# Patient Record
Sex: FEMALE | Race: WHITE | Employment: OTHER | URBAN - METROPOLITAN AREA
[De-identification: names, ages, dates, MRNs, and addresses within clinical notes are randomized per-mention and may not be internally consistent; named-entity substitution may affect disease eponyms.]

---

## 2017-02-06 NOTE — TELEPHONE ENCOUNTER
Refused Prescriptions:                       Disp   Refills    ranitidine (ZANTAC) 300 MG tablet [Pharmac*90 tab*0        Sig: TAKE ONE TABLET BY MOUTH EVERY NIGHT AT BEDTIME  Refused By: KRISTIAN BREWSTER  Reason for Refusal: Request already responded to by other means (phone, fax, etc.)  Reason for Refusal Comment: refilled 11-4-2016 for one year    Deo  [phone] (home) [phone] (work)

## 2017-02-21 DIAGNOSIS — I10 ESSENTIAL HYPERTENSION, BENIGN: ICD-10-CM

## 2017-02-21 RX ORDER — LISINOPRIL 10 MG/1
TABLET ORAL
Qty: 90 TABLET | Refills: 0 | COMMUNITY
Start: 2017-02-21 | End: 2017-03-23

## 2017-02-21 RX ORDER — LISINOPRIL 10 MG/1
TABLET ORAL
Qty: 270 TABLET | Refills: 0 | OUTPATIENT
Start: 2017-02-21

## 2017-02-21 NOTE — TELEPHONE ENCOUNTER
Ok refill of lisinopril for 1 month to [facility]. Pt is due for a fasting OV for refills.    Thanks,Lauren  [phone] (home)

## 2017-03-09 DIAGNOSIS — G40.909 SEIZURE DISORDER (H): ICD-10-CM

## 2017-03-09 RX ORDER — PRIMIDONE 250 MG/1
TABLET ORAL
Qty: 180 TABLET | Refills: 1 | Status: SHIPPED | OUTPATIENT
Start: 2017-03-09 | End: 2017-09-13

## 2017-03-09 NOTE — TELEPHONE ENCOUNTER
Last OV was 8/1/16. Pt was notified on 2/21/17 that she needs a return OV to recheck her lipids, they were off last March.     Gema Coombs is requesting a refill of:    Pending Prescriptions:                       Disp   Refills    primidone (MYSOLINE) 250 MG tablet [Pharm*60 tab*0            Sig: TAKE 1 TABLET(250 MG) BY MOUTH TWICE DAILY    Please advise

## 2017-03-23 DIAGNOSIS — I10 ESSENTIAL HYPERTENSION, BENIGN: ICD-10-CM

## 2017-03-23 RX ORDER — LISINOPRIL 10 MG/1
TABLET ORAL
Qty: 45 TABLET | Refills: 0 | COMMUNITY
Start: 2017-03-23 | End: 2017-03-24

## 2017-03-23 NOTE — TELEPHONE ENCOUNTER
Refill for Lisinopril for a 15 day supply (which is 45 tablets).   Pt has a O.V on 3/24/17.   Prescription has been called into the Pharmacy at [phone].     LOC [facility] ([facility])

## 2017-03-24 ENCOUNTER — OFFICE VISIT (OUTPATIENT)
Dept: FAMILY MEDICINE | Facility: CLINIC | Age: 67
End: 2017-03-24

## 2017-03-24 VITALS
OXYGEN SATURATION: 96 % | DIASTOLIC BLOOD PRESSURE: 80 MMHG | WEIGHT: 293 LBS | SYSTOLIC BLOOD PRESSURE: 134 MMHG | HEART RATE: 82 BPM | TEMPERATURE: 97.9 F | BODY MASS INDEX: 52.35 KG/M2

## 2017-03-24 DIAGNOSIS — I10 ESSENTIAL HYPERTENSION, BENIGN: ICD-10-CM

## 2017-03-24 DIAGNOSIS — J45.30 MILD PERSISTENT ASTHMA WITHOUT COMPLICATION: ICD-10-CM

## 2017-03-24 DIAGNOSIS — E66.01 MORBID OBESITY WITH BMI OF 50.0-59.9, ADULT (H): ICD-10-CM

## 2017-03-24 DIAGNOSIS — G47.33 SLEEP APNEA, OBSTRUCTIVE: ICD-10-CM

## 2017-03-24 PROCEDURE — 36415 COLL VENOUS BLD VENIPUNCTURE: CPT | Performed by: FAMILY MEDICINE

## 2017-03-24 PROCEDURE — 99214 OFFICE O/P EST MOD 30 MIN: CPT | Performed by: FAMILY MEDICINE

## 2017-03-24 PROCEDURE — 80061 LIPID PANEL: CPT | Mod: 90 | Performed by: FAMILY MEDICINE

## 2017-03-24 RX ORDER — ALBUTEROL SULFATE 90 UG/1
AEROSOL, METERED RESPIRATORY (INHALATION)
Qty: 8.5 G | Refills: 1 | Status: SHIPPED | OUTPATIENT
Start: 2017-03-24 | End: 2017-09-13

## 2017-03-24 RX ORDER — LISINOPRIL 10 MG/1
TABLET ORAL
Qty: 270 TABLET | Refills: 1 | Status: SHIPPED | OUTPATIENT
Start: 2017-03-24 | End: 2017-09-13

## 2017-03-24 NOTE — PATIENT INSTRUCTIONS
Schedule sleep study/ consult    Recheck in August-  Due for a repeat echocardiogram at that time    New dose of advair- sent to your pharmacy    Stay as active as possible     Helpful site for healthy eating: www.choosemyplate.gov    -enjoy food but eat less  -avoid oversize portions (avoid buffets)  -make half your plate fruits and vegetables  -switch to fat-free or low fat milk  -make at least one half of your grains, whole grains  -drink water instead of sweet drinks  -compare sodium (salt) in foods like soup, bread and frozen meals and choose foods with lower numbers

## 2017-03-24 NOTE — NURSING NOTE
Gema is here for a medication recheck.     Pre-Visit Screening :  Immunizations : up to date  Colonoscopy : Pt is due for this  Mammogram :Pt is due for this  Asthma Action Test/Plan : anahy  PHQ9/GAD7 :  Na    Pulse - regular  My Chart - accepts    CLASSIFICATION OF OVERWEIGHT AND OBESITY BY BMI                         Obesity Class           BMI(kg/m2)  Underweight                                    < 18.5  Normal                                         18.5-24.9  Overweight                                     25.0-29.9  OBESITY                     I                  30.0-34.9                              II                 35.0-39.9  EXTREME OBESITY             III                >40                             Patient's  BMI Body mass index is 52.35 kg/(m^2).  http://hin.nhlbi.nih.gov/menuplanner/menu.cgi  Questioned patient about current smoking habits.  Pt. has never smoked.    LOC [place] ([facility])

## 2017-03-24 NOTE — PROGRESS NOTES
SUBJECTIVE:                                                    Gema Coombs is a 66 year old female who presents to clinic today for the following health issues     Hypertension Follow-up      Outpatient blood pressures are not being checked.    Low Salt Diet: no- eats salty snacks- chips     Asthma Follow-Up    Was ACT completed today?  Yes- 20     Respiratory symptoms:   Cough: Yes-  When in [place]-   Wheezing:not currently     Shortness of breath: not currently    Use of short- acting(rescue) inhaler: three times a week,      Taking controlled (daily) meds as prescribed: Yes    ER/UC visits or hospital admissions since last visit: none     Recent asthma triggers that patient is dealing with: animal dander/ horse barn-(three dogs in house)           Amount of exercise or physical activity: No regular exercise program    Problems taking medications regularly: No    Medication side effects: none    Diet: regular (no restrictions)      PROBLEMS TO ADD ON...    Paroxysmal atrial fibrillation- Dr Salcedo prescribed calcium channel blocker    Morbid Obesity:gaining- not wearing sleep apnea    Sleep apnea: suppose to see sleep doctor again    Seizure disorder- due for primidone level in August    Ear pressure since landing last May    Problem list and histories reviewed & adjusted, as indicated.  Additional history: working from home- tries to get up from desk and move around  Father in law- ill- lives out of state- more stress  No seizure for 30 years- tolerates mysoline- repeat levels in six months    Patient Active Problem List   Diagnosis     Myalgia and myositis     Breast disorder     Generalized nonconvulsive epilepsy (H)     Family history of other cardiovascular diseases     Esophageal reflux     Essential hypertension, benign     Mild persistent asthma     Hyperlipidemia     Diverticulitis of colon     Advance care planning     Sleep apnea, obstructive     Other specified cardiac dysrhythmias/pac's      Health Care Home     Morbid obesity with BMI of 50.0-59.9, adult (H)     Chronic idiopathic thrombocytopenia (H)     BCC (basal cell carcinoma), face     Seizure disorder (H)     Blurry vision     Mitral valve regurgitation/ repeat echocardiogram 2017     Past Surgical History:   Procedure Laterality Date     BIOPSY OF BREAST, INCISIONAL      L breasts     BIOPSY OF BREAST, NEEDLE CORE  &    R breasts     C ANORECTAL MYOMECTOMY      uterine fibroids     C  DELIVERY ONLY      x 2     LAPAROSCOPY,LYSIS ADHESNS  1985    pain R side poss r/t c-sections       Social History   Substance Use Topics     Smoking status: Never Smoker     Smokeless tobacco: Never Used     Alcohol use 0.0 oz/week     0 drink(s) per week      Comment: rare     Family History   Problem Relation Age of Onset     CANCER Maternal Aunt      breast-     CANCER Maternal Grandmother      breast-     HEART DISEASE Paternal Grandfather      heartattack-  at 58     HEART DISEASE Maternal Grandfather           HEART DISEASE Paternal Uncle      bypass-alive &well     Thyroid Disease Sister      hypothyroidism-alive         Current Outpatient Prescriptions   Medication Sig Dispense Refill     lisinopril (PRINIVIL/ZESTRIL) 10 MG tablet TAKE 2 TABLETS BY MOUTH EVERY MORNING AND 1 TABLET EVERY EVENING 270 tablet 1     albuterol (ALBUTEROL) 108 (90 BASE) MCG/ACT Inhaler INHALE ONE TO TWO PUFFS INTO THE LUNGS THREE TIMES DAILY AS NEEDED 8.5 g 1     fluticasone-salmeterol (ADVAIR) 250-50 MCG/DOSE diskus inhaler Inhale 1 puff into the lungs 2 times daily 3 Inhaler 1     primidone (MYSOLINE) 250 MG tablet TAKE 1 TABLET(250 MG) BY MOUTH TWICE DAILY 180 tablet 1     ranitidine (ZANTAC) 300 MG tablet TAKE ONE TABLET BY MOUTH EVERY NIGHT AT BEDTIME 90 tablet 3     diltiazem (TIAZAC) 120 MG 24 hr ER beaded capsule Take 1 capsule (120 mg) by mouth daily 30 capsule 11     aspirin  MG tablet Take 1 tablet  (325 mg) by mouth daily 90 tablet 3     albuterol (2.5 MG/3ML) 0.083% nebulizer solution Take 1 vial (2.5 mg) by nebulization every 6 hours as needed for shortness of breath / dyspnea 1 Box 1     ibuprofen (ADVIL,MOTRIN) 600 MG tablet Take 1 tablet (600 mg) by mouth every 6 hours as needed for moderate pain 30 tablet 0     [DISCONTINUED] lisinopril (PRINIVIL/ZESTRIL) 10 MG tablet TAKE 2 TABLETS BY MOUTH EVERY MORNING AND 1 TABLET EVERY EVENING 45 tablet 0     [DISCONTINUED] albuterol (ALBUTEROL) 108 (90 BASE) MCG/ACT inhaler INHALE ONE TO TWO PUFFS INTO THE LUNGS THREE TIMES DAILY AS NEEDED 8.5 g 3     [DISCONTINUED] fluticasone-salmeterol (ADVAIR) 100-50 MCG/DOSE diskus inhaler Inhale 1 puff into the lungs 2 times daily 1 Inhaler 3       Reviewed and updated as needed this visit by clinical staff       Reviewed and updated as needed this visit by Provider         ROS:  C: NEGATIVE for fever, chills, change in weight  E/M: NEGATIVE for ear, mouth and throat problems  RESP:feeling like her asthma is less well controlled- although ACT improved from six months ago  CV: NEGATIVE for chest pain, palpitations or peripheral edema    OBJECTIVE:                                                    /80 (BP Location: Left arm, Patient Position: Chair)  Pulse 82  Temp 97.9  F (36.6  C) (Oral)  Wt (!) 142.7 kg (314 lb 9.6 oz)  SpO2 96%  BMI 52.35 kg/m2  Body mass index is 52.35 kg/(m^2).  Regular rate and  rhythm. S1 and S2 normal, no murmurs, clicks, gallops or rubs. No edema or JVD. Chest is clear; no wheezes or rales.      Diagnostic Test Results:  No results found for this or any previous visit (from the past 24 hour(s)).     ASSESSMENT/PLAN:                                                      Problem List Items Addressed This Visit     Mild persistent asthma    Essential hypertension, benign - Primary           BMI:   Estimated body mass index is 52.35 kg/(m^2) as calculated from the following:    Height as of  "12/19/16: 1.651 m (5' 5\").    Weight as of this encounter: 142.7 kg (314 lb 9.6 oz).   Weight management plan: Discussed healthy diet and exercise guidelines and patient will follow up in 6 months in clinic to re-evaluate.   Understands recommendations for healthy eating- but not motivated to change- weight gain this year      MEDICATIONS:        - Increase Advair  to 250/50       - Continue other medications without change  FUTURE APPOINTMENTS:       - Follow-up visit in 6 months- surveillance labs for medications- schedule echocardiogram  Work on weight loss  Regular exercise  Reduce salt intake  Schedule sleep study- new onset paroxysmal atrial fibrillation-    Flavia Alberts MD  [facility]  "

## 2017-03-24 NOTE — MR AVS SNAPSHOT
After Visit Summary   3/24/2017    Gema Coombs    MRN: [id number]           Patient Information     Date Of Birth          1950        Visit Information        Provider Department      3/24/2017 8:00 AM Flavia Alberts MD [facility]        Today's Diagnoses     Essential hypertension, benign    -  1    Mild persistent asthma without complication          Care Instructions    Schedule sleep study/ consult    Recheck in August-  Due for a repeat echocardiogram at that time    New dose of advair- sent to your pharmacy    Stay as active as possible     Helpful site for healthy eating: www.[employer].gov    -enjoy food but eat less  -avoid oversize portions (avoid buffets)  -make half your plate fruits and vegetables  -switch to fat-free or low fat milk  -make at least one half of your grains, whole grains  -drink water instead of sweet drinks  -compare sodium (salt) in foods like soup, bread and frozen meals and choose foods with lower numbers        Follow-ups after your visit        Who to contact     If you have questions or need follow up information about today's clinic visit or your schedule please contact [facility] directly at [phone].  Normal or non-critical lab and imaging results will be communicated to you by Globoforcehart, letter or phone within 4 business days after the clinic has received the results. If you do not hear from us within 7 days, please contact the clinic through [employer]a Was[employer] or phone. If you have a critical or abnormal lab result, we will notify you by phone as soon as possible.  Submit refill requests through VibeDeck or call your pharmacy and they will forward the refill request to us. Please allow 3 business days for your refill to be completed.          Additional Information About Your Visit        MyChart Information     V[employer]Deck gives you secure access to your electronic health record. If you see a primary care  provider, you can also send messages to your care team and make appointments. If you have questions, please call your primary care clinic.  If you do not have a primary care provider, please call [phone] and they will assist you.        Care EveryWhere ID     This is your Care EveryWhere ID. This could be used by other organizations to access your [facility] medical records  [id number]        Your Vitals Were     Pulse Temperature Pulse Oximetry BMI (Body Mass Index)          82 97.9  F (36.6  C) (Oral) 96% 52.35 kg/m2         Blood Pressure from Last 3 Encounters:   03/24/17 134/80   12/19/16 134/64   09/12/16 149/74    Weight from Last 3 Encounters:   03/24/17 (!) 142.7 kg (314 lb 9.6 oz)   12/19/16 (!) 140.6 kg (310 lb)   09/12/16 (!) 140.6 kg (310 lb)              We Performed the Following     Lipid Profile     VENOUS COLLECTION          Today's Medication Changes          These changes are accurate as of: 3/24/17  8:33 AM.  If you have any questions, ask your nurse or doctor.               Start taking these medicines.        Dose/Directions    fluticasone-salmeterol 250-50 MCG/DOSE diskus inhaler   Commonly known as:  ADVAIR   Used for:  Mild persistent asthma without complication   Replaces:  fluticasone-salmeterol 100-50 MCG/DOSE diskus inhaler   Started by:  Flavia Alberts MD        Dose:  1 puff   Inhale 1 puff into the lungs 2 times daily   Quantity:  3 Inhaler   Refills:  1         These medicines have changed or have updated prescriptions.        Dose/Directions    lisinopril 10 MG tablet   Commonly known as:  PRINIVIL/ZESTRIL   This may have changed:  See the new instructions.   Used for:  Essential hypertension, benign   Changed by:  Flavia Alberts MD        TAKE 2 TABLETS BY MOUTH EVERY MORNING AND 1 TABLET EVERY EVENING   Quantity:  270 tablet   Refills:  1         Stop taking these medicines if you haven't already. Please contact your care team if you have questions.      fluticasone-salmeterol 100-50 MCG/DOSE diskus inhaler   Commonly known as:  ADVAIR   Replaced by:  fluticasone-salmeterol 250-50 MCG/DOSE diskus inhaler   Stopped by:  Flavia Alberts MD                Where to get your medicines      These medications were sent to [id number] [place] - [address] AT [address], [place] [address]     Phone:  [phone]     albuterol 108 (90 BASE) MCG/ACT Inhaler    fluticasone-salmeterol 250-50 MCG/DOSE diskus inhaler    lisinopril 10 MG tablet                Primary Care Provider Office Phone # Fax #    Flavia Alberts [phone]       [facility] [address]  [facility] [address]        Thank you!     Thank you for choosing [facility]  for your care. Our goal is always to provide you with excellent care. Hearing back from our patients is one way we can continue to improve our services. Please take a few minutes to complete the written survey that you may receive in the mail after your visit with us. Thank you!             Your Updated Medication List - Protect others around you: Learn how to safely use, store and throw away your medicines at www.disposemymeds.org.          This list is accurate as of: 3/24/17  8:33 AM.  Always use your most recent med list.                   Brand Name Dispense Instructions for use    * albuterol (2.5 MG/3ML) 0.083% neb solution     1 Box    Take 1 vial (2.5 mg) by nebulization every 6 hours as needed for shortness of breath / dyspnea       * albuterol 108 (90 BASE) MCG/ACT Inhaler    albuterol    8.5 g    INHALE ONE TO TWO PUFFS INTO THE LUNGS THREE TIMES DAILY AS NEEDED       aspirin  MG EC tablet     90 tablet    Take 1 tablet (325 mg) by mouth daily       diltiazem 120 MG 24 hr ER beaded capsule    TIAZAC    30 capsule    Take 1 capsule (120 mg) by mouth daily       fluticasone-salmeterol  250-50 MCG/DOSE diskus inhaler    ADVAIR    3 Inhaler    Inhale 1 puff into the lungs 2 times daily       ibuprofen 600 MG tablet    ADVIL/MOTRIN    30 tablet    Take 1 tablet (600 mg) by mouth every 6 hours as needed for moderate pain       lisinopril 10 MG tablet    PRINIVIL/ZESTRIL    270 tablet    TAKE 2 TABLETS BY MOUTH EVERY MORNING AND 1 TABLET EVERY EVENING       primidone 250 MG tablet    MYSOLINE    180 tablet    TAKE 1 TABLET(250 MG) BY MOUTH TWICE DAILY       ranitidine 300 MG tablet    ZANTAC    90 tablet    TAKE ONE TABLET BY MOUTH EVERY NIGHT AT BEDTIME       * Notice:  This list has 2 medication(s) that are the same as other medications prescribed for you. Read the directions carefully, and ask your doctor or other care provider to review them with you.

## 2017-03-25 LAB
CHOLEST SERPL-MCNC: 232 MG/DL (ref 125–200)
CHOLEST/HDLC SERPL: 4.5 (CALC)
HDLC SERPL-MCNC: 51 MG/DL
LDLC SERPL CALC-MCNC: 155 MG/DL (CALC)
NONHDLC SERPL-MCNC: 181 MG/DL (CALC)
TRIGL SERPL-MCNC: 129 MG/DL

## 2017-03-25 ASSESSMENT — ASTHMA QUESTIONNAIRES: ACT_TOTALSCORE: 20

## 2017-06-08 DIAGNOSIS — I48.0 PAROXYSMAL ATRIAL FIBRILLATION (H): ICD-10-CM

## 2017-06-08 RX ORDER — DILTIAZEM HYDROCHLORIDE 120 MG/1
120 CAPSULE, EXTENDED RELEASE ORAL DAILY
Qty: 60 CAPSULE | Refills: 0 | Status: SHIPPED | OUTPATIENT
Start: 2017-06-08 | End: 2017-09-13

## 2017-06-09 ENCOUNTER — MYC MEDICAL ADVICE (OUTPATIENT)
Dept: CARDIOLOGY | Facility: CLINIC | Age: 67
End: 2017-06-09

## 2017-06-09 NOTE — TELEPHONE ENCOUNTER
New prescription  Received: Today       Gema Espinoza [facility] Heart Ep Nurse       Phone Number: [phone]                     Hello Dr Salcedo,   I picked up refills of my prescriptions and there was one from you that I was not familiar with, nor had I requested it.  It was Diltiazem  MG capsules. When I saw you last August you had prescribed Tatzia  MG capsules because of my low platelet account instead of anticoagulants to treat my A-Fib. Which drug am I supposed to be taking?     Georgia Coombs       Message received via My Chart-will forward to Dr. Salcedo. ERICA Trevino RN.

## 2017-06-13 NOTE — TELEPHONE ENCOUNTER
"Georgia,    Per Dr. Salcedo, \"This is the same medication.\" Please take as prescribed.    Thank You,    ERICA Trevino RN  "

## 2017-09-06 DIAGNOSIS — J45.30 MILD PERSISTENT ASTHMA WITHOUT COMPLICATION: ICD-10-CM

## 2017-09-06 RX ORDER — ALBUTEROL SULFATE 90 UG/1
AEROSOL, METERED RESPIRATORY (INHALATION)
Qty: 8.5 G | Refills: 0 | Status: SHIPPED | OUTPATIENT
Start: 2017-09-06 | End: 2017-11-20

## 2017-09-06 NOTE — TELEPHONE ENCOUNTER
Pending Prescriptions:                       Disp   Refills    PROAIR  (90 BASE) MCG/ACT inhaler *8.5 g               Sig: INHALE ONE TO TWO PUFFS INTO THE LUNGS THREE           TIMES DAILY AS NEEDED    Dr. Alberts please review:    Pt last ACT was 3-2017-good for one year  Pt last refill was 3--six months  Do you want to refill for another six months?  Do you need pt to come back fasting?  Last lipid was 3- and per notes pt is to rtc in one year to repeat that.  Please advise, fax, change qty or send to DARREL Desouza  [phone] (home) [phone] (work)

## 2017-09-13 ENCOUNTER — OFFICE VISIT (OUTPATIENT)
Dept: FAMILY MEDICINE | Facility: CLINIC | Age: 67
End: 2017-09-13

## 2017-09-13 VITALS
SYSTOLIC BLOOD PRESSURE: 146 MMHG | OXYGEN SATURATION: 97 % | WEIGHT: 293 LBS | HEIGHT: 64 IN | DIASTOLIC BLOOD PRESSURE: 88 MMHG | BODY MASS INDEX: 50.02 KG/M2 | TEMPERATURE: 98.1 F | HEART RATE: 82 BPM

## 2017-09-13 DIAGNOSIS — Z71.89 ACP (ADVANCE CARE PLANNING): ICD-10-CM

## 2017-09-13 DIAGNOSIS — E66.01 MORBID OBESITY, UNSPECIFIED OBESITY TYPE (H): ICD-10-CM

## 2017-09-13 DIAGNOSIS — J45.30 MILD PERSISTENT ASTHMA WITHOUT COMPLICATION: ICD-10-CM

## 2017-09-13 DIAGNOSIS — Z11.59 NEED FOR HEPATITIS C SCREENING TEST: ICD-10-CM

## 2017-09-13 DIAGNOSIS — G40.909 SEIZURE DISORDER (H): Primary | ICD-10-CM

## 2017-09-13 DIAGNOSIS — I10 ESSENTIAL HYPERTENSION, BENIGN: ICD-10-CM

## 2017-09-13 DIAGNOSIS — I48.0 PAROXYSMAL ATRIAL FIBRILLATION (H): ICD-10-CM

## 2017-09-13 PROCEDURE — 80048 BASIC METABOLIC PNL TOTAL CA: CPT | Mod: 90 | Performed by: PHYSICIAN ASSISTANT

## 2017-09-13 PROCEDURE — 99213 OFFICE O/P EST LOW 20 MIN: CPT | Performed by: PHYSICIAN ASSISTANT

## 2017-09-13 PROCEDURE — 86803 HEPATITIS C AB TEST: CPT | Mod: 90 | Performed by: PHYSICIAN ASSISTANT

## 2017-09-13 PROCEDURE — 36415 COLL VENOUS BLD VENIPUNCTURE: CPT | Performed by: PHYSICIAN ASSISTANT

## 2017-09-13 RX ORDER — DILTIAZEM HYDROCHLORIDE 120 MG/1
120 CAPSULE, EXTENDED RELEASE ORAL DAILY
Qty: 90 CAPSULE | Refills: 1 | Status: SHIPPED | OUTPATIENT
Start: 2017-09-13 | End: 2018-05-22

## 2017-09-13 RX ORDER — ALBUTEROL SULFATE 0.83 MG/ML
1 SOLUTION RESPIRATORY (INHALATION) EVERY 6 HOURS PRN
Qty: 1 BOX | Refills: 1 | Status: SHIPPED | OUTPATIENT
Start: 2017-09-13 | End: 2019-01-28

## 2017-09-13 RX ORDER — PRIMIDONE 250 MG/1
TABLET ORAL
Qty: 180 TABLET | Refills: 1 | Status: SHIPPED | OUTPATIENT
Start: 2017-09-13 | End: 2018-03-16

## 2017-09-13 RX ORDER — LISINOPRIL 10 MG/1
TABLET ORAL
Qty: 270 TABLET | Refills: 1 | Status: SHIPPED | OUTPATIENT
Start: 2017-09-13 | End: 2018-03-16

## 2017-09-13 NOTE — NURSING NOTE
Gema Coombs is here today for a non fasting medication recheck.    Pre-Visit Screening :  Immunizations : not up to date - Prevnar 13 due (Patient Declined), will have flu shot at her work  Colonoscopy : is due and to be scheduled by patient for later completion  Mammogram : is due and to be scheduled by patient for later completion  Asthma Action Test/Plan : Completed Today  PHQ9/GAD7 :  NA    Pulse - regular  My Chart - accepts    CLASSIFICATION OF OVERWEIGHT AND OBESITY BY BMI                         Obesity Class           BMI(kg/m2)  Underweight                                    < 18.5  Normal                                         18.5-24.9  Overweight                                     25.0-29.9  OBESITY                     I                  30.0-34.9                              II                 35.0-39.9  EXTREME OBESITY             III                >40                             Patient's  BMI Body mass index is 54.07 kg/(m^2).  http://hin.nhlbi.nih.gov/menuplanner/menu.cgi  Questioned patient about current smoking habits.  Pt. has never smoked.    Claudette Ramirez, CMA

## 2017-09-13 NOTE — LETTER
My Asthma Action Plan  Name: Gema Coombs   YOB: 1950  Date: 9/13/2017   My doctor: Iveth Veras PA-C   My clinic: [facility]        My Control Medicine: Fluticasone + salmeterol (Advair) -  Diskus 250/50 mcg BID  My Rescue Medicine: Albuterol nebulizer solution every 6 hours as needed  Albuterol (Proair/Ventolin/Proventil) inhaler every 6 hours as needed   My Asthma Severity: intermittent  Avoid your asthma triggers: smoke and strong odors and fumes              GREEN ZONE   Good Control    I feel good    No cough or wheeze    Can work, sleep and play without asthma symptoms       Take your asthma control medicine every day.     1. If exercise triggers your asthma, take your rescue medication    15 minutes before exercise or sports, and    During exercise if you have asthma symptoms  2. Spacer to use with inhaler: If you have a spacer, make sure to use it with your inhaler             YELLOW ZONE Getting Worse  I have ANY of these:    I do not feel good    Cough or wheeze    Chest feels tight    Wake up at night   1. Keep taking your Green Zone medications  2. Start taking your rescue medicine:    every 20 minutes for up to 1 hour. Then every 4 hours for 24-48 hours.  3. If you stay in the Yellow Zone for more than 12-24 hours, contact your doctor.  4. If you do not return to the Green Zone in 12-24 hours or you get worse, start taking your oral steroid medicine if prescribed by your provider.           RED ZONE Medical Alert - Get Help  I have ANY of these:    I feel awful    Medicine is not helping    Breathing getting harder    Trouble walking or talking    Nose opens wide to breathe       1. Take your rescue medicine NOW  2. If your provider has prescribed an oral steroid medicine, start taking it NOW  3. Call your doctor NOW  4. If you are still in the Red Zone after 20 minutes and you have not reached your doctor:    Take your rescue medicine again and    Call  911 or go to the emergency room right away    See your regular doctor within 2 weeks of an Emergency Room or Urgent Care visit for follow-up treatment.        Electronically signed by: Iveth Veras, September 13, 2017    Annual Reminders:  Meet with Asthma Educator,  Flu Shot in the Fall, consider Pneumonia Vaccination for patients with asthma (aged 19 and older).    Pharmacy: [facility] [address] [place] [address] [address]                    Asthma Triggers  How To Control Things That Make Your Asthma Worse    Triggers are things that make your asthma worse.  Look at the list below to help you find your triggers and what you can do about them.  You can help prevent asthma flare-ups by staying away from your triggers.      Trigger                                                          What you can do   Cigarette Smoke  Tobacco smoke can make asthma worse. Do not allow smoking in your home, car or around you.  Be sure no one smokes at a child s day care or school.  If you smoke, ask your health care provider for ways to help you quit.  Ask family members to quit too.  Ask your health care provider for a referral to Quit Plan to help you quit smoking, or call 9-060-386-PLAN.     Colds, Flu, Bronchitis  These are common triggers of asthma. Wash your hands often.  Don t touch your eyes, nose or mouth.  Get a flu shot every year.     Dust Mites  These are tiny bugs that live in cloth or carpet. They are too small to see. Wash sheets and blankets in hot water every week.   Encase pillows and mattress in dust mite proof covers.  Avoid having carpet if you can. If you have carpet, vacuum weekly.   Use a dust mask and HEPA vacuum.   Pollen and Outdoor Mold  Some people are allergic to trees, grass, or weed pollen, or molds. Try to keep your windows closed.  Limit time out doors when pollen count is high.   Ask you health care provider about taking medicine during allergy  season.     Animal Dander  Some people are allergic to skin flakes, urine or saliva from pets with fur or feathers. Keep pets with fur or feathers out of your home.    If you can t keep the pet outdoors, then keep the pet out of your bedroom.  Keep the bedroom door closed.  Keep pets off cloth furniture and away from stuffed toys.     Mice, Rats, and Cockroaches  Some people are allergic to the waste from these pests.   Cover food and garbage.  Clean up spills and food crumbs.  Store grease in the refrigerator.   Keep food out of the bedroom.   Indoor Mold  This can be a trigger if your home has high moisture. Fix leaking faucets, pipes, or other sources of water.   Clean moldy surfaces.  Dehumidify basement if it is damp and smelly.   Smoke, Strong Odors, and Sprays  These can reduce air quality. Stay away from strong odors and sprays, such as perfume, powder, hair spray, paints, smoke incense, paint, cleaning products, candles and new carpet.   Exercise or Sports  Some people with asthma have this trigger. Be active!  Ask your doctor about taking medicine before sports or exercise to prevent symptoms.    Warm up for 5-10 minutes before and after sports or exercise.     Other Triggers of Asthma  Cold air:  Cover your nose and mouth with a scarf.  Sometimes laughing or crying can be a trigger.  Some medicines and food can trigger asthma.
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
## 2017-09-14 LAB
BUN SERPL-MCNC: 11 MG/DL (ref 7–25)
BUN/CREATININE RATIO: ABNORMAL (CALC) (ref 6–22)
CALCIUM SERPL-MCNC: 9 MG/DL (ref 8.6–10.4)
CHLORIDE SERPLBLD-SCNC: 105 MMOL/L (ref 98–110)
CO2 SERPL-SCNC: 26 MMOL/L (ref 20–31)
CREAT SERPL-MCNC: 0.75 MG/DL (ref 0.5–0.99)
EGFR AFRICAN AMERICAN - QUEST: 97 ML/MIN/1.73M2
GFR SERPL CREATININE-BSD FRML MDRD: 84 ML/MIN/1.73M2
GLUCOSE - QUEST: 103 MG/DL (ref 65–99)
HCV AB - QUEST: NORMAL
POTASSIUM SERPL-SCNC: 4.1 MMOL/L (ref 3.5–5.3)
SIGNAL TO CUT OFF - QUEST: 0.01
SODIUM SERPL-SCNC: 142 MMOL/L (ref 135–146)

## 2017-09-25 ENCOUNTER — MYC MEDICAL ADVICE (OUTPATIENT)
Dept: FAMILY MEDICINE | Facility: CLINIC | Age: 67
End: 2017-09-25

## 2017-09-25 ENCOUNTER — HOSPITAL ENCOUNTER (EMERGENCY)
Facility: CLINIC | Age: 67
Discharge: HOME | End: 2017-09-25
Attending: INTERNAL MEDICINE | Admitting: INTERNAL MEDICINE
Payer: COMMERCIAL

## 2017-09-25 VITALS
HEART RATE: 98 BPM | OXYGEN SATURATION: 96 % | RESPIRATION RATE: 18 BRPM | SYSTOLIC BLOOD PRESSURE: 129 MMHG | TEMPERATURE: 98.2 F | BODY MASS INDEX: 53.38 KG/M2 | WEIGHT: 293 LBS | DIASTOLIC BLOOD PRESSURE: 71 MMHG

## 2017-09-25 DIAGNOSIS — K57.32 DIVERTICULITIS OF COLON: ICD-10-CM

## 2017-09-25 PROCEDURE — 99282 EMERGENCY DEPT VISIT SF MDM: CPT

## 2017-09-25 RX ORDER — CIPROFLOXACIN 500 MG/1
500 TABLET, FILM COATED ORAL 2 TIMES DAILY
Qty: 20 TABLET | Refills: 0 | Status: SHIPPED | OUTPATIENT
Start: 2017-09-25 | End: 2017-10-05

## 2017-09-25 RX ORDER — METRONIDAZOLE 500 MG/1
500 TABLET ORAL 4 TIMES DAILY
Qty: 40 TABLET | Refills: 0 | Status: SHIPPED | OUTPATIENT
Start: 2017-09-25 | End: 2017-10-05

## 2017-09-25 ASSESSMENT — ENCOUNTER SYMPTOMS
ABDOMINAL PAIN: 1
DIARRHEA: 1
BACK PAIN: 1
VOMITING: 0
FEVER: 0
NAUSEA: 1

## 2017-09-25 NOTE — ED AVS SNAPSHOT
[facility]    [address]    [facility] [address]    Phone:  [phone]    Fax:  [phone]                                       Gema Coombs   MRN: [id number]    Department:  [facility]   Date of Visit:  9/25/2017           After Visit Summary Signature Page     I have received my discharge instructions, and my questions have been answered. I have discussed any challenges I see with this plan with the nurse or doctor.    ..........................................................................................................................................  Patient/Patient Representative Signature      ..........................................................................................................................................  Patient Representative Print Name and Relationship to Patient    ..................................................               ................................................  Date                                            Time    ..........................................................................................................................................  Reviewed by Signature/Title    ...................................................              ..............................................  Date                                                            Time

## 2017-09-25 NOTE — ED PROVIDER NOTES
History     Chief Complaint:    Abdominal Pain      HPI   Gema Coombs is a 67 year old female with a history of diverticulitis who presents to the ED today with abdominal pain. The patient states that she has had abdominal pain for the last four days. The patient reports that the pain comes and goes since the onset of her symptoms and states that the pain is similar to her pain related to diverticulitis in the past, stating that it is present in her lower back and the left lower quadrant and radiates across the abdomen. She also reports to having nausea last night, but states that her fluid intake has been normal. The patient states that she has had one normal bowel movement since the onset of symptoms, but then diarrhea ever since. The patient also denies having any fevers. The patient strongly believes that her symptoms are consistent with diverticulitis and tried to call her clinic earlier, but could not get an appointment and was denied a prescription without an appointment, which is why she presents to the ED today.     Allergies:  No known drug allergies.      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     Past Surgical History:    Anorectal myomectomy -        Family History:    Thyroid disease - sister    Social History:  Marital Status:    Presents to the ED alone   Tobacco Use: never smoker  Alcohol Use: rare  PCP: Flavia Alberts     Review of Systems   Constitutional: Negative for fever.   Gastrointestinal: Positive for abdominal pain, diarrhea and nausea. Negative for vomiting.   Musculoskeletal: Positive for back pain.   All other systems reviewed and are negative.      Physical Exam   First Vitals:  BP: 129/71  Pulse: 98  Temp: 98.2  F (36.8  C)  Resp:  18  Weight: (!) 141.1 kg (311 lb)  SpO2: 96 %    Physical Exam   Constitutional: She is cooperative.   HENT:   Mouth/Throat: Oropharynx is clear and moist and mucous membranes are normal.   Eyes: Conjunctivae are normal.   Neck: Normal range of motion.   Cardiovascular: Regular rhythm and normal heart sounds.    Pulmonary/Chest: Effort normal and breath sounds normal.   Abdominal: Soft. Normal appearance and bowel sounds are normal. There is tenderness in the left lower quadrant. There is no rebound and no guarding.   Musculoskeletal: Normal range of motion.   Lymphadenopathy:     She has no cervical adenopathy.   Neurological: She is alert.   Skin: Skin is warm and dry.   Psychiatric: She has a normal mood and affect.       Emergency Department Course   Emergency Department Course:  Nursing notes and vitals reviewed.  (4134) I performed an exam of the patient as documented above.    Findings and plan explained to the patient. Patient discharged home with instructions regarding supportive care, medications, and reasons to return. The importance of close follow-up was reviewed. The patient was prescribed flagyl and Cipro.      Impression & Plan    Medical Decision Making:  Gema Coombs is a 67 year old female with a history of recurrent diverticulitis, who presents complaining of her typical symptoms. As noted, she had called her clinic prior to coming in, asking if they could prescribe her medication, but they declined. Her exam does not suggest any more serious etiology of symptoms. She is afebrile and able to take oral fluid without difficulty. She is well educated and informed. I am comfortable with a clinic diagnosis and empirical treatment with close observation as an outpatient. I trust that she will participate full in this. I have discharged her with Cipro and flagyl, return is worse or not improved in 2-3 days.       Diagnosis:    ICD-10-CM    1. Diverticulitis of colon K57.32         Disposition:  discharged to home    Discharge Medications:  New Prescriptions    CIPROFLOXACIN (CIPRO) 500 MG TABLET    Take 1 tablet (500 mg) by mouth 2 times daily for 10 days    METRONIDAZOLE (FLAGYL) 500 MG TABLET    Take 1 tablet (500 mg) by mouth 4 times daily for 10 days         Vanessa FERREIRA am serving as a scribe on 9/25/2017 at 5:24 PM to personally document services performed by Dr. Light based on my observations and the provider's statements to me.    9/25/2017   [facility] EMERGENCY DEPARTMENT       Bernadette Light MD  09/25/17 1949

## 2017-09-25 NOTE — DISCHARGE INSTRUCTIONS
Diverticulitis    Some people get pouches along the wall of the colon as they get older. The pouches, called diverticuli, usually cause no symptoms. If the pouches become blocked, you can get an infection. This infection is called diverticulitis. It causes pain in your lower abdomen and fever. If not treated, it can become a serious condition, causing an abscess to form inside the pouch. The abscess may block the intestinal tract even or rupture, spreading infection throughout the abdomen.  When treatment is started early, oral antibiotics alone may be enough to cure diverticulitis. This method is tried first. But, if you don't improve or if your condition gets worse while using oral antibiotics, you may need to be admitted to the hospital for IV antibiotics. Severe cases may require surgery.  Home care  The following guidelines will help you care for yourself at home:    During the acute illness, rest and follow your healthcare provider's instructions about diet. Sometimes you will need to follow a clear liquid diet to rest your bowel. Once your symptoms are better, you may be told to follow a low-fiber diet for some time. Include foods like:    Flake cereal, mashed potatoes, pancakes, waffles, pasta, white bread, rice, applesauce, bananas, eggs, fish, poultry, tofu, and cooked soft vegetables    Take antibiotics exactly as instructed. Don't miss any doses or stop taking the medication, even if you feel better.    Monitor your temperature and tell your healthcare provider if you have rising temperatures.  Preventing future attacks  Once you have an episode of diverticulitis, you are at risk for having it again. After you have recovered from this episode, you may be able to lower your risk by eating a high-fiber diet (20 gm/day to 35 gm/day of fiber). This cleans out the colon pouches that already exist and may prevent new ones from forming. Foods high in fiber include fresh fruits and edible peelings, raw or  lightly cooked vegetables, whole grain cereals and breads, dried beans and peas, and bran.  Other steps that can help prevent future attacks include:    Take your medicines, such as antibiotics, as your healthcare provider says.    Drink 6 to 8 glasses of water every day, unless told otherwise.    Use a heating pad or hot water bottle to help abdominal cramping or pain.    Begin an exercise program. Ask your healthcare provider how to get started. You can benefit from simple activities such as walking or gardening.    Treat diarrhea with a bland diet. Start with liquids only; then slowly add fiber over time.    Watch for changes in your bowel movements (constipation to diarrhea). Avoid constipation by eating a high fiber diet and taking a stool softener if needed.    Get plenty of rest and sleep.  Follow-up care  Follow up with your healthcare provider as advised or sooner if you are not getting better in the next 2 days.  When to seek medical advice  Call your healthcare provider right away if any of these occur:    Fever of 100.4 F (38 C) or higher, or as directed by your healthcare provider    Repeated vomiting or swelling of the abdomen    Weakness, dizziness, light-headedness    Pain in your abdomen that gets worse, severe, or spreads to your back    Pain that moves to the right lower abdomen    Rectal bleeding (stools that are red, black or maroon color)    Unexpected vaginal bleeding  Date Last Reviewed: 9/1/2016 2000-2017 [employer]. [address] [place] [address]. All rights reserved. This information is not intended as a substitute for professional medical care. Always follow your healthcare professional's instructions.

## 2017-09-25 NOTE — ED NOTES
67-year-old female presnts to the ER with complaints of abd pain that has come and gone over with last couple of days. Pt states she has a history of diverticulitis and this is the same type of pain.

## 2017-09-25 NOTE — ED AVS SNAPSHOT
[facility] Emergency Department    [address] [place] [address]    Phone:  [phone]    Fax:  [phone]                                       Gema Coombs   MRN: [id number]    Department:  [facility] Emergency Department   Date of Visit:  9/25/2017           Patient Information     Date Of Birth          1950        Your diagnoses for this visit were:     Diverticulitis of colon        You were seen by Bernadette Light MD.        Discharge Instructions         Diverticulitis    Some people get pouches along the wall of the colon as they get older. The pouches, called diverticuli, usually cause no symptoms. If the pouches become blocked, you can get an infection. This infection is called diverticulitis. It causes pain in your lower abdomen and fever. If not treated, it can become a serious condition, causing an abscess to form inside the pouch. The abscess may block the intestinal tract even or rupture, spreading infection throughout the abdomen.  When treatment is started early, oral antibiotics alone may be enough to cure diverticulitis. This method is tried first. But, if you don't improve or if your condition gets worse while using oral antibiotics, you may need to be admitted to the hospital for IV antibiotics. Severe cases may require surgery.  Home care  The following guidelines will help you care for yourself at home:    During the acute illness, rest and follow your healthcare provider's instructions about diet. Sometimes you will need to follow a clear liquid diet to rest your bowel. Once your symptoms are better, you may be told to follow a low-fiber diet for some time. Include foods like:    Flake cereal, mashed potatoes, pancakes, waffles, pasta, white bread, rice, applesauce, bananas, eggs, fish, poultry, tofu, and cooked soft vegetables    Take antibiotics exactly as instructed. Don't miss any doses or stop taking the medication, even if  you feel better.    Monitor your temperature and tell your healthcare provider if you have rising temperatures.  Preventing future attacks  Once you have an episode of diverticulitis, you are at risk for having it again. After you have recovered from this episode, you may be able to lower your risk by eating a high-fiber diet (20 gm/day to 35 gm/day of fiber). This cleans out the colon pouches that already exist and may prevent new ones from forming. Foods high in fiber include fresh fruits and edible peelings, raw or lightly cooked vegetables, whole grain cereals and breads, dried beans and peas, and bran.  Other steps that can help prevent future attacks include:    Take your medicines, such as antibiotics, as your healthcare provider says.    Drink 6 to 8 glasses of water every day, unless told otherwise.    Use a heating pad or hot water bottle to help abdominal cramping or pain.    Begin an exercise program. Ask your healthcare provider how to get started. You can benefit from simple activities such as walking or gardening.    Treat diarrhea with a bland diet. Start with liquids only; then slowly add fiber over time.    Watch for changes in your bowel movements (constipation to diarrhea). Avoid constipation by eating a high fiber diet and taking a stool softener if needed.    Get plenty of rest and sleep.  Follow-up care  Follow up with your healthcare provider as advised or sooner if you are not getting better in the next 2 days.  When to seek medical advice  Call your healthcare provider right away if any of these occur:    Fever of 100.4 F (38 C) or higher, or as directed by your healthcare provider    Repeated vomiting or swelling of the abdomen    Weakness, dizziness, light-headedness    Pain in your abdomen that gets worse, severe, or spreads to your back    Pain that moves to the right lower abdomen    Rectal bleeding (stools that are red, black or maroon color)    Unexpected vaginal bleeding  Date Last  Reviewed: 9/1/2016 2000-2017 [employer] [address] [place] [address], [place] [address]. All rights reserved. This information is not intended as a substitute for professional medical care. Always follow your healthcare professional's instructions.          24 Hour Appointment Hotline       To make an appointment at any [facility], call [phone] ([phone]). If you don't have a family doctor or clinic, we will help you find one. [facility] clinics are conveniently located to serve the needs of you and your family.             Review of your medicines      START taking        Dose / Directions Last dose taken    ciprofloxacin 500 MG tablet   Commonly known as:  CIPRO   Dose:  500 mg   Quantity:  20 tablet        Take 1 tablet (500 mg) by mouth 2 times daily for 10 days   Refills:  0        metroNIDAZOLE 500 MG tablet   Commonly known as:  FLAGYL   Dose:  500 mg   Quantity:  40 tablet        Take 1 tablet (500 mg) by mouth 4 times daily for 10 days   Refills:  0          Our records show that you are taking the medicines listed below. If these are incorrect, please call your family doctor or clinic.        Dose / Directions Last dose taken    diltiazem 120 MG 24 hr ER beaded capsule   Commonly known as:  TIAZAC   Dose:  120 mg   Quantity:  90 capsule        Take 1 capsule (120 mg) by mouth daily   Refills:  1        fluticasone-salmeterol 250-50 MCG/DOSE diskus inhaler   Commonly known as:  ADVAIR   Dose:  1 puff   Quantity:  3 Inhaler        Inhale 1 puff into the lungs 2 times daily   Refills:  1        ibuprofen 600 MG tablet   Commonly known as:  ADVIL/MOTRIN   Dose:  600 mg   Quantity:  30 tablet        Take 1 tablet (600 mg) by mouth every 6 hours as needed for moderate pain   Refills:  0        lisinopril 10 MG tablet   Commonly known as:  PRINIVIL/ZESTRIL   Quantity:  270 tablet        TAKE 2 TABLETS BY MOUTH EVERY MORNING AND 1 TABLET EVERY EVENING   Refills:  1        primidone 250  MG tablet   Commonly known as:  MYSOLINE   Quantity:  180 tablet        TAKE 1 TABLET(250 MG) BY MOUTH TWICE DAILY   Refills:  1        * PROAIR  (90 BASE) MCG/ACT Inhaler   Quantity:  8.5 g   Generic drug:  albuterol        INHALE ONE TO TWO PUFFS INTO THE LUNGS THREE TIMES DAILY AS NEEDED   Refills:  0        * albuterol (2.5 MG/3ML) 0.083% neb solution   Dose:  1 vial   Quantity:  1 Box        Take 1 vial (2.5 mg) by nebulization every 6 hours as needed for shortness of breath / dyspnea   Refills:  1        ranitidine HCl 300 MG Caps   Quantity:  90 capsule        TAKE ONE CAPSULE BY MOUTH ONCE DAILY   Refills:  3        * Notice:  This list has 2 medication(s) that are the same as other medications prescribed for you. Read the directions carefully, and ask your doctor or other care provider to review them with you.            Prescriptions were sent or printed at these locations (2 Prescriptions)                   Other Prescriptions                Printed at Department/Unit printer (2 of 2)         metroNIDAZOLE (FLAGYL) 500 MG tablet               ciprofloxacin (CIPRO) 500 MG tablet                Orders Needing Specimen Collection     None      Pending Results     No orders found from 9/23/2017 to 9/26/2017.            Pending Culture Results     No orders found from 9/23/2017 to 9/26/2017.            Pending Results Instructions     If you had any lab results that were not finalized at the time of your Discharge, you can call the ED Lab Result RN at [phone]. You will be contacted by this team for any positive Lab results or changes in treatment. The nurses are available 7 days a week from 10A to 6:30P.  You can leave a message 24 hours per day and they will return your call.        Test Results From Your Hospital Stay               Clinical Quality Measure: Blood Pressure Screening     Your blood pressure was checked while you were in the emergency department today. The last reading we obtained  was  BP: 129/71 . Please read the guidelines below about what these numbers mean and what you should do about them.  If your systolic blood pressure (the top number) is less than 120 and your diastolic blood pressure (the bottom number) is less than 80, then your blood pressure is normal. There is nothing more that you need to do about it.  If your systolic blood pressure (the top number) is 120-139 or your diastolic blood pressure (the bottom number) is 80-89, your blood pressure may be higher than it should be. You should have your blood pressure rechecked within a year by a primary care provider.  If your systolic blood pressure (the top number) is 140 or greater or your diastolic blood pressure (the bottom number) is 90 or greater, you may have high blood pressure. High blood pressure is treatable, but if left untreated over time it can put you at risk for heart attack, stroke, or kidney failure. You should have your blood pressure rechecked by a primary care provider within the next 4 weeks.  If your provider in the emergency department today gave you specific instructions to follow-up with your doctor or provider even sooner than that, you should follow that instruction and not wait for up to 4 weeks for your follow-up visit.        Thank you for choosing [facility]       Thank you for choosing [facility] for your care. Our goal is always to provide you with excellent care. Hearing back from our patients is one way we can continue to improve our services. Please take a few minutes to complete the written survey that you may receive in the mail after you visit with us. Thank you!        [email or web address] Information     [employer] gives you secure access to your electronic health record. If you see a primary care provider, you can also send messages to your care team and make appointments. If you have questions, please call your primary care clinic.  If you do not have a primary care provider, please call [phone] and they  will assist you.        Care EveryWhere ID     This is your Care EveryWhere ID. This could be used by other organizations to access your [facility] medical records  [id number]        Equal Access to Services     KYARA CHAPMAN : Ila Geiger, dot benz, nayla shields, simba hernandez. So [facility] [place] [phone].    ATENCIÓN: Si habla español, tiene a teresa disposición servicios gratuitos de asistencia lingüística. Llame al [phone].    We comply with applicable federal civil rights laws and [place] laws. We do not discriminate on the basis of race, color, national origin, age, disability sex, sexual orientation or gender identity.            After Visit Summary       This is your record. Keep this with you and show to your community pharmacist(s) and doctor(s) at your next visit.
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
## 2017-11-20 ENCOUNTER — OFFICE VISIT (OUTPATIENT)
Dept: FAMILY MEDICINE | Facility: CLINIC | Age: 67
End: 2017-11-20

## 2017-11-20 VITALS
BODY MASS INDEX: 53.97 KG/M2 | HEART RATE: 95 BPM | WEIGHT: 293 LBS | TEMPERATURE: 99.6 F | DIASTOLIC BLOOD PRESSURE: 80 MMHG | SYSTOLIC BLOOD PRESSURE: 150 MMHG | OXYGEN SATURATION: 96 %

## 2017-11-20 DIAGNOSIS — D69.3 CHRONIC IDIOPATHIC THROMBOCYTOPENIA (H): ICD-10-CM

## 2017-11-20 DIAGNOSIS — J45.30 MILD PERSISTENT ASTHMA WITHOUT COMPLICATION: ICD-10-CM

## 2017-11-20 DIAGNOSIS — J06.9 VIRAL URI WITH COUGH: ICD-10-CM

## 2017-11-20 LAB
% GRANULOCYTES: 77.6 %
HCT VFR BLD AUTO: 45.5 % (ref 35–47)
HEMOGLOBIN: 14.9 G/DL (ref 11.7–15.7)
LYMPHOCYTES NFR BLD AUTO: 14.2 %
MCH RBC QN AUTO: 30.6 PG (ref 26–33)
MCHC RBC AUTO-ENTMCNC: 32.7 G/DL (ref 31–36)
MCV RBC AUTO: 93.5 FL (ref 78–100)
MONOCYTES NFR BLD AUTO: 8.2 %
PLATELET COUNT - QUEST: 126 10^9/L (ref 150–375)
RBC # BLD AUTO: 4.87 10*12/L (ref 3.8–5.2)
WBC # BLD AUTO: 6.8 10*9/L (ref 4–11)

## 2017-11-20 PROCEDURE — 99213 OFFICE O/P EST LOW 20 MIN: CPT | Performed by: FAMILY MEDICINE

## 2017-11-20 PROCEDURE — 85025 COMPLETE CBC W/AUTO DIFF WBC: CPT | Performed by: FAMILY MEDICINE

## 2017-11-20 PROCEDURE — 36415 COLL VENOUS BLD VENIPUNCTURE: CPT | Performed by: FAMILY MEDICINE

## 2017-11-20 RX ORDER — ALBUTEROL SULFATE 90 UG/1
1 AEROSOL, METERED RESPIRATORY (INHALATION) EVERY 4 HOURS PRN
Qty: 8.5 G | Refills: 0 | Status: SHIPPED | OUTPATIENT
Start: 2017-11-20 | End: 2018-02-05
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
## 2017-11-20 NOTE — NURSING NOTE
Gema is here for St and cough started Friday with St now has a cough and chest congestion possible bronchitis    Pre-Visit Screening :  Immunizations : up to date    Colonoscopy : is up to date  Mammogram : is up to date  Asthma Action Test/Plan : Done today  PHQ9/GAD7 :  NA  Pulse - regular  My Chart - accepts    CLASSIFICATION OF OVERWEIGHT AND OBESITY BY BMI                         Obesity Class           BMI(kg/m2)  Underweight                                    < 18.5  Normal                                         18.5-24.9  Overweight                                     25.0-29.9  OBESITY                     I                  30.0-34.9                              II                 35.0-39.9  EXTREME OBESITY             III                >40                             Patient's  BMI Body mass index is 53.97 kg/(m^2).  http://hin.nhlbi.nih.gov/menuplanner/menu.cgi  Questioned patient about current smoking habits.  Pt. has never smoked.  The patient has verbalized that it is ok to leave a detailed voice message on the patient's cell phone with results/recommendations from this visit.

## 2017-11-20 NOTE — MR AVS SNAPSHOT
After Visit Summary   11/20/2017    Gema Coombs    MRN: [id number]           Patient Information     Date Of Birth          1950        Visit Information        Provider Department      11/20/2017 4:00 PM Flavia Alberts MD [facility]        Today's Diagnoses     Viral URI with cough        Mild persistent asthma without complication        Chronic idiopathic thrombocytopenia (H)           Follow-ups after your visit        Who to contact     If you have questions or need follow up information about today's clinic visit or your schedule please contact [facility] directly at [phone].  Normal or non-critical lab and imaging results will be communicated to you by [employer]Intern[employer]hart, letter or phone within 4 business days after the clinic has received the results. If you do not hear from us within 7 days, please contact the clinic through [employer]hart or phone. If you have a critical or abnormal lab result, we will notify you by phone as soon as possible.  Submit refill requests through [employer] or call your pharmacy and they will forward the refill request to us. Please allow 3 business days for your refill to be completed.          Additional Information About Your Visit        MyChart Information     [employer] gives you secure access to your electronic health record. If you see a primary care provider, you can also send messages to your care team and make appointments. If you have questions, please call your primary care clinic.  If you do not have a primary care provider, please call [phone] and they will assist you.        Care EveryWhere ID     This is your Care EveryWhere ID. This could be used by other organizations to access your [facility] medical records  [id number]        Your Vitals Were     Pulse Temperature Pulse Oximetry Breastfeeding? BMI (Body Mass Index)       95 99.6  F (37.6  C) (Oral) 96% Yes 53.97 kg/m2        Blood Pressure from Last  3 Encounters:   11/20/17 150/80   09/25/17 129/71   09/13/17 146/88    Weight from Last 3 Encounters:   11/20/17 (!) 142.6 kg (314 lb 6.4 oz)   09/25/17 (!) 141.1 kg (311 lb)   09/13/17 (!) 142.9 kg (315 lb)              We Performed the Following     CL AFF HEMOGRAM/PLATE/DIFF (BFP)     VENOUS COLLECTION          Today's Medication Changes          These changes are accurate as of: 11/20/17 11:59 PM.  If you have any questions, ask your nurse or doctor.               These medicines have changed or have updated prescriptions.        Dose/Directions    * albuterol (2.5 MG/3ML) 0.083% neb solution   This may have changed:  Another medication with the same name was changed. Make sure you understand how and when to take each.   Used for:  Mild persistent asthma without complication   Changed by:  Iveth Veras PA-C        Dose:  1 vial   Take 1 vial (2.5 mg) by nebulization every 6 hours as needed for shortness of breath / dyspnea   Quantity:  1 Box   Refills:  1       * albuterol 108 (90 BASE) MCG/ACT Inhaler   Commonly known as:  PROAIR HFA   This may have changed:  See the new instructions.   Used for:  Mild persistent asthma without complication   Changed by:  Flavia Alberts MD        Dose:  1 puff   Inhale 1 puff into the lungs every 4 hours as needed for shortness of breath / dyspnea or wheezing   Quantity:  8.5 g   Refills:  0       * Notice:  This list has 2 medication(s) that are the same as other medications prescribed for you. Read the directions carefully, and ask your doctor or other care provider to review them with you.         Where to get your medicines      These medications were sent to [id number] - [address]  [address], [place] [address]     Phone:  [phone]     albuterol 108 (90 BASE) MCG/ACT Inhaler                Primary Care Provider Office Phone # Fax #    Flavia Alberts [phone]  [phone]       [address] [facility] [address]  [facility] [address]        Equal Access to Services     KYARA CHAPMAN : Hadii aad ku hadshortyo Sofrancescaali, waaxda luqadaha, qaybta kaalmada alyson, simba voazra david. So [facility] [phone].    ATENCIÓN: Si habla español, tiene a teresa disposición servicios gratuitos de asistencia lingüística. Rachael al [phone].    We comply with applicable federal civil rights laws and [place] laws. We do not discriminate on the basis of race, color, national origin, age, disability, sex, sexual orientation, or gender identity.            Thank you!     Thank you for choosing [facility]  for your care. Our goal is always to provide you with excellent care. Hearing back from our patients is one way we can continue to improve our services. Please take a few minutes to complete the written survey that you may receive in the mail after your visit with us. Thank you!             Your Updated Medication List - Protect others around you: Learn how to safely use, store and throw away your medicines at www.disposemymeds.org.          This list is accurate as of: 11/20/17 11:59 PM.  Always use your most recent med list.                   Brand Name Dispense Instructions for use Diagnosis    * albuterol (2.5 MG/3ML) 0.083% neb solution     1 Box    Take 1 vial (2.5 mg) by nebulization every 6 hours as needed for shortness of breath / dyspnea    Mild persistent asthma without complication       * albuterol 108 (90 BASE) MCG/ACT Inhaler    PROAIR HFA    8.5 g    Inhale 1 puff into the lungs every 4 hours as needed for shortness of breath / dyspnea or wheezing    Mild persistent asthma without complication       diltiazem 120 MG 24 hr ER beaded capsule    TIAZAC    90 capsule    Take 1 capsule (120 mg) by mouth daily    Paroxysmal atrial fibrillation (H)       fluticasone-salmeterol 250-50 MCG/DOSE diskus inhaler    ADVAIR    3 Inhaler    Inhale 1  puff into the lungs 2 times daily    Mild persistent asthma without complication       ibuprofen 600 MG tablet    ADVIL/MOTRIN    30 tablet    Take 1 tablet (600 mg) by mouth every 6 hours as needed for moderate pain        lisinopril 10 MG tablet    PRINIVIL/ZESTRIL    270 tablet    TAKE 2 TABLETS BY MOUTH EVERY MORNING AND 1 TABLET EVERY EVENING    Essential hypertension, benign       primidone 250 MG tablet    MYSOLINE    180 tablet    TAKE 1 TABLET(250 MG) BY MOUTH TWICE DAILY    Seizure disorder (H)       ranitidine HCl 300 MG Caps     90 capsule    TAKE ONE CAPSULE BY MOUTH ONCE DAILY    Gastroesophageal reflux disease without esophagitis       * Notice:  This list has 2 medication(s) that are the same as other medications prescribed for you. Read the directions carefully, and ask your doctor or other care provider to review them with you.

## 2017-11-21 ASSESSMENT — ASTHMA QUESTIONNAIRES: ACT_TOTALSCORE: 18

## 2018-02-05 DIAGNOSIS — J45.30 MILD PERSISTENT ASTHMA WITHOUT COMPLICATION: ICD-10-CM

## 2018-02-06 NOTE — TELEPHONE ENCOUNTER
Pending Prescriptions:                       Disp   Refills    PROAIR  (90 BASE) MCG/ACT inhaler *8.5 g               Sig: INHALE 1 PUFFS INTO THE LUNGS EVERY 4 HOURS AS           NEEDED FOR SHORTNESS OF BREATH, DYSPNEA, OR           WHEEZING      Pt last refill was  with asthma check  Do you want to refill again?  Is pt due for ov?  Please fax deny or send to [facility] and let them know if pt is due for a fasting or non fasting ov  [facility]  [phone] (home) NONE (work)

## 2018-02-08 RX ORDER — ALBUTEROL SULFATE 90 UG/1
AEROSOL, METERED RESPIRATORY (INHALATION)
Qty: 8.5 G | Refills: 1 | Status: SHIPPED | OUTPATIENT
Start: 2018-02-08 | End: 2018-04-12

## 2018-03-16 DIAGNOSIS — G40.909 SEIZURE DISORDER (H): ICD-10-CM

## 2018-03-16 DIAGNOSIS — I10 ESSENTIAL HYPERTENSION, BENIGN: ICD-10-CM

## 2018-03-16 RX ORDER — LISINOPRIL 10 MG/1
TABLET ORAL
Qty: 90 TABLET | Refills: 0 | COMMUNITY
Start: 2018-03-16 | End: 2018-04-12

## 2018-03-16 RX ORDER — LISINOPRIL 10 MG/1
TABLET ORAL
Qty: 45 TABLET | Refills: 0 | OUTPATIENT
Start: 2018-03-16

## 2018-03-16 RX ORDER — PRIMIDONE 250 MG/1
TABLET ORAL
Qty: 60 TABLET | Refills: 0 | COMMUNITY
Start: 2018-03-16 | End: 2018-04-13

## 2018-03-16 NOTE — TELEPHONE ENCOUNTER
[facility]  Lisinopril  30 days   Pt is due for a fasting ov  [facility]  [phone] (home

## 2018-03-16 NOTE — TELEPHONE ENCOUNTER
Gema YAS Coombs is requesting a refill of the following medication:    Signed Prescriptions:                        Disp   Refills    primidone (MYSOLINE) 250 MG tablet         60 tab*0        Sig: TAKE 1 TABLET(250 MG) BY MOUTH TWICE DAILY  Authorizing Provider: BOBBY WILL  Ordering User: HERBERT PRAKASH    Patient has been informed that they are due for a fasting OV. 30 days sent to pharmacy.    Herbert Prakash, [facility]

## 2018-04-12 DIAGNOSIS — I10 ESSENTIAL HYPERTENSION, BENIGN: ICD-10-CM

## 2018-04-12 DIAGNOSIS — J45.30 MILD PERSISTENT ASTHMA WITHOUT COMPLICATION: ICD-10-CM

## 2018-04-12 RX ORDER — LISINOPRIL 10 MG/1
TABLET ORAL
Qty: 90 TABLET | Refills: 0 | Status: SHIPPED | OUTPATIENT
Start: 2018-04-12 | End: 2018-04-13

## 2018-04-12 RX ORDER — ALBUTEROL SULFATE 90 UG/1
AEROSOL, METERED RESPIRATORY (INHALATION)
Qty: 8.5 G | Refills: 0 | Status: SHIPPED | OUTPATIENT
Start: 2018-04-12 | End: 2018-05-19

## 2018-04-12 NOTE — TELEPHONE ENCOUNTER
Pending Prescriptions:                       Disp   Refills    PROAIR  (90 Base) MCG/ACT inhaler *8.5 g               Sig: INHALE 1 PUFFS INTO THE LUNGS EVERY 4 HOURS AS           NEEDED FOR SHORTNESS OF BREATH, DYSPNEA, OR           WHEEZING    Pt last ov was 11-  Last refill was 2-8-2018  Please deny fax change qty or send to FD, and let them know if pt should be fasting or not  Deo  [phone] (home) NONE (work)

## 2018-04-12 NOTE — TELEPHONE ENCOUNTER
Pt last seen 9/13/17 for OV. Was advised to F/U in 6 months for medication and ACT. 30 day given 3/16/18 and Pt was advised that she needs OV.  Please advise    Gema Coombs is requesting a refill of:    Pending Prescriptions:                       Disp   Refills    PROAIR  (90 Base) MCG/ACT inhaler *8.5 g  0            Sig: INHALE 1 PUFFS INTO THE LUNGS EVERY 4 HOURS AS           NEEDED FOR SHORTNESS OF BREATH, DYSPNEA, OR           WHEEZING    lisinopril (PRINIVIL/ZESTRIL) 10 MG [id number] tab*0            Sig: TAKE 2 TABLETS BY MOUTH EVERY MORNING AND 1           TABLET EVERY EVENING

## 2018-04-13 ENCOUNTER — OFFICE VISIT (OUTPATIENT)
Dept: FAMILY MEDICINE | Facility: CLINIC | Age: 68
End: 2018-04-13

## 2018-04-13 VITALS
HEART RATE: 89 BPM | TEMPERATURE: 98.8 F | DIASTOLIC BLOOD PRESSURE: 80 MMHG | OXYGEN SATURATION: 97 % | SYSTOLIC BLOOD PRESSURE: 126 MMHG | WEIGHT: 293 LBS | BODY MASS INDEX: 54.41 KG/M2

## 2018-04-13 DIAGNOSIS — G40.909 SEIZURE DISORDER (H): ICD-10-CM

## 2018-04-13 DIAGNOSIS — R10.32 LLQ ABDOMINAL PAIN: ICD-10-CM

## 2018-04-13 DIAGNOSIS — Z79.899 MEDICATION MANAGEMENT: ICD-10-CM

## 2018-04-13 DIAGNOSIS — I10 ESSENTIAL HYPERTENSION, BENIGN: ICD-10-CM

## 2018-04-13 DIAGNOSIS — D69.6 THROMBOCYTOPENIA (H): ICD-10-CM

## 2018-04-13 DIAGNOSIS — R73.09 ABNORMAL GLUCOSE: ICD-10-CM

## 2018-04-13 LAB
ALBUMIN (URINE): ABNORMAL MG/DL
APPEARANCE UR: ABNORMAL
BACTERIA, UR: ABNORMAL
BILIRUB UR QL: ABNORMAL
CASTS/LPF: ABNORMAL
COLOR UR: YELLOW
EP/HPF: ABNORMAL
ERYTHROCYTE [DISTWIDTH] IN BLOOD BY AUTOMATED COUNT: 13.7 %
GLUCOSE URINE: ABNORMAL MG/DL
HBA1C MFR BLD: 4.7 % (ref 4–7)
HCT VFR BLD AUTO: 44 % (ref 35–47)
HEMOGLOBIN: 14.7 G/DL (ref 11.7–15.7)
HGB UR QL: ABNORMAL
KETONES UR QL: ABNORMAL MG/DL
LEUKOCYTE ESTERASE - QUEST: ABNORMAL
MCH RBC QN AUTO: 30.2 PG (ref 26–33)
MCHC RBC AUTO-ENTMCNC: 33.4 G/DL (ref 31–36)
MCV RBC AUTO: 90.4 FL (ref 78–100)
MISC.: ABNORMAL
NITRITE UR QL STRIP: ABNORMAL
PH UR STRIP: 5.5 PH (ref 5–7)
PLATELET COUNT - QUEST: 100 10^9/L (ref 150–375)
RBC # BLD AUTO: 4.87 10*12/L (ref 3.8–5.2)
RBC, UR MICRO: ABNORMAL (ref ?–2)
SP. GRAVITY: 1.02
UROBILINOGEN UR QL STRIP: 0.2 EU/DL (ref 0.2–1)
WBC # BLD AUTO: 4.3 10*9/L (ref 4–11)
WBC, UR MICRO: ABNORMAL (ref ?–2)

## 2018-04-13 PROCEDURE — 99214 OFFICE O/P EST MOD 30 MIN: CPT | Performed by: FAMILY MEDICINE

## 2018-04-13 PROCEDURE — 87086 URINE CULTURE/COLONY COUNT: CPT | Mod: 90 | Performed by: FAMILY MEDICINE

## 2018-04-13 PROCEDURE — 81001 URINALYSIS AUTO W/SCOPE: CPT | Performed by: FAMILY MEDICINE

## 2018-04-13 PROCEDURE — 83036 HEMOGLOBIN GLYCOSYLATED A1C: CPT | Performed by: FAMILY MEDICINE

## 2018-04-13 PROCEDURE — 85027 COMPLETE CBC AUTOMATED: CPT | Performed by: FAMILY MEDICINE

## 2018-04-13 PROCEDURE — 87088 URINE BACTERIA CULTURE: CPT | Mod: 90 | Performed by: FAMILY MEDICINE

## 2018-04-13 PROCEDURE — 80053 COMPREHEN METABOLIC PANEL: CPT | Mod: 90 | Performed by: FAMILY MEDICINE

## 2018-04-13 PROCEDURE — 36415 COLL VENOUS BLD VENIPUNCTURE: CPT | Performed by: FAMILY MEDICINE

## 2018-04-13 RX ORDER — CIPROFLOXACIN 500 MG/1
500 TABLET, FILM COATED ORAL 2 TIMES DAILY
Qty: 20 TABLET | Refills: 0 | Status: SHIPPED | OUTPATIENT
Start: 2018-04-13 | End: 2018-06-16

## 2018-04-13 RX ORDER — LISINOPRIL 10 MG/1
TABLET ORAL
Qty: 90 TABLET | Refills: 3 | Status: SHIPPED | OUTPATIENT
Start: 2018-04-13 | End: 2018-06-16

## 2018-04-13 RX ORDER — PRIMIDONE 250 MG/1
TABLET ORAL
Qty: 180 TABLET | Refills: 3 | Status: SHIPPED | OUTPATIENT
Start: 2018-04-13 | End: 2018-06-16

## 2018-04-13 RX ORDER — METRONIDAZOLE 500 MG/1
500 TABLET ORAL 3 TIMES DAILY
Qty: 30 TABLET | Refills: 0 | Status: SHIPPED | OUTPATIENT
Start: 2018-04-13 | End: 2018-06-16

## 2018-04-13 NOTE — NURSING NOTE
Gema is here for medication check. She would also like to talk about a possible diverticulitis flare up she is experiencing. Healthcare Directive was given to patient to fill out and turn in when able to.     Pre-visit Screening:  Immunizations:  not up to date - Due to pneumonia and Tdap   Colonoscopy:  is due but patient declined today-will call when ready to have referral put in  Mammogram: is due but patient declined today-will call when ready to have referral put in  Asthma Action Test/Plan: Doing all right  PHQ9: No concerns  GAD7:  No concerns   Questioned patient about current smoking habits Pt. has never smoked.  Ok to leave detailed message on voice mail for today's visit only Yes, phone # [phone]

## 2018-04-13 NOTE — PROGRESS NOTES
SUBJECTIVE:   Gema Coombs is a 67 year old female who presents to clinic today for the following health issues:    Hypertension Follow-up      Outpatient blood pressures are being checked with a phone ap- she reports her results correlate with today's office pressures .    Ap also monitors her heart rate- history of paroxysmal atrial fibrillation-     Low Salt Diet: no added salt      Amount of exercise or physical activity: no formal exercise program    Problems taking medications regularly: No    Medication side effects: none    Diet: low salt      PROBLEMS TO ADD ON...  Seizure disorder: needs refill of primidone  No seizures for decades  Unwilling to stop medication- she has no apparent side effects  Monitoring labs today, CBC, comp profile  Last Primidone level 8/2016- therapeutic level on her current dose    She believes she is coming done with another bout of diverticulitis-  Last treated for diverticulitis last fall-   CT scan 12/2016- showed mild diverticulitis  Previous antibiotic treatments for diiverculitis:8    She states her symptoms are always consistent-  Starts feeling low grade pain in her left side, which progresses in severity and radiates to her low back       She currently denies fever or chills  Started clear liquids a day ago-   No change in bowel habits-       Problem list and histories reviewed & adjusted, as indicated.  Additional history: as above    Patient Active Problem List   Diagnosis     Myalgia and myositis     Breast disorder     Generalized nonconvulsive epilepsy (H)     Family history of other cardiovascular diseases     Esophageal reflux     Essential hypertension, benign     Mild persistent asthma     Hyperlipidemia     Diverticulitis of colon     ACP (advance care planning)     Sleep apnea, obstructive     Other specified cardiac dysrhythmias/pac's     Health Care Home     Morbid obesity with BMI of 50.0-59.9, adult (H)     Chronic idiopathic thrombocytopenia (H)     BCC  (basal cell carcinoma), face     Seizure disorder (H)     Blurry vision     Mitral valve regurgitation/ repeat echocardiogram 2017     Past Surgical History:   Procedure Laterality Date     BIOPSY OF BREAST, INCISIONAL      L breasts     BIOPSY OF BREAST, NEEDLE CORE  &    R breasts     C ANORECTAL MYOMECTOMY      uterine fibroids     C  DELIVERY ONLY      x 2     LAPAROSCOPY,LYSIS ADHESNS      pain R side poss r/t c-sections       Social History   Substance Use Topics     Smoking status: Never Smoker     Smokeless tobacco: Never Used     Alcohol use 0.0 oz/week     0 drink(s) per week      Comment: rare     Family History   Problem Relation Age of Onset     CANCER Maternal Aunt      breast-     CANCER Maternal Grandmother      breast-     HEART DISEASE Paternal Grandfather      heartattack-  at 58     HEART DISEASE Maternal Grandfather           HEART DISEASE Paternal Uncle      bypass-alive &well     Thyroid Disease Sister      hypothyroidism-alive         Current Outpatient Prescriptions   Medication Sig Dispense Refill     lisinopril (PRINIVIL/ZESTRIL) 10 MG tablet TAKE 2 TABLETS BY MOUTH EVERY MORNING AND 1 TABLET EVERY EVENING 90 tablet 3     primidone (MYSOLINE) 250 MG tablet TAKE 1 TABLET(250 MG) BY MOUTH TWICE DAILY 180 tablet 3     ciprofloxacin (CIPRO) 500 MG tablet Take 1 tablet (500 mg) by mouth 2 times daily 20 tablet 0     metroNIDAZOLE (FLAGYL) 500 MG tablet Take 1 tablet (500 mg) by mouth 3 times daily 30 tablet 0     PROAIR  (90 Base) MCG/ACT inhaler INHALE 1 PUFFS INTO THE LUNGS EVERY 4 HOURS AS NEEDED FOR SHORTNESS OF BREATH, DYSPNEA, OR WHEEZING 8.5 g 0     albuterol (2.5 MG/3ML) 0.083% neb solution Take 1 vial (2.5 mg) by nebulization every 6 hours as needed for shortness of breath / dyspnea 1 Box 1     diltiazem (TIAZAC) 120 MG 24 hr ER beaded capsule Take 1 capsule (120 mg) by mouth daily 90 capsule 1     ranitidine HCl  300 MG CAPS TAKE ONE CAPSULE BY MOUTH ONCE DAILY 90 capsule 3     fluticasone-salmeterol (ADVAIR) 250-50 MCG/DOSE diskus inhaler Inhale 1 puff into the lungs 2 times daily 3 Inhaler 1     ibuprofen (ADVIL,MOTRIN) 600 MG tablet Take 1 tablet (600 mg) by mouth every 6 hours as needed for moderate pain 30 tablet 0       Reviewed and updated as needed this visit by clinical staff  Tobacco  Allergies  Meds  Problems       Reviewed and updated as needed this visit by Provider         ROS:  CONSTITUTIONAL: NEGATIVE for fever, chills, change in weight  ENT/MOUTH: NEGATIVE for ear, mouth and throat problems  RESP: NEGATIVE for significant cough or SOB  CV: NEGATIVE for chest pain, palpitations or peripheral edema  GI: abdominal pain as above  ; denies dysuria, hematuria  Endo: weight unchanged this past year    OBJECTIVE:     /80 (BP Location: Right arm, Patient Position: Sitting, Cuff Size: Adult Large)  Pulse 89  Temp 98.8  F (37.1  C) (Oral)  Wt 143.8 kg (317 lb)  SpO2 97%  BMI 54.41 kg/m2  Body mass index is 54.41 kg/(m^2).   No acute distress  Regular rate and  rhythm. S1 and S2 normal, no murmurs, clicks, gallops or rubs. No edema or JVD. Chest is clear; no wheezes or rales.  The abdomen is soft without  guarding, mass or organomegaly.   Point tenderness lower left abdomen/ groin-Bowel sounds are normal. No CVA tenderness.      Diagnostic Test Results:  No results found for this or any previous visit (from the past 24 hour(s)).    ASSESSMENT/PLAN:     Problem List Items Addressed This Visit     Essential hypertension, benign    Relevant Medications    lisinopril (PRINIVIL/ZESTRIL) 10 MG tablet    Other Relevant Orders    VENOUS COLLECTION (Completed)    Comprehensive metabolic panel    Seizure disorder (H)    Relevant Medications    primidone (MYSOLINE) 250 MG tablet    Other Relevant Orders    Comprehensive metabolic panel    HEMOGRAM/PLATELET (BFP) (Completed)      Other Visit Diagnoses     LLQ  "abdominal pain        Relevant Medications    ciprofloxacin (CIPRO) 500 MG tablet    metroNIDAZOLE (FLAGYL) 500 MG tablet    Other Relevant Orders    VENOUS COLLECTION (Completed)    HEMOGRAM/PLATELET (BFP) (Completed)    HCL  Urinalysis, Routine (BFP) (Completed)    Urine Culture Aerobic Bacterial    Medication management        Relevant Orders    Comprehensive metabolic panel    HEMOGRAM/PLATELET (BFP) (Completed)    Abnormal glucose        Relevant Orders    Hemoglobin A1c (BFP) (Completed)    VENOUS COLLECTION (Completed)    Comprehensive metabolic panel    Thrombocytopenia (H)        Relevant Orders    CL AFF HEMOGRAM/PLATE/DIFF (BFP)    VENOUS COLLECTION           BMI:   Estimated body mass index is 54.41 kg/(m^2) as calculated from the following:    Height as of 9/13/17: 1.626 m (5' 4\").    Weight as of this encounter: 143.8 kg (317 lb).   Weight management plan: Discussed healthy diet and exercise guidelines and patient will follow up in 6 months in clinic to re-evaluate.      MEDICATIONS:  Continue current medications without change    RX for antibiotics given, but advised further observation (upcoming weekend) -   If increasing LLQ pain, fever/ chills- fill RX  FUTURE APPOINTMENTS:       - Follow-up visit in 6 months       - repeat CBC/ platelet count in one month (low platelet)  Regular exercise    Flavia Alberts MD  [facility]    "

## 2018-04-13 NOTE — MR AVS SNAPSHOT
After Visit Summary   4/13/2018    Gema Coombs    MRN: [id number]           Patient Information     Date Of Birth          1950        Visit Information        Provider Department      4/13/2018 9:30 AM Flavia Alberts MD [facility]        Today's Diagnoses     Medication management    -  1    Essential hypertension, benign        Seizure disorder (H)        LLQ abdominal pain        Abnormal glucose           Follow-ups after your visit        Who to contact     If you have questions or need follow up information about today's clinic visit or your schedule please contact [facility] directly at [phone].  Normal or non-critical lab and imaging results will be communicated to you by NeighborMDhart, letter or phone within 4 business days after the clinic has received the results. If you do not hear from us within 7 days, please contact the clinic through NeighborMDhart or phone. If you have a critical or abnormal lab result, we will notify you by phone as soon as possible.  Submit refill requests through [employer] or call your pharmacy and they will forward the refill request to us. Please allow 3 business days for your refill to be completed.          Additional Information About Your Visit        MyChart Information     [employer] gives you secure access to your electronic health record. If you see a primary care provider, you can also send messages to your care team and make appointments. If you have questions, please call your primary care clinic.  If you do not have a primary care provider, please call [phone] and they will assist you.        Care EveryWhere ID     This is your Care EveryWhere ID. This could be used by other organizations to access your [place] medical records  [id number]        Your Vitals Were     Pulse Temperature Pulse Oximetry BMI (Body Mass Index)          89 98.8  F (37.1  C) (Oral) 97% 54.41 kg/m2         Blood Pressure  from Last 3 Encounters:   04/13/18 126/80   11/20/17 150/80   09/25/17 129/71    Weight from Last 3 Encounters:   04/13/18 143.8 kg (317 lb)   11/20/17 (!) 142.6 kg (314 lb 6.4 oz)   09/25/17 (!) 141.1 kg (311 lb)              We Performed the Following     Comprehensive metabolic panel     HCL  Urinalysis, Routine (BFP)     Hemoglobin A1c (BFP)     HEMOGRAM/PLATELET (BFP)     Urine Culture Aerobic Bacterial     VENOUS COLLECTION          Today's Medication Changes          These changes are accurate as of 4/13/18 10:38 AM.  If you have any questions, ask your nurse or doctor.               Start taking these medicines.        Dose/Directions    ciprofloxacin 500 MG tablet   Commonly known as:  CIPRO   Used for:  LLQ abdominal pain   Started by:  Flavia Alberts MD        Dose:  500 mg   Take 1 tablet (500 mg) by mouth 2 times daily   Quantity:  20 tablet   Refills:  0       metroNIDAZOLE 500 MG tablet   Commonly known as:  FLAGYL   Used for:  LLQ abdominal pain   Started by:  Flavia Alberts MD        Dose:  500 mg   Take 1 tablet (500 mg) by mouth 3 times daily   Quantity:  30 tablet   Refills:  0            Where to get your medicines      These medications were sent to [id number]  [place] - [address]  [address], [place] [address]     Phone:  [phone]     ciprofloxacin 500 MG tablet    lisinopril 10 MG tablet    metroNIDAZOLE 500 MG tablet    primidone 250 MG tablet                Primary Care Provider Office Phone # Fax #    Flavia Alberts [phone] [address] [facility] [address]  [facility] [address]        Equal Access to Services     [facility]: Hadii aad ku hadasho Soomaali, waaxda luqadaha, qaybta kaalmada adeegyada, simba nuñezn ascencion hernandez. So [facility] [phone].    ATENCIÓN: Si habla español, tiene a teresa disposición servicios gratuitos de asistencia lingüística. Llame al  [phone].    We comply with applicable federal civil rights laws and [place] laws. We do not discriminate on the basis of race, color, national origin, age, disability, sex, sexual orientation, or gender identity.            Thank you!     Thank you for choosing [facility]  for your care. Our goal is always to provide you with excellent care. Hearing back from our patients is one way we can continue to improve our services. Please take a few minutes to complete the written survey that you may receive in the mail after your visit with us. Thank you!             Your Updated Medication List - Protect others around you: Learn how to safely use, store and throw away your medicines at www.disposemymeds.org.          This list is accurate as of 4/13/18 10:38 AM.  Always use your most recent med list.                   Brand Name Dispense Instructions for use Diagnosis    * albuterol (2.5 MG/3ML) 0.083% neb solution     1 Box    Take 1 vial (2.5 mg) by nebulization every 6 hours as needed for shortness of breath / dyspnea    Mild persistent asthma without complication       * PROAIR  (90 Base) MCG/ACT Inhaler   Generic drug:  albuterol     8.5 g    INHALE 1 PUFFS INTO THE LUNGS EVERY 4 HOURS AS NEEDED FOR SHORTNESS OF BREATH, DYSPNEA, OR WHEEZING    Mild persistent asthma without complication       ciprofloxacin 500 MG tablet    CIPRO    20 tablet    Take 1 tablet (500 mg) by mouth 2 times daily    LLQ abdominal pain       diltiazem 120 MG 24 hr ER beaded capsule    TIAZAC    90 capsule    Take 1 capsule (120 mg) by mouth daily    Paroxysmal atrial fibrillation (H)       fluticasone-salmeterol 250-50 MCG/DOSE diskus inhaler    ADVAIR    3 Inhaler    Inhale 1 puff into the lungs 2 times daily    Mild persistent asthma without complication       ibuprofen 600 MG tablet    ADVIL/MOTRIN    30 tablet    Take 1 tablet (600 mg) by mouth every 6 hours as needed for moderate pain         lisinopril 10 MG tablet    PRINIVIL/ZESTRIL    90 tablet    TAKE 2 TABLETS BY MOUTH EVERY MORNING AND 1 TABLET EVERY EVENING    Essential hypertension, benign       metroNIDAZOLE 500 MG tablet    FLAGYL    30 tablet    Take 1 tablet (500 mg) by mouth 3 times daily    LLQ abdominal pain       primidone 250 MG tablet    MYSOLINE    180 tablet    TAKE 1 TABLET(250 MG) BY MOUTH TWICE DAILY    Seizure disorder (H)       ranitidine HCl 300 MG Caps     90 capsule    TAKE ONE CAPSULE BY MOUTH ONCE DAILY    Gastroesophageal reflux disease without esophagitis       * Notice:  This list has 2 medication(s) that are the same as other medications prescribed for you. Read the directions carefully, and ask your doctor or other care provider to review them with you.

## 2018-04-14 LAB
ALBUMIN SERPL-MCNC: 4.1 G/DL (ref 3.6–5.1)
ALBUMIN/GLOB SERPL: 1.7 (CALC) (ref 1–2.5)
ALP SERPL-CCNC: 90 U/L (ref 33–130)
ALT SERPL-CCNC: 14 U/L (ref 6–29)
AST SERPL-CCNC: 15 U/L (ref 10–35)
BILIRUB SERPL-MCNC: 0.6 MG/DL (ref 0.2–1.2)
BUN SERPL-MCNC: 16 MG/DL (ref 7–25)
BUN/CREATININE RATIO: NORMAL (CALC) (ref 6–22)
CALCIUM SERPL-MCNC: 8.9 MG/DL (ref 8.6–10.4)
CHLORIDE SERPLBLD-SCNC: 104 MMOL/L (ref 98–110)
CO2 SERPL-SCNC: 24 MMOL/L (ref 20–31)
CREAT SERPL-MCNC: 0.74 MG/DL (ref 0.5–0.99)
EGFR AFRICAN AMERICAN - QUEST: 99 ML/MIN/1.73M2
GFR SERPL CREATININE-BSD FRML MDRD: 85 ML/MIN/1.73M2
GLOBULIN, CALCULATED - QUEST: 2.4 G/DL (CALC) (ref 1.9–3.7)
GLUCOSE - QUEST: 94 MG/DL (ref 65–99)
POTASSIUM SERPL-SCNC: 3.9 MMOL/L (ref 3.5–5.3)
PROT SERPL-MCNC: 6.5 G/DL (ref 6.1–8.1)
SODIUM SERPL-SCNC: 141 MMOL/L (ref 135–146)

## 2018-04-15 LAB — URINE VOIDED CULTURE: NORMAL

## 2018-05-19 DIAGNOSIS — J45.30 MILD PERSISTENT ASTHMA WITHOUT COMPLICATION: ICD-10-CM

## 2018-05-21 NOTE — TELEPHONE ENCOUNTER
Pending Prescriptions:                       Disp   Refills    PROAIR  (90 Base) MCG/ACT inhaler *8.5 g  0            Sig: INHALE 1 PUFFS INTO THE LUNGS EVERY 4 HOURS AS           NEEDED FOR SHORTNESS OF BREATH, DYSPNEA, OR           WHEEZING    You refilled this on 4-  Would pt be out this soon?  Please fax deny or send to DARREL Desouza  [phone] (home) NONE (work)

## 2018-05-21 NOTE — TELEPHONE ENCOUNTER
Please call patient to see how she is dosing her albuterol     Complete ACT     If score is under 20- needs to be seen  I am holding on the refill

## 2018-05-22 ENCOUNTER — MYC MEDICAL ADVICE (OUTPATIENT)
Dept: FAMILY MEDICINE | Facility: CLINIC | Age: 68
End: 2018-05-22

## 2018-05-22 DIAGNOSIS — I48.0 PAROXYSMAL ATRIAL FIBRILLATION (H): ICD-10-CM

## 2018-05-22 RX ORDER — ALBUTEROL SULFATE 90 UG/1
AEROSOL, METERED RESPIRATORY (INHALATION)
Qty: 8.5 G | Refills: 0 | Status: SHIPPED | OUTPATIENT
Start: 2018-05-22 | End: 2018-06-16

## 2018-05-22 RX ORDER — DILTIAZEM HYDROCHLORIDE 120 MG/1
120 CAPSULE, EXTENDED RELEASE ORAL DAILY
Qty: 90 CAPSULE | Refills: 3 | Status: ON HOLD | OUTPATIENT
Start: 2018-05-22 | End: 2018-10-20

## 2018-05-22 NOTE — TELEPHONE ENCOUNTER
Did ACT over the phone with patient and her score was 20. She states that over all she feels her asthma is well controlled and that right now she is just having a harder time with it because of the weather but right when she uses the inhaler those concerns go away. Routing to Dr. Alberts for review.

## 2018-05-22 NOTE — TELEPHONE ENCOUNTER
From: Gema Coombs  To: Flavia Alberts MD  Sent: 5/22/2018 9:36 AM CDT  Subject: Question about medications    Hello,  I sent a request to my pharmacy last week for a refill on my Diltiazem for my A-Fib. As of last night Nora have not heard from anyone in your office. I was in the office on 4/13/18 and we went over all my meds at that time. I took my last Diltiazem yesterday. Can we please get this refilled?    Thank you so much!    Georgia Coombs

## 2018-05-23 ASSESSMENT — ASTHMA QUESTIONNAIRES: ACT_TOTALSCORE: 20

## 2018-06-16 ENCOUNTER — HOSPITAL ENCOUNTER (INPATIENT)
Facility: CLINIC | Age: 68
LOS: 2 days | Discharge: HOME | DRG: 392 | End: 2018-06-18
Attending: EMERGENCY MEDICINE | Admitting: HOSPITALIST
Payer: COMMERCIAL

## 2018-06-16 ENCOUNTER — APPOINTMENT (OUTPATIENT)
Dept: CT IMAGING | Facility: CLINIC | Age: 68
DRG: 392 | End: 2018-06-16
Attending: EMERGENCY MEDICINE
Payer: COMMERCIAL

## 2018-06-16 DIAGNOSIS — K57.32 DIVERTICULITIS OF COLON: ICD-10-CM

## 2018-06-16 PROBLEM — K57.92 ACUTE DIVERTICULITIS: Status: ACTIVE | Noted: 2018-06-16

## 2018-06-16 LAB
ALBUMIN SERPL-MCNC: 3.5 G/DL (ref 3.4–5)
ALBUMIN UR-MCNC: NEGATIVE MG/DL
ALP SERPL-CCNC: 93 U/L (ref 40–150)
ALT SERPL W P-5'-P-CCNC: 16 U/L (ref 0–50)
ANION GAP SERPL CALCULATED.3IONS-SCNC: 7 MMOL/L (ref 3–14)
APPEARANCE UR: CLEAR
AST SERPL W P-5'-P-CCNC: 16 U/L (ref 0–45)
BASOPHILS # BLD AUTO: 0 10E9/L (ref 0–0.2)
BASOPHILS NFR BLD AUTO: 0.1 %
BILIRUB SERPL-MCNC: 0.4 MG/DL (ref 0.2–1.3)
BILIRUB UR QL STRIP: NEGATIVE
BUN SERPL-MCNC: 8 MG/DL (ref 7–30)
CALCIUM SERPL-MCNC: 8.5 MG/DL (ref 8.5–10.1)
CHLORIDE SERPL-SCNC: 106 MMOL/L (ref 94–109)
CO2 SERPL-SCNC: 27 MMOL/L (ref 20–32)
COLOR UR AUTO: YELLOW
CREAT BLD-MCNC: 0.7 MG/DL (ref 0.52–1.04)
CREAT SERPL-MCNC: 0.71 MG/DL (ref 0.52–1.04)
CRP SERPL-MCNC: 31 MG/L (ref 0–8)
DIFFERENTIAL METHOD BLD: ABNORMAL
EOSINOPHIL # BLD AUTO: 0 10E9/L (ref 0–0.7)
EOSINOPHIL NFR BLD AUTO: 0 %
ERYTHROCYTE [DISTWIDTH] IN BLOOD BY AUTOMATED COUNT: 13.7 % (ref 10–15)
ERYTHROCYTE [SEDIMENTATION RATE] IN BLOOD BY WESTERGREN METHOD: 9 MM/H (ref 0–30)
GFR SERPL CREATININE-BSD FRML MDRD: 82 ML/MIN/1.7M2
GFR SERPL CREATININE-BSD FRML MDRD: 84 ML/MIN/1.7M2
GLUCOSE SERPL-MCNC: 104 MG/DL (ref 70–99)
GLUCOSE UR STRIP-MCNC: NEGATIVE MG/DL
HCT VFR BLD AUTO: 44.5 % (ref 35–47)
HGB BLD-MCNC: 14.5 G/DL (ref 11.7–15.7)
HGB UR QL STRIP: ABNORMAL
IMM GRANULOCYTES # BLD: 0 10E9/L (ref 0–0.4)
IMM GRANULOCYTES NFR BLD: 0.5 %
KETONES UR STRIP-MCNC: NEGATIVE MG/DL
LACTATE BLD-SCNC: 2.4 MMOL/L (ref 0.7–2)
LEUKOCYTE ESTERASE UR QL STRIP: NEGATIVE
LIPASE SERPL-CCNC: 120 U/L (ref 73–393)
LYMPHOCYTES # BLD AUTO: 1.1 10E9/L (ref 0.8–5.3)
LYMPHOCYTES NFR BLD AUTO: 14.5 %
MCH RBC QN AUTO: 29.5 PG (ref 26.5–33)
MCHC RBC AUTO-ENTMCNC: 32.6 G/DL (ref 31.5–36.5)
MCV RBC AUTO: 91 FL (ref 78–100)
MONOCYTES # BLD AUTO: 0.6 10E9/L (ref 0–1.3)
MONOCYTES NFR BLD AUTO: 7.9 %
NEUTROPHILS # BLD AUTO: 6 10E9/L (ref 1.6–8.3)
NEUTROPHILS NFR BLD AUTO: 77 %
NITRATE UR QL: NEGATIVE
NRBC # BLD AUTO: 0 10*3/UL
NRBC BLD AUTO-RTO: 0 /100
PH UR STRIP: 6 PH (ref 5–7)
PLATELET # BLD AUTO: 88 10E9/L (ref 150–450)
POTASSIUM SERPL-SCNC: 3.7 MMOL/L (ref 3.4–5.3)
PROCALCITONIN SERPL-MCNC: <0.05 NG/ML
PROT SERPL-MCNC: 6.7 G/DL (ref 6.8–8.8)
RBC # BLD AUTO: 4.91 10E12/L (ref 3.8–5.2)
RBC #/AREA URNS AUTO: 0 /HPF (ref 0–2)
SODIUM SERPL-SCNC: 140 MMOL/L (ref 133–144)
SOURCE: ABNORMAL
SP GR UR STRIP: 1.01 (ref 1–1.03)
UROBILINOGEN UR STRIP-MCNC: 0 MG/DL (ref 0–2)
WBC # BLD AUTO: 7.8 10E9/L (ref 4–11)
WBC #/AREA URNS AUTO: 0 /HPF (ref 0–5)

## 2018-06-16 PROCEDURE — 12000000 ZZH R&B MED SURG/OB

## 2018-06-16 PROCEDURE — 85652 RBC SED RATE AUTOMATED: CPT | Performed by: EMERGENCY MEDICINE

## 2018-06-16 PROCEDURE — 85025 COMPLETE CBC W/AUTO DIFF WBC: CPT | Performed by: EMERGENCY MEDICINE

## 2018-06-16 PROCEDURE — 81001 URINALYSIS AUTO W/SCOPE: CPT | Performed by: EMERGENCY MEDICINE

## 2018-06-16 PROCEDURE — 25000128 H RX IP 250 OP 636: Performed by: EMERGENCY MEDICINE

## 2018-06-16 PROCEDURE — 82565 ASSAY OF CREATININE: CPT

## 2018-06-16 PROCEDURE — 96374 THER/PROPH/DIAG INJ IV PUSH: CPT

## 2018-06-16 PROCEDURE — 25000128 H RX IP 250 OP 636: Performed by: HOSPITALIST

## 2018-06-16 PROCEDURE — 96375 TX/PRO/DX INJ NEW DRUG ADDON: CPT

## 2018-06-16 PROCEDURE — 25000125 ZZHC RX 250: Performed by: EMERGENCY MEDICINE

## 2018-06-16 PROCEDURE — 83690 ASSAY OF LIPASE: CPT | Performed by: EMERGENCY MEDICINE

## 2018-06-16 PROCEDURE — 99223 1ST HOSP IP/OBS HIGH 75: CPT | Mod: AI | Performed by: HOSPITALIST

## 2018-06-16 PROCEDURE — 25000132 ZZH RX MED GY IP 250 OP 250 PS 637: Performed by: HOSPITALIST

## 2018-06-16 PROCEDURE — 84145 PROCALCITONIN (PCT): CPT | Performed by: HOSPITALIST

## 2018-06-16 PROCEDURE — 80053 COMPREHEN METABOLIC PANEL: CPT | Performed by: EMERGENCY MEDICINE

## 2018-06-16 PROCEDURE — 25000125 ZZHC RX 250: Performed by: HOSPITALIST

## 2018-06-16 PROCEDURE — 74177 CT ABD & PELVIS W/CONTRAST: CPT

## 2018-06-16 PROCEDURE — 96361 HYDRATE IV INFUSION ADD-ON: CPT

## 2018-06-16 PROCEDURE — 36415 COLL VENOUS BLD VENIPUNCTURE: CPT | Performed by: HOSPITALIST

## 2018-06-16 PROCEDURE — 86140 C-REACTIVE PROTEIN: CPT | Performed by: EMERGENCY MEDICINE

## 2018-06-16 PROCEDURE — 83605 ASSAY OF LACTIC ACID: CPT | Performed by: EMERGENCY MEDICINE

## 2018-06-16 PROCEDURE — 99285 EMERGENCY DEPT VISIT HI MDM: CPT | Mod: 25

## 2018-06-16 RX ORDER — ONDANSETRON 2 MG/ML
4 INJECTION INTRAMUSCULAR; INTRAVENOUS EVERY 6 HOURS PRN
Status: DISCONTINUED | OUTPATIENT
Start: 2018-06-16 | End: 2018-06-18 | Stop reason: HOSPADM

## 2018-06-16 RX ORDER — PRIMIDONE 250 MG/1
250 TABLET ORAL 2 TIMES DAILY
COMMUNITY
End: 2019-01-28

## 2018-06-16 RX ORDER — SODIUM CHLORIDE, SODIUM LACTATE, POTASSIUM CHLORIDE, CALCIUM CHLORIDE 600; 310; 30; 20 MG/100ML; MG/100ML; MG/100ML; MG/100ML
1000 INJECTION, SOLUTION INTRAVENOUS CONTINUOUS
Status: DISCONTINUED | OUTPATIENT
Start: 2018-06-16 | End: 2018-06-18

## 2018-06-16 RX ORDER — IOPAMIDOL 755 MG/ML
100 INJECTION, SOLUTION INTRAVASCULAR ONCE
Status: COMPLETED | OUTPATIENT
Start: 2018-06-16 | End: 2018-06-16

## 2018-06-16 RX ORDER — PRIMIDONE 50 MG/1
250 TABLET ORAL 2 TIMES DAILY
Status: DISCONTINUED | OUTPATIENT
Start: 2018-06-16 | End: 2018-06-18 | Stop reason: HOSPADM

## 2018-06-16 RX ORDER — LISINOPRIL 20 MG/1
20 TABLET ORAL EVERY MORNING
Status: DISCONTINUED | OUTPATIENT
Start: 2018-06-17 | End: 2018-06-18 | Stop reason: HOSPADM

## 2018-06-16 RX ORDER — LISINOPRIL 10 MG/1
10 TABLET ORAL EVERY EVENING
Status: ON HOLD | COMMUNITY
End: 2018-10-20

## 2018-06-16 RX ORDER — HYDROMORPHONE HYDROCHLORIDE 1 MG/ML
.3-.5 INJECTION, SOLUTION INTRAMUSCULAR; INTRAVENOUS; SUBCUTANEOUS
Status: DISCONTINUED | OUTPATIENT
Start: 2018-06-16 | End: 2018-06-18 | Stop reason: HOSPADM

## 2018-06-16 RX ORDER — ERTAPENEM 1 G/1
1 INJECTION, POWDER, LYOPHILIZED, FOR SOLUTION INTRAMUSCULAR; INTRAVENOUS ONCE
Status: COMPLETED | OUTPATIENT
Start: 2018-06-16 | End: 2018-06-16

## 2018-06-16 RX ORDER — LISINOPRIL 10 MG/1
20 TABLET ORAL EVERY MORNING
Status: ON HOLD | COMMUNITY
End: 2018-10-20

## 2018-06-16 RX ORDER — HYDROMORPHONE HYDROCHLORIDE 2 MG/1
2 TABLET ORAL
Status: DISCONTINUED | OUTPATIENT
Start: 2018-06-16 | End: 2018-06-18 | Stop reason: HOSPADM

## 2018-06-16 RX ORDER — ONDANSETRON 4 MG/1
4 TABLET, ORALLY DISINTEGRATING ORAL EVERY 6 HOURS PRN
Status: DISCONTINUED | OUTPATIENT
Start: 2018-06-16 | End: 2018-06-18 | Stop reason: HOSPADM

## 2018-06-16 RX ORDER — LISINOPRIL 10 MG/1
10 TABLET ORAL EVERY EVENING
Status: DISCONTINUED | OUTPATIENT
Start: 2018-06-16 | End: 2018-06-18 | Stop reason: HOSPADM

## 2018-06-16 RX ORDER — HYDROMORPHONE HYDROCHLORIDE 1 MG/ML
0.5 INJECTION, SOLUTION INTRAMUSCULAR; INTRAVENOUS; SUBCUTANEOUS
Status: COMPLETED | OUTPATIENT
Start: 2018-06-16 | End: 2018-06-16

## 2018-06-16 RX ORDER — NALOXONE HYDROCHLORIDE 0.4 MG/ML
.1-.4 INJECTION, SOLUTION INTRAMUSCULAR; INTRAVENOUS; SUBCUTANEOUS
Status: DISCONTINUED | OUTPATIENT
Start: 2018-06-16 | End: 2018-06-18 | Stop reason: HOSPADM

## 2018-06-16 RX ORDER — ALBUTEROL SULFATE 90 UG/1
1 AEROSOL, METERED RESPIRATORY (INHALATION) EVERY 4 HOURS PRN
COMMUNITY
End: 2019-01-28

## 2018-06-16 RX ORDER — ONDANSETRON 2 MG/ML
4 INJECTION INTRAMUSCULAR; INTRAVENOUS EVERY 30 MIN PRN
Status: DISCONTINUED | OUTPATIENT
Start: 2018-06-16 | End: 2018-06-16

## 2018-06-16 RX ORDER — ACETAMINOPHEN 500 MG
500-1000 TABLET ORAL EVERY 6 HOURS PRN
COMMUNITY
End: 2020-05-22

## 2018-06-16 RX ORDER — DILTIAZEM HYDROCHLORIDE 120 MG/1
120 CAPSULE, COATED, EXTENDED RELEASE ORAL DAILY
Status: DISCONTINUED | OUTPATIENT
Start: 2018-06-17 | End: 2018-06-18 | Stop reason: HOSPADM

## 2018-06-16 RX ADMIN — ERTAPENEM SODIUM 1 G: 1 INJECTION, POWDER, LYOPHILIZED, FOR SOLUTION INTRAMUSCULAR; INTRAVENOUS at 10:52

## 2018-06-16 RX ADMIN — IOPAMIDOL 100 ML: 755 INJECTION, SOLUTION INTRAVENOUS at 09:49

## 2018-06-16 RX ADMIN — SODIUM CHLORIDE, POTASSIUM CHLORIDE, SODIUM LACTATE AND CALCIUM CHLORIDE 1000 ML: 600; 310; 30; 20 INJECTION, SOLUTION INTRAVENOUS at 21:30

## 2018-06-16 RX ADMIN — SODIUM CHLORIDE, PRESERVATIVE FREE 65 ML: 5 INJECTION INTRAVENOUS at 09:48

## 2018-06-16 RX ADMIN — ONDANSETRON 4 MG: 2 INJECTION INTRAMUSCULAR; INTRAVENOUS at 09:27

## 2018-06-16 RX ADMIN — TAZOBACTAM SODIUM AND PIPERACILLIN SODIUM 3.38 G: 375; 3 INJECTION, SOLUTION INTRAVENOUS at 21:09

## 2018-06-16 RX ADMIN — Medication 0.5 MG: at 09:27

## 2018-06-16 RX ADMIN — SODIUM CHLORIDE, POTASSIUM CHLORIDE, SODIUM LACTATE AND CALCIUM CHLORIDE 1000 ML: 600; 310; 30; 20 INJECTION, SOLUTION INTRAVENOUS at 13:35

## 2018-06-16 RX ADMIN — HYDROMORPHONE HYDROCHLORIDE 2 MG: 2 TABLET ORAL at 16:06

## 2018-06-16 RX ADMIN — SODIUM CHLORIDE, POTASSIUM CHLORIDE, SODIUM LACTATE AND CALCIUM CHLORIDE 1000 ML: 600; 310; 30; 20 INJECTION, SOLUTION INTRAVENOUS at 09:26

## 2018-06-16 RX ADMIN — LISINOPRIL 10 MG: 10 TABLET ORAL at 21:10

## 2018-06-16 RX ADMIN — TAZOBACTAM SODIUM AND PIPERACILLIN SODIUM 3.38 G: 375; 3 INJECTION, SOLUTION INTRAVENOUS at 15:43

## 2018-06-16 RX ADMIN — FAMOTIDINE 20 MG: 10 INJECTION, SOLUTION INTRAVENOUS at 13:35

## 2018-06-16 RX ADMIN — PRIMIDONE 250 MG: 50 TABLET ORAL at 21:09

## 2018-06-16 ASSESSMENT — ACTIVITIES OF DAILY LIVING (ADL)
ADLS_ACUITY_SCORE: 8
ADLS_ACUITY_SCORE: 8

## 2018-06-16 ASSESSMENT — PAIN DESCRIPTION - DESCRIPTORS: DESCRIPTORS: CONSTANT;CRAMPING

## 2018-06-16 NOTE — IP AVS SNAPSHOT
MRN:[id number]                      After Visit Summary   6/16/2018    Gema Coombs    MRN: [id number]           Thank you!     Thank you for choosing [facility] for your care. Our goal is always to provide you with excellent care. Hearing back from our patients is one way we can continue to improve our services. Please take a few minutes to complete the written survey that you may receive in the mail after you visit. If you would like to speak to someone directly about your visit please contact Patient Relations at [phone]. Thank you!          Patient Information     Date Of Birth          1950        Designated Caregiver       Most Recent Value    Caregiver    Will someone help with your care after discharge? yes    Name of designated caregiver Shreyas ()    Phone number of caregiver [phone]    Caregiver address Same at pt      About your hospital stay     You were admitted on:  June 16, 2018 You last received care in the:  [place] [address] [facility]    You were discharged on:  June 18, 2018        Reason for your hospital stay       Acute sigmoid diverticulitis                  Who to Call     For medical emergencies, please call 911.  For non-urgent questions about your medical care, please call your primary care provider or clinic, [phone]          Attending Provider     Provider Specialty    Beto Heredia MD Emergency Medicine    [facility]       Primary Care Provider Office Phone # Fax #    Flavia Alberts [phone]      After Care Instructions     Activity       Your activity upon discharge: activity as tolerated            Diet       Follow this diet upon discharge: Low residue                  Follow-up Appointments     Follow-up and recommended labs and tests        Recommend follow-up with Dr. Shayne Guallpa at [facility] for bariatric surgery discussion, call [phone]  "for appointments                  Pending Results     No orders found from 6/14/2018 to 6/17/2018.            Statement of Approval     Ordered          06/18/18 1247  I have reviewed and agree with all the recommendations and orders detailed in this document.  EFFECTIVE NOW     Approved and electronically signed by:  Stephen Aguilar MD             Admission Information     Date & Time Provider Department Dept. Phone    6/16/2018 Stephen Aguilar MD [place] [address] [facility] [phone]      Your Vitals Were     Blood Pressure Pulse Temperature Respirations Height Weight    146/78 (BP Location: Left arm) 81 97  F (36.1  C) (Oral) 16 1.626 m (5' 4.02\") 146.1 kg (322 lb 3.2 oz)    Pulse Oximetry BMI (Body Mass Index)                94% 55.31 kg/m2          MyChart Information     You[employer] gives you secure access to your electronic health record. If you see a primary care provider, you can also send messages to your care team and make appointments. If you have questions, please call your primary care clinic.  If you do not have a primary care provider, please call [phone] and they will assist you.        Care EveryWhere ID     This is your Care EveryWhere ID. This could be used by other organizations to access your [place] medical records  [id number]        Equal Access to Services     KYARA CHAPMAN : Ila leyo Sobarry, waaxda luqadaha, qaybta kaalmada adeegyada, simba hernandez. So [facility] [phone].    ATENCIÓN: Si habla español, tiene a teresa disposición servicios gratuitos de asistencia lingüística. Llame al [phone].    We comply with applicable federal civil rights laws and [place] laws. We do not discriminate on the basis of race, color, national origin, age, disability, sex, sexual orientation, or gender identity.               Review of your medicines      START taking        Dose / Directions    amoxicillin-clavulanate 875-125 MG per tablet "   Commonly known as:  AUGMENTIN   Used for:  Diverticulitis of colon   Notes to Patient:  Recommended to take this medication at the start of breakfast and evening meals.         Dose:  1 tablet   Take 1 tablet by mouth 2 times daily for 12 days   Quantity:  24 tablet   Refills:  0       saccharomyces boulardii 250 MG capsule   Commonly known as:  FLORASTOR   Used for:  Diverticulitis of colon   Notes to Patient:  Make sure to wait at LEAST two hours before/after taking this medication, to take your antibiotic        Dose:  250 mg   Take 1 capsule (250 mg) by mouth 2 times daily for 14 days   Quantity:  28 capsule   Refills:  0         CONTINUE these medicines which have NOT CHANGED        Dose / Directions    acetaminophen 500 MG tablet   Commonly known as:  TYLENOL        Dose:  500-1000 mg   Take 500-1,000 mg by mouth every 6 hours as needed for mild pain   Refills:  0       * albuterol 108 (90 Base) MCG/ACT Inhaler   Commonly known as:  PROAIR HFA/PROVENTIL HFA/VENTOLIN HFA        Dose:  1 puff   Inhale 1 puff into the lungs every 4 hours as needed for shortness of breath / dyspnea or wheezing   Refills:  0       * albuterol (2.5 MG/3ML) 0.083% neb solution   Used for:  Mild persistent asthma without complication        Dose:  1 vial   Take 1 vial (2.5 mg) by nebulization every 6 hours as needed for shortness of breath / dyspnea   Quantity:  1 Box   Refills:  1       diltiazem 120 MG 24 hr ER beaded capsule   Commonly known as:  TIAZAC   Used for:  Paroxysmal atrial fibrillation (H)        Dose:  120 mg   Take 1 capsule (120 mg) by mouth daily   Quantity:  90 capsule   Refills:  3       * lisinopril 10 MG tablet   Commonly known as:  PRINIVIL/ZESTRIL        Dose:  20 mg   Take 20 mg by mouth every morning   Refills:  0       * lisinopril 10 MG tablet   Commonly known as:  PRINIVIL/ZESTRIL        Dose:  10 mg   Take 10 mg by mouth every evening   Refills:  0       primidone 250 MG tablet   Commonly known as:   MYSOLINE        Dose:  250 mg   Take 250 mg by mouth 2 times daily   Refills:  0       ranitidine HCl 300 MG Caps        Dose:  1 capsule   Take 1 capsule by mouth daily   Refills:  0       * Notice:  This list has 4 medication(s) that are the same as other medications prescribed for you. Read the directions carefully, and ask your doctor or other care provider to review them with you.         Where to get your medicines      These medications were sent to [id number] - [place], [place] - [address] AT [facility] [address]  [address]RE[address]  [facility], [place] [address]     Phone:  [phone]     amoxicillin-clavulanate 875-125 MG per tablet    saccharomyces boulardii 250 MG capsule                Protect others around you: Learn how to safely use, store and throw away your medicines at www.disposemymeds.org.        ANTIBIOTIC INSTRUCTION     You've Been Prescribed an Antibiotic - Now What?  Your healthcare team thinks that you or your loved one might have an infection. Some infections can be treated with antibiotics, which are powerful, life-saving drugs. Like all medications, antibiotics have side effects and should only be used when necessary. There are some important things you should know about your antibiotic treatment.      Your healthcare team may run tests before you start taking an antibiotic.    Your team may take samples (e.g., from your blood, urine or other areas) to run tests to look for bacteria. These test can be important to determine if you need an antibiotic at all and, if you do, which antibiotic will work best.      Within a few days, your healthcare team might change or even stop your antibiotic.    Your team may start you on an antibiotic while they are working to find out what is making you sick.    Your team might change your antibiotic because test results show that a different antibiotic would be better to treat your infection.    In some cases, once your team has  more information, they learn that you do not need an antibiotic at all. They may find out that you don't have an infection, or that the antibiotic you're taking won't work against your infection. For example, an infection caused by a virus can't be treated with antibiotics. Staying on an antibiotic when you don't need it is more likely to be harmful than helpful.      You may experience side effects from your antibiotic.    Like all medications, antibiotics have side effects. Some of these can be serious.    Let you healthcare team know if you have any known allergies when you are admitted to the hospital.    One significant side effect of nearly all antibiotics is the risk of severe and sometimes deadly diarrhea caused by Clostridium difficile (C. Difficile). This occurs when a person takes antibiotics because some good germs are destroyed. Antibiotic use allows C. diificile to take over, putting patients at high risk for this serious infection.    As a patient or caregiver, it is important to understand your or your loved one's antibiotic treatment. It is especially important for caregivers to speak up when patients can't speak for themselves. Here are some important questions to ask your healthcare team.    What infection is this antibiotic treating and how do you know I have that infection?    What side effects might occur from this antibiotic?    How long will I need to take this antibiotic?    Is it safe to take this antibiotic with other medications or supplements (e.g., vitamins) that I am taking?     Are there any special directions I need to know about taking this antibiotic? For example, should I take it with food?    How will I be monitored to know whether my infection is responding to the antibiotic?    What tests may help to make sure the right antibiotic is prescribed for me?      Information provided by:  www.cdc.gov/getsmart  U.S. Department of Health and Human Services  Centers for disease Control  and Prevention  National Center for Emerging and Zoonotic Infectious Diseases  Division of Healthcare Quality Promotion             Medication List: This is a list of all your medications and when to take them. Check marks below indicate your daily home schedule. Keep this list as a reference.      Medications           Morning Afternoon Evening Bedtime As Needed    acetaminophen 500 MG tablet   Commonly known as:  TYLENOL   Take 500-1,000 mg by mouth every 6 hours as needed for mild pain   Last time this was given:  Not given during hospitalization.    Next Dose Due:  Take AS NEEDED, as prescribed.                                    * albuterol 108 (90 Base) MCG/ACT Inhaler   Commonly known as:  PROAIR HFA/PROVENTIL HFA/VENTOLIN HFA   Inhale 1 puff into the lungs every 4 hours as needed for shortness of breath / dyspnea or wheezing   Last time this was given:  Not given during hospitalization.    Next Dose Due:  Take AS NEEDED, as prescribed.                                    * albuterol (2.5 MG/3ML) 0.083% neb solution   Take 1 vial (2.5 mg) by nebulization every 6 hours as needed for shortness of breath / dyspnea   Last time this was given:  Not given during hospitalization.    Next Dose Due:  Take AS NEEDED, as prescribed.                                    amoxicillin-clavulanate 875-125 MG per tablet   Commonly known as:  AUGMENTIN   Take 1 tablet by mouth 2 times daily for 12 days   Last time this was given:  Not given during hospitalization, as you were receiving IV antibiotics while hospitalized.    Next Dose Due:  Start taking this tonight, Monday 6/18/2018, with evening meal.   Notes to Patient:  Recommended to take this medication at the start of breakfast and evening meals.                                 diltiazem 120 MG 24 hr ER beaded capsule   Commonly known as:  TIAZAC   Take 1 capsule (120 mg) by mouth daily   Last time this was given:  120 mg on 6/18/2018  8:33 AM   Next Dose Due:  Tomorrow  morning, Tuesday 6/19/2018                                * lisinopril 10 MG tablet   Commonly known as:  PRINIVIL/ZESTRIL   Take 20 mg by mouth every morning   Last time this was given:  20 mg on 6/18/2018  8:34 AM   Next Dose Due:  Tomorrow morning, Tuesday 6/19/2018                                * lisinopril 10 MG tablet   Commonly known as:  PRINIVIL/ZESTRIL   Take 10 mg by mouth every evening   Last time this was given:  20 mg on 6/18/2018  8:34 AM   Next Dose Due:  This evening, Monday 6/18/2018, around 8 PM.                                 primidone 250 MG tablet   Commonly known as:  MYSOLINE   Take 250 mg by mouth 2 times daily   Last time this was given:  250 mg on 6/18/2018  8:33 AM   Next Dose Due:  Tonight, Monday 6/18/2018, around 8 PM.                                 ranitidine HCl 300 MG Caps   Take 1 capsule by mouth daily   Last time this was given:  Not given during hospitalization, as you were receiving IV Pepcid while hospitalized. You last received a dose of that at midnight on 6/18/2018.    Next Dose Due:  Tomorrow morning, Tuesday 6/19/2018                                saccharomyces boulardii 250 MG capsule   Commonly known as:  FLORASTOR   Take 1 capsule (250 mg) by mouth 2 times daily for 14 days   Last time this was given:  Not given during hospitalization.    Next Dose Due:  Start taking tonight, Monday 6/18/2018, around bedtime.   Notes to Patient:  Make sure to wait at LEAST two hours before/after taking this medication, to take your antibiotic                                * Notice:  This list has 4 medication(s) that are the same as other medications prescribed for you. Read the directions carefully, and ask your doctor or other care provider to review them with you.              More Information        Amoxicillin; Clavulanic Acid chewable tablets  Brand Name: Augmentin  What is this medicine?  AMOXICILLIN; CLAVULANIC ACID (a mox i AUDRA in; LUC kelly ic AS id) is a penicillin  antibiotic. It is used to treat certain kinds of bacterial infections. It It will not work for colds, flu, or other viral infections.  How should I use this medicine?  Take this medicine by mouth. Chew it completely before swallowing. Follow the directions on the prescription label. Take this medicine at the start of a meal or snack. Take your medicine at regular intervals. Do not take your medicine more often than directed. Take all of your medicine as directed even if you think you are better. Do not skip doses or stop your medicine early.  Talk to your pediatrician regarding the use of this medicine in children. While this drug may be prescribed for selected conditions, precautions do apply.  What side effects may I notice from receiving this medicine?  Side effects that you should report to your doctor or health care professional as soon as possible:    allergic reactions like skin rash, itching or hives, swelling of the face, lips, or tongue    breathing problems    dark urine    fever or chills, sore throat    redness, blistering, peeling or loosening of the skin, including inside the mouth    seizures    trouble passing urine or change in the amount of urine    unusual bleeding, bruising    unusually weak or tired    white patches or sores in the mouth or throat  Side effects that usually do not require medical attention (report to your doctor or health care professional if they continue or are bothersome):    diarrhea    dizziness    headache    nausea, vomiting    stomach upset    vaginal or anal irritation  What may interact with this medicine?    allopurinol    anticoagulants    birth control pills    methotrexate    probenecid    What if I miss a dose?  If you miss a dose, take it as soon as you can. If it is almost time for your next dose, take only that dose. Do not take double or extra doses.  Where should I keep my medicine?  Keep out of the reach of children.  Store at room temperature below 25  degrees C (77 degrees F). Keep container tightly closed. Throw away any unused medicine after the expiration date.  What should I tell my health care provider before I take this medicine?  They need to know if you have any of these conditions:    bowel disease, like colitis    kidney disease    liver disease    mononucleosis    phenylketonuria    an unusual or allergic reaction to amoxicillin, penicillin, cephalosporin, other antibiotics, clavulanic acid, other medicines, foods, dyes, or preservatives    pregnant or trying to get pregnant    breast-feeding  What should I watch for while using this medicine?  Tell your doctor or health care professional if your symptoms do not improve.  Do not treat diarrhea with over the counter products. Contact your doctor if you have diarrhea that lasts more than 2 days or if it is severe and watery.  If you have diabetes, you may get a false-positive result for sugar in your urine. Check with your doctor or health care professional.  Birth control pills may not work properly while you are taking this medicine. Talk to your doctor about using an extra method of birth control.  NOTE:This sheet is a summary. It may not cover all possible information. If you have questions about this medicine, talk to your doctor, pharmacist, or health care provider. Copyright  2018 Elsevier                Saccharomyces boulardii (Florastor) oral dosage forms  What is this medicine?  SACCHAROMYCES boulardii (SAK a lindsay MYE sees pike LAR john eye) is a supplement. It is used to help the normal balance of bacteria in the colon. The FDA has not approved this supplement for any medical use.  How should I use this medicine?  Take this medicine by mouth. Follow the directions on the package labeling, or take as directed by your health care professional. Do not take this medicine more often than directed.    Capsules: Swallow capsules whole or mix contents of capsule with water, apple juice or other  non-carbonated drinks. Capsule contents can also be added to apple sauce and other soft foods. Do not add to hot beverages.    Granules: Mix contents of packet with water, apple juice, or other non-carbonated drinks. For babies, may add to formula after warmed. Can also be added to apple sauce and other soft baby foods. Do not add to hot beverages.    Chewable tablets: Chew tablet completely before swallowing. Can be crushed and sprinkled on tongue. Can also be added to water, apple juice, or other non-carbonated drinks or apple sauce or other soft foods. Do not add to hot beverages.  Contact your pediatrician regarding the use of this medicine in children. Special care may be needed. This medicine is not recommended for children or infants unless prescribed by a doctor.  What side effects may I notice from receiving this medicine?  Side effects that you should report to your doctor or health care professional as soon as possible:    allergic reactions like skin rash, itching or hives, swelling of the face, lips, or tongue    breathing problems    fever    nausea, vomiting    unusually weak or tired  Side effects that usually do not require medical attention (report to your doctor or health care professional if they continue or are bothersome):    constipation    increased thirst    mild gas  What may interact with this medicine?      medications for fungal infections, such as fluconazole, itraconazole, ketoconazole, nystatin, or voriconazole.  What if I miss a dose?  If you miss a dose, take it as soon as you can. If it is almost time for your next dose, take only that dose. Do not take double or extra doses.  Where should I keep my medicine?  Keep out of the reach of children.  Store at room temperature under 25 degrees C (77 degrees F). Keep granule packets or capsules closed until time of use. Throw away any unused medicine after the expiration date.  What should I tell my health care provider before I take this  medicine?  They need to know if you have any of these conditions:    chronic disease    have a central venous catheter    immune system problems    an unusual or allergic reaction to Saccharomyces boulardii, [place]'s yeast or other yeast, lactose or milk (Florastor brand contains lactose), other foods, dyes, or preservatives    pregnant or trying to get pregnant    breast-feeding  What should I watch for while using this medicine?  See your doctor if your symptoms do not get better or if they get worse.  If you have allergies to milk or you are sensitive to lactose, do not use the Florastor brand supplement. Florastor contains lactose.  NOTE:This sheet is a summary. It may not cover all possible information. If you have questions about this medicine, talk to your doctor, pharmacist, or health care provider. Copyright  2018 Elsevier

## 2018-06-16 NOTE — ED NOTES
[facility]  ED Nurse Handoff Report    Gema Coombs is a 67 year old female   ED Chief complaint: Abdominal Pain  . ED Diagnosis:   Final diagnoses:   Diverticulitis of colon     Allergies:   Allergies   Allergen Reactions     No Known Drug Allergy        Code Status: Full Code  Activity level - Baseline/Home:  Independent. Activity Level - Current:   Independent. Lift room needed: No. Bariatric: No   Needed: No   Isolation: No. Infection: Not Applicable.     Vital Signs:   Vitals:    06/16/18 0842 06/16/18 0845 06/16/18 1019 06/16/18 1020   BP:  (!) 173/97 138/76    Resp:  18     Temp:  98.3  F (36.8  C)     TempSrc:  Oral     SpO2:  96%  93%   Weight: 145 kg (319 lb 10.7 oz)          Cardiac Rhythm:  ,      Pain level: 0-10 Pain Scale: 6  Patient confused: No. Patient Falls Risk: Yes.   Elimination Status: Has voided   Patient Report - Initial Complaint: abdominal pain. Focused Assessment: Gastrointestinal - GI Signs/Symptoms: abdominal pain; constipation; nausea Gastrointestinal Comment: h/o abdominal pain. Pt states she usually has diarrhea with diverticulitis flares but is more constipated at this time.   Tests Performed: labs, imaging. Abnormal Results:   Labs Ordered and Resulted from Time of ED Arrival Up to the Time of Departure from the ED   CBC WITH PLATELETS DIFFERENTIAL - Abnormal; Notable for the following:        Result Value    Platelet Count 88 (*)     All other components within normal limits   COMPREHENSIVE METABOLIC PANEL - Abnormal; Notable for the following:     Glucose 104 (*)     Protein Total 6.7 (*)     All other components within normal limits   LACTIC ACID WHOLE BLOOD - Abnormal; Notable for the following:     Lactic Acid 2.4 (*)     All other components within normal limits   ROUTINE UA WITH MICROSCOPIC - Abnormal; Notable for the following:     Blood Urine Small (*)     All other components within normal limits   LIPASE   CREATININE POCT   PULSE OXIMETRY  NURSING   PERIPHERAL IV CATHETER   FREE WATER   ISTAT CREATININE NURSING POCT     CT Abdomen Pelvis w Contrast   Preliminary Result   IMPRESSION:  Acute sigmoid diverticulitis. No evidence for   intraperitoneal free air or abscess formation.      .   Treatments provided: IVF, ABX, Zofran, Dilaudid  Family Comments: at bedside  OBS brochure/video discussed/provided to patient:  No  ED Medications:   Medications   lactated ringers BOLUS 1,000 mL (1,000 mLs Intravenous New Bag 6/16/18 0926)     Followed by   lactated ringers infusion (not administered)   ondansetron (ZOFRAN) injection 4 mg (4 mg Intravenous Given 6/16/18 0927)   ertapenem (INVanz) 1 g vial to attach to  mL bag (not administered)   HYDROmorphone (PF) (DILAUDID) injection 0.5 mg (0.5 mg Intravenous Given 6/16/18 0927)   sodium chloride 0.9 % bag 500mL for CT scan flush use (65 mLs Intravenous Given 6/16/18 0948)   iopamidol (ISOVUE-370) solution 100 mL (100 mLs Intravenous Given 6/16/18 0949)     Drips infusing:  No  For the majority of the shift, the patient's behavior Green. Interventions performed were NA.     Severe Sepsis OR Septic Shock Diagnosis Present: No      ED Nurse Name/Phone Number: Leilani Allison,   10:23 AM    RECEIVING UNIT ED HANDOFF REVIEW    Above ED Nurse Handoff Report was reviewed: Yes  Reviewed by: Farheen Mckinney on June 16, 2018 at 10:58 AM

## 2018-06-16 NOTE — PHARMACY-ADMISSION MEDICATION HISTORY
Admission medication history interview status for this patient is complete. See [facility] admission navigator for allergy information, prior to admission medications and immunization status.     Medication history interview source(s):Patient  Medication history resources (including written lists, pill bottles, clinic record):None  [facility]    Changes made to PTA medication list:  Added: tylenol  Deleted: cipro, flagyl, advil, advair  Changed: None    Actions taken by pharmacist (provider contacted, etc):None     Additional medication history information:  -Was on Cipro and Flagyl in April 2018 for diverticulitis  -Advair stopped by physician due to vision side effects    Medication reconciliation/reorder completed by provider prior to medication history? No    Do you take OTC medications (eg tylenol, ibuprofen, fish oil, eye/ear drops, etc)?  Yes    For patients on insulin therapy: No        Prior to Admission medications    Medication Sig Last Dose Taking? Auth Provider   acetaminophen (TYLENOL) 500 MG tablet Take 500-1,000 mg by mouth every 6 hours as needed for mild pain Past Week at N/A Yes Unknown, Entered By History   albuterol (2.5 MG/3ML) 0.083% neb solution Take 1 vial (2.5 mg) by nebulization every 6 hours as needed for shortness of breath / dyspnea Past Week at N/A Yes Iveth Veras PA-C   albuterol (PROAIR HFA/PROVENTIL HFA/VENTOLIN HFA) 108 (90 Base) MCG/ACT Inhaler Inhale 1 puff into the lungs every 4 hours as needed for shortness of breath / dyspnea or wheezing Past Week at N/A Yes Unknown, Entered By History   diltiazem (TIAZAC) 120 MG 24 hr ER beaded capsule Take 1 capsule (120 mg) by mouth daily 6/16/2018 at AM Yes Flavia Alberts MD   lisinopril (PRINIVIL/ZESTRIL) 10 MG tablet Take 20 mg by mouth every morning 6/16/2018 at AM Yes Unknown, Entered By History   lisinopril (PRINIVIL/ZESTRIL) 10 MG tablet Take 10 mg by mouth every evening 6/15/2018 at PM Yes Unknown,  Entered By History   primidone (MYSOLINE) 250 MG tablet Take 250 mg by mouth 2 times daily 6/16/2018 at AM Yes Unknown, Entered By History   ranitidine HCl 300 MG CAPS Take 1 capsule by mouth daily 6/16/2018 at 0200 Yes Unknown, Entered By History

## 2018-06-16 NOTE — ED PROVIDER NOTES
History     Chief Complaint:  Abdominal Pain      HPI   Gema Coombs is a 67 year old female, with a history of diverticulitis, who presents with  for evaluation of abdominal pain. Patient reports that she was diagnosed several years ago with diverticulitis. She notes that on , she met with her Doctor and was prescribed Flagyl and Cipro which she has been taking since. Patient reports that her abdominal pain had improved but never fully resolved until yesterday when the patient reports that her abdominal pain worsened. The pain is present today which prompts her visit to the ED today. Pain is more on the left and radiates along the abdomen. She reports mild nausea recently but denies nausea now. Patient denies fevers or chills. She denies vomiting. She reports that walking and bumps in the car worsens symptoms. She reports that as far as she knows, her renal function has been okay. She reports history of 2 C sections.     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 She has never used smokeless tobacco. She reports that she drinks alcohol. She reports that she does not use illicit drugs.   Marital Status:   [2]     Review of Systems   All other systems reviewed and are negative.  ROS as in HPI above.     Physical Exam   First Vitals:  BP: (!) 173/97  Heart Rate: 99  Temp: 98.3  F (36.8   C)  Resp: 18  Weight: 145 kg (319 lb 10.7 oz)  SpO2: 96 %      Physical Exam  General: Morbidly Obese  HEENT:   The scalp and head appear normal    The pupils are equal, round, and reactive to light    Extraocular muscles are intact.    The nose is normal.    The oropharynx is normal.      Uvula is in the midline.    Neck:  Normal range of motion.    Lungs:  Clear.      No rales, no wheezing.      There is no tachypnea.  Non-labored.  Cardiac: Regular rate.      Normal S1 and S2.      No pathological murmur/rub    Abdomen: Rebound tenderness left lower quadrant in suprapubic region. Percussive tenderness in left lower quadrant.  MS:  Normal tone. Normal movement of all extremities.   Neurologic:     Normal mentation.  No cranial nerve deficits.  No focal motor or sensory                            changes.      Speech normal.  Psych:  Awake.     Alert.      Normal affect.      Appropriate interactions.  Skin:  No rash.      No lesions.    Emergency Department Course   Imaging:  CT Abdomen Pelvis w Contrast   Preliminary Result   IMPRESSION:  Acute sigmoid diverticulitis. No evidence for   intraperitoneal free air or abscess formation.           Laboratory:  UA: Urine blood small A, otherwise within normal limits  Creatinine POCT: 0.7, GFR Estimate 84, GFR Estimate if black >90  CBC: PLT 88L, otherwise within normal limits   CMP: Glucose 104H, Protein 6.7L, otherwise within normal limits   Lactic Acid 2.4H, otherwise within normal limits  Lipase 120    Interventions:  0926: lactated Bolus 1L  0927: Dilaudid 0.5mg  0927: Zofran     Emergency Department Course:  Past medical records, nursing notes, and vitals reviewed.  0848: I performed an exam of the patient and obtained history, as documented above.       IV inserted and blood drawn for the above work up to be conducted.      Findings and plan explained to the Patient and spouse who consents to admission.     1030: Discussed the patient with Dr. Chambers , who will  admit the patient to an inpatient bed for further monitoring, evaluation, and treatment.     Impression & Plan        Medical Decision Making:  Cynthia White is a 67-year-old female who is had ongoing symptoms of left lower quadrant abdominal pain since being clinically diagnosed in April by her primary doctor with acute sigmoid diverticulitis.  She been on a course of Cipro and Flagyl for 10-14 days but never got fully better and is had issues with pain in that area since.  Last night she developed severe pain in this region and was not able to sleep at all.  She feels nauseous.  She is felt chilled at times but has not had any measurable temperature.    CT imaging today shows acute sigmoid diverticulitis.  My thought is she never got fully resolved from her previous exacerbation.  I think she needs to be admitted for IV antibiotics for failure of outpatient management.  I am going to talk with the hospitalist about admission to medical floor.  Patient shows no signs of sepsis at this point.  White blood cell count is normal but her lactate is elevated and she is receiving fluids currently.  Critical Care time:  none    Diagnosis:    ICD-10-CM    1. Diverticulitis of colon K57.32    2.      Thrombocytopenia    Disposition:  Admitted to in patient care.    Discharge Medications:  New Prescriptions    No medications on file     Angelito FERREIRA, am serving as a scribe at 8:48 AM on 6/16/2018 to document services personally performed by Beto Heredia MD based on my observations and the provider's statements to me.    [facility] EMERGENCY DEPARTMENT              Beto Heredia MD  06/16/18 1908

## 2018-06-16 NOTE — IP AVS SNAPSHOT
[place] [id number] [facility]    [address]    [facility] [address]    Phone:  [phone]    Fax:  [phone]                                       After Visit Summary   6/16/2018    Gema Coombs    MRN: [id number]           After Visit Summary Signature Page     I have received my discharge instructions, and my questions have been answered. I have discussed any challenges I see with this plan with the nurse or doctor.    ..........................................................................................................................................  Patient/Patient Representative Signature      ..........................................................................................................................................  Patient Representative Print Name and Relationship to Patient    ..................................................               ................................................  Date                                            Time    ..........................................................................................................................................  Reviewed by Signature/Title    ...................................................              ..............................................  Date                                                            Time

## 2018-06-16 NOTE — ED TRIAGE NOTES
Patient to ED for evaluation of abdominal pain. Patient states she has h/o diverticulitis and was last on ABX in April.

## 2018-06-16 NOTE — H&P
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  She presents with progressing pain located in the left lower quadrant, present since Thursday night but aggravated in the last 24 hours.  Pain is unbearable at times.  She denies fever.  No nausea or vomiting.  No chills.  No diarrhea or abnormal urination.  She has a well-known history of diverticulosis with diverticulitis.  She has had 5 flareup before this time with similar presentation.  Last episode was in April of this year and she completed 10 days of ciprofloxacin and metronidazole.  In her opinion she never felt back to normal.  She has been seen by Dr. Cordova colorectal surgeon in the distant past.    Her lab workup:  Normal CBC except for platelets 80,000.  Lactic acid 2.4 and rest of the chemistry within normal limits.  EKG normal sinus rhythm.  CT of the abdomen and pelvis.  Acute sigmoid diverticulitis without evidence of perforation or abscess.    Past Medical History    I have reviewed this patient's medical history and updated it with pertinent information if needed.   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  It is her admission #6 with diverticulitis flareup.    1.  Acute sigmoid diverticulitis.  2.  Left lower quadrant abdominal pain secondary to #1.  3.  History of diverticulosis and diverticulitis in the past.  4.  Essential hypertension.  5.  Paroxysmal atrial fibrillation.  6.  History of seizure.  7.  Moderate thrombocytopenia, suspect secondary to diverticulitis.    Plan.    Inpatient.  Telemetry.  N.p.o. except for medication.  IVF ringer lactate 125 mL/h.  Pain controlled with Dilaudid tablets p.o. or IV depending on tolerance.  Zosyn 3.375 g IV every 6 hours.  Famotidine 20 mg IV every 12 hours.  Primidone 250 mg 1 tablet twice a day  Diltiazem and XT 12 milligrams daily.  Lisinopril as prior to admission for blood pressure control.  No aspirin, close follow-up of platelet number.   CRS consultation.        Code Status   Full Code    Primary Care Physician   Flavia Alberts      Past Surgical History   I have reviewed this patient's surgical history and updated it with pertinent information if needed.  Past Surgical History:   Procedure Laterality Date     BIOPSY OF BREAST, INCISIONAL      L breasts     BIOPSY OF BREAST, NEEDLE CORE  &    R breasts     C ANORECTAL MYOMECTOMY      uterine fibroids     C  DELIVERY ONLY      x 2     LAPAROSCOPY,LYSIS ADHESNS      pain R side poss r/t c-sections       Prior to Admission Medications   Prior to Admission Medications   Prescriptions Last Dose Informant Patient Reported? Taking?   acetaminophen (TYLENOL) 500 MG tablet Past Week at N/A Self Yes Yes   Sig: Take 500-1,000 mg by mouth every 6 hours as needed for mild pain   albuterol (2.5 MG/3ML) 0.083% neb solution Past Week at N/A Self No Yes   Sig: Take 1 vial (2.5 mg) by nebulization every 6 hours as needed for shortness of breath / dyspnea   albuterol (PROAIR HFA/PROVENTIL  HFA/VENTOLIN HFA) 108 (90 Base) MCG/ACT Inhaler Past Week at N/A Self Yes Yes   Sig: Inhale 1 puff into the lungs every 4 hours as needed for shortness of breath / dyspnea or wheezing   diltiazem (TIAZAC) 120 MG 24 hr ER beaded capsule 2018 at AM Self No Yes   Sig: Take 1 capsule (120 mg) by mouth daily   lisinopril (PRINIVIL/ZESTRIL) 10 MG tablet 2018 at AM Self Yes Yes   Sig: Take 20 mg by mouth every morning   lisinopril (PRINIVIL/ZESTRIL) 10 MG tablet 6/15/2018 at PM Self Yes Yes   Sig: Take 10 mg by mouth every evening   primidone (MYSOLINE) 250 MG tablet 2018 at AM Self Yes Yes   Sig: Take 250 mg by mouth 2 times daily   ranitidine HCl 300 MG CAPS 2018 at 0200 Self Yes Yes   Sig: Take 1 capsule by mouth daily      Facility-Administered Medications: None     Allergies   Allergies   Allergen Reactions     No Known Drug Allergy        Social History   I have personally reviewed the social history with the patient showing.  Social History   Substance Use Topics     Smoking status: Never Smoker     Smokeless tobacco: Never Used     Alcohol use 0.0 oz/week     0 drink(s) per week      Comment: rare     Family History   I have reviewed this patient's family history and updated it with pertinent information if needed.   Family History   Problem Relation Age of Onset     CANCER Maternal Aunt      breast-     CANCER Maternal Grandmother      breast-     HEART DISEASE Paternal Grandfather      heartattack-  at 58     HEART DISEASE Maternal Grandfather           HEART DISEASE Paternal Uncle      bypass-alive &well     Thyroid Disease Sister      hypothyroidism-alive       Review of Systems   Except as noted in the HPI, a 12-system Review of Systems was found to be negative.  Specifically:     General:  No chronic fatigue, unexpected weight gain or weight loss.  ENT:  No ear or sinus infections.  Respiratory:  No wheezing, dyspnea on exertion,  or chronic cough.  CV:   No chest pain, cyanosis, palpitations, dizziness, or fainting.  GI:  Abdominal pain, reflux symptoms, nausea, vomiting or diarrhea.  Allergy / Immunology:  No allergy symptoms (itching, sneezing, watery eyes, rhinorrhea, congestion, or post-nasal drip).  Endocrine:  No hot or cold intolerance, excessive sweating, excessive thirst, or frequent urination.  Skin:  No problems with rashes, sensitive skin, or eczema.  Neurologic:  No headaches, weakness, numbness, dizziness, or seizures.  Musculoskeletal:  No muscle or joint pain.  Lymphatic:  No swollen lymph nodes.  Psychiatric:  No depression, anxiety, or sleep disorder.    Physical Exam   Vital Signs with Ranges  Temp:  [98.3  F (36.8  C)] 98.3  F (36.8  C)  Heart Rate:  [96-99] 96  Resp:  [18] 18  BP: (138-173)/(76-97) 138/76  SpO2:  [93 %-96 %] 93 %  319 lbs 10.67 oz    GEN: Obese.  Alert, oriented x 3, appears comfortable, NAD.  HEENT:  Normocephalic/atraumatic, no scleral icterus, no nasal discharge, mouth moist.  CV:  Regular rate and rhythm, no murmur or JVD.  S1 + S2 noted, no S3 or S4.  LUNGS:  Clear to auscultation bilaterally without rales/rhonchi/wheezing/retractions.  Symmetric chest rise on inhalation noted.  ABD:  Active bowel sounds, soft, tenderness in left lower quadrant/non-distended.  No rebound/guarding/rigidity.  EXT:  No edema or cyanosis.  No joint synovitis noted.  SKIN:  Dry to touch, no exanthems noted in the visualized areas.       Data   I personally reviewed the EKG tracing showing Normal sinus rhythm..  Results for orders placed or performed during the hospital encounter of 06/16/18 (from the past 24 hour(s))   CBC with platelets differential   Result Value Ref Range    WBC 7.8 4.0 - 11.0 10e9/L    RBC Count 4.91 3.8 - 5.2 10e12/L    Hemoglobin 14.5 11.7 - 15.7 g/dL    Hematocrit 44.5 35.0 - 47.0 %    MCV 91 78 - 100 fl    MCH 29.5 26.5 - 33.0 pg    MCHC 32.6 31.5 - 36.5 g/dL    RDW 13.7 10.0 - 15.0 %    Platelet Count 88 (L) 150 - 450  10e9/L    Diff Method Automated Method     % Neutrophils 77.0 %    % Lymphocytes 14.5 %    % Monocytes 7.9 %    % Eosinophils 0.0 %    % Basophils 0.1 %    % Immature Granulocytes 0.5 %    Nucleated RBCs 0 0 /100    Absolute Neutrophil 6.0 1.6 - 8.3 10e9/L    Absolute Lymphocytes 1.1 0.8 - 5.3 10e9/L    Absolute Monocytes 0.6 0.0 - 1.3 10e9/L    Absolute Eosinophils 0.0 0.0 - 0.7 10e9/L    Absolute Basophils 0.0 0.0 - 0.2 10e9/L    Abs Immature Granulocytes 0.0 0 - 0.4 10e9/L    Absolute Nucleated RBC 0.0    Comprehensive metabolic panel   Result Value Ref Range    Sodium 140 133 - 144 mmol/L    Potassium 3.7 3.4 - 5.3 mmol/L    Chloride 106 94 - 109 mmol/L    Carbon Dioxide 27 20 - 32 mmol/L    Anion Gap 7 3 - 14 mmol/L    Glucose 104 (H) 70 - 99 mg/dL    Urea Nitrogen 8 7 - 30 mg/dL    Creatinine 0.71 0.52 - 1.04 mg/dL    GFR Estimate 82 >60 mL/min/1.7m2    GFR Estimate If Black >90 >60 mL/min/1.7m2    Calcium 8.5 8.5 - 10.1 mg/dL    Bilirubin Total 0.4 0.2 - 1.3 mg/dL    Albumin 3.5 3.4 - 5.0 g/dL    Protein Total 6.7 (L) 6.8 - 8.8 g/dL    Alkaline Phosphatase 93 40 - 150 U/L    ALT 16 0 - 50 U/L    AST 16 0 - 45 U/L   Lactic acid whole blood   Result Value Ref Range    Lactic Acid 2.4 (H) 0.7 - 2.0 mmol/L   Lipase   Result Value Ref Range    Lipase 120 73 - 393 U/L   Creatinine POCT   Result Value Ref Range    Creatinine 0.7 0.52 - 1.04 mg/dL    GFR Estimate 84 >60 mL/min/1.7m2    GFR Estimate If Black >90 >60 mL/min/1.7m2   UA with Microscopic   Result Value Ref Range    Color Urine Yellow     Appearance Urine Clear     Glucose Urine Negative NEG^Negative mg/dL    Bilirubin Urine Negative NEG^Negative    Ketones Urine Negative NEG^Negative mg/dL    Specific Gravity Urine 1.010 1.003 - 1.035    Blood Urine Small (A) NEG^Negative    pH Urine 6.0 5.0 - 7.0 pH    Protein Albumin Urine Negative NEG^Negative mg/dL    Urobilinogen mg/dL 0.0 0.0 - 2.0 mg/dL    Nitrite Urine Negative NEG^Negative    Leukocyte Esterase  Urine Negative NEG^Negative    Source Catheterized Urine     WBC Urine 0 0 - 5 /HPF    RBC Urine 0 0 - 2 /HPF   CT Abdomen Pelvis w Contrast    Narrative    CT ABDOMEN AND PELVIS WITH CONTRAST  6/16/2018 9:58 AM     HISTORY: Left lower quadrant abdominal pain, history of  diverticulitis.     COMPARISON: December 19, 2016.    TECHNIQUE: Volumetric helical acquisition of CT images from the lung  bases through the symphysis pubis were acquired after administration  of intravenous contrast. Coronal images reconstructed from axial image  data. Radiation dose for this scan was reduced using automated  exposure control, adjustment of the mA and/or kV according to patient  size, or iterative reconstruction technique.    FINDINGS: There is inflammation around the sigmoid colon consistent  with acute diverticulitis. No evidence for intraperitoneal free air or  abscess formation. Moderate diverticulosis. There are mild  atherosclerotic changes of the visualized aorta and its branches.  There is no evidence of aortic dissection or aneurysm. No  hydronephrosis. Unremarkable appendix. Unremarkable gallbladder. The  liver, spleen, adrenal glands, kidneys and pancreas demonstrate no  worrisome focal lesion. Lung bases are clear. No pleural or  pericardial effusion. Bone windows reveal no suspicious lesions. No  free air in the abdomen. There are no abdominal or pelvic lymph nodes  that are abnormal by size criteria. There are no dilated loops of  small intestine or large bowel to suggest ileus or obstruction.      Impression    IMPRESSION:  Acute sigmoid diverticulitis. No evidence for  intraperitoneal free air or abscess formation.    SARAH EDOUARD MD       Disclaimer: This note consists of symbols derived from keyboarding, dictation and/or voice recognition software. As a result, there may be errors in the script that have gone undetected. Please consider this when interpreting information found in this chart.

## 2018-06-16 NOTE — PLAN OF CARE
A&O x 4. Up with SBA. PO dilaudid for pain, IV if PO not working (plan to stay with PO if possible). NPO. NSR on tele. IV zosyn, LR at 125 ml/hr.   CRS

## 2018-06-17 LAB
BASOPHILS # BLD AUTO: 0 10E9/L (ref 0–0.2)
BASOPHILS NFR BLD AUTO: 0.2 %
DIFFERENTIAL METHOD BLD: ABNORMAL
EOSINOPHIL # BLD AUTO: 0 10E9/L (ref 0–0.7)
EOSINOPHIL NFR BLD AUTO: 0 %
ERYTHROCYTE [DISTWIDTH] IN BLOOD BY AUTOMATED COUNT: 13.6 % (ref 10–15)
HCT VFR BLD AUTO: 41.6 % (ref 35–47)
HGB BLD-MCNC: 13.3 G/DL (ref 11.7–15.7)
IMM GRANULOCYTES # BLD: 0 10E9/L (ref 0–0.4)
IMM GRANULOCYTES NFR BLD: 0.4 %
LYMPHOCYTES # BLD AUTO: 0.7 10E9/L (ref 0.8–5.3)
LYMPHOCYTES NFR BLD AUTO: 13.7 %
MCH RBC QN AUTO: 29.3 PG (ref 26.5–33)
MCHC RBC AUTO-ENTMCNC: 32 G/DL (ref 31.5–36.5)
MCV RBC AUTO: 92 FL (ref 78–100)
MONOCYTES # BLD AUTO: 0.4 10E9/L (ref 0–1.3)
MONOCYTES NFR BLD AUTO: 7.9 %
NEUTROPHILS # BLD AUTO: 4 10E9/L (ref 1.6–8.3)
NEUTROPHILS NFR BLD AUTO: 77.8 %
NRBC # BLD AUTO: 0 10*3/UL
NRBC BLD AUTO-RTO: 0 /100
PLATELET # BLD AUTO: 88 10E9/L (ref 150–450)
RBC # BLD AUTO: 4.54 10E12/L (ref 3.8–5.2)
WBC # BLD AUTO: 5.2 10E9/L (ref 4–11)

## 2018-06-17 PROCEDURE — 25000128 H RX IP 250 OP 636: Performed by: EMERGENCY MEDICINE

## 2018-06-17 PROCEDURE — 36415 COLL VENOUS BLD VENIPUNCTURE: CPT | Performed by: HOSPITALIST

## 2018-06-17 PROCEDURE — 12000000 ZZH R&B MED SURG/OB

## 2018-06-17 PROCEDURE — 25000125 ZZHC RX 250: Performed by: HOSPITALIST

## 2018-06-17 PROCEDURE — 25000132 ZZH RX MED GY IP 250 OP 250 PS 637: Performed by: HOSPITALIST

## 2018-06-17 PROCEDURE — 25000128 H RX IP 250 OP 636: Performed by: HOSPITALIST

## 2018-06-17 PROCEDURE — 99233 SBSQ HOSP IP/OBS HIGH 50: CPT | Performed by: HOSPITALIST

## 2018-06-17 PROCEDURE — 85025 COMPLETE CBC W/AUTO DIFF WBC: CPT | Performed by: HOSPITALIST

## 2018-06-17 RX ADMIN — FAMOTIDINE 20 MG: 10 INJECTION, SOLUTION INTRAVENOUS at 00:07

## 2018-06-17 RX ADMIN — TAZOBACTAM SODIUM AND PIPERACILLIN SODIUM 3.38 G: 375; 3 INJECTION, SOLUTION INTRAVENOUS at 04:03

## 2018-06-17 RX ADMIN — PRIMIDONE 250 MG: 50 TABLET ORAL at 09:03

## 2018-06-17 RX ADMIN — TAZOBACTAM SODIUM AND PIPERACILLIN SODIUM 3.38 G: 375; 3 INJECTION, SOLUTION INTRAVENOUS at 09:56

## 2018-06-17 RX ADMIN — PRIMIDONE 250 MG: 50 TABLET ORAL at 20:04

## 2018-06-17 RX ADMIN — TAZOBACTAM SODIUM AND PIPERACILLIN SODIUM 3.38 G: 375; 3 INJECTION, SOLUTION INTRAVENOUS at 15:56

## 2018-06-17 RX ADMIN — FAMOTIDINE 20 MG: 10 INJECTION, SOLUTION INTRAVENOUS at 11:46

## 2018-06-17 RX ADMIN — SODIUM CHLORIDE, POTASSIUM CHLORIDE, SODIUM LACTATE AND CALCIUM CHLORIDE 1000 ML: 600; 310; 30; 20 INJECTION, SOLUTION INTRAVENOUS at 06:18

## 2018-06-17 RX ADMIN — SODIUM CHLORIDE, POTASSIUM CHLORIDE, SODIUM LACTATE AND CALCIUM CHLORIDE 1000 ML: 600; 310; 30; 20 INJECTION, SOLUTION INTRAVENOUS at 15:56

## 2018-06-17 RX ADMIN — LISINOPRIL 20 MG: 20 TABLET ORAL at 09:03

## 2018-06-17 RX ADMIN — LISINOPRIL 10 MG: 10 TABLET ORAL at 20:04

## 2018-06-17 RX ADMIN — TAZOBACTAM SODIUM AND PIPERACILLIN SODIUM 3.38 G: 375; 3 INJECTION, SOLUTION INTRAVENOUS at 22:11

## 2018-06-17 RX ADMIN — DILTIAZEM HYDROCHLORIDE 120 MG: 120 CAPSULE, COATED, EXTENDED RELEASE ORAL at 09:03

## 2018-06-17 ASSESSMENT — ACTIVITIES OF DAILY LIVING (ADL)
ADLS_ACUITY_SCORE: 8

## 2018-06-17 NOTE — PROGRESS NOTES
[facility]    Hospitalist Progress Note    Date of Service (when I saw the patient): 06/17/2018  Provider:  Stephen Aguilar MD     Initial presenting complaint/issue to hospital (Diagnosis): LLQ abdomen pain  Assessment & Plan   Gema Coombs is a 67 year old female who presents with left lower quadrant abdominal  pain progressing in the last 3 days preceding the admission and radiological evidence of acute sigmoid diverticulitis.  It is her admission #6 with diverticulitis flareup.     1.  Acute sigmoid diverticulitis.  2.  Left lower quadrant abdominal pain secondary to #1.  3.  History of diverticulosis and diverticulitis in the past.  4.  Essential hypertension.  5.  Paroxysmal atrial fibrillation.  6.  History of seizure.  7.  Moderate thrombocytopenia, suspect secondary to diverticulitis.     Plan.    Inpatient.  Telemetry.  ADAT per CRS advice..  IVF ringer lactate 75 mL/h.  Pain controlled with Dilaudid tablets p.o. or IV depending on tolerance.  Zosyn 3.375 g IV every 6 hours.  Famotidine 20 mg IV every 12 hours.  Primidone 250 mg 1 tablet twice a day  Diltiazem and XT 12 milligrams daily.  Lisinopril as prior to admission for blood pressure control.  No aspirin, close follow-up of platelet number.   CRS consultation appreciatedapreciated.            # Pain Assessment:  Current Pain Score 6/17/2018   Patient currently in pain? denies   Pain score (0-10) -   Pain location -   Pain descriptors -   Gema may pain level was assessed and she currently denies pain.        DVT Prophylaxis: Pneumatic Compression Devices  Code Status: Full Code    Disposition: Expected discharge in 24 hours.     Interval History   She is feeling better, less pain, no nausea or vomiting.  Colorectal surgeon has seen her today, recommending advance diet as tolerated.    -Data reviewed today: I reviewed all new labs and imaging results over the last 24 hours. I personally reviewed the EKG tracing showing  sinus rhythm in  the monitor.    Physical Exam   Temp: 96.8  F (36  C) Temp src: Oral BP: 164/60 Pulse: 89 Heart Rate: 87 Resp: 16 SpO2: 95 % O2 Device: None (Room air)    Vitals:    06/16/18 0842 06/16/18 1117   Weight: 145 kg (319 lb 10.7 oz) 146.1 kg (322 lb 3.2 oz)     Vital Signs with Ranges  Temp:  [96.8  F (36  C)-98.9  F (37.2  C)] 96.8  F (36  C)  Pulse:  [89] 89  Heart Rate:  [76-87] 87  Resp:  [16-18] 16  BP: (121-164)/(46-61) 164/60  SpO2:  [92 %-95 %] 95 %  I/O last 3 completed shifts:  In: 1748 [I.V.:1748]  Out: -     GEN:  Alert, oriented x 3, appears comfortable, NAD.  HEENT:  Normocephalic/atraumatic, no scleral icterus, no nasal discharge, mouth moist.  CV:  Regular rate and rhythm, no murmur or JVD.  S1 + S2 noted, no S3 or S4.  LUNGS:  Clear to auscultation bilaterally without rales/rhonchi/wheezing/retractions.  Symmetric chest rise on inhalation noted.  ABD:  Active bowel sounds, soft, tender in LLQ/non-distended.  No rebound/guarding/rigidity.  EXT:  No edema or cyanosis.  No joint synovitis noted.  SKIN:  Dry to touch, no exanthems noted in the visualized areas.       Medications     lactated ringers 1,000 mL (06/17/18 0618)       diltiazem  120 mg Oral Daily     famotidine  20 mg Intravenous Q12H     lisinopril  10 mg Oral QPM     lisinopril  20 mg Oral QAM     piperacillin-tazobactam  3.375 g Intravenous Q6H     primidone  250 mg Oral BID       Data     Recent Labs  Lab 06/16/18  0854   WBC 7.8   HGB 14.5   MCV 91   PLT 88*      POTASSIUM 3.7   CHLORIDE 106   CO2 27   BUN 8   CR 0.71   ANIONGAP 7   TONIA 8.5   *   ALBUMIN 3.5   PROTTOTAL 6.7*   BILITOTAL 0.4   ALKPHOS 93   ALT 16   AST 16   LIPASE 120       No results found for this or any previous visit (from the past 24 hour(s)).          Disclaimer: This note consists of symbols derived from keyboarding, dictation and/or voice recognition software. As a result, there may be errors in the script that have gone undetected. Please consider this  when interpreting information found in this chart.

## 2018-06-17 NOTE — PLAN OF CARE
Problem: Patient Care Overview  Goal: Plan of Care/Patient Progress Review  Ambulatory Status:  Pt up independently  VS:  vss  Pain:  Minimal pain, declining intervention  Resp: LS diminished  GI:  denies nausea.  NPO with ice and meds diet.  BS active.  Passing flatus.  Last BM 6/16.  Tx:  zosyn  Consults:  colorectal  Disposition:  tbd  Tele:

## 2018-06-17 NOTE — PROGRESS NOTES
[facility]  Colon and Rectal Surgery Consult Note  Name: Gema Coombs    MRN: [id number]  YOB: 1950    Age: 67 year old  Date of admission: 2018  Primary care provider: Flavia Alberts     Requesting Physician:  Stephen Aguilar MD   Reason for consult:  Diverticulitis.           History of Present Illness:     Gema Coombs is a 67 year old morbidly obese female with BMI of 55.1 who presents with what she claims is her 5th episode of diverticulitis, the last being in 2018, treated with 10 days of Cipro/Flagyl. Reviewing the imaging, it appears she has had CT scan documented sigmoid diverticulitis on the following dates: 09, 10/12/10, 13 (questionable) and 16. Also had CT scan 14 which was not read as diverticulitis.   Pain present for last 3 days, worsened yesterday and brought her to ED at [facility]. Notes nagging symptoms since April attack.   Denies N/V/Fever/Chills/Urinary complaints    On presentation to ED, was AVSS, non-toxic, WBC 7.8  CT scan personally reviewed and consistent with uncomplicated sigmoid diverticulitis.                     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      Hypertension      Paroxysmal atrial fibrillation (H) 16             Past Surgical History:     Past Surgical History:   Procedure Laterality Date     BIOPSY OF BREAST, INCISIONAL      L breasts     BIOPSY OF BREAST, NEEDLE CORE  &    R breasts     C ANORECTAL MYOMECTOMY      uterine fibroids     C  DELIVERY ONLY      x 2     LAPAROSCOPY,LYSIS ADHESNS  1985    pain R side poss r/t c-sections               Social History:     Social History   Substance Use Topics     Smoking status: Never Smoker     Smokeless tobacco: Never Used     Alcohol use 0.0 oz/week     0 drink(s) per week      Comment:  "rare             Family History:     Family History   Problem Relation Age of Onset     CANCER Maternal Aunt      breast-     CANCER Maternal Grandmother      breast-     HEART DISEASE Paternal Grandfather      heartattack-  at 58     HEART DISEASE Maternal Grandfather           HEART DISEASE Paternal Uncle      bypass-alive &well     Thyroid Disease Sister      hypothyroidism-alive             Allergies:     Allergies   Allergen Reactions     No Known Drug Allergy              Medications:       diltiazem  120 mg Oral Daily     famotidine  20 mg Intravenous Q12H     lisinopril  10 mg Oral QPM     lisinopril  20 mg Oral QAM     piperacillin-tazobactam  3.375 g Intravenous Q6H     primidone  250 mg Oral BID             Review of Systems:   A comprehensive greater than 10 system review of systems was carried out.  Pertinent positives and negatives are noted above.  Otherwise negative for contributory info.            Physical Exam:   Blood pressure 149/61, pulse 89, temperature 97.3  F (36.3  C), temperature source Oral, resp. rate 18, height 1.626 m (5' 4\"), weight 146.1 kg (322 lb 3.2 oz), SpO2 93 %, currently breastfeeding.    Intake/Output Summary (Last 24 hours) at 18 0814  Last data filed at 18 0558   Gross per 24 hour   Intake             1748 ml   Output                0 ml   Net             1748 ml     Exam:  Awake alert and oriented  Non labored breathing  abd soft, minimally tender, obese  Extremities warm without edema         Data:     Recent Results (from the past 24 hour(s))   CT Abdomen Pelvis w Contrast    Narrative    CT ABDOMEN AND PELVIS WITH CONTRAST  2018 9:58 AM     HISTORY: Left lower quadrant abdominal pain, history of  diverticulitis.     COMPARISON: 2016.    TECHNIQUE: Volumetric helical acquisition of CT images from the lung  bases through the symphysis pubis were acquired after administration  of intravenous contrast. " Coronal images reconstructed from axial image  data. Radiation dose for this scan was reduced using automated  exposure control, adjustment of the mA and/or kV according to patient  size, or iterative reconstruction technique.    FINDINGS: There is inflammation around the sigmoid colon consistent  with acute diverticulitis. No evidence for intraperitoneal free air or  abscess formation. Moderate diverticulosis. There are mild  atherosclerotic changes of the visualized aorta and its branches.  There is no evidence of aortic dissection or aneurysm. No  hydronephrosis. Unremarkable appendix. Unremarkable gallbladder. The  liver, spleen, adrenal glands, kidneys and pancreas demonstrate no  worrisome focal lesion. Lung bases are clear. No pleural or  pericardial effusion. Bone windows reveal no suspicious lesions. No  free air in the abdomen. There are no abdominal or pelvic lymph nodes  that are abnormal by size criteria. There are no dilated loops of  small intestine or large bowel to suggest ileus or obstruction.      Impression    IMPRESSION:  Acute sigmoid diverticulitis. No evidence for  intraperitoneal free air or abscess formation.    SARAH EDOUARD MD         Recent Labs  Lab 06/16/18  0854   WBC 7.8   HGB 14.5   HCT 44.5   MCV 91   PLT 88*          Lab Results   Component Value Date     06/16/2018     04/13/2018     09/13/2017    Lab Results   Component Value Date    CHLORIDE 106 06/16/2018    CHLORIDE 104 04/13/2018    CHLORIDE 105 09/13/2017    Lab Results   Component Value Date    BUN 8 06/16/2018    BUN 16 04/13/2018    BUN NOT APPLICABLE 04/13/2018    BUN 11 09/13/2017    BUN NOT APPLICABLE 09/13/2017      Lab Results   Component Value Date    POTASSIUM 3.7 06/16/2018    POTASSIUM 3.9 04/13/2018    POTASSIUM 4.1 09/13/2017    Lab Results   Component Value Date    CO2 27 06/16/2018    CO2 24 04/13/2018    CO2 26 09/13/2017    Lab Results   Component Value Date    CR 0.71 06/16/2018    CR  0.74 04/13/2018    CR 0.75 09/13/2017            Assessment and Plan:   68 yo F with BMI 55.1 and 5th attack of uncomplicated diverticulitis dating back to 2009. Complains of smoldering symptoms since April 2018 attack.  AVSS, non-toxic, not in need of urgent surgery.  Discussed concept of surgery for diverticulitis with no absolute indication in this setting, but smoldering symptoms may warrant eventual consideration if unable to resolve.  Would recommend another round of po abx (14 days total with Augmentin, to try something different) to see if we can get resolution.  May want to consider bariatric surgery to set up for a more successful colectomy if needed. Patient has been thinking about this anyway to allow her to do other things she misses (ride horses).  Also concerned about a future trip to [place] for son's wedding and fears an attack on that trip - discussed sending her with abx if needed.  OK to adat per CRS.  Is completely non-toxic. D/C home when able to tolerate po, take abx and pain controlled with orals.  Bart Espino

## 2018-06-17 NOTE — PLAN OF CARE
Neuro: A&O x 4  Resp: WNL  Cardiac: NSR on tele  GI/: c/o boating. Diet changed to clear liquid   Mobility: independent  Pain: min. Amount of abdominal discomfort- denies need for intervention  IV: LR at 75 ml/hr  Plan: continue IV zosyn,   CRS following

## 2018-06-18 VITALS
BODY MASS INDEX: 50.02 KG/M2 | TEMPERATURE: 97 F | HEART RATE: 81 BPM | WEIGHT: 293 LBS | HEIGHT: 64 IN | OXYGEN SATURATION: 94 % | SYSTOLIC BLOOD PRESSURE: 146 MMHG | DIASTOLIC BLOOD PRESSURE: 78 MMHG | RESPIRATION RATE: 16 BRPM

## 2018-06-18 LAB
BASOPHILS # BLD AUTO: 0 10E9/L (ref 0–0.2)
BASOPHILS NFR BLD AUTO: 0.2 %
CRP SERPL-MCNC: 69.9 MG/L (ref 0–8)
DIFFERENTIAL METHOD BLD: ABNORMAL
EOSINOPHIL # BLD AUTO: 0 10E9/L (ref 0–0.7)
EOSINOPHIL NFR BLD AUTO: 0 %
ERYTHROCYTE [DISTWIDTH] IN BLOOD BY AUTOMATED COUNT: 13.5 % (ref 10–15)
HCT VFR BLD AUTO: 41.9 % (ref 35–47)
HGB BLD-MCNC: 13.4 G/DL (ref 11.7–15.7)
IMM GRANULOCYTES # BLD: 0 10E9/L (ref 0–0.4)
IMM GRANULOCYTES NFR BLD: 0.7 %
LYMPHOCYTES # BLD AUTO: 1.1 10E9/L (ref 0.8–5.3)
LYMPHOCYTES NFR BLD AUTO: 27 %
MCH RBC QN AUTO: 29 PG (ref 26.5–33)
MCHC RBC AUTO-ENTMCNC: 32 G/DL (ref 31.5–36.5)
MCV RBC AUTO: 91 FL (ref 78–100)
MONOCYTES # BLD AUTO: 0.3 10E9/L (ref 0–1.3)
MONOCYTES NFR BLD AUTO: 8.1 %
NEUTROPHILS # BLD AUTO: 2.7 10E9/L (ref 1.6–8.3)
NEUTROPHILS NFR BLD AUTO: 64 %
NRBC # BLD AUTO: 0 10*3/UL
NRBC BLD AUTO-RTO: 0 /100
PLATELET # BLD AUTO: 88 10E9/L (ref 150–450)
RBC # BLD AUTO: 4.62 10E12/L (ref 3.8–5.2)
WBC # BLD AUTO: 4.2 10E9/L (ref 4–11)

## 2018-06-18 PROCEDURE — 86140 C-REACTIVE PROTEIN: CPT | Performed by: HOSPITALIST

## 2018-06-18 PROCEDURE — 25000132 ZZH RX MED GY IP 250 OP 250 PS 637: Performed by: HOSPITALIST

## 2018-06-18 PROCEDURE — 25000128 H RX IP 250 OP 636: Performed by: EMERGENCY MEDICINE

## 2018-06-18 PROCEDURE — 25000125 ZZHC RX 250: Performed by: HOSPITALIST

## 2018-06-18 PROCEDURE — 85025 COMPLETE CBC W/AUTO DIFF WBC: CPT | Performed by: HOSPITALIST

## 2018-06-18 PROCEDURE — 36415 COLL VENOUS BLD VENIPUNCTURE: CPT | Performed by: HOSPITALIST

## 2018-06-18 PROCEDURE — 99239 HOSP IP/OBS DSCHRG MGMT >30: CPT | Performed by: HOSPITALIST

## 2018-06-18 PROCEDURE — 25000128 H RX IP 250 OP 636: Performed by: HOSPITALIST

## 2018-06-18 PROCEDURE — 25000128 H RX IP 250 OP 636: Performed by: PHYSICIAN ASSISTANT

## 2018-06-18 RX ORDER — SACCHAROMYCES BOULARDII 250 MG
250 CAPSULE ORAL 2 TIMES DAILY
Qty: 28 CAPSULE | Refills: 0 | Status: SHIPPED | OUTPATIENT
Start: 2018-06-18 | End: 2019-02-25

## 2018-06-18 RX ORDER — FAMOTIDINE 20 MG/1
20 TABLET, FILM COATED ORAL EVERY 12 HOURS
Status: DISCONTINUED | OUTPATIENT
Start: 2018-06-18 | End: 2018-06-18 | Stop reason: HOSPADM

## 2018-06-18 RX ORDER — SODIUM CHLORIDE, SODIUM LACTATE, POTASSIUM CHLORIDE, CALCIUM CHLORIDE 600; 310; 30; 20 MG/100ML; MG/100ML; MG/100ML; MG/100ML
1000 INJECTION, SOLUTION INTRAVENOUS CONTINUOUS
Status: DISCONTINUED | OUTPATIENT
Start: 2018-06-18 | End: 2018-06-18 | Stop reason: HOSPADM

## 2018-06-18 RX ADMIN — TAZOBACTAM SODIUM AND PIPERACILLIN SODIUM 3.38 G: 375; 3 INJECTION, SOLUTION INTRAVENOUS at 09:34

## 2018-06-18 RX ADMIN — PRIMIDONE 250 MG: 50 TABLET ORAL at 08:33

## 2018-06-18 RX ADMIN — SODIUM CHLORIDE, POTASSIUM CHLORIDE, SODIUM LACTATE AND CALCIUM CHLORIDE 1000 ML: 600; 310; 30; 20 INJECTION, SOLUTION INTRAVENOUS at 09:32

## 2018-06-18 RX ADMIN — SODIUM CHLORIDE, POTASSIUM CHLORIDE, SODIUM LACTATE AND CALCIUM CHLORIDE 1000 ML: 600; 310; 30; 20 INJECTION, SOLUTION INTRAVENOUS at 05:31

## 2018-06-18 RX ADMIN — FAMOTIDINE 20 MG: 10 INJECTION, SOLUTION INTRAVENOUS at 00:10

## 2018-06-18 RX ADMIN — TAZOBACTAM SODIUM AND PIPERACILLIN SODIUM 3.38 G: 375; 3 INJECTION, SOLUTION INTRAVENOUS at 05:01

## 2018-06-18 RX ADMIN — FAMOTIDINE 20 MG: 20 TABLET ORAL at 11:51

## 2018-06-18 RX ADMIN — DILTIAZEM HYDROCHLORIDE 120 MG: 120 CAPSULE, COATED, EXTENDED RELEASE ORAL at 08:33

## 2018-06-18 RX ADMIN — LISINOPRIL 20 MG: 20 TABLET ORAL at 08:34

## 2018-06-18 ASSESSMENT — ACTIVITIES OF DAILY LIVING (ADL)
ADLS_ACUITY_SCORE: 8

## 2018-06-18 NOTE — DISCHARGE SUMMARY
[facility]    Discharge Summary  Hospitalist    Date of Admission:  6/16/2018  Date of Discharge:  6/18/2018  Provider:  Stephen Aguilar MD  Date of Service (when I last saw the patient): 06/18/18    Discharge Diagnoses   1.  Acute sigmoid diverticulitis.  2.  Left lower quadrant abdominal pain secondary to #1.  3.  History of diverticulosis and diverticulitis in the past.  4.  Essential hypertension.  5.  Paroxysmal atrial fibrillation.  6.  History of seizure.  7.  Moderate thrombocytopenia, suspect secondary to diverticulitis.    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  Please see the admission history and physical for full details.    Hospital Course   Gema Coombs is a 67 year old female who presents with left lower quadrant abdominal  pain progressing in the last 3 days preceding the admission and radiological evidence of acute sigmoid diverticulitis.  It is her admission #6 with diverticulitis flareup.  She has had an uneventful hospital course, pain resolution, no fever and no other complications.  Colorectal surgeon consulted, they recommended full course of Augmentin on discharge to complete 14 days in total (she had 2 days of Zosyn here in the hospital).  She will continue advancing her diet to allow for residue, no need for narcotics on discharge per patient request.  Probiotic has been added to treatment.  Follow-up has been arranged with colorectal surgeon in the outpatient clinic.      1.  Acute sigmoid diverticulitis.  2.  Left lower quadrant abdominal pain secondary to #1.  3.  History of diverticulosis and diverticulitis in the past.  4.  Essential  hypertension.  5.  Paroxysmal atrial fibrillation.  6.  History of seizure.  7.  Moderate thrombocytopenia, suspect secondary to diverticulitis.    # Discharge Pain Plan:    - Patient currently has NO PAIN and is not being prescribed pain medications on discharge.    Significant Results and Procedures   See below    Pending Results      Unresulted Labs Ordered in the Past 30 Days of this Admission     No orders found from 4/17/2018 to 6/17/2018.          Code Status   Full Code       Primary Care Physician   Flavia Alberts    GEN:  Alert, oriented x 3, appears comfortable, NAD.  HEENT:  Normocephalic/atraumatic, no scleral icterus, no nasal discharge, mouth moist.  CV:  Regular rate and rhythm, no murmur or JVD.  S1 + S2 noted, no S3 or S4.  LUNGS:  Clear to auscultation bilaterally without rales/rhonchi/wheezing/retractions.  Symmetric chest rise on inhalation noted.  ABD:  Active bowel sounds, soft,  Mildly tender LLQ/non-distended.  No rebound/guarding/rigidity.  EXT:  No edema or cyanosis.  No joint synovitis noted.  SKIN:  Dry to touch, no exanthems noted in the visualized areas.     Discharge Disposition   Discharged to home    Consultations This Hospital Stay   COLORECTAL SURGERY IP CONSULT    Time Spent on this Encounter   I, Stephen Aguilar, personally saw the patient today and spent greater than 30 minutes discharging this patient.     Discharge Orders     Follow-up and recommended labs and tests    Recommend follow-up with Dr. Shayne Guallpa at [facility] for bariatric surgery discussion, call [phone] for appointments     Activity   Your activity upon discharge: activity as tolerated     Reason for your hospital stay   Acute sigmoid diverticulitis     Full Code     Diet   Follow this diet upon discharge: Low residue       Discharge Medications   Current Discharge Medication List      START taking these medications    Details   amoxicillin-clavulanate (AUGMENTIN) 875-125 MG per  tablet Take 1 tablet by mouth 2 times daily for 12 days  Qty: 24 tablet, Refills: 0    Associated Diagnoses: Diverticulitis of colon      saccharomyces boulardii (FLORASTOR) 250 MG capsule Take 1 capsule (250 mg) by mouth 2 times daily for 14 days  Qty: 28 capsule, Refills: 0    Associated Diagnoses: Diverticulitis of colon         CONTINUE these medications which have NOT CHANGED    Details   acetaminophen (TYLENOL) 500 MG tablet Take 500-1,000 mg by mouth every 6 hours as needed for mild pain      albuterol (2.5 MG/3ML) 0.083% neb solution Take 1 vial (2.5 mg) by nebulization every 6 hours as needed for shortness of breath / dyspnea  Qty: 1 Box, Refills: 1    Associated Diagnoses: Mild persistent asthma without complication      albuterol (PROAIR HFA/PROVENTIL HFA/VENTOLIN HFA) 108 (90 Base) MCG/ACT Inhaler Inhale 1 puff into the lungs every 4 hours as needed for shortness of breath / dyspnea or wheezing      diltiazem (TIAZAC) 120 MG 24 hr ER beaded capsule Take 1 capsule (120 mg) by mouth daily  Qty: 90 capsule, Refills: 3    Associated Diagnoses: Paroxysmal atrial fibrillation (H)      !! lisinopril (PRINIVIL/ZESTRIL) 10 MG tablet Take 20 mg by mouth every morning      !! lisinopril (PRINIVIL/ZESTRIL) 10 MG tablet Take 10 mg by mouth every evening      primidone (MYSOLINE) 250 MG tablet Take 250 mg by mouth 2 times daily      ranitidine HCl 300 MG CAPS Take 1 capsule by mouth daily       !! - Potential duplicate medications found. Please discuss with provider.        Allergies   Allergies   Allergen Reactions     No Known Drug Allergy      Data   Most Recent 3 CBC's:  Recent Labs   Lab Test  06/18/18   0648  06/17/18   1247  06/16/18   0854   WBC  4.2  5.2  7.8   HGB  13.4  13.3  14.5   MCV  91  92  91   PLT  88*  88*  88*      Most Recent 3 BMP's:  Recent Labs   Lab Test  06/16/18   0854  04/13/18   1047  09/13/17   1322  12/19/16   0240   02/29/16   1303   NA  140  141  142  143   < >  142   POTASSIUM  3.7   3.9  4.1  3.7   < >  3.9   CHLORIDE  106  104  105  105   < >  107   CO2  27  24  26  29   < >  28   BUN  8  16  NOT APPLICABLE  11  NOT APPLICABLE  12   < >  12   CR  0.71  0.74  0.75  0.80   < >  0.81   ANIONGAP  7   --    --   9   --   7   TONIA  8.5  8.9  9.0  8.3*   < >  8.4*   GLC  104*  94  103*  101*   < >  104*    < > = values in this interval not displayed.     Most Recent 2 LFT's:  Recent Labs   Lab Test  06/16/18   0854  04/13/18   1047   AST  16  15   ALT  16  14   ALKPHOS  93  90   BILITOTAL  0.4  0.6     Most Recent INR's and Anticoagulation Dosing History:  Anticoagulation Dose History     Recent Dosing and Labs Latest Ref Rng & Units 10/15/2003 2/14/2012 2/29/2016    INR 0.86 - 1.14 .99 0.99 0.97        Most Recent 3 Troponin's:  Recent Labs   Lab Test  02/29/16   1308  02/29/16   1303   TROPI   --   0.017   TROPONIN  0.00   --      Most Recent Cholesterol Panel:  Recent Labs   Lab Test  03/24/17   0838   CHOL  232*   LDL  155*   HDL  51   TRIG  129     Most Recent 6 Bacteria Isolates From Any Culture (See EPIC Reports for Culture Details):  Recent Labs   Lab Test  09/22/14   1003  03/26/13   1130  06/18/11   1437   CULT  10,000 to 50,000 colonies/mL mixed urogenital lois  No growth  50 to 100,000 colonies/mL Klebsiella pneumoniae     Most Recent TSH, T4 and A1c Labs:  Recent Labs   Lab Test  04/13/18   1034   03/01/16   1033   TSH   --    --   2.61   A1C  4.7   < >   --     < > = values in this interval not displayed.     Results for orders placed or performed during the hospital encounter of 06/16/18   CT Abdomen Pelvis w Contrast    Narrative    CT ABDOMEN AND PELVIS WITH CONTRAST  6/16/2018 9:58 AM     HISTORY: Left lower quadrant abdominal pain, history of  diverticulitis.     COMPARISON: December 19, 2016.    TECHNIQUE: Volumetric helical acquisition of CT images from the lung  bases through the symphysis pubis were acquired after administration  of intravenous contrast. Coronal images  reconstructed from axial image  data. Radiation dose for this scan was reduced using automated  exposure control, adjustment of the mA and/or kV according to patient  size, or iterative reconstruction technique.    FINDINGS: There is inflammation around the sigmoid colon consistent  with acute diverticulitis. No evidence for intraperitoneal free air or  abscess formation. Moderate diverticulosis. There are mild  atherosclerotic changes of the visualized aorta and its branches.  There is no evidence of aortic dissection or aneurysm. No  hydronephrosis. Unremarkable appendix. Unremarkable gallbladder. The  liver, spleen, adrenal glands, kidneys and pancreas demonstrate no  worrisome focal lesion. Lung bases are clear. No pleural or  pericardial effusion. Bone windows reveal no suspicious lesions. No  free air in the abdomen. There are no abdominal or pelvic lymph nodes  that are abnormal by size criteria. There are no dilated loops of  small intestine or large bowel to suggest ileus or obstruction.      Impression    IMPRESSION:  Acute sigmoid diverticulitis. No evidence for  intraperitoneal free air or abscess formation.    SARAH EDOUARD MD         Disclaimer: This note consists of symbols derived from keyboarding, dictation and/or voice recognition software. As a result, there may be errors in the script that have gone undetected. Please consider this when interpreting information found in this chart.

## 2018-06-18 NOTE — PROGRESS NOTES
COLON & RECTAL SURGERY  PROGRESS NOTE    June 18, 2018    SUBJECTIVE:  Pt doing better this morning. She states she had a small bowel movement that was soft and formed followed by a smaller, looser stool as well. She continues to pass gas. She denies nausea. She reports pain is minimal.     OBJECTIVE:  Temp:  [96.7  F (35.9  C)-97.9  F (36.6  C)] 97  F (36.1  C)  Pulse:  [81] 81  Heart Rate:  [74-87] 74  Resp:  [16-18] 16  BP: (146-164)/(60-78) 146/78  SpO2:  [94 %-96 %] 94 %    Intake/Output Summary (Last 24 hours) at 06/18/18 0816  Last data filed at 06/18/18 0558   Gross per 24 hour   Intake             2907 ml   Output                0 ml   Net             2907 ml       GENERAL:  Awake, alert, no acute distress, lying in bed.  HEAD: Nomocephalic atraumatic  SCLERA: anicteric  EXTREMITIES: warm and well perfused  ABDOMEN:  Soft, obese, non-tender, non-distended, no rebound or guarding, no peritoneal signs    LABS:  Lab Results   Component Value Date    WBC 4.2 06/18/2018     Lab Results   Component Value Date    HGB 13.4 06/18/2018     Lab Results   Component Value Date    HCT 41.9 06/18/2018     Lab Results   Component Value Date    PLT 88 06/18/2018     Last Basic Metabolic Panel:  Lab Results   Component Value Date     06/16/2018      Lab Results   Component Value Date    POTASSIUM 3.7 06/16/2018     Lab Results   Component Value Date    CHLORIDE 106 06/16/2018     Lab Results   Component Value Date    TONIA 8.5 06/16/2018     Lab Results   Component Value Date    CO2 27 06/16/2018     Lab Results   Component Value Date    BUN 8 06/16/2018     Lab Results   Component Value Date    CR 0.71 06/16/2018     Lab Results   Component Value Date     06/16/2018       ASSESSMENT/PLAN: 66yo woman morbidly obese female with smoldering diverticulitis. Pt remains afebrile, VSS. No leukocytosis.     1. Advance to full liquid diet; advance diet as tolerated  2. Continue antibiotics, transitioning to 14days total  with Augmentin   3. Continue PRN pain control, limit narcotics  4. Encourage OOB, ambulate  5. Dispo: if continues to tolerate advancing of diet, okay for d/c home as early as today from CRS standpoint    Sona Angel PA-C  [facility]  Phone: [phone]     Colon and Rectal Surgery Attending Note    Patient seen and examined independently.  Agree with above assessment and plan.  67 year old woman with BMI of 55 and frequent recurrent diverticulitis having had a course of cipro/flagyl last month and recurrent pain after feeling better for about 2 weeks.   CT C/W uncomplicated sigmoid diverticulitis. WBC count 7--> 4, thrombocytopenia at 88    She is feeling better this AM.    Abd obese, soft. Non-tender  Plan as above.   No plans for urgent surgery.   May benefit from referal to the weight loss clinic at the [facility] Zhou specific risk factors that may impact abdominal surgery making it difficult and higher risk, include her weight, low plts, HTN, history of afib and asthma.    Nieves Cordova MD  [facility]  Office Phone # [phone]

## 2018-06-18 NOTE — PLAN OF CARE
Problem: Patient Care Overview  Goal: Plan of Care/Patient Progress Review  Outcome: Adequate for Discharge Date Met: 06/18/18  Patient hospitalized for diverticulitis. Cleared for discharge to home with  today per MD. Discharge instructions, medications, and follow-ups reviewed with patient and  in detail. Discharge medications, including Augmentin and Florastor were filled at patient's pharmacy of choice. Patient and  verbalized understanding of discharge instructions. Belongings were returned to patient at time of discharge.  providing transport home.

## 2018-06-18 NOTE — PLAN OF CARE
Problem: Patient Care Overview  Goal: Plan of Care/Patient Progress Review  Ambulatory Status:  Pt up independently  Pain:  Denies pain  Resp: LS diminished  GI:  denies nausea.  Clear liquid diet.  BS active.  Passing flatus.  Last BM 6/17  Tx:  zosyn  Consults:  colorectal  Disposition:  tbd

## 2018-06-18 NOTE — PLAN OF CARE
Problem: Patient Care Overview  Goal: Plan of Care/Patient Progress Review  Outcome: Improving  [id number]. LS diminished. BS x4, pt no complaining of pain or nausea. Slight abdominal bloating mentioned. Tolerating clear liquid diet. Independently ambulating, walking hallways frequently. PIV - LR @ 125 ml/hr. Getting IV Zosyn. A&O, call appropriately for assistance. Telemetry monitoring. DC pending.

## 2018-06-22 ENCOUNTER — HOSPITAL ENCOUNTER (EMERGENCY)
Facility: CLINIC | Age: 68
Discharge: HOME | End: 2018-06-23
Attending: EMERGENCY MEDICINE | Admitting: EMERGENCY MEDICINE
Payer: COMMERCIAL

## 2018-06-22 ENCOUNTER — APPOINTMENT (OUTPATIENT)
Dept: CT IMAGING | Facility: CLINIC | Age: 68
End: 2018-06-22
Attending: EMERGENCY MEDICINE
Payer: COMMERCIAL

## 2018-06-22 DIAGNOSIS — K57.32 DIVERTICULITIS OF COLON: ICD-10-CM

## 2018-06-22 LAB
BASOPHILS # BLD AUTO: 0 10E9/L (ref 0–0.2)
BASOPHILS NFR BLD AUTO: 0.1 %
CREAT BLD-MCNC: 0.7 MG/DL (ref 0.52–1.04)
DIFFERENTIAL METHOD BLD: ABNORMAL
EOSINOPHIL # BLD AUTO: 0 10E9/L (ref 0–0.7)
EOSINOPHIL NFR BLD AUTO: 0.1 %
ERYTHROCYTE [DISTWIDTH] IN BLOOD BY AUTOMATED COUNT: 13.5 % (ref 10–15)
GFR SERPL CREATININE-BSD FRML MDRD: 84 ML/MIN/1.7M2
HCT VFR BLD AUTO: 47 % (ref 35–47)
HGB BLD-MCNC: 15.5 G/DL (ref 11.7–15.7)
IMM GRANULOCYTES # BLD: 0 10E9/L (ref 0–0.4)
IMM GRANULOCYTES NFR BLD: 0.4 %
LACTATE BLD-SCNC: 1.2 MMOL/L (ref 0.7–2)
LYMPHOCYTES # BLD AUTO: 1.1 10E9/L (ref 0.8–5.3)
LYMPHOCYTES NFR BLD AUTO: 14.8 %
MCH RBC QN AUTO: 29.8 PG (ref 26.5–33)
MCHC RBC AUTO-ENTMCNC: 33 G/DL (ref 31.5–36.5)
MCV RBC AUTO: 90 FL (ref 78–100)
MONOCYTES # BLD AUTO: 0.5 10E9/L (ref 0–1.3)
MONOCYTES NFR BLD AUTO: 6.2 %
NEUTROPHILS # BLD AUTO: 5.8 10E9/L (ref 1.6–8.3)
NEUTROPHILS NFR BLD AUTO: 78.4 %
NRBC # BLD AUTO: 0 10*3/UL
NRBC BLD AUTO-RTO: 0 /100
PLATELET # BLD AUTO: 110 10E9/L (ref 150–450)
RBC # BLD AUTO: 5.2 10E12/L (ref 3.8–5.2)
WBC # BLD AUTO: 7.4 10E9/L (ref 4–11)

## 2018-06-22 PROCEDURE — 80053 COMPREHEN METABOLIC PANEL: CPT | Performed by: EMERGENCY MEDICINE

## 2018-06-22 PROCEDURE — 74177 CT ABD & PELVIS W/CONTRAST: CPT

## 2018-06-22 PROCEDURE — 99285 EMERGENCY DEPT VISIT HI MDM: CPT | Mod: 25

## 2018-06-22 PROCEDURE — 85025 COMPLETE CBC W/AUTO DIFF WBC: CPT | Performed by: EMERGENCY MEDICINE

## 2018-06-22 PROCEDURE — 82565 ASSAY OF CREATININE: CPT

## 2018-06-22 PROCEDURE — 83605 ASSAY OF LACTIC ACID: CPT | Performed by: EMERGENCY MEDICINE

## 2018-06-22 PROCEDURE — 25000128 H RX IP 250 OP 636: Performed by: EMERGENCY MEDICINE

## 2018-06-22 RX ORDER — IOPAMIDOL 755 MG/ML
500 INJECTION, SOLUTION INTRAVASCULAR ONCE
Status: COMPLETED | OUTPATIENT
Start: 2018-06-22 | End: 2018-06-22

## 2018-06-22 RX ADMIN — SODIUM CHLORIDE 65 ML: 9 INJECTION, SOLUTION INTRAVENOUS at 23:51

## 2018-06-22 RX ADMIN — IOPAMIDOL 100 ML: 755 INJECTION, SOLUTION INTRAVENOUS at 23:51

## 2018-06-22 NOTE — ED AVS SNAPSHOT
[facility] Emergency Department    [address]    [facility] [address]    Phone:  [phone]    Fax:  [phone]                                       Gema Coombs   MRN: [id number]    Department:  [facility] Emergency Department   Date of Visit:  6/22/2018           After Visit Summary Signature Page     I have received my discharge instructions, and my questions have been answered. I have discussed any challenges I see with this plan with the nurse or doctor.    ..........................................................................................................................................  Patient/Patient Representative Signature      ..........................................................................................................................................  Patient Representative Print Name and Relationship to Patient    ..................................................               ................................................  Date                                            Time    ..........................................................................................................................................  Reviewed by Signature/Title    ...................................................              ..............................................  Date                                                            Time

## 2018-06-22 NOTE — ED AVS SNAPSHOT
[facility] Emergency Department    [address]    [facility] [address]    Phone:  [phone]    Fax:  [phone]                                       Gema Coombs   MRN: [id number]    Department:  [facility] Emergency Department   Date of Visit:  6/22/2018           Patient Information     Date Of Birth          1950        Your diagnoses for this visit were:     Diverticulitis of colon        You were seen by Mike Mejia MD.      Follow-up Information     Follow up with Flavia Alberts MD. Call in 1 week.    Specialty:  Family Practice    Contact information:    [address]  [facility] [address] [phone]          Discharge Instructions       Please stop the Augmentin    Discharge Instructions  Diverticulitis  Your doctor has diagnosed you with diverticulitis.  Diverticulitis is an infection of a diverticulum, which is a tiny sack-like structure that protrudes off the wall of the colon.  These sacks are created over years of increased pressure in the colon - usually as a result of a diet without enough fruits, vegetables and whole grains. Because these sacks are small, bacteria can get trapped inside them and cause an infection.  This infection often causes abdominal pain, fever, nausea and vomiting. Diverticulitis is usually treated at home.  However, sometimes diverticulitis needs treatment in the hospital and may even need surgery.    Return to the Emergency Department if:     You get an oral temperature above 102oF or as directed by your doctor.    You have blood in your stools (bright red or black, tarry stools), or have blood in your vomit.    You keep throwing up or can t drink liquids or can t keep your medicine down.    You can t have a bowel movement or you can t pass gas.    Your stomach gets bloated or bigger.    You faint or become very weak.    Your pain is too bad to tolerate.    You have new symptoms or anything that  "worries you.    What can I do to help myself?    Fill any antibiotic prescriptions the doctor gave you and take them right away. Be sure to finish the whole antibiotic prescription.    For the first day or two at home drink only clear liquids.  This lets your intestines rest.    If your pain has improved after one or two days, you may start eating mild foods. Soda crackers, toast, plain noodles, gelatin, applesauce and bananas are good first choices.  Avoid foods that have acid, are spicy, fatty or fibrous (such as meats, coarse grains, vegetables). You may start eating these foods again in about 3-4 days when you are better.    Once you are back to normal, eat a high fiber diet of fruits, vegetables and whole grains. Some people think you should avoid eating nuts, seeds, and corn, but there is no definite proof this makes any difference in whether you will get diverticulitis again.     Pain and fever can be treated with Tylenol  (acetaminophen) or with the prescription pain medication given to you by your doctor.  If the prescription pain medication has acetaminophen in it, don t use acetaminophen with it. If you have been given a narcotic pain medication, you should not drive for 4 hours after taking it.    Probiotics: If you have been given an antibiotic, you may want to also take a probiotic pill or eat yogurt with live cultures. Probiotics have \"good bacteria\" to help your intestines stay healthy. Studies have shown that probiotics help prevent diarrhea and other intestine problems (including C. diff infection) when you take antibiotics. You can buy these without a prescription in the pharmacy section of the store.   FOLLOW UP WITH YOUR REGULAR DOCTOR IN 2 - 3 DAYS.  This is important as further testing may be needed to determine how to treat your diverticulitis in the future.  If you were given a prescription for medicine here today, be sure to read all of the information (including the package insert) that " comes with your prescription.  This will include important information about the medicine, its side effects, and any warnings that you need to know about.  The pharmacist who fills the prescription can provide more information and answer questions you may have about the medicine.  If you have questions or concerns that the pharmacist cannot address, please call or return to the Emergency Department.     Opioid Medication Information    Pain medications are among the most commonly prescribed medicines, so we are including this information for all our patients. If you did not receive pain medication or get a prescription for pain medicine, you can ignore it.     You may have been given a prescription for an opioid (narcotic) pain medicine and/or have received a pain medicine while here in the Emergency Department. These medicines can make you drowsy or impaired. You must not drive, operate dangerous equipment, or engage in any other dangerous activities while taking these medications. If you drive while taking these medications, you could be arrested for DUI, or driving under the influence. Do not drink any alcohol while you are taking these medications.     Opioid pain medications can cause addiction. If you have a history of chemical dependency of any type, you are at a higher risk of becoming addicted to pain medications.  Only take these prescribed medications to treat your pain when all other options have been tried. Take it for as short a time and as few doses as possible. Store your pain pills in a secure place, as they are frequently stolen and provide a dangerous opportunity for children or visitors in your house to start abusing these powerful medications. We will not replace any lost or stolen medicine.  As soon as your pain is better, you should flush all your remaining medication.     Many prescription pain medications contain Tylenol  (acetaminophen), including Vicodin , Tylenol #3 , Norco , Lortab , and  Percocet .  You should not take any extra pills of Tylenol  if you are using these prescription medications or you can get very sick.  Do not ever take more than 3000 mg of acetaminophen in any 24 hour period.    All opioids tend to cause constipation. Drink plenty of water and eat foods that have a lot of fiber, such as fruits, vegetables, prune juice, apple juice and high fiber cereal.  Take a laxative if you don t move your bowels at least every other day. Miralax , Milk of Magnesia, Colace , or Senna  can be used to keep you regular.      Remember that you can always come back to the Emergency Department if you are not able to see your regular doctor in the amount of time listed above, if you get any new symptoms, or if there is anything that worries you.        24 Hour Appointment Hotline       To make an appointment at any [facility], call [phone] ([phone]). If you don't have a family doctor or clinic, we will help you find one. [facility] clinics are conveniently located to serve the needs of you and your family.             Review of your medicines      START taking        Dose / Directions Last dose taken    ciprofloxacin 500 MG tablet   Commonly known as:  CIPRO   Dose:  500 mg   Quantity:  20 tablet        Take 1 tablet (500 mg) by mouth 2 times daily for 10 days   Refills:  0        HYDROcodone-acetaminophen 5-325 MG per tablet   Commonly known as:  NORCO   Dose:  1 tablet   Quantity:  10 tablet        Take 1 tablet by mouth every 6 hours as needed for severe pain   Refills:  0        metroNIDAZOLE 500 MG tablet   Commonly known as:  FLAGYL   Dose:  500 mg   Quantity:  30 tablet        Take 1 tablet (500 mg) by mouth 3 times daily for 10 days   Refills:  0        ondansetron 4 MG ODT tab   Commonly known as:  ZOFRAN ODT   Dose:  4 mg   Quantity:  10 tablet        Take 1 tablet (4 mg) by mouth every 8 hours as needed for nausea   Refills:  0          Our records show that you are taking  the medicines listed below. If these are incorrect, please call your family doctor or clinic.        Dose / Directions Last dose taken    acetaminophen 500 MG tablet   Commonly known as:  TYLENOL   Dose:  500-1000 mg        Take 500-1,000 mg by mouth every 6 hours as needed for mild pain   Refills:  0        * albuterol 108 (90 Base) MCG/ACT Inhaler   Commonly known as:  PROAIR HFA/PROVENTIL HFA/VENTOLIN HFA   Dose:  1 puff        Inhale 1 puff into the lungs every 4 hours as needed for shortness of breath / dyspnea or wheezing   Refills:  0        * albuterol (2.5 MG/3ML) 0.083% neb solution   Dose:  1 vial   Quantity:  1 Box        Take 1 vial (2.5 mg) by nebulization every 6 hours as needed for shortness of breath / dyspnea   Refills:  1        diltiazem 120 MG 24 hr ER beaded capsule   Commonly known as:  TIAZAC   Dose:  120 mg   Quantity:  90 capsule        Take 1 capsule (120 mg) by mouth daily   Refills:  3        * lisinopril 10 MG tablet   Commonly known as:  PRINIVIL/ZESTRIL   Dose:  20 mg        Take 20 mg by mouth every morning   Refills:  0        * lisinopril 10 MG tablet   Commonly known as:  PRINIVIL/ZESTRIL   Dose:  10 mg        Take 10 mg by mouth every evening   Refills:  0        primidone 250 MG tablet   Commonly known as:  MYSOLINE   Dose:  250 mg        Take 250 mg by mouth 2 times daily   Refills:  0        ranitidine HCl 300 MG Caps   Dose:  1 capsule        Take 1 capsule by mouth daily   Refills:  0        saccharomyces boulardii 250 MG capsule   Commonly known as:  FLORASTOR   Dose:  250 mg   Quantity:  28 capsule        Take 1 capsule (250 mg) by mouth 2 times daily for 14 days   Refills:  0        * Notice:  This list has 4 medication(s) that are the same as other medications prescribed for you. Read the directions carefully, and ask your doctor or other care provider to review them with you.      STOP taking        Dose Reason for stopping Comments    amoxicillin-clavulanate 875-125 MG  per tablet   Commonly known as:  AUGMENTIN                      Information about OPIOIDS     PRESCRIPTION OPIOIDS: WHAT YOU NEED TO KNOW   We gave you an opioid (narcotic) pain medicine. It is important to manage your pain, but opioids are not always the best choice. You should first try all the other options your care team gave you. Take this medicine for as short a time (and as few doses) as possible.     These medicines have risks:    DO NOT drive when on new or higher doses of pain medicine. These medicines can affect your alertness and reaction times, and you could be arrested for driving under the influence (DUI). If you need to use opioids long-term, talk to your care team about driving.    DO NOT operate heave machinery    DO NOT do any other dangerous activities while taking these medicines.     DO NOT drink any alcohol while taking these medicines.      If the opioid prescribed includes acetaminophen, DO NOT take with any other medicines that contain acetaminophen. Read all labels carefully. Look for the word  acetaminophen  or  Tylenol.  Ask your pharmacist if you have questions or are unsure.    You can get addicted to pain medicines, especially if you have a history of addiction (chemical, alcohol or substance dependence). Talk to your care team about ways to reduce this risk.    Store your pills in a secure place, locked if possible. We will not replace any lost or stolen medicine. If you don t finish your medicine, please throw away (dispose) as directed by your pharmacist. The [place] [employer]ution [employer] has more information about safe disposal: https://www.pca.[facility].[place]/living-green/managing-unwanted-medications.     All opioids tend to cause constipation. Drink plenty of water and eat foods that have a lot of fiber, such as fruits, vegetables, prune juice, apple juice and high-fiber cereal. Take a laxative (Miralax, milk of magnesia, Colace, Senna) if you don t move your bowels at least  every other day.         Prescriptions were sent or printed at these locations (4 Prescriptions)                   Other Prescriptions                Printed at Department/Unit printer (4 of 4)         ciprofloxacin (CIPRO) 500 MG tablet               HYDROcodone-acetaminophen (NORCO) 5-325 MG per tablet               metroNIDAZOLE (FLAGYL) 500 MG tablet               ondansetron (ZOFRAN ODT) 4 MG ODT tab                Procedures and tests performed during your visit     CBC with platelets differential    CT Abdomen Pelvis w Contrast    Comprehensive metabolic panel    Creatinine POCT    Lactic acid whole blood      Orders Needing Specimen Collection     None      Pending Results     Date and Time Order Name Status Description    6/22/2018 2321 CT Abdomen Pelvis w Contrast Preliminary             Pending Culture Results     No orders found for last 3 day(s).            Pending Results Instructions     If you had any lab results that were not finalized at the time of your Discharge, you can call the ED Lab Result RN at [phone]. You will be contacted by this team for any positive Lab results or changes in treatment. The nurses are available 7 days a week from 10A to 6:30P.  You can leave a message 24 hours per day and they will return your call.        Test Results From Your Hospital Stay        6/22/2018 11:42 PM      Component Results     Component Value Ref Range & Units Status    WBC 7.4 4.0 - 11.0 10e9/L Final    RBC Count 5.20 3.8 - 5.2 10e12/L Final    Hemoglobin 15.5 11.7 - 15.7 g/dL Final    Hematocrit 47.0 35.0 - 47.0 % Final    MCV 90 78 - 100 fl Final    MCH 29.8 26.5 - 33.0 pg Final    MCHC 33.0 31.5 - 36.5 g/dL Final    RDW 13.5 10.0 - 15.0 % Final    Platelet Count 110 (L) 150 - 450 10e9/L Final    Diff Method Automated Method  Final    % Neutrophils 78.4 % Final    % Lymphocytes 14.8 % Final    % Monocytes 6.2 % Final    % Eosinophils 0.1 % Final    % Basophils 0.1 % Final    % Immature  Granulocytes 0.4 % Final    Nucleated RBCs 0 0 /100 Final    Absolute Neutrophil 5.8 1.6 - 8.3 10e9/L Final    Absolute Lymphocytes 1.1 0.8 - 5.3 10e9/L Final    Absolute Monocytes 0.5 0.0 - 1.3 10e9/L Final    Absolute Eosinophils 0.0 0.0 - 0.7 10e9/L Final    Absolute Basophils 0.0 0.0 - 0.2 10e9/L Final    Abs Immature Granulocytes 0.0 0 - 0.4 10e9/L Final    Absolute Nucleated RBC 0.0  Final         6/23/2018 12:05 AM      Component Results     Component Value Ref Range & Units Status    Sodium 139 133 - 144 mmol/L Final    Potassium 3.6 3.4 - 5.3 mmol/L Final    Chloride 103 94 - 109 mmol/L Final    Carbon Dioxide 28 20 - 32 mmol/L Final    Anion Gap 8 3 - 14 mmol/L Final    Glucose 99 70 - 99 mg/dL Final    Urea Nitrogen 6 (L) 7 - 30 mg/dL Final    Creatinine 0.68 0.52 - 1.04 mg/dL Final    GFR Estimate 87 >60 mL/min/1.7m2 Final    Non  GFR Calc    GFR Estimate If Black >90 >60 mL/min/1.7m2 Final    African American GFR Calc    Calcium 8.9 8.5 - 10.1 mg/dL Final    Bilirubin Total 0.3 0.2 - 1.3 mg/dL Final    Albumin 4.0 3.4 - 5.0 g/dL Final    Protein Total 7.4 6.8 - 8.8 g/dL Final    Alkaline Phosphatase 102 40 - 150 U/L Final    ALT 27 0 - 50 U/L Final    AST 21 0 - 45 U/L Final         6/22/2018 11:44 PM      Component Results     Component Value Ref Range & Units Status    Lactic Acid 1.2 0.7 - 2.0 mmol/L Final         6/23/2018 12:11 AM      Narrative     CT ABDOMEN PELVIS W CONTRAST  6/23/2018 12:00 AM     HISTORY: Worse left lower quadrant pain on ABX for known  diverticulitis.     TECHNIQUE: Volumetric acquisition through abdomen and pelvis with IV  contrast. 100 mL Isovue-370. Radiation dose for this scan was reduced  using automated exposure control, adjustment of the mA and/or kV  according to patient size, or iterative reconstruction technique.    COMPARISON: 6/16/2018.    FINDINGS: Liver and gallbladder are negative. Spleen is mildly  prominent measuring 14.5 cm craniocaudad.  Pancreas, adrenal glands and  kidneys demonstrate no worrisome findings.    Colonic diverticulosis, most prominent in the sigmoid colon. Mild wall  thickening of the sigmoid colon with adjacent inflammation compatible  with diverticulitis. No bowel obstruction or abscess. No significant  change.        Impression     IMPRESSION:  1. Sigmoid diverticulitis without significant change. No abscess,  bowel obstruction or free air.  2. Mild splenomegaly.         6/22/2018 11:41 PM      Component Results     Component Value Ref Range & Units Status    Creatinine 0.7 0.52 - 1.04 mg/dL Final    GFR Estimate 84 >60 mL/min/1.7m2 Final    GFR Estimate If Black >90 >60 mL/min/1.7m2 Final                Clinical Quality Measure: Blood Pressure Screening     Your blood pressure was checked while you were in the emergency department today. The last reading we obtained was  BP: (!) 184/114 . Please read the guidelines below about what these numbers mean and what you should do about them.  If your systolic blood pressure (the top number) is less than 120 and your diastolic blood pressure (the bottom number) is less than 80, then your blood pressure is normal. There is nothing more that you need to do about it.  If your systolic blood pressure (the top number) is 120-139 or your diastolic blood pressure (the bottom number) is 80-89, your blood pressure may be higher than it should be. You should have your blood pressure rechecked within a year by a primary care provider.  If your systolic blood pressure (the top number) is 140 or greater or your diastolic blood pressure (the bottom number) is 90 or greater, you may have high blood pressure. High blood pressure is treatable, but if left untreated over time it can put you at risk for heart attack, stroke, or kidney failure. You should have your blood pressure rechecked by a primary care provider within the next 4 weeks.  If your provider in the emergency department today gave you  specific instructions to follow-up with your doctor or provider even sooner than that, you should follow that instruction and not wait for up to 4 weeks for your follow-up visit.        Thank you for choosing [facility]       Thank you for choosing [facility] for your care. Our goal is always to provide you with excellent care. Hearing back from our patients is one way we can continue to improve our services. Please take a few minutes to complete the written survey that you may receive in the mail after you visit with us. Thank you!        [facility] Information     [facility] gives you secure access to your electronic health record. If you see a primary care provider, you can also send messages to your care team and make appointments. If you have questions, please call your primary care clinic.  If you do not have a primary care provider, please call [phone] and they will assist you.        Care EveryWhere ID     This is your Care EveryWhere ID. This could be used by other organizations to access your [facility] medical records  [id number]        Equal Access to Services     KYARA CHAPMAN AH: Hadii leah Geiger, dot benz, nayla shields, simba hernandez. So [facility] [phone].    ATENCIÓN: Si habla español, tiene a teresa disposición servicios gratuitos de asistencia lingüística. Llame al [phone].    We comply with applicable federal civil rights laws and [place] laws. We do not discriminate on the basis of race, color, national origin, age, disability, sex, sexual orientation, or gender identity.            After Visit Summary       This is your record. Keep this with you and show to your community pharmacist(s) and doctor(s) at your next visit.

## 2018-06-23 VITALS
HEART RATE: 97 BPM | OXYGEN SATURATION: 95 % | DIASTOLIC BLOOD PRESSURE: 98 MMHG | SYSTOLIC BLOOD PRESSURE: 169 MMHG | RESPIRATION RATE: 18 BRPM | TEMPERATURE: 98 F

## 2018-06-23 LAB
ALBUMIN SERPL-MCNC: 4 G/DL (ref 3.4–5)
ALP SERPL-CCNC: 102 U/L (ref 40–150)
ALT SERPL W P-5'-P-CCNC: 27 U/L (ref 0–50)
ANION GAP SERPL CALCULATED.3IONS-SCNC: 8 MMOL/L (ref 3–14)
AST SERPL W P-5'-P-CCNC: 21 U/L (ref 0–45)
BILIRUB SERPL-MCNC: 0.3 MG/DL (ref 0.2–1.3)
BUN SERPL-MCNC: 6 MG/DL (ref 7–30)
CALCIUM SERPL-MCNC: 8.9 MG/DL (ref 8.5–10.1)
CHLORIDE SERPL-SCNC: 103 MMOL/L (ref 94–109)
CO2 SERPL-SCNC: 28 MMOL/L (ref 20–32)
CREAT SERPL-MCNC: 0.68 MG/DL (ref 0.52–1.04)
GFR SERPL CREATININE-BSD FRML MDRD: 87 ML/MIN/1.7M2
GLUCOSE SERPL-MCNC: 99 MG/DL (ref 70–99)
POTASSIUM SERPL-SCNC: 3.6 MMOL/L (ref 3.4–5.3)
PROT SERPL-MCNC: 7.4 G/DL (ref 6.8–8.8)
SODIUM SERPL-SCNC: 139 MMOL/L (ref 133–144)

## 2018-06-23 RX ORDER — CIPROFLOXACIN 500 MG/1
500 TABLET, FILM COATED ORAL 2 TIMES DAILY
Qty: 20 TABLET | Refills: 0 | Status: SHIPPED | OUTPATIENT
Start: 2018-06-23 | End: 2019-02-25

## 2018-06-23 RX ORDER — ONDANSETRON 4 MG/1
4 TABLET, ORALLY DISINTEGRATING ORAL EVERY 8 HOURS PRN
Qty: 10 TABLET | Refills: 0 | Status: SHIPPED | OUTPATIENT
Start: 2018-06-23 | End: 2019-02-25

## 2018-06-23 RX ORDER — HYDROCODONE BITARTRATE AND ACETAMINOPHEN 5; 325 MG/1; MG/1
1 TABLET ORAL EVERY 6 HOURS PRN
Qty: 10 TABLET | Refills: 0 | Status: SHIPPED | OUTPATIENT
Start: 2018-06-23 | End: 2019-01-28

## 2018-06-23 RX ORDER — METRONIDAZOLE 500 MG/1
500 TABLET ORAL 3 TIMES DAILY
Qty: 30 TABLET | Refills: 0 | Status: SHIPPED | OUTPATIENT
Start: 2018-06-23 | End: 2019-02-25

## 2018-06-23 ASSESSMENT — ENCOUNTER SYMPTOMS
VOMITING: 0
ABDOMINAL PAIN: 1
BACK PAIN: 1

## 2018-06-23 NOTE — ED TRIAGE NOTES
Patient presents with complaints of abdomen pain. Patient was diagnosed with diverticulitis and hospitalized for it recently. Patient states that today the pain has came back and gotten worse She states that she has been following her diet restrictions but the pain keeps getting worse. ABC intact without need for intervention at this time.

## 2018-06-23 NOTE — ED PROVIDER NOTES
History     Chief Complaint:  Abdominal Pain    HPI   Gema Coombs is a 67 year old female with a history of diverticulitis and hypertension who presents to the emergency department today for evaluation of abdominal pain. Per chart review, the patient was admitted on 6/16/18 for sigmoid colon diverticulitis and put on a 12 day course of Augmentin. The patient reports she has been following her diet restrictions but continues to have lower abdominal pain and nausea, prompting her to present to the emergency department. She denies vomiting.     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 No pertinent family history.     Social History:  The patient was accompanied to the ED by her .  Smoking Status: never  Smokeless Tobacco: never  Alcohol Use: rarely  Marital Status:        Review of Systems   Cardiovascular: Positive for leg swelling.   Gastrointestinal: Positive for abdominal pain. Negative for vomiting.   Musculoskeletal: Positive for back pain.   All other systems reviewed and are negative.    Physical Exam   First Vitals: BP (!) 184/114  Pulse 97  Temp 98  F (36.7  C)  Resp 18  SpO2 94%    Physical Exam  Constitutional:  Oriented to person, place, and time. Well appearing and non-toxic.   HENT:   Head:    Normocephalic.   Mouth/Throat:   Oropharynx is clear and moist.   Eyes:    EOM are normal. Pupils are equal, round, and reactive to light.    Neck:    Neck supple.   Cardiovascular:  Normal rate, regular rhythm and normal heart sounds.      Exam reveals no gallop and no friction rub.       No murmur heard.  Pulmonary/Chest:  Effort normal and breath sounds normal.      No respiratory distress. No wheezes. No rales.      No reproducible chest wall pain.  Abdominal:   Mild lower left quadrant tenderness. Soft. No distension.No rebound and no guarding.   Musculoskeletal:  Normal range of motion.   Neurological:   Alert and oriented to person, place, and time.           Moves all 4 extremities spontaneously    Skin:    No rash noted. No pallor.     Emergency Department Course     Imaging:  Radiology findings were communicated with the patient who voiced understanding of the findings.    CT Abdomen Pelvis w Contrast   Preliminary Result   IMPRESSION:   1. Sigmoid diverticulitis without significant change. No abscess,   bowel obstruction or free air.   2. Mild splenomegaly.   Report per radiology      Laboratory:  Laboratory findings were communicated with the patient who voiced understanding of the findings.    Creatine POCT: 0.7  CBC: WBC 7.4, HGB 15.5,  (L)  CMP: Creatinine 0.68, BUN 6 (L)  Lactic Acid: 1.2    Emergency Department Course:  Nursing notes and vitals reviewed.  I performed an exam of the patient as documented above.   IV was inserted and blood was drawn for laboratory testing, results above.    I personally reviewed the laboratory and imaging results with the Patient and answered all related questions prior to discharge.  Findings and plan explained to the Patient. Patient discharged home with instructions regarding supportive care, medications, and reasons to return. The importance of close follow-up was reviewed. The patient was prescribed Cipro, Flagyl, Zofran and Norco.     Impression & Plan      Medical Decision Making:  Gema Coombs is a 67 year old female that was just recently admitted for diverticulitis an on Augmentin. She  have lower left quadrant abdominal pain. Differential includes acute diverticulitis, abscess perforation and other causes. She had a work up including CT which showed diverticulitis. She is afebrile here with a negative lactic acid and at this time, I do believe while initial trial Augmented was prudent, it appear she will need a change in her antibiotics. She Has been told to discontinue her Augmentin and to start cipro and flagyl has been prescribed for her. She is appropriate for continued out -patient management. Follow up with PCP and or colorectal surgery and return if any worsening pain or difficulty tolerating PO.     Diagnosis:      1. Diverticulitis of colon     Disposition:  discharged to home    Discharge Medication List as of 6/23/2018 12:21 AM   START taking these medications    Details   ciprofloxacin (CIPRO) 500 MG tablet Take 1 tablet (500 mg) by mouth 2 times daily for 10 days, Disp-20 tablet, R-0, Local Print      HYDROcodone-acetaminophen (NORCO) 5-325 MG per tablet Take 1 tablet by mouth every 6 hours as needed for severe pain, Disp-10 tablet, R-0, Local Print      metroNIDAZOLE (FLAGYL) 500 MG tablet Take 1 tablet (500 mg) by mouth 3 times daily for 10 days, Disp-30 tablet, R-0, Local PrintEat yogurt or cottage cheese daily to prevent diarrhea caused by taking this antibiotic      ondansetron (ZOFRAN ODT) 4 MG ODT tab Take 1 tablet (4 mg) by mouth every 8 hours as needed for nausea, Disp-10 tablet, R-0, Local Print     Scribe Disclosure:  I, Miguelangel Grant, am serving as a scribe at 11:13 PM on 6/22/2018 to document services personally performed by Mike Mejia MD based on my observations and the provider's statements to me.     6/22/2018   [facility] EMERGENCY DEPARTMENT       Mike Mejia MD  06/23/18 0258

## 2018-06-23 NOTE — DISCHARGE INSTRUCTIONS
Please stop the Augmentin    Discharge Instructions  Diverticulitis  Your doctor has diagnosed you with diverticulitis.  Diverticulitis is an infection of a diverticulum, which is a tiny sack-like structure that protrudes off the wall of the colon.  These sacks are created over years of increased pressure in the colon - usually as a result of a diet without enough fruits, vegetables and whole grains. Because these sacks are small, bacteria can get trapped inside them and cause an infection.  This infection often causes abdominal pain, fever, nausea and vomiting. Diverticulitis is usually treated at home.  However, sometimes diverticulitis needs treatment in the hospital and may even need surgery.    Return to the Emergency Department if:     You get an oral temperature above 102oF or as directed by your doctor.    You have blood in your stools (bright red or black, tarry stools), or have blood in your vomit.    You keep throwing up or can t drink liquids or can t keep your medicine down.    You can t have a bowel movement or you can t pass gas.    Your stomach gets bloated or bigger.    You faint or become very weak.    Your pain is too bad to tolerate.    You have new symptoms or anything that worries you.    What can I do to help myself?    Fill any antibiotic prescriptions the doctor gave you and take them right away. Be sure to finish the whole antibiotic prescription.    For the first day or two at home drink only clear liquids.  This lets your intestines rest.    If your pain has improved after one or two days, you may start eating mild foods. Soda crackers, toast, plain noodles, gelatin, applesauce and bananas are good first choices.  Avoid foods that have acid, are spicy, fatty or fibrous (such as meats, coarse grains, vegetables). You may start eating these foods again in about 3-4 days when you are better.    Once you are back to normal, eat a high fiber diet of fruits, vegetables and whole grains. Some  "people think you should avoid eating nuts, seeds, and corn, but there is no definite proof this makes any difference in whether you will get diverticulitis again.     Pain and fever can be treated with Tylenol  (acetaminophen) or with the prescription pain medication given to you by your doctor.  If the prescription pain medication has acetaminophen in it, don t use acetaminophen with it. If you have been given a narcotic pain medication, you should not drive for 4 hours after taking it.    Probiotics: If you have been given an antibiotic, you may want to also take a probiotic pill or eat yogurt with live cultures. Probiotics have \"good bacteria\" to help your intestines stay healthy. Studies have shown that probiotics help prevent diarrhea and other intestine problems (including C. diff infection) when you take antibiotics. You can buy these without a prescription in the pharmacy section of the store.   FOLLOW UP WITH YOUR REGULAR DOCTOR IN 2 - 3 DAYS.  This is important as further testing may be needed to determine how to treat your diverticulitis in the future.  If you were given a prescription for medicine here today, be sure to read all of the information (including the package insert) that comes with your prescription.  This will include important information about the medicine, its side effects, and any warnings that you need to know about.  The pharmacist who fills the prescription can provide more information and answer questions you may have about the medicine.  If you have questions or concerns that the pharmacist cannot address, please call or return to the Emergency Department.     Opioid Medication Information    Pain medications are among the most commonly prescribed medicines, so we are including this information for all our patients. If you did not receive pain medication or get a prescription for pain medicine, you can ignore it.     You may have been given a prescription for an opioid (narcotic) " pain medicine and/or have received a pain medicine while here in the Emergency Department. These medicines can make you drowsy or impaired. You must not drive, operate dangerous equipment, or engage in any other dangerous activities while taking these medications. If you drive while taking these medications, you could be arrested for DUI, or driving under the influence. Do not drink any alcohol while you are taking these medications.     Opioid pain medications can cause addiction. If you have a history of chemical dependency of any type, you are at a higher risk of becoming addicted to pain medications.  Only take these prescribed medications to treat your pain when all other options have been tried. Take it for as short a time and as few doses as possible. Store your pain pills in a secure place, as they are frequently stolen and provide a dangerous opportunity for children or visitors in your house to start abusing these powerful medications. We will not replace any lost or stolen medicine.  As soon as your pain is better, you should flush all your remaining medication.     Many prescription pain medications contain Tylenol  (acetaminophen), including Vicodin , Tylenol #3 , Norco , Lortab , and Percocet .  You should not take any extra pills of Tylenol  if you are using these prescription medications or you can get very sick.  Do not ever take more than 3000 mg of acetaminophen in any 24 hour period.    All opioids tend to cause constipation. Drink plenty of water and eat foods that have a lot of fiber, such as fruits, vegetables, prune juice, apple juice and high fiber cereal.  Take a laxative if you don t move your bowels at least every other day. Miralax , Milk of Magnesia, Colace , or Senna  can be used to keep you regular.      Remember that you can always come back to the Emergency Department if you are not able to see your regular doctor in the amount of time listed above, if you get any new symptoms, or  if there is anything that worries you.

## 2018-06-25 ENCOUNTER — CARE COORDINATION (OUTPATIENT)
Dept: FAMILY MEDICINE | Facility: CLINIC | Age: 68
End: 2018-06-25

## 2018-06-25 NOTE — PROGRESS NOTES
Care Coordination Assessment    PCP: Flavia Alberts    Referral Source:  ED/IP List    Clinical Data: Patient discharged from inpatient 06/18/18 with diverticulitis of colon.  Patient discharged home with instructions for low residue diet.    Plan: I will forward to Vilma in clinical support to assess and assist with appropriate follow up

## 2018-06-27 NOTE — PROGRESS NOTES
Left message for patient to call clinic back. She called back right away and stated that she ended up back in the hospital again and they gave her some different medications. She was out of work all last week but is back now and feels things are getting better. She is back on liquid diet but ate an egg and a piece of a sausage and is hoping to be able to eat more foods shortly once she feels more comfortable doing so. She is having soft stools right now and making sure to stay hydrated, she was told if things start to go in the wrong direction again to call us and come in for a follow up appointment. She will keep in contact and had no further questions or concerns at this time.

## 2018-08-01 ENCOUNTER — APPOINTMENT (OUTPATIENT)
Dept: CT IMAGING | Facility: CLINIC | Age: 68
End: 2018-08-01
Attending: EMERGENCY MEDICINE
Payer: COMMERCIAL

## 2018-08-01 ENCOUNTER — HOSPITAL ENCOUNTER (EMERGENCY)
Facility: CLINIC | Age: 68
Discharge: HOME | End: 2018-08-01
Attending: EMERGENCY MEDICINE | Admitting: EMERGENCY MEDICINE
Payer: COMMERCIAL

## 2018-08-01 VITALS
SYSTOLIC BLOOD PRESSURE: 122 MMHG | OXYGEN SATURATION: 95 % | HEART RATE: 65 BPM | TEMPERATURE: 98.2 F | RESPIRATION RATE: 20 BRPM | DIASTOLIC BLOOD PRESSURE: 59 MMHG

## 2018-08-01 DIAGNOSIS — K57.32 DIVERTICULITIS OF LARGE INTESTINE WITHOUT PERFORATION OR ABSCESS WITHOUT BLEEDING: ICD-10-CM

## 2018-08-01 DIAGNOSIS — K57.32 SIGMOID DIVERTICULITIS: ICD-10-CM

## 2018-08-01 LAB
ANION GAP SERPL CALCULATED.3IONS-SCNC: 7 MMOL/L (ref 3–14)
BASOPHILS # BLD AUTO: 0 10E9/L (ref 0–0.2)
BASOPHILS NFR BLD AUTO: 0.2 %
BUN SERPL-MCNC: 8 MG/DL (ref 7–30)
CALCIUM SERPL-MCNC: 9 MG/DL (ref 8.5–10.1)
CHLORIDE SERPL-SCNC: 107 MMOL/L (ref 94–109)
CO2 SERPL-SCNC: 28 MMOL/L (ref 20–32)
CREAT BLD-MCNC: 0.7 MG/DL (ref 0.52–1.04)
CREAT SERPL-MCNC: 0.73 MG/DL (ref 0.52–1.04)
DIFFERENTIAL METHOD BLD: ABNORMAL
EOSINOPHIL # BLD AUTO: 0 10E9/L (ref 0–0.7)
EOSINOPHIL NFR BLD AUTO: 0 %
ERYTHROCYTE [DISTWIDTH] IN BLOOD BY AUTOMATED COUNT: 14.3 % (ref 10–15)
GFR SERPL CREATININE-BSD FRML MDRD: 80 ML/MIN/1.7M2
GFR SERPL CREATININE-BSD FRML MDRD: 84 ML/MIN/1.7M2
GLUCOSE SERPL-MCNC: 100 MG/DL (ref 70–99)
HCT VFR BLD AUTO: 42.2 % (ref 35–47)
HGB BLD-MCNC: 13.9 G/DL (ref 11.7–15.7)
IMM GRANULOCYTES # BLD: 0 10E9/L (ref 0–0.4)
IMM GRANULOCYTES NFR BLD: 0.4 %
LACTATE BLD-SCNC: 1 MMOL/L (ref 0.7–2)
LYMPHOCYTES # BLD AUTO: 0.7 10E9/L (ref 0.8–5.3)
LYMPHOCYTES NFR BLD AUTO: 14.6 %
MCH RBC QN AUTO: 29.9 PG (ref 26.5–33)
MCHC RBC AUTO-ENTMCNC: 32.9 G/DL (ref 31.5–36.5)
MCV RBC AUTO: 91 FL (ref 78–100)
MONOCYTES # BLD AUTO: 0.3 10E9/L (ref 0–1.3)
MONOCYTES NFR BLD AUTO: 6.7 %
NEUTROPHILS # BLD AUTO: 3.8 10E9/L (ref 1.6–8.3)
NEUTROPHILS NFR BLD AUTO: 78.1 %
NRBC # BLD AUTO: 0 10*3/UL
NRBC BLD AUTO-RTO: 0 /100
PLATELET # BLD AUTO: 76 10E9/L (ref 150–450)
POTASSIUM SERPL-SCNC: 4 MMOL/L (ref 3.4–5.3)
RBC # BLD AUTO: 4.65 10E12/L (ref 3.8–5.2)
SODIUM SERPL-SCNC: 142 MMOL/L (ref 133–144)
WBC # BLD AUTO: 4.9 10E9/L (ref 4–11)

## 2018-08-01 PROCEDURE — 83605 ASSAY OF LACTIC ACID: CPT | Performed by: EMERGENCY MEDICINE

## 2018-08-01 PROCEDURE — 74177 CT ABD & PELVIS W/CONTRAST: CPT

## 2018-08-01 PROCEDURE — 82565 ASSAY OF CREATININE: CPT

## 2018-08-01 PROCEDURE — 25000132 ZZH RX MED GY IP 250 OP 250 PS 637: Performed by: EMERGENCY MEDICINE

## 2018-08-01 PROCEDURE — 96374 THER/PROPH/DIAG INJ IV PUSH: CPT | Mod: 59

## 2018-08-01 PROCEDURE — 85025 COMPLETE CBC W/AUTO DIFF WBC: CPT | Performed by: EMERGENCY MEDICINE

## 2018-08-01 PROCEDURE — 99285 EMERGENCY DEPT VISIT HI MDM: CPT | Mod: 25

## 2018-08-01 PROCEDURE — 96361 HYDRATE IV INFUSION ADD-ON: CPT

## 2018-08-01 PROCEDURE — 25000128 H RX IP 250 OP 636: Performed by: EMERGENCY MEDICINE

## 2018-08-01 PROCEDURE — 80048 BASIC METABOLIC PNL TOTAL CA: CPT | Performed by: EMERGENCY MEDICINE

## 2018-08-01 RX ORDER — HYDROCODONE BITARTRATE AND ACETAMINOPHEN 5; 325 MG/1; MG/1
2 TABLET ORAL ONCE
Status: COMPLETED | OUTPATIENT
Start: 2018-08-01 | End: 2018-08-01

## 2018-08-01 RX ORDER — METRONIDAZOLE 500 MG/1
500 TABLET ORAL 3 TIMES DAILY
Qty: 42 TABLET | Refills: 0 | Status: SHIPPED | OUTPATIENT
Start: 2018-08-01 | End: 2019-02-25

## 2018-08-01 RX ORDER — IOPAMIDOL 755 MG/ML
100 INJECTION, SOLUTION INTRAVASCULAR ONCE
Status: COMPLETED | OUTPATIENT
Start: 2018-08-01 | End: 2018-08-01

## 2018-08-01 RX ORDER — CIPROFLOXACIN 500 MG/1
500 TABLET, FILM COATED ORAL 2 TIMES DAILY
Qty: 28 TABLET | Refills: 0 | Status: SHIPPED | OUTPATIENT
Start: 2018-08-01 | End: 2019-02-25

## 2018-08-01 RX ORDER — ONDANSETRON 2 MG/ML
4 INJECTION INTRAMUSCULAR; INTRAVENOUS
Status: COMPLETED | OUTPATIENT
Start: 2018-08-01 | End: 2018-08-01

## 2018-08-01 RX ADMIN — IOPAMIDOL 100 ML: 755 INJECTION, SOLUTION INTRAVENOUS at 12:20

## 2018-08-01 RX ADMIN — SODIUM CHLORIDE 65 ML: 900 INJECTION, SOLUTION INTRAVENOUS at 12:20

## 2018-08-01 RX ADMIN — SODIUM CHLORIDE 1000 ML: 9 INJECTION, SOLUTION INTRAVENOUS at 11:30

## 2018-08-01 RX ADMIN — ONDANSETRON 4 MG: 2 INJECTION, SOLUTION INTRAMUSCULAR; INTRAVENOUS at 11:36

## 2018-08-01 RX ADMIN — HYDROCODONE BITARTRATE AND ACETAMINOPHEN 2 TABLET: 5; 325 TABLET ORAL at 11:36

## 2018-08-01 ASSESSMENT — ENCOUNTER SYMPTOMS
VOMITING: 0
FEVER: 0
NAUSEA: 1
DIARRHEA: 0
ABDOMINAL PAIN: 1

## 2018-08-01 NOTE — ED AVS SNAPSHOT
[facility] Emergency Department    [address]    [facility] [address]    Phone:  [phone]    Fax:  [phone]                                       Gema Coombs   MRN: [id number]    Department:  [facility] Emergency Department   Date of Visit:  8/1/2018           After Visit Summary Signature Page     I have received my discharge instructions, and my questions have been answered. I have discussed any challenges I see with this plan with the nurse or doctor.    ..........................................................................................................................................  Patient/Patient Representative Signature      ..........................................................................................................................................  Patient Representative Print Name and Relationship to Patient    ..................................................               ................................................  Date                                            Time    ..........................................................................................................................................  Reviewed by Signature/Title    ...................................................              ..............................................  Date                                                            Time

## 2018-08-01 NOTE — ED TRIAGE NOTES
Patient presents with left-lower sided abdominal pain. Patient was diagnosed with diverticulitis in June and has been fighting it ever since with multiple antibiotics. Pain got worse again today, so she came in. ABCDs intact, alert and oriented x 4.

## 2018-08-01 NOTE — DISCHARGE INSTRUCTIONS
Take antibiotics as directed.  Use Tylenol or ibuprofen for mild to moderate pain and your remaining stronger pain medications as needed for severe pain.  Use your Zofran as needed for nausea and vomiting.    Call your primary care provider for follow-up.  However, you should discuss recurrence with your colorectal surgeon to discuss possibility of colonoscopy and/or colon resection.    Return immediately if you have new or concerning symptoms such as fever greater than 101 F, uncontrolled pain, or uncontrolled vomiting/diarrhea.

## 2018-08-01 NOTE — ED PROVIDER NOTES
"  History     Chief Complaint:  Abdominal Pain    The history is provided by the patient.      Gema Coombs is a 68 year old female with a history of diverticulitis who presents with her  for abdominal pain. She states that she had diverticulitis confirmed by a CT in this ER 6/16/18. She was admitted for IV antibiotics and was discharged 6/18/18 on Augmentin. She returned 6/22/18 in this ER and repeat CT showed persistent diverticulitis, unchanged. She was then switched to Cipro and Flagyl for 10 days. She took these as prescribed and had improvement in her symptoms for about 10 days. She then had some diarrhea for a couple of days which resolved spontaneously and since then (for the last couple of weeks) patient has had \"twinges\" of pain that were mild and self limiting, but similar to prior episodes of diverticulitis. Patient had planned to see PCP, but in the last 1-2 days has had worsening or pain such that suprapubic and LLQ pain is now constant, moderate, and radiating toward her lower back. These symptoms are similar to all other episodes of diverticulitis, but patient is concerned she may have \"a perforation.\" She denies fever or emesis, although she notes some nausea last night that has since resolved. She has not taken analgesics prior to arrival. She reports she has never had colonoscopy but does see colorectal surgery. She had a normal bowel movement this morning.    Allergies:  NKDA     Medications:    Albuterol  Cardizem  Lisinopril  Rantidine  Primidone    Past Medical History:    Diverticulitis  Extrinsic asthma  HTN  Paroxysmal Afib  Blurry vision  Morbid obesity  Epilepsy  Breast disorder  GERD  Thrombocytopenia  Mitral valve regurgitation    Past Surgical History:    Breast biopsy  C section  Anorectal myomectomy    Family History:    Cancer  Heart disease  Thyroid disease    Social History:  Marital Status:     Negative for tobacco use.  Alcohol: rare    Review of Systems "   Constitutional: Negative for fever.   Gastrointestinal: Positive for abdominal pain and nausea. Negative for diarrhea (resolved weeks ago) and vomiting.   All other systems reviewed and are negative.      Physical Exam     Patient Vitals for the past 24 hrs:   BP Temp Temp src Pulse Heart Rate Resp SpO2   08/01/18 1330 122/59 - - 65 - - 95 %   08/01/18 1328 - - - - - - 97 %   08/01/18 1315 - - - - - - 95 %   08/01/18 1310 - - - - - - 95 %   08/01/18 1208 - - - - - - 95 %   08/01/18 1200 113/55 - - - - - 96 %   08/01/18 1145 - - - - - - 95 %   08/01/18 1141 - - - - - - 96 %   08/01/18 1140 124/76 - - - - - -   08/01/18 1049 164/85 98.2  F (36.8  C) Oral - 87 20 98 %       Physical Exam  General: Well-developed. Obese. Well appearing elderly  woman. Cooperative.  Head:  Atraumatic.  Eyes:  Conjunctivae, lids, and sclerae are normal.  ENT:    Normal nose. Moist mucous membranes.  Neck:  Supple. Normal range of motion.  CV:  Regular rate and rhythm. Normal heart sounds with no murmurs, rubs, or gallops detected.  Resp:  No respiratory distress. Clear to auscultation bilaterally without decreased breath sounds, wheezing, rales, or rhonchi.  GI:  Soft. Non-distended.  Suprapubic and left lower quadrant tenderness without guarding or rigidity.   MS:  Normal ROM.   Skin:  Warm. Non-diaphoretic. No pallor.  Neuro:  Awake. A&Ox3. Normal strength.  Psych: Normal mood and affect. Normal speech.  Vitals reviewed.    Emergency Department Course     Imaging:  Radiographic findings were communicated with the patient who voiced understanding of the findings.    CT Abdomen/Pelvis with IV contrast:   1. Sigmoid mucosal thickening and surrounding inflammation are  compatible with diverticulitis. Consider correlation with colonoscopy  if not already done.  2. Mild splenomegaly is unchanged. as per radiology.    Laboratory:    CBC: WBC: 4.9, HGB: 13.9, PLT: 76 (L)  BMP: Glucose 100 (H), o/w WNL (Creatinine: 0.73)    Lactic  "acid: 1.0    1141 Creatinine:    Interventions:  1136 NS 1L IV   Norco 5-325 X2 Oral    1312 Zofran 4 mg IV      Emergency Department Course:  Nursing notes and vitals reviewed. (1112) I performed an exam of the patient as documented above.     IV inserted. Medicine administered as documented above. Blood drawn. This was sent to the lab for further testing, results above.    The patient was sent for a CT of the abdomen and pelvis while in the emergency department, findings above.     (1323) I rechecked the patient and discussed the results of her workup thus far. She is feeling better with improvement in pain.     Findings and plan explained to the patient. Patient discharged home with instructions regarding supportive care, medications, and reasons to return. The importance of close follow-up was reviewed. The patient was prescribed Cipro and Flagyl.    I personally reviewed the laboratory results with the patient and answered all related questions prior to discharge.       Impression & Plan      Medical Decision Making:  Cynthia is a 68-year-old female who presents with suprapubic and left lower quadrant pain consistent with multiple episodes of prior diverticulitis.  She notes she was recently admitted for diverticulitis and discharged on Augmentin.  She did not have improvement and on repeat ER visit which confirmed unchanged diverticulitis on the CT scan, she was then changed to Cipro and Flagyl.  She states she had a period of wellness at for a few weeks after completion of her antibiotics though she has had \"twinges\" of pain over the couple of weeks.  She had worsening pain today.  She had a normal bowel movement this morning as well as no fever, vomiting, or diarrhea.  On exam, patient is well-appearing.  She has tenderness in the suprapubic and left lower quadrant without other findings.  Electrolytes are unremarkable with a creatinine of 0.73.  Fortunately there is no leukocytosis although patient does " have thrombocytopenia to 76 which is minimally lower from patient's baseline of .  Given concern for complicated diverticulitis, I did perform a CT of the abdomen pelvis with contrast.  This reveals mucosal thickening of the sigmoid with surrounding inflammation compatible with diverticulitis.  At this point, I believe it is reasonable to treat her as diverticulitis with antibiotics again.  However, patient has had multiple episodes of such (7 visits with diverticulitis as diagnosis since 2012, 7 of 8 CT scans of the abdomen and pelvis performed here since 2009 are positive for diverticulitis) and states she has not had a colonoscopy.  I stressed to her the importance of following up with her colorectal surgeon for colonoscopy and to discuss colon resection given this extensive history and this recurrence within 5 weeks of last episode that required hospitalization and 2 different courses of antibiotics. Patient states her pain is under control here with Norco and she was also given IV fluids and Zofran. On repeat evaluation, states she is feeling better.  She has leftover prescriptions for Zofran and Norco as needed at home.  I will treat her with ciprofloxacin and Flagyl given this seemed to work better for her last episode than Augmentin.  She verbalizes understanding of the importance of close follow-up with her colorectal surgeon.  I provided strict return precautions including uncontrolled abdominal pain, fever, or other concerns.  Patient's questions were answered and she verbalized understanding.  Amenable to discharge.      Diagnosis:    ICD-10-CM    1. Diverticulitis of large intestine without perforation or abscess without bleeding K57.32    2. Sigmoid diverticulitis K57.32        Disposition:  discharged home    Discharge Medications:  Discharge Medication List as of 8/1/2018  1:36 PM      START taking these medications    Details   ciprofloxacin (CIPRO) 500 MG tablet Take 1 tablet (500 mg) by  mouth 2 times daily for 14 days, Disp-28 tablet, R-0, Local Print      metroNIDAZOLE (FLAGYL) 500 MG tablet Take 1 tablet (500 mg) by mouth 3 times daily for 14 days, Disp-42 tablet, R-0, Local PrintEat yogurt or cottage cheese daily to prevent diarrhea caused by taking this antibiotic           Scribe Disclosure:  I, Seth David, am serving as a scribe on 8/1/2018 at 11:12 AM to personally document services performed by Malgorzata Soriano MD based on my observations and the provider's statements to me.       Seth David  8/1/2018   [facility] EMERGENCY DEPARTMENT       Malgorzata Soriano MD  08/01/18 [id number]

## 2018-08-01 NOTE — ED AVS SNAPSHOT
[facility] Emergency Department    [address]    [facility] [address]    Phone:  [phone]    Fax:  [phone]                                       Gema Coombs   MRN: [id number]    Department:  [facility] Emergency Department   Date of Visit:  8/1/2018           Patient Information     Date Of Birth          1950        Your diagnoses for this visit were:     Diverticulitis of large intestine without perforation or abscess without bleeding     Sigmoid diverticulitis        You were seen by Malgorzata Soriano MD.      Follow-up Information     Follow up with Nieves Cordova MD In 1 week.    Specialty:  Colon and Rectal Surgery    Contact information:    COLON RECTAL SURGERY  [address]  [facility] [address]  [phone]          Follow up with Flavia Alberts MD In 3 days.    Specialty:  Family Practice    Contact information:    [address] [facility] [address]  [facility] [id number] [phone]          Follow up with [facility] Emergency Department.    Specialty:  EMERGENCY MEDICINE    Why:  If symptoms worsen    Contact information:    [address]  [facility] [id number] [phone]        Discharge Instructions       Take antibiotics as directed.  Use Tylenol or ibuprofen for mild to moderate pain and your remaining stronger pain medications as needed for severe pain.  Use your Zofran as needed for nausea and vomiting.    Call your primary care provider for follow-up.  However, you should discuss recurrence with your colorectal surgeon to discuss possibility of colonoscopy and/or colon resection.    Return immediately if you have new or concerning symptoms such as fever greater than 101 F, uncontrolled pain, or uncontrolled vomiting/diarrhea.    Discharge References/Attachments     DIVERTICULITIS (ENGLISH)    DIVERTICULITIS, DISCHARGE INSTRUCTIONS FOR (ENGLISH)      24 Hour Appointment Hotline       To make an  appointment at any [facility], call [phone] ([phone]). If you don't have a family doctor or clinic, we will help you find one. [facility] are conveniently located to serve the needs of you and your family.             Review of your medicines      START taking        Dose / Directions Last dose taken    ciprofloxacin 500 MG tablet   Commonly known as:  CIPRO   Dose:  500 mg   Quantity:  28 tablet        Take 1 tablet (500 mg) by mouth 2 times daily for 14 days   Refills:  0        metroNIDAZOLE 500 MG tablet   Commonly known as:  FLAGYL   Dose:  500 mg   Quantity:  42 tablet        Take 1 tablet (500 mg) by mouth 3 times daily for 14 days   Refills:  0          Our records show that you are taking the medicines listed below. If these are incorrect, please call your family doctor or clinic.        Dose / Directions Last dose taken    acetaminophen 500 MG tablet   Commonly known as:  TYLENOL   Dose:  500-1000 mg        Take 500-1,000 mg by mouth every 6 hours as needed for mild pain   Refills:  0        * albuterol 108 (90 Base) MCG/ACT Inhaler   Commonly known as:  PROAIR HFA/PROVENTIL HFA/VENTOLIN HFA   Dose:  1 puff        Inhale 1 puff into the lungs every 4 hours as needed for shortness of breath / dyspnea or wheezing   Refills:  0        * albuterol (2.5 MG/3ML) 0.083% neb solution   Dose:  1 vial   Quantity:  1 Box        Take 1 vial (2.5 mg) by nebulization every 6 hours as needed for shortness of breath / dyspnea   Refills:  1        diltiazem 120 MG 24 hr ER beaded capsule   Commonly known as:  TIAZAC   Dose:  120 mg   Quantity:  90 capsule        Take 1 capsule (120 mg) by mouth daily   Refills:  3        HYDROcodone-acetaminophen 5-325 MG per tablet   Commonly known as:  NORCO   Dose:  1 tablet   Quantity:  10 tablet        Take 1 tablet by mouth every 6 hours as needed for severe pain   Refills:  0        * lisinopril 10 MG tablet   Commonly known as:  PRINIVIL/ZESTRIL   Dose:  20  mg        Take 20 mg by mouth every morning   Refills:  0        * lisinopril 10 MG tablet   Commonly known as:  PRINIVIL/ZESTRIL   Dose:  10 mg        Take 10 mg by mouth every evening   Refills:  0        primidone 250 MG tablet   Commonly known as:  MYSOLINE   Dose:  250 mg        Take 250 mg by mouth 2 times daily   Refills:  0        ranitidine HCl 300 MG Caps   Dose:  1 capsule        Take 1 capsule by mouth daily   Refills:  0        * Notice:  This list has 4 medication(s) that are the same as other medications prescribed for you. Read the directions carefully, and ask your doctor or other care provider to review them with you.            Prescriptions were sent or printed at these locations (2 Prescriptions)                   Other Prescriptions                Printed at Department/Unit printer (2 of 2)         ciprofloxacin (CIPRO) 500 MG tablet               metroNIDAZOLE (FLAGYL) 500 MG tablet                Procedures and tests performed during your visit     Basic metabolic panel    CBC with platelets differential    CT Abdomen Pelvis w Contrast    Creatinine POCT    Lactic acid whole blood    Peripheral IV catheter    Pulse oximetry nursing      Orders Needing Specimen Collection     None      Pending Results     No orders found from 7/30/2018 to 8/2/2018.            Pending Culture Results     No orders found from 7/30/2018 to 8/2/2018.            Pending Results Instructions     If you had any lab results that were not finalized at the time of your Discharge, you can call the ED Lab Result RN at [phone]. You will be contacted by this team for any positive Lab results or changes in treatment. The nurses are available 7 days a week from 10A to 6:30P.  You can leave a message 24 hours per day and they will return your call.        Test Results From Your Hospital Stay        8/1/2018 12:55 PM      Narrative     CT ABDOMEN AND PELVIS WITH CONTRAST   8/1/2018 12:25 PM     HISTORY: Abdominal pain,  diverticulitis.     TECHNIQUE:  100 mL Isovue-370. Radiation dose for this scan was  reduced using automated exposure control, adjustment of the mA and/or  kV according to patient size, or iterative reconstruction technique.    COMPARISON: 6/22/2018    FINDINGS: The liver, gallbladder, pancreas, and adrenal glands are  unremarkable. Mild splenomegaly is unchanged. No hydronephrosis or  solid renal mass. No abdominal or retroperitoneal adenopathy. No free  air or ascites. No evidence of bowel obstruction.    There is persistent infiltration around the sigmoid colon, associated  with diverticulosis. No evidence of perforation or abscess. No pelvic  lymphadenopathy or free fluid.        Impression     IMPRESSION:  1. Sigmoid mucosal thickening and surrounding inflammation are  compatible with diverticulitis. Consider correlation with colonoscopy  if not already done.  2. Mild splenomegaly is unchanged.    KEENAN JENSEN MD         8/1/2018 11:54 AM      Component Results     Component Value Ref Range & Units Status    Lactic Acid 1.0 0.7 - 2.0 mmol/L Final         8/1/2018 12:10 PM      Component Results     Component Value Ref Range & Units Status    Sodium 142 133 - 144 mmol/L Final    Potassium 4.0 3.4 - 5.3 mmol/L Final    Chloride 107 94 - 109 mmol/L Final    Carbon Dioxide 28 20 - 32 mmol/L Final    Anion Gap 7 3 - 14 mmol/L Final    Glucose 100 (H) 70 - 99 mg/dL Final    Urea Nitrogen 8 7 - 30 mg/dL Final    Creatinine 0.73 0.52 - 1.04 mg/dL Final    GFR Estimate 80 >60 mL/min/1.7m2 Final    Non  GFR Calc    GFR Estimate If Black >90 >60 mL/min/1.7m2 Final    African American GFR Calc    Calcium 9.0 8.5 - 10.1 mg/dL Final         8/1/2018 11:47 AM      Component Results     Component Value Ref Range & Units Status    WBC 4.9 4.0 - 11.0 10e9/L Final    RBC Count 4.65 3.8 - 5.2 10e12/L Final    Hemoglobin 13.9 11.7 - 15.7 g/dL Final    Hematocrit 42.2 35.0 - 47.0 % Final    MCV 91 78 - 100 fl  Final    MCH 29.9 26.5 - 33.0 pg Final    MCHC 32.9 31.5 - 36.5 g/dL Final    RDW 14.3 10.0 - 15.0 % Final    Platelet Count 76 (L) 150 - 450 10e9/L Final    Diff Method Automated Method  Final    % Neutrophils 78.1 % Final    % Lymphocytes 14.6 % Final    % Monocytes 6.7 % Final    % Eosinophils 0.0 % Final    % Basophils 0.2 % Final    % Immature Granulocytes 0.4 % Final    Nucleated RBCs 0 0 /100 Final    Absolute Neutrophil 3.8 1.6 - 8.3 10e9/L Final    Absolute Lymphocytes 0.7 (L) 0.8 - 5.3 10e9/L Final    Absolute Monocytes 0.3 0.0 - 1.3 10e9/L Final    Absolute Eosinophils 0.0 0.0 - 0.7 10e9/L Final    Absolute Basophils 0.0 0.0 - 0.2 10e9/L Final    Abs Immature Granulocytes 0.0 0 - 0.4 10e9/L Final    Absolute Nucleated RBC 0.0  Final         8/1/2018 11:45 AM      Component Results     Component Value Ref Range & Units Status    Creatinine 0.7 0.52 - 1.04 mg/dL Final    GFR Estimate 84 >60 mL/min/1.7m2 Final    GFR Estimate If Black >90 >60 mL/min/1.7m2 Final                Clinical Quality Measure: Blood Pressure Screening     Your blood pressure was checked while you were in the emergency department today. The last reading we obtained was  BP: 122/59 . Please read the guidelines below about what these numbers mean and what you should do about them.  If your systolic blood pressure (the top number) is less than 120 and your diastolic blood pressure (the bottom number) is less than 80, then your blood pressure is normal. There is nothing more that you need to do about it.  If your systolic blood pressure (the top number) is 120-139 or your diastolic blood pressure (the bottom number) is 80-89, your blood pressure may be higher than it should be. You should have your blood pressure rechecked within a year by a primary care provider.  If your systolic blood pressure (the top number) is 140 or greater or your diastolic blood pressure (the bottom number) is 90 or greater, you may have high blood pressure. High  blood pressure is treatable, but if left untreated over time it can put you at risk for heart attack, stroke, or kidney failure. You should have your blood pressure rechecked by a primary care provider within the next 4 weeks.  If your provider in the emergency department today gave you specific instructions to follow-up with your doctor or provider even sooner than that, you should follow that instruction and not wait for up to 4 weeks for your follow-up visit.        Thank you for choosing [facility]       Thank you for choosing [facility] for your care. Our goal is always to provide you with excellent care. Hearing back from our patients is one way we can continue to improve our services. Please take a few minutes to complete the written survey that you may receive in the mail after you visit with us. Thank you!        [employer] Information     [employer] gives you secure access to your electronic health record. If you see a primary care provider, you can also send messages to your care team and make appointments. If you have questions, please call your primary care clinic.  If you do not have a primary care provider, please call [phone] and they will assist you.        Care EveryWhere ID     This is your Care EveryWhere ID. This could be used by other organizations to access your [facility] medical records  [id number]        Equal Access to Services     KYARA CHAPMAN : Hadii leah Geiger, dot benz, nayla shields, simba hernandez. So [facility] [phone].    ATENCIÓN: Si habla español, tiene a teresa disposición servicios gratuitos de asistencia lingüística. Rachael al [phone].    We comply with applicable federal civil rights laws and [place] laws. We do not discriminate on the basis of race, color, national origin, age, disability, sex, sexual orientation, or gender identity.            After Visit Summary       This is your record. Keep this with you and show to  your community pharmacist(s) and doctor(s) at your next visit.

## 2018-08-29 ENCOUNTER — HOSPITAL ENCOUNTER (OUTPATIENT)
Facility: CLINIC | Age: 68
End: 2018-08-29
Attending: COLON & RECTAL SURGERY | Admitting: COLON & RECTAL SURGERY
Payer: COMMERCIAL

## 2018-08-30 ENCOUNTER — TRANSFERRED RECORDS (OUTPATIENT)
Dept: FAMILY MEDICINE | Facility: CLINIC | Age: 68
End: 2018-08-30

## 2018-09-12 ENCOUNTER — HOSPITAL ENCOUNTER (OUTPATIENT)
Facility: CLINIC | Age: 68
Setting detail: OBSERVATION
Discharge: HOME | End: 2018-09-13
Attending: EMERGENCY MEDICINE | Admitting: INTERNAL MEDICINE
Payer: COMMERCIAL

## 2018-09-12 ENCOUNTER — APPOINTMENT (OUTPATIENT)
Dept: CT IMAGING | Facility: CLINIC | Age: 68
End: 2018-09-12
Attending: EMERGENCY MEDICINE
Payer: COMMERCIAL

## 2018-09-12 DIAGNOSIS — K57.32 DIVERTICULITIS OF COLON: ICD-10-CM

## 2018-09-12 PROBLEM — K57.92 DIVERTICULITIS: Status: ACTIVE | Noted: 2018-09-12

## 2018-09-12 LAB
ALBUMIN SERPL-MCNC: 3.8 G/DL (ref 3.4–5)
ALBUMIN UR-MCNC: NEGATIVE MG/DL
ALP SERPL-CCNC: 95 U/L (ref 40–150)
ALT SERPL W P-5'-P-CCNC: 12 U/L (ref 0–50)
ANION GAP SERPL CALCULATED.3IONS-SCNC: 7 MMOL/L (ref 3–14)
APPEARANCE UR: CLEAR
AST SERPL W P-5'-P-CCNC: 16 U/L (ref 0–45)
BASOPHILS # BLD AUTO: 0 10E9/L (ref 0–0.2)
BASOPHILS NFR BLD AUTO: 0.2 %
BILIRUB SERPL-MCNC: 0.5 MG/DL (ref 0.2–1.3)
BILIRUB UR QL STRIP: NEGATIVE
BUN SERPL-MCNC: 7 MG/DL (ref 7–30)
CALCIUM SERPL-MCNC: 8.7 MG/DL (ref 8.5–10.1)
CHLORIDE SERPL-SCNC: 104 MMOL/L (ref 94–109)
CO2 SERPL-SCNC: 30 MMOL/L (ref 20–32)
COLOR UR AUTO: YELLOW
CREAT SERPL-MCNC: 0.75 MG/DL (ref 0.52–1.04)
DIFFERENTIAL METHOD BLD: ABNORMAL
EOSINOPHIL # BLD AUTO: 0 10E9/L (ref 0–0.7)
EOSINOPHIL NFR BLD AUTO: 0 %
ERYTHROCYTE [DISTWIDTH] IN BLOOD BY AUTOMATED COUNT: 14.3 % (ref 10–15)
GFR SERPL CREATININE-BSD FRML MDRD: 78 ML/MIN/1.7M2
GLUCOSE SERPL-MCNC: 98 MG/DL (ref 70–99)
GLUCOSE UR STRIP-MCNC: NEGATIVE MG/DL
HCT VFR BLD AUTO: 42.8 % (ref 35–47)
HGB BLD-MCNC: 13.9 G/DL (ref 11.7–15.7)
HGB UR QL STRIP: ABNORMAL
IMM GRANULOCYTES # BLD: 0 10E9/L (ref 0–0.4)
IMM GRANULOCYTES NFR BLD: 0.3 %
KETONES UR STRIP-MCNC: NEGATIVE MG/DL
LEUKOCYTE ESTERASE UR QL STRIP: ABNORMAL
LIPASE SERPL-CCNC: 116 U/L (ref 73–393)
LYMPHOCYTES # BLD AUTO: 0.8 10E9/L (ref 0.8–5.3)
LYMPHOCYTES NFR BLD AUTO: 13.1 %
MCH RBC QN AUTO: 29.8 PG (ref 26.5–33)
MCHC RBC AUTO-ENTMCNC: 32.5 G/DL (ref 31.5–36.5)
MCV RBC AUTO: 92 FL (ref 78–100)
MONOCYTES # BLD AUTO: 0.4 10E9/L (ref 0–1.3)
MONOCYTES NFR BLD AUTO: 6.6 %
MUCOUS THREADS #/AREA URNS LPF: PRESENT /LPF
NEUTROPHILS # BLD AUTO: 5.1 10E9/L (ref 1.6–8.3)
NEUTROPHILS NFR BLD AUTO: 79.8 %
NITRATE UR QL: NEGATIVE
NRBC # BLD AUTO: 0 10*3/UL
NRBC BLD AUTO-RTO: 0 /100
PH UR STRIP: 5 PH (ref 5–7)
PLATELET # BLD AUTO: 85 10E9/L (ref 150–450)
POTASSIUM SERPL-SCNC: 3.6 MMOL/L (ref 3.4–5.3)
PROT SERPL-MCNC: 6.7 G/DL (ref 6.8–8.8)
RBC # BLD AUTO: 4.67 10E12/L (ref 3.8–5.2)
RBC #/AREA URNS AUTO: 1 /HPF (ref 0–2)
SODIUM SERPL-SCNC: 141 MMOL/L (ref 133–144)
SOURCE: ABNORMAL
SP GR UR STRIP: 1.01 (ref 1–1.03)
SQUAMOUS #/AREA URNS AUTO: 1 /HPF (ref 0–1)
UROBILINOGEN UR STRIP-MCNC: 0 MG/DL (ref 0–2)
WBC # BLD AUTO: 6.4 10E9/L (ref 4–11)
WBC #/AREA URNS AUTO: 2 /HPF (ref 0–5)

## 2018-09-12 PROCEDURE — 25000132 ZZH RX MED GY IP 250 OP 250 PS 637: Performed by: PHYSICIAN ASSISTANT

## 2018-09-12 PROCEDURE — 25000128 H RX IP 250 OP 636: Performed by: PHYSICIAN ASSISTANT

## 2018-09-12 PROCEDURE — 96367 TX/PROPH/DG ADDL SEQ IV INF: CPT

## 2018-09-12 PROCEDURE — 80053 COMPREHEN METABOLIC PANEL: CPT | Performed by: EMERGENCY MEDICINE

## 2018-09-12 PROCEDURE — G0378 HOSPITAL OBSERVATION PER HR: HCPCS

## 2018-09-12 PROCEDURE — 74177 CT ABD & PELVIS W/CONTRAST: CPT

## 2018-09-12 PROCEDURE — 83690 ASSAY OF LIPASE: CPT | Performed by: EMERGENCY MEDICINE

## 2018-09-12 PROCEDURE — 25000128 H RX IP 250 OP 636: Performed by: EMERGENCY MEDICINE

## 2018-09-12 PROCEDURE — 96375 TX/PRO/DX INJ NEW DRUG ADDON: CPT

## 2018-09-12 PROCEDURE — 99220 ZZC INITIAL OBSERVATION CARE,LEVL III: CPT | Performed by: PHYSICIAN ASSISTANT

## 2018-09-12 PROCEDURE — 85025 COMPLETE CBC W/AUTO DIFF WBC: CPT | Performed by: EMERGENCY MEDICINE

## 2018-09-12 PROCEDURE — 96365 THER/PROPH/DIAG IV INF INIT: CPT | Mod: 59

## 2018-09-12 PROCEDURE — 81001 URINALYSIS AUTO W/SCOPE: CPT | Performed by: EMERGENCY MEDICINE

## 2018-09-12 PROCEDURE — 99285 EMERGENCY DEPT VISIT HI MDM: CPT | Mod: 25

## 2018-09-12 PROCEDURE — 96361 HYDRATE IV INFUSION ADD-ON: CPT

## 2018-09-12 RX ORDER — ONDANSETRON 2 MG/ML
4 INJECTION INTRAMUSCULAR; INTRAVENOUS EVERY 6 HOURS PRN
Status: DISCONTINUED | OUTPATIENT
Start: 2018-09-12 | End: 2018-09-13 | Stop reason: HOSPADM

## 2018-09-12 RX ORDER — DILTIAZEM HYDROCHLORIDE 120 MG/1
120 CAPSULE, COATED, EXTENDED RELEASE ORAL DAILY
Status: DISCONTINUED | OUTPATIENT
Start: 2018-09-13 | End: 2018-09-13 | Stop reason: HOSPADM

## 2018-09-12 RX ORDER — LIDOCAINE 40 MG/G
CREAM TOPICAL
Status: DISCONTINUED | OUTPATIENT
Start: 2018-09-12 | End: 2018-09-13 | Stop reason: HOSPADM

## 2018-09-12 RX ORDER — AMOXICILLIN 250 MG
1 CAPSULE ORAL 2 TIMES DAILY PRN
Status: DISCONTINUED | OUTPATIENT
Start: 2018-09-12 | End: 2018-09-13 | Stop reason: HOSPADM

## 2018-09-12 RX ORDER — LISINOPRIL 10 MG/1
10 TABLET ORAL EVERY EVENING
Status: DISCONTINUED | OUTPATIENT
Start: 2018-09-12 | End: 2018-09-13 | Stop reason: HOSPADM

## 2018-09-12 RX ORDER — ACETAMINOPHEN 650 MG/1
650 SUPPOSITORY RECTAL EVERY 4 HOURS PRN
Status: DISCONTINUED | OUTPATIENT
Start: 2018-09-12 | End: 2018-09-13 | Stop reason: HOSPADM

## 2018-09-12 RX ORDER — ACETAMINOPHEN 325 MG/1
650 TABLET ORAL EVERY 4 HOURS PRN
Status: DISCONTINUED | OUTPATIENT
Start: 2018-09-12 | End: 2018-09-13 | Stop reason: HOSPADM

## 2018-09-12 RX ORDER — IOPAMIDOL 755 MG/ML
100 INJECTION, SOLUTION INTRAVASCULAR ONCE
Status: COMPLETED | OUTPATIENT
Start: 2018-09-12 | End: 2018-09-12

## 2018-09-12 RX ORDER — SODIUM CHLORIDE 9 MG/ML
INJECTION, SOLUTION INTRAVENOUS CONTINUOUS
Status: DISCONTINUED | OUTPATIENT
Start: 2018-09-12 | End: 2018-09-13 | Stop reason: HOSPADM

## 2018-09-12 RX ORDER — MORPHINE SULFATE 4 MG/ML
4 INJECTION, SOLUTION INTRAMUSCULAR; INTRAVENOUS ONCE
Status: COMPLETED | OUTPATIENT
Start: 2018-09-12 | End: 2018-09-12

## 2018-09-12 RX ORDER — OXYCODONE HYDROCHLORIDE 5 MG/1
5-10 TABLET ORAL
Status: DISCONTINUED | OUTPATIENT
Start: 2018-09-12 | End: 2018-09-13 | Stop reason: HOSPADM

## 2018-09-12 RX ORDER — PROCHLORPERAZINE MALEATE 5 MG
5 TABLET ORAL EVERY 6 HOURS PRN
Status: DISCONTINUED | OUTPATIENT
Start: 2018-09-12 | End: 2018-09-13 | Stop reason: HOSPADM

## 2018-09-12 RX ORDER — LISINOPRIL 20 MG/1
20 TABLET ORAL EVERY MORNING
Status: DISCONTINUED | OUTPATIENT
Start: 2018-09-13 | End: 2018-09-13 | Stop reason: HOSPADM

## 2018-09-12 RX ORDER — NALOXONE HYDROCHLORIDE 0.4 MG/ML
.1-.4 INJECTION, SOLUTION INTRAMUSCULAR; INTRAVENOUS; SUBCUTANEOUS
Status: DISCONTINUED | OUTPATIENT
Start: 2018-09-12 | End: 2018-09-13 | Stop reason: HOSPADM

## 2018-09-12 RX ORDER — LEVOFLOXACIN 5 MG/ML
500 INJECTION, SOLUTION INTRAVENOUS ONCE
Status: COMPLETED | OUTPATIENT
Start: 2018-09-12 | End: 2018-09-12

## 2018-09-12 RX ORDER — PRIMIDONE 250 MG/1
250 TABLET ORAL 2 TIMES DAILY
Status: DISCONTINUED | OUTPATIENT
Start: 2018-09-12 | End: 2018-09-13 | Stop reason: HOSPADM

## 2018-09-12 RX ORDER — AMOXICILLIN 250 MG
2 CAPSULE ORAL 2 TIMES DAILY PRN
Status: DISCONTINUED | OUTPATIENT
Start: 2018-09-12 | End: 2018-09-13 | Stop reason: HOSPADM

## 2018-09-12 RX ORDER — POLYETHYLENE GLYCOL 3350 17 G/17G
17 POWDER, FOR SOLUTION ORAL DAILY PRN
Status: DISCONTINUED | OUTPATIENT
Start: 2018-09-12 | End: 2018-09-13 | Stop reason: HOSPADM

## 2018-09-12 RX ORDER — ONDANSETRON 4 MG/1
4 TABLET, ORALLY DISINTEGRATING ORAL EVERY 6 HOURS PRN
Status: DISCONTINUED | OUTPATIENT
Start: 2018-09-12 | End: 2018-09-13 | Stop reason: HOSPADM

## 2018-09-12 RX ORDER — PROCHLORPERAZINE 25 MG
12.5 SUPPOSITORY, RECTAL RECTAL EVERY 12 HOURS PRN
Status: DISCONTINUED | OUTPATIENT
Start: 2018-09-12 | End: 2018-09-13 | Stop reason: HOSPADM

## 2018-09-12 RX ORDER — LEVOFLOXACIN 5 MG/ML
500 INJECTION, SOLUTION INTRAVENOUS EVERY 24 HOURS
Status: DISCONTINUED | OUTPATIENT
Start: 2018-09-13 | End: 2018-09-13 | Stop reason: HOSPADM

## 2018-09-12 RX ORDER — HYDROMORPHONE HYDROCHLORIDE 1 MG/ML
0.5 INJECTION, SOLUTION INTRAMUSCULAR; INTRAVENOUS; SUBCUTANEOUS
Status: DISCONTINUED | OUTPATIENT
Start: 2018-09-12 | End: 2018-09-13 | Stop reason: HOSPADM

## 2018-09-12 RX ORDER — SODIUM CHLORIDE 9 MG/ML
INJECTION, SOLUTION INTRAVENOUS CONTINUOUS
Status: DISCONTINUED | OUTPATIENT
Start: 2018-09-12 | End: 2018-09-12

## 2018-09-12 RX ORDER — ALBUTEROL SULFATE 0.83 MG/ML
2.5 SOLUTION RESPIRATORY (INHALATION) EVERY 6 HOURS PRN
Status: DISCONTINUED | OUTPATIENT
Start: 2018-09-12 | End: 2018-09-13 | Stop reason: HOSPADM

## 2018-09-12 RX ADMIN — METRONIDAZOLE 500 MG: 500 INJECTION, SOLUTION INTRAVENOUS at 17:20

## 2018-09-12 RX ADMIN — IOPAMIDOL 100 ML: 755 INJECTION, SOLUTION INTRAVENOUS at 15:32

## 2018-09-12 RX ADMIN — PRIMIDONE 250 MG: 250 TABLET ORAL at 20:36

## 2018-09-12 RX ADMIN — RANITIDINE 300 MG: 150 TABLET ORAL at 20:35

## 2018-09-12 RX ADMIN — LISINOPRIL 10 MG: 10 TABLET ORAL at 20:37

## 2018-09-12 RX ADMIN — LEVOFLOXACIN 500 MG: 5 INJECTION, SOLUTION INTRAVENOUS at 16:10

## 2018-09-12 RX ADMIN — MORPHINE SULFATE 4 MG: 4 INJECTION INTRAVENOUS at 13:40

## 2018-09-12 RX ADMIN — SODIUM CHLORIDE 65 ML: 900 INJECTION, SOLUTION INTRAVENOUS at 15:31

## 2018-09-12 RX ADMIN — METRONIDAZOLE 500 MG: 500 INJECTION, SOLUTION INTRAVENOUS at 22:15

## 2018-09-12 RX ADMIN — SODIUM CHLORIDE: 9 INJECTION, SOLUTION INTRAVENOUS at 19:00

## 2018-09-12 ASSESSMENT — ENCOUNTER SYMPTOMS
DIARRHEA: 1
CONSTIPATION: 1
ABDOMINAL PAIN: 1

## 2018-09-12 NOTE — PHARMACY-ADMISSION MEDICATION HISTORY
Admission medication history interview status for this patient is complete. See [facility] admission navigator for allergy information, prior to admission medications and immunization status.     Medication history interview source(s):Patient  Medication history resources (including written lists, pill bottles, clinic record):Care Everywhere  Primary pharmacy:[facility] [address]    Changes made to PTA medication list:  Added: None  Deleted: None  Changed: None    Actions taken by pharmacist (provider contacted, etc):None     Additional medication history information:None    Medication reconciliation/reorder completed by provider prior to medication history? No    Do you take OTC medications (eg tylenol, ibuprofen, fish oil, eye/ear drops, etc)? Y    Prior to Admission medications    Medication Sig Last Dose Taking? Auth Provider   acetaminophen (TYLENOL) 500 MG tablet Take 500-1,000 mg by mouth every 6 hours as needed for mild pain 9/11/2018 at Unknown time Yes Unknown, Entered By History   albuterol (PROAIR HFA/PROVENTIL HFA/VENTOLIN HFA) 108 (90 Base) MCG/ACT Inhaler Inhale 1 puff into the lungs every 4 hours as needed for shortness of breath / dyspnea or wheezing 9/12/2018 at Noon Yes Unknown, Entered By History   diltiazem (TIAZAC) 120 MG 24 hr ER beaded capsule Take 1 capsule (120 mg) by mouth daily 9/12/2018 at 0900 Yes Flavia Alberts MD   HYDROcodone-acetaminophen (NORCO) 5-325 MG per tablet Take 1 tablet by mouth every 6 hours as needed for severe pain Past Month at Unknown time Yes Mike Mejia MD   lisinopril (PRINIVIL/ZESTRIL) 10 MG tablet Take 20 mg by mouth every morning 9/12/2018 at 0900 Yes Unknown, Entered By History   lisinopril (PRINIVIL/ZESTRIL) 10 MG tablet Take 10 mg by mouth every evening 9/11/2018 at pm Yes Unknown, Entered By History   primidone (MYSOLINE) 250 MG tablet Take 250 mg by mouth 2 times daily 9/12/2018 at 0900 Yes Unknown, Entered By History   ranitidine HCl 300 MG  CAPS Take 1 capsule by mouth daily 9/11/2018 at pm Yes Unknown, Entered By History   albuterol (2.5 MG/3ML) 0.083% neb solution Take 1 vial (2.5 mg) by nebulization every 6 hours as needed for shortness of breath / dyspnea More than a month at Unknown time  Iveth Veras PA-C

## 2018-09-12 NOTE — ED TRIAGE NOTES
Pt has had several episodes of diverticulitis since April.  Has been on 4 rounds of abx and liquid and low residue diet since.  Pt having worsening pain in left abdomen, some nausea.  Pt has followed up with colorectal surgery on 8/20 and then has a colonoscopy scheduled for 10/20.

## 2018-09-12 NOTE — IP AVS SNAPSHOT
MRN:[id number]                      After Visit Summary   9/12/2018    Gema Coombs    MRN: [id number]           Thank you!     Thank you for choosing [facility] for your care. Our goal is always to provide you with excellent care. Hearing back from our patients is one way we can continue to improve our services. Please take a few minutes to complete the written survey that you may receive in the mail after you visit. If you would like to speak to someone directly about your visit please contact Patient Relations at [phone]. Thank you!          Patient Information     Date Of Birth          1950        About your hospital stay     You were admitted on:  September 12, 2018 You last received care in the:  [facility] Observation Department    You were discharged on:  September 13, 2018        Reason for your hospital stay       Recurrent abdominal pain with ED work up confirming recurrent diverticulitis. Antibiotics were started and colorectal surgery was consulted. They recommended continued antibiotic treatment and will set you up for a partial colectomy as an outpatient in the near future.                  Who to Call     For medical emergencies, please call 911.  For non-urgent questions about your medical care, please call your primary care provider or clinic, [phone]          Attending Provider     Provider Specialty    Carolina Mendez MD Emergency Medicine    KohliAndra MD Internal Medicine       Primary Care Provider Office Phone # Fax #    Flavia Alberts [phone]       When to contact your care team       Call your primary doctor if you have any of the following: temperature greater than 101, increased pain or persistent nausea, vomiting or diarrhea.                  After Care Instructions     Activity       Your activity upon discharge: activity as tolerated            Diet       Follow this diet upon discharge:  Advance to a low residue diet as tolerated                  Follow-up Appointments     Follow-up and recommended labs and tests        Follow up with primary care provider, Flavia Alberts, within 7 days for hospital follow- up.  No follow up labs or test are needed.  Follow up with colorectal surgery to plan an outpatient surgery. Continue antibiotics                  Your next 10 appointments already scheduled     Oct 22, 2018   Procedure with Nieves Cordova MD   [facility] ([facility])    [address] [place] [id number] [phone]           [facility] is located at [address] [place]                         Pending Results     No orders found for last 3 day(s).            Statement of Approval     Ordered          09/13/18 9336  I have reviewed and agree with all the recommendations and orders detailed in this document.  EFFECTIVE NOW     Approved and electronically signed by:  Mariia De La Torre PA-C             Admission Information     Date & Time Provider Department Dept. Phone    9/12/2018 Andra Kohli MD [facility] Observation Department [phone]      Your Vitals Were     Blood Pressure Pulse Temperature Respirations Weight Pulse Oximetry    149/73 (BP Location: Left arm) 79 98.9  F (37.2  C) (Oral) 16 135.2 kg (298 lb) 93%    BMI (Body Mass Index)                   51.13 kg/m2           MyChart Information     [employer] gives you secure access to your electronic health record. If you see a primary care provider, you can also send messages to your care team and make appointments. If you have questions, please call your primary care clinic.  If you do not have a primary care provider, please call [phone] and they will assist you.        Care EveryWhere ID     This is your Care EveryWhere ID. This could be used by other organizations to access your [place] medical records  [id number]        Equal  Access to Services     [facility]: Hadii aad ku hadshortydavid Sobarry, waaxda luqadaha, qaybta kaalmarita shields, simba hernandez. So [facility] [phone].    ATENCIÓN: Si habla clarisse, tiene a teresa disposición servicios gratuitos de asistencia lingüística. Llame al [phone].    We comply with applicable federal civil rights laws and [place] laws. We do not discriminate on the basis of race, color, national origin, age, disability, sex, sexual orientation, or gender identity.               Review of your medicines      START taking        Dose / Directions    levofloxacin 750 MG tablet   Commonly known as:  LEVAQUIN   Used for:  Diverticulitis of colon        Dose:  750 mg   Take 1 tablet (750 mg) by mouth daily for 14 days   Quantity:  14 tablet   Refills:  0       metroNIDAZOLE 500 MG tablet   Commonly known as:  FLAGYL   Used for:  Diverticulitis of colon        Dose:  500 mg   Take 1 tablet (500 mg) by mouth 3 times daily for 14 days   Quantity:  42 tablet   Refills:  0         CONTINUE these medicines which have NOT CHANGED        Dose / Directions    acetaminophen 500 MG tablet   Commonly known as:  TYLENOL        Dose:  500-1000 mg   Take 500-1,000 mg by mouth every 6 hours as needed for mild pain   Refills:  0       * albuterol 108 (90 Base) MCG/ACT inhaler   Commonly known as:  PROAIR HFA/PROVENTIL HFA/VENTOLIN HFA        Dose:  1 puff   Inhale 1 puff into the lungs every 4 hours as needed for shortness of breath / dyspnea or wheezing   Refills:  0       * albuterol (2.5 MG/3ML) 0.083% neb solution   Used for:  Mild persistent asthma without complication        Dose:  1 vial   Take 1 vial (2.5 mg) by nebulization every 6 hours as needed for shortness of breath / dyspnea   Quantity:  1 Box   Refills:  1       diltiazem 120 MG 24 hr ER beaded capsule   Commonly known as:  TIAZAC   Used for:  Paroxysmal atrial fibrillation (H)        Dose:  120 mg   Take 1 capsule (120 mg) by mouth  daily   Quantity:  90 capsule   Refills:  3       HYDROcodone-acetaminophen 5-325 MG per tablet   Commonly known as:  NORCO        Dose:  1 tablet   Take 1 tablet by mouth every 6 hours as needed for severe pain   Quantity:  10 tablet   Refills:  0       * lisinopril 10 MG tablet   Commonly known as:  PRINIVIL/ZESTRIL        Dose:  20 mg   Take 20 mg by mouth every morning   Refills:  0       * lisinopril 10 MG tablet   Commonly known as:  PRINIVIL/ZESTRIL        Dose:  10 mg   Take 10 mg by mouth every evening   Refills:  0       primidone 250 MG tablet   Commonly known as:  MYSOLINE        Dose:  250 mg   Take 250 mg by mouth 2 times daily   Refills:  0       ranitidine HCl 300 MG Caps        Dose:  1 capsule   Take 1 capsule by mouth daily   Refills:  0       * Notice:  This list has 4 medication(s) that are the same as other medications prescribed for you. Read the directions carefully, and ask your doctor or other care provider to review them with you.         Where to get your medicines      These medications were sent to [facility] [id number] [facility] [address]     Phone:  [phone]     levofloxacin 750 MG tablet    metroNIDAZOLE 500 MG tablet                Protect others around you: Learn how to safely use, store and throw away your medicines at www.disposemymeds.org.        ANTIBIOTIC INSTRUCTION     You've Been Prescribed an Antibiotic - Now What?  Your healthcare team thinks that you or your loved one might have an infection. Some infections can be treated with antibiotics, which are powerful, life-saving drugs. Like all medications, antibiotics have side effects and should only be used when necessary. There are some important things you should know about your antibiotic treatment.      Your healthcare team may run tests before you start taking an antibiotic.    Your team may take samples (e.g., from your blood, urine or other  areas) to run tests to look for bacteria. These test can be important to determine if you need an antibiotic at all and, if you do, which antibiotic will work best.      Within a few days, your healthcare team might change or even stop your antibiotic.    Your team may start you on an antibiotic while they are working to find out what is making you sick.    Your team might change your antibiotic because test results show that a different antibiotic would be better to treat your infection.    In some cases, once your team has more information, they learn that you do not need an antibiotic at all. They may find out that you don't have an infection, or that the antibiotic you're taking won't work against your infection. For example, an infection caused by a virus can't be treated with antibiotics. Staying on an antibiotic when you don't need it is more likely to be harmful than helpful.      You may experience side effects from your antibiotic.    Like all medications, antibiotics have side effects. Some of these can be serious.    Let you healthcare team know if you have any known allergies when you are admitted to the hospital.    One significant side effect of nearly all antibiotics is the risk of severe and sometimes deadly diarrhea caused by Clostridium difficile (C. Difficile). This occurs when a person takes antibiotics because some good germs are destroyed. Antibiotic use allows C. diificile to take over, putting patients at high risk for this serious infection.    As a patient or caregiver, it is important to understand your or your loved one's antibiotic treatment. It is especially important for caregivers to speak up when patients can't speak for themselves. Here are some important questions to ask your healthcare team.    What infection is this antibiotic treating and how do you know I have that infection?    What side effects might occur from this antibiotic?    How long will I need to take this  antibiotic?    Is it safe to take this antibiotic with other medications or supplements (e.g., vitamins) that I am taking?     Are there any special directions I need to know about taking this antibiotic? For example, should I take it with food?    How will I be monitored to know whether my infection is responding to the antibiotic?    What tests may help to make sure the right antibiotic is prescribed for me?      Information provided by:  www.cdc.gov/getsmart  U.S. Department of Health and Human Services  Centers for disease Control and Prevention  National Center for Emerging and Zoonotic Infectious Diseases  Division of Healthcare Quality Promotion             Medication List: This is a list of all your medications and when to take them. Check marks below indicate your daily home schedule. Keep this list as a reference.      Medications           Morning Afternoon Evening Bedtime As Needed    acetaminophen 500 MG tablet   Commonly known as:  TYLENOL   Take 500-1,000 mg by mouth every 6 hours as needed for mild pain                                * albuterol 108 (90 Base) MCG/ACT inhaler   Commonly known as:  PROAIR HFA/PROVENTIL HFA/VENTOLIN HFA   Inhale 1 puff into the lungs every 4 hours as needed for shortness of breath / dyspnea or wheezing                                * albuterol (2.5 MG/3ML) 0.083% neb solution   Take 1 vial (2.5 mg) by nebulization every 6 hours as needed for shortness of breath / dyspnea                                diltiazem 120 MG 24 hr ER beaded capsule   Commonly known as:  TIAZAC   Take 1 capsule (120 mg) by mouth daily                                HYDROcodone-acetaminophen 5-325 MG per tablet   Commonly known as:  NORCO   Take 1 tablet by mouth every 6 hours as needed for severe pain                                levofloxacin 750 MG tablet   Commonly known as:  LEVAQUIN   Take 1 tablet (750 mg) by mouth daily for 14 days                                * lisinopril 10 MG  tablet   Commonly known as:  PRINIVIL/ZESTRIL   Take 20 mg by mouth every morning   Last time this was given:  20 mg on 9/13/2018  8:39 AM                                * lisinopril 10 MG tablet   Commonly known as:  PRINIVIL/ZESTRIL   Take 10 mg by mouth every evening   Last time this was given:  20 mg on 9/13/2018  8:39 AM                                metroNIDAZOLE 500 MG tablet   Commonly known as:  FLAGYL   Take 1 tablet (500 mg) by mouth 3 times daily for 14 days                                primidone 250 MG tablet   Commonly known as:  MYSOLINE   Take 250 mg by mouth 2 times daily   Last time this was given:  250 mg on 9/13/2018  8:39 AM                                ranitidine HCl 300 MG Caps   Take 1 capsule by mouth daily                                * Notice:  This list has 4 medication(s) that are the same as other medications prescribed for you. Read the directions carefully, and ask your doctor or other care provider to review them with you.

## 2018-09-12 NOTE — H&P
[facility] Outpatient / Observation Unit  History and Physical Exam     Gema Coombs MRN# [id number]   YOB: 1950 Age: 68 year old      Date of Admission:  9/12/2018    Primary care provider: Flavia Alberts          Assessment:   Gema Coombs is a 68 year old female with a PMH significant for HTN, PAF, asthma, seizure hx and hx of recurrent diverticulitis, who presents with abdominal pain and nausea.  Work up in the ED reveals: VSS. CMP and CBC are fairly unremarkable other than mild thrombocytopenia of 85. Lipase is normal. UA is fairly unremarkable. CT of the abd/pelvis shows acute diverticulitis of the sigmoid colon, no abscess or free air. She received IV Levaquin and IV Flagyl and 4 mg IV Morphine.  Patient will be registered to Observation for further evaluation and symptom management.     1. Recurrent acute diverticulitis - 4th episode since April. Has been seen by Colorectal in the past. Surgery has been discussed but trying to avoid this. Current sx ongoing for 2 days, associated nausea. Denies blood in stool. ED spoke with CRS who will likely consider colonoscopy vs hemicolectomy, formal consult placed to assess pt. Received IV Levaquin and Flagyl in ED, will continue. IVFs, NPO and supportive cares.   2. HTN - resume home lisinopril and diltiazem  3. PAF - on diltiazem, resume. Not anticoagulated as CHADS-Vasc was 1  4. Asthma - well managed on albuterol neb  5. Seizure hx - well managed on Primidone         Plan:     1. [place] to Observation  2. IV Levaquin and Flagyl  3. IV hydration with Normal saline, @, 100 ml/hr  4. Supportive care with anti-emetics, pain meds PRN  5. NPO  6. Colorectal surgery consult  7. Follow labs  8. DVT prophylaxis: pt at low risk, encourage ambulation.                    Chief Complaint:   Acute abdominal pain and nausea         History of Present Illness:   Gema Coombs is a 68 year old female with a PMH significant for HTN, PAF, asthma, seizure hx and hx  of recurrent diverticulitis, who presents with abdominal pain and nausea. She reports onset of left sided abdominal pain about 2 days ago with associated nausea but no vomiting. She notes that his is her 4 th episode of diverticulitis since April. Her episodes usually start with a normal BM followed by several days of diarrhea and then won't have a BM for several days, so she had a normal BM 2 days ago followed by loose stools yesterday, none today. She denies fever, chills, chest pain, SOB, blood in stools or dysuria. She has been seen by the Colorectal surgeon in the past, Dr. Cordova. Surgery has been considered but pt would like to avoid this. She does have a wedding in [place] in February that she wants to make sure she is well enough to go to.             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      Hypertension      Paroxysmal atrial fibrillation (H) 16               Past Surgical History:     Past Surgical History:   Procedure Laterality Date     BIOPSY OF BREAST, INCISIONAL      L breasts     BIOPSY OF BREAST, NEEDLE CORE  &    R breasts     C ANORECTAL MYOMECTOMY      uterine fibroids     C  DELIVERY ONLY      x 2     LAPAROSCOPY,LYSIS ADHESNS  1985    pain R side poss r/t c-sections               Social History:     Social History     Social History     Marital status:      Spouse name: Jalen     Number of children: 2     Years of education: 14     Occupational History     [employer]     Social History Main Topics     Smoking status: Never Smoker     Smokeless tobacco: Never Used     Alcohol use 0.0 oz/week     0 drink(s) per week      Comment: rare     Drug use: No     Sexual activity: Yes     Partners: Male      Comment: postmenopause     Other Topics Concern     Exercise Yes      Seat Belt Yes     Self-Exams Yes     Social History Narrative               Family History:     Family History   Problem Relation Age of Onset     Cancer Maternal Aunt      breast-     Cancer Maternal Grandmother      breast-     HEART DISEASE Paternal Grandfather      heartattack-  at 58     HEART DISEASE Maternal Grandfather           HEART DISEASE Paternal Uncle      bypass-alive &well     Thyroid Disease Sister      hypothyroidism-alive              Allergies:      Allergies   Allergen Reactions     No Known Drug Allergy                Medications:     Prior to Admission medications    Medication Sig Last Dose Taking? Auth Provider   acetaminophen (TYLENOL) 500 MG tablet Take 500-1,000 mg by mouth every 6 hours as needed for mild pain 2018 at Unknown time Yes Unknown, Entered By History   albuterol (PROAIR HFA/PROVENTIL HFA/VENTOLIN HFA) 108 (90 Base) MCG/ACT Inhaler Inhale 1 puff into the lungs every 4 hours as needed for shortness of breath / dyspnea or wheezing 2018 at Noon Yes Unknown, Entered By History   diltiazem (TIAZAC) 120 MG 24 hr ER beaded capsule Take 1 capsule (120 mg) by mouth daily 2018 at 0900 Yes Flavia Alberts MD   HYDROcodone-acetaminophen (NORCO) 5-325 MG per tablet Take 1 tablet by mouth every 6 hours as needed for severe pain Past Month at Unknown time Yes Mike Mejia MD   lisinopril (PRINIVIL/ZESTRIL) 10 MG tablet Take 20 mg by mouth every morning 2018 at 0900 Yes Unknown, Entered By History   lisinopril (PRINIVIL/ZESTRIL) 10 MG tablet Take 10 mg by mouth every evening 2018 at pm Yes Unknown, Entered By History   primidone (MYSOLINE) 250 MG tablet Take 250 mg by mouth 2 times daily 2018 at 0900 Yes Unknown, Entered By History   ranitidine HCl 300 MG CAPS Take 1 capsule by mouth daily 2018 at pm Yes Unknown, Entered By History   albuterol (2.5 MG/3ML) 0.083% neb solution Take 1 vial (2.5 mg) by nebulization  every 6 hours as needed for shortness of breath / dyspnea More than a month at Unknown time  Iveth Veras PA-C              Review of Systems:   A Comprehensive greater than 10 system review of systems was carried out.  Pertinent positives and negatives are noted above.  Otherwise negative for contributory information.     Constitutional, neuro, ENT, endocrine, pulmonary, cardiac, gastrointestinal, genitourinary, musculoskeletal, integument and psychiatric systems are negative, except as otherwise noted.         Physical Exam:   Blood pressure 140/60, pulse 77, temperature 99.5  F (37.5  C), temperature source Oral, resp. rate 16, weight 135.2 kg (298 lb), SpO2 98 %, currently breastfeeding.    GENERAL:  Comfortable.  PSYCH: pleasant, oriented, No acute distress.  HEENT:  Atraumatic, normocephalic. Normal conjunctiva, normal hearing, and oropharynx is normal.  NECK:  Supple, no neck vein distention.  HEART:  Normal S1, S2 with no murmur, no pericardial rub, gallops or S3 or S4.  LUNGS:  Clear to auscultation, normal Respiratory effort. No wheezing, rales or ronchi.  GI:  Soft, normal bowel sounds. Let sided abdominal tenderness, LLQ>LUQ, non distended.   EXTREMITIES:  No pedal edema, +2 pulses bilateral and equal.  SKIN:  Dry to touch, No rash, wound or ulcerations.  NEUROLOGIC:  CN 2-12 intact, BL 5/5 symmetric upper and lower extremity strength, sensation is intact with no focal deficits.              Data:       Recent Labs  Lab 09/12/18  1345   WBC 6.4   HGB 13.9   HCT 42.8   MCV 92   PLT 85*       Recent Labs  Lab 09/12/18  1345      POTASSIUM 3.6   CHLORIDE 104   CO2 30   ANIONGAP 7   GLC 98   BUN 7   CR 0.75   GFRESTIMATED 78   GFRESTBLACK >90   TONIA 8.7   PROTTOTAL 6.7*   ALBUMIN 3.8   BILITOTAL 0.5   ALKPHOS 95   AST 16   ALT 12       Recent Labs  Lab 09/12/18  1345   LIPASE 116       Recent Labs  Lab 09/12/18  1437   COLOR Yellow   APPEARANCE Clear   URINEGLC Negative   URINEBILI Negative    URINEKETONE Negative   SG 1.012   UBLD Small*   URINEPH 5.0   PROTEIN Negative   NITRITE Negative   LEUKEST Trace*   RBCU 1   WBCU 2         Recent Results (from the past 48 hour(s))   CT Abdomen Pelvis w Contrast    Narrative    CT ABDOMEN AND PELVIS WITH CONTRAST   9/12/2018 3:42 PM     HISTORY: Abdominal pain.     TECHNIQUE:  CT abdomen and pelvis with 100 mL Isovue-370 IV. Radiation  dose for this scan was reduced using automated exposure control,  adjustment of the mA and/or kV according to patient size, or iterative  reconstruction technique.    COMPARISON: CT abdomen and pelvis 8/1/2018.    FINDINGS:   Acute diverticulitis at the proximal sigmoid colon left lower quadrant  series 3 image 63, series 4 image 32. No abscess or free air. No bowel  obstruction.    Liver, gallbladder, adrenals, spleen, pancreas, and kidneys do not  demonstrate any acute abnormalities. Mild splenomegaly continues to  measure 14.2 cm, stable series 4 image 44. No focal splenic lesions.  No enlarged lymph nodes identified.    Ventral abdominal wall bulging appears stable.      Impression    IMPRESSION:  1. Acute diverticulitis at the sigmoid colon again identified. No  abscess. Underlying colonic lesion is not excluded by this exam and  colonoscopy could provide further assessment.  2. Stable mild splenomegaly.    MD Mariia MERCEDES PA-C

## 2018-09-12 NOTE — PROGRESS NOTES
Labs and imaging reviewed. Patient examined. Off abx for 2 weeks, typical pain returned. 4/10, across upper abdomen and down left side. Non toxic. No peritoneal signs.    No indication for urgent surgery  Full consult to follow tomorrow    Daron Hogan MD  Colorectal Surgery Fellow  [facility]  Phone: [phone]

## 2018-09-12 NOTE — ED NOTES
"[facility]  ED Nurse Handoff Report    Gema Coombs is a 68 year old female   ED Chief complaint: Abdominal Pain  . ED Diagnosis:   Final diagnoses:   Diverticulitis of colon     Allergies:   Allergies   Allergen Reactions     No Known Drug Allergy        Code Status: Full Code  Activity level - Baseline/Home:  Independent. Activity Level - Current:   Stand with Assist. Lift room needed: No. Bariatric: No   Needed: No   Isolation: No. Infection: Not Applicable.     Vital Signs:   Vitals:    09/12/18 1343 09/12/18 1400 09/12/18 1430 09/12/18 1600   BP: 143/85 163/77 (!) 175/98 164/85   Pulse:       Resp:       Temp:       SpO2: 95% 93% 95% 97%   Weight:           Cardiac Rhythm:  ,      Pain level: 0-10 Pain Scale: 5  Patient confused: No. Patient Falls Risk: Yes.   Elimination Status: Has voided   Patient Report - Initial Complaint: abd pain. Focused Assessment: Gema Coombs is a 68 year old female with a history of paroxysmal atrial fibrillation and diverticulitis, not anticoagulated, who presents for evaluation of abdominal pain. The patient reports a long history of abdominal pain dating back to April of this year with her first diagnosis of diverticulitis. Since that time, the patient notes that she will typically be put on antibiotics and notice resolution of her symptoms for some short amount of time before her abdominal pain returns. Patient finished her most recent round of antibiotics 2 weeks ago and states that she felt well after this. Patient notes that she first began to feel sore in her abdomen 3 days ago and has since experienced constipation and diarrhea. On presentation, the patient notes that her pain is \"6/10\" in severity and that it will wax and wane from 5/10 to 8/10. Patient typically sees Dr. Vick or [facility] who advised her to come into the emergency department if she noticed return of her symptoms. Patient denies other complaint.  "      Tests Performed:   Labs Ordered and Resulted from Time of ED Arrival Up to the Time of Departure from the ED   ROUTINE UA WITH MICROSCOPIC - Abnormal; Notable for the following:        Result Value    Blood Urine Small (*)     Leukocyte Esterase Urine Trace (*)     Mucous Urine Present (*)     All other components within normal limits   CBC WITH PLATELETS DIFFERENTIAL - Abnormal; Notable for the following:     Platelet Count 85 (*)     All other components within normal limits   COMPREHENSIVE METABOLIC PANEL - Abnormal; Notable for the following:     Protein Total 6.7 (*)     All other components within normal limits   LIPASE   MAY SALINE LOCK IV     CT Abdomen Pelvis w Contrast   Final Result   IMPRESSION:   1. Acute diverticulitis at the sigmoid colon again identified. No   abscess. Underlying colonic lesion is not excluded by this exam and   colonoscopy could provide further assessment.   2. Stable mild splenomegaly.      JULIAN HOLLINS MD        Treatments provided: abx  Family Comments: family member present in room  OBS brochure/video discussed/provided to patient:  Yes  ED Medications:   Medications   sodium chloride 0.9% infusion (not administered)   levofloxacin (LEVAQUIN) infusion 500 mg (500 mg Intravenous New Bag 9/12/18 1610)   metroNIDAZOLE (FLAGYL) infusion 500 mg (not administered)   morphine (PF) injection 4 mg (4 mg Intravenous Given 9/12/18 1340)   0.9% sodium chloride BOLUS (65 mLs Intravenous New Bag 9/12/18 1531)   iopamidol (ISOVUE-370) solution 100 mL (100 mLs Intravenous Given 9/12/18 1532)     Drips infusing:  Yes  For the majority of the shift, the patient's behavior Green. Interventions performed were frequent rounding.     Severe Sepsis OR Septic Shock Diagnosis Present: No      ED Nurse Name/Phone Number: Angela Hollins,   4:16 PM    RECEIVING UNIT ED HANDOFF REVIEW    Above ED Nurse Handoff Report was reviewed: yes  Reviewed by: Sophia Wells on September 12, 2018 at 5:00 PM

## 2018-09-12 NOTE — ED PROVIDER NOTES
"  History     Chief Complaint:  Abdominal Pain    The history is provided by the patient.      Gema Coombs is a 68 year old female with a history of paroxysmal atrial fibrillation and diverticulitis, not anticoagulated, who presents for evaluation of abdominal pain. The patient reports a long history of abdominal pain dating back to April of this year with her first diagnosis of diverticulitis. Since that time, the patient notes that she will typically be put on antibiotics and notice resolution of her symptoms for some short amount of time before her abdominal pain returns. Patient finished her most recent round of antibiotics 2 weeks ago and states that she felt well after this. Patient notes that she first began to feel sore in her abdomen 3 days ago and has since experienced constipation and diarrhea. On presentation, the patient notes that her pain is \"6/10\" in severity and that it will wax and wane from 5/10 to 8/10. Patient typically sees Dr. Cordova of [facility] who advised her to come into the emergency department if she noticed return of her symptoms. Patient denies other complaint.      Allergies:  No known drug allergies      Medications:    Albuterol inhaler  Tiazac  Lisinopril  Primidone  Ranitidine    Past Medical History:    Acute diverticulitis  Seizure disorder  Basal cell carcinoma  Chronic idiopathic thrombocytopenia  Mild persisted asthma  Hyperlipidemia  Benign essential hypertension   GERD  Generalized nonconvulsive epilepsy  Breast disorder  Myalgia myositis  [facility]  ACP  Paroxysmal atrial fibrillation     Past Surgical History:    Left/right breast biopsy   (2x)  Uterine fibroids  Laparoscopic lysis adhesions    Family History:    Breast cancer  Heart disease  Hypothyroidism     Social History:  Presents with spouse   Tobacco use: Never smoker   Alcohol use: Yes (rarely)  PCP: Flavia Alberts    Marital Status:       Review of Systems "   Gastrointestinal: Positive for abdominal pain, constipation and diarrhea.   All other systems reviewed and are negative.  Pt has had several episodes of diverticulitis since April.  Has been on 4 rounds of abx and liquid and low residue diet since.  Pt having worsening pain in left abdomen, some nausea.  Pt has followed up with colorectal surgery on 8/20 and then has a colonoscopy scheduled for 10/20 with Dr Cordova.    Physical Exam     Patient Vitals for the past 24 hrs:   BP Temp Pulse Resp SpO2 Weight   09/12/18 1343 143/85 - - - 95 % -   09/12/18 1243 (!) 183/100 98.2  F (36.8  C) 94 20 97 % 134.3 kg (296 lb)        Physical Exam   Abdominal:         GEN: patient appears tired, family at the bedside  HEAD: atraumatic, normocephalic  EYES: pupils reactive, conjunctivae normal  ENT: TMs flat and white bilaterally, oropharynx normal with no erythema or exudate, mucus membranes dry  NECK: no cervical LAD  RESPIRATORY: no tachypnea, breath sounds clear to auscultation (no rales, wheezes, rhonchi), chest wall nontender, normal phonation  CVS: normal S1/S2, no murmurs/rubs/gallops  ABDOMEN: soft, tender LLQ, no masses or organomegaly, no rebound, decreased bowel sounds, no peritoneal signs  BACK: no costovertebral angle tenderness  EXTREMITIES: intact pulses x 4, full range of motion at joints, no edema  SKIN: warm and dry  NEURO:  Overall symmetrical exam  HEME: no bruising  Emergency Department Course     Imaging:  Radiographic findings were communicated with the patient who voiced understanding of the findings.    CT Abd/pelvis with contrast:  IMPRESSION:  1. Acute diverticulitis at the sigmoid colon again identified. No abscess. Underlying colonic lesion is not excluded by this exam and colonoscopy could provide further assessment.  2. Stable mild splenomegaly.    Imaging independently reviewed and agree with radiologist interpretation.      Laboratory:  CBC: PLT 85 (L) o/w WNL (WBC 6.4, HGB 13.9)   CMP: Protein  total 6.7 (L) o/w WNL (Creatinine 0.75)   Lipase: 116    UA with micro: Blood small, LE trace, Mucous present o/w negative     Interventions:  1340: Morphine 4 mg IV    Flagyl 500 mg IV  Levaquin 500 mg IV x 1  The patient's symptoms were improved with parenteral narcotics.  Heplock  Cardiac/Sp02 monitoring  1l NS IV    Emergency Department Course:  Past medical records, nursing notes, and vitals reviewed.  1320: I performed an exam of the patient and obtained history, as documented above.    IV inserted and blood drawn. Above interventions provided. Blood was sent to the lab for further testing, results above.   The patient provided a urine sample here in the emergency department. This was sent for laboratory testing, findings above.  The patient was sent for a CT while in the emergency department, findings above.     2:07 PM Colorectal knows of the patient, will visit in the ED    4:05 PM: I discussed the patient with Giovana Mercado of colorectal who advised admitting the patient.  Their service will see the patient and consult    4:02 PM recheck, patient updated and consents to admission.    4:09 PM: Discussed the patient with Mariia De La Torre PA-C for Dr. Paredes, who will admit the patient to an observation bed for further monitoring, evaluation, and treatment.      /60 (BP Location: Left arm)  Pulse 77  Temp 99.5  F (37.5  C) (Oral)  Resp 16  Wt 135.2 kg (298 lb)  SpO2 98%  BMI 51.13 kg/m2    Discussed results with patient.  Gave patient copies of all results (applicable labs, CT scans and/or ultrasounds).  Answered questions.    Impression & Plan      Medical Decision Making:  Gema Coombs is a 68 year old female who presents with left lower quadrant abdominal pain. The patient had diverticulitis of the colon and subsequent admission on 6/16. She was seen back in the ER on 6/22 and her most recent episode was on 8/1 (all treated with antibiotics). She has a colonoscopy that is scheduled with   Art (colorectal) for next month and she reports increasing left lower quadrant abdominal pain. She has not had any fevers or chills but does have tenderness to palpation. IV placed and labs sent.  IV hydration started and pain medicine given IV.    Her urine did not show any infection. Her white blood cell count and chemistry panel is normal. Lipase does not show any evidence of pancreatitis. Her CT scan shows no evidence of abscess but there is acute diverticulitis at the sigmoid colon and stable mild splenomegaly. Underlying colonic lesion is not excluded. Patient does have flagyl and Levaquin that has been ordered and we are awaiting colorectal consult.    ADDENDUM: colorectal states to admit the patient and they will consult.  Case discussed with hospitalist service.    Diagnosis:    ICD-10-CM   1. Diverticulitis of colon K57.32       Disposition:  Admitted to hospitalist service.      Scribe Disclosure:  Lopez FERREIRA, am serving as a scribe at 1:37 PM on 9/12/2018 to document services personally performed by Carolina Mendez MD based on my observations and the provider's statements to me.   9/12/2018   [facility] EMERGENCY DEPARTMENT     Carolina Mendez MD  09/12/18 8433

## 2018-09-12 NOTE — PLAN OF CARE
Problem: Patient Care Overview  Goal: Plan of Care/Patient Progress Review  ROOM # 205    Living Situation (if not independent, order SW consult): Ind with    Facility name:  : [facility]     Activity level at baseline: x1  Activity level on admit: SBA      Patient registered to observation; given Patient Bill of Rights; given the opportunity to ask questions about observation status and their plan of care.  Patient has been oriented to the observation room, bathroom and call light is in place.    Discussed discharge goals and expectations with patient/family.

## 2018-09-12 NOTE — IP AVS SNAPSHOT
[facility] Observation Department    [address]    [facility] [address]    Phone:  [phone]                                       After Visit Summary   9/12/2018    Gema Coombs    MRN: [id number]           After Visit Summary Signature Page     I have received my discharge instructions, and my questions have been answered. I have discussed any challenges I see with this plan with the nurse or doctor.    ..........................................................................................................................................  Patient/Patient Representative Signature      ..........................................................................................................................................  Patient Representative Print Name and Relationship to Patient    ..................................................               ................................................  Date                                   Time    ..........................................................................................................................................  Reviewed by Signature/Title    ...................................................              ..............................................  Date                                               Time          22EPIC Rev 08/18

## 2018-09-13 VITALS
BODY MASS INDEX: 51.13 KG/M2 | WEIGHT: 293 LBS | OXYGEN SATURATION: 96 % | TEMPERATURE: 96.6 F | RESPIRATION RATE: 16 BRPM | SYSTOLIC BLOOD PRESSURE: 140 MMHG | HEART RATE: 73 BPM | DIASTOLIC BLOOD PRESSURE: 52 MMHG

## 2018-09-13 LAB
ANION GAP SERPL CALCULATED.3IONS-SCNC: 7 MMOL/L (ref 3–14)
BASOPHILS # BLD AUTO: 0 10E9/L (ref 0–0.2)
BASOPHILS NFR BLD AUTO: 0.3 %
BUN SERPL-MCNC: 5 MG/DL (ref 7–30)
CALCIUM SERPL-MCNC: 8.4 MG/DL (ref 8.5–10.1)
CHLORIDE SERPL-SCNC: 106 MMOL/L (ref 94–109)
CO2 SERPL-SCNC: 30 MMOL/L (ref 20–32)
CREAT SERPL-MCNC: 0.7 MG/DL (ref 0.52–1.04)
DIFFERENTIAL METHOD BLD: ABNORMAL
EOSINOPHIL # BLD AUTO: 0 10E9/L (ref 0–0.7)
EOSINOPHIL NFR BLD AUTO: 0 %
ERYTHROCYTE [DISTWIDTH] IN BLOOD BY AUTOMATED COUNT: 14.4 % (ref 10–15)
GFR SERPL CREATININE-BSD FRML MDRD: 84 ML/MIN/1.7M2
GLUCOSE SERPL-MCNC: 102 MG/DL (ref 70–99)
HCT VFR BLD AUTO: 37.8 % (ref 35–47)
HGB BLD-MCNC: 12.2 G/DL (ref 11.7–15.7)
IMM GRANULOCYTES # BLD: 0 10E9/L (ref 0–0.4)
IMM GRANULOCYTES NFR BLD: 0.3 %
LYMPHOCYTES # BLD AUTO: 0.9 10E9/L (ref 0.8–5.3)
LYMPHOCYTES NFR BLD AUTO: 22.8 %
MCH RBC QN AUTO: 29.7 PG (ref 26.5–33)
MCHC RBC AUTO-ENTMCNC: 32.3 G/DL (ref 31.5–36.5)
MCV RBC AUTO: 92 FL (ref 78–100)
MONOCYTES # BLD AUTO: 0.4 10E9/L (ref 0–1.3)
MONOCYTES NFR BLD AUTO: 9.8 %
NEUTROPHILS # BLD AUTO: 2.7 10E9/L (ref 1.6–8.3)
NEUTROPHILS NFR BLD AUTO: 66.8 %
NRBC # BLD AUTO: 0 10*3/UL
NRBC BLD AUTO-RTO: 0 /100
PLATELET # BLD AUTO: 68 10E9/L (ref 150–450)
POTASSIUM SERPL-SCNC: 3.5 MMOL/L (ref 3.4–5.3)
RBC # BLD AUTO: 4.11 10E12/L (ref 3.8–5.2)
SODIUM SERPL-SCNC: 143 MMOL/L (ref 133–144)
WBC # BLD AUTO: 4 10E9/L (ref 4–11)

## 2018-09-13 PROCEDURE — 99217 ZZC OBSERVATION CARE DISCHARGE: CPT | Performed by: PHYSICIAN ASSISTANT

## 2018-09-13 PROCEDURE — 25000128 H RX IP 250 OP 636: Performed by: PHYSICIAN ASSISTANT

## 2018-09-13 PROCEDURE — G0378 HOSPITAL OBSERVATION PER HR: HCPCS

## 2018-09-13 PROCEDURE — 96376 TX/PRO/DX INJ SAME DRUG ADON: CPT

## 2018-09-13 PROCEDURE — 25000132 ZZH RX MED GY IP 250 OP 250 PS 637: Performed by: PHYSICIAN ASSISTANT

## 2018-09-13 PROCEDURE — 36415 COLL VENOUS BLD VENIPUNCTURE: CPT | Performed by: PHYSICIAN ASSISTANT

## 2018-09-13 PROCEDURE — 85025 COMPLETE CBC W/AUTO DIFF WBC: CPT | Performed by: PHYSICIAN ASSISTANT

## 2018-09-13 PROCEDURE — 80048 BASIC METABOLIC PNL TOTAL CA: CPT | Performed by: PHYSICIAN ASSISTANT

## 2018-09-13 RX ORDER — LEVOFLOXACIN 750 MG/1
750 TABLET, FILM COATED ORAL DAILY
Qty: 14 TABLET | Refills: 0 | Status: SHIPPED | OUTPATIENT
Start: 2018-09-13 | End: 2019-02-25

## 2018-09-13 RX ORDER — METRONIDAZOLE 500 MG/1
500 TABLET ORAL 3 TIMES DAILY
Qty: 42 TABLET | Refills: 0 | Status: SHIPPED | OUTPATIENT
Start: 2018-09-13 | End: 2019-02-25

## 2018-09-13 RX ADMIN — METRONIDAZOLE 500 MG: 500 INJECTION, SOLUTION INTRAVENOUS at 05:07

## 2018-09-13 RX ADMIN — PRIMIDONE 250 MG: 250 TABLET ORAL at 08:39

## 2018-09-13 RX ADMIN — SODIUM CHLORIDE: 9 INJECTION, SOLUTION INTRAVENOUS at 05:01

## 2018-09-13 RX ADMIN — LISINOPRIL 20 MG: 20 TABLET ORAL at 08:39

## 2018-09-13 RX ADMIN — DILTIAZEM HYDROCHLORIDE 120 MG: 120 CAPSULE, COATED, EXTENDED RELEASE ORAL at 08:39

## 2018-09-13 RX ADMIN — METRONIDAZOLE 500 MG: 500 INJECTION, SOLUTION INTRAVENOUS at 13:07

## 2018-09-13 NOTE — PLAN OF CARE
Problem: Patient Care Overview  Goal: Plan of Care/Patient Progress Review  PRIMARY DIAGNOSIS: ACUTE PAIN R/t Diverticulits  OUTPATIENT/OBSERVATION GOALS TO BE MET BEFORE DISCHARGE:  1. Pain Status: Improved-controlled with oral pain medications.    2. Return to near baseline physical activity: No    3. Cleared for discharge by consultants (if involved): No    Discharge Planner Nurse   Safe discharge environment identified: Yes  Barriers to discharge: Yes       Entered by: Edilberto Martinez 09/12/2018 10:44 PM     Please review provider order for any additional goals.   Nurse to notify provider when observation goals have been met and patient is ready for discharge.  Pt pain 3/10. ashley es pain meds. On abx. Surgery consult. NPO at midnight

## 2018-09-13 NOTE — PLAN OF CARE
"Problem: Patient Care Overview  Goal: Plan of Care/Patient Progress Review  PRIMARY DIAGNOSIS: ACUTE PAIN r/t diverticulitis  OUTPATIENT/OBSERVATION GOALS TO BE MET BEFORE DISCHARGE:  1. Pain Status: Improved - not currently taking medications to relieve pain, refusing non-pharm interventions    2. Return to near baseline physical activity: Yes    3. Cleared for discharge by consultants (if involved): No, colorectal surgery consult in AM    Discharge Planner Nurse   Safe discharge environment identified: Yes  Barriers to discharge: Yes, colorectal surgery consult in AM       Entered by: Tamiko Tello 09/13/2018 12:43 AM     Please review provider order for any additional goals.   Nurse to notify provider when observation goals have been met and patient is ready for discharge.    Pt A&Ox4 and able to make needs known. Currently stating pain in LLQ 3/10 as a \"pulling\" pain d/t diverticulitis, she is refusing pharm and non-pharm interventions to relieve the pain. LS clear on auscultation, bowel sounds present in all 4Qs, pt passing gas. NPO except ice chips and meds, pt understands diet. Colorectal surgery consult in AM.      "

## 2018-09-13 NOTE — PLAN OF CARE
"Problem: Patient Care Overview  Goal: Plan of Care/Patient Progress Review  PRIMARY DIAGNOSIS: ACUTE PAIN r/t diverticulitis  OUTPATIENT/OBSERVATION GOALS TO BE MET BEFORE DISCHARGE:  1. Pain Status: Improved - not currently taking medications to relieve pain, refusing non-pharm interventions     2. Return to near baseline physical activity: Yes     3. Cleared for discharge by consultants (if involved): No, colorectal surgery consult in AM     Discharge Planner Nurse   Safe discharge environment identified: Yes  Barriers to discharge: Yes, colorectal surgery consult in AM     Please review provider order for any additional goals.   Nurse to notify provider when observation goals have been met and patient is ready for discharge.          Pt sleeping majority of shift. VSS. Continuous IV fluids running. Pt ambulated to bathroom SBA, decline dysuria, declines having a BM, but stated she was feeling more gas \"rumbling\" in her stomach. Denies abd discomfort/pain.  NPO except ice chips and meds, pt understands diet. Colorectal surgery consult in AM.      "

## 2018-09-13 NOTE — CONSULTS
[facility]  Colon and Rectal Surgery Consult Note  Name: Gema Coombs    MRN: [id number]  YOB: 1950    Age: 68 year old  Date of admission: 9/12/2018  Primary care provider: Flavia Alberts     Requesting Physician:  Britt  Reason for consult:  recurrent diverticultis           History of Present Illness:   Gema is a 68-year-old woman with hospital consultation for recurrent diverticulitis.     In brief, back in 2013 and 2014 she had diverticulitis. It sounds as if she did pretty well until December of 2016, where she had worsening pain, and a CT scan demonstrated mild sigmoid diverticulitis. She was treated with antibiotics and then did relatively well until June of 2018, at which point she was having worsening pain and, again, was diagnosed with acute sigmoid diverticulitis on her CT scan. She had increased pain over the subsequent few days prompting her to be admitted to the hospital, at which point she was seen by our service. At that point, it was noted that this was roughly her fifth course of diverticulitis and that she had had antibiotics back in April. In looking through her results, she had had CT diverticulitis of the sigmoid colon in 2009, 2010, 2013, 2014 and in 2016. She was treated with antibiotics in June and finished those, and several weeks later back on August 1 she had another episode of increased pain and, again, sigmoid mucosal thickening and surrounding inflammation were noted. She had more antibiotics and I saw her in the office and she was feeling well.    2 days ago she began having increased pain again. CT scan in the ER showed uncomplicated sigmoid diverticulitis. Las BM 2 days ago with some diarrhea.    Since the ABX restarted yesterday she is feeling better. No nausea or vomiting. + flatus.     She has a history of asthma, epilepsy, hypertension, paroxysmal atrial fibrillation, and at this point is not on anticoagulation due to low platelet  count.    Her surgical history includes a myomectomy, partial hysterectomy, and two Cesarian sections.      She notes that she has not had a colonoscopy because every time she has tried to do that she has had diverticulitis preventing that.            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      Hypertension      Paroxysmal atrial fibrillation (H) 16             Past Surgical History:     Past Surgical History:   Procedure Laterality Date     BIOPSY OF BREAST, INCISIONAL      L breasts     BIOPSY OF BREAST, NEEDLE CORE  &    R breasts     C ANORECTAL MYOMECTOMY      uterine fibroids     C  DELIVERY ONLY      x 2     LAPAROSCOPY,LYSIS ADHESNS      pain R side poss r/t c-sections               Social History:     Social History   Substance Use Topics     Smoking status: Never Smoker     Smokeless tobacco: Never Used     Alcohol use 0.0 oz/week     0 drink(s) per week      Comment: rare             Family History:     Family History   Problem Relation Age of Onset     Cancer Maternal Aunt      breast-     Cancer Maternal Grandmother      breast-     HEART DISEASE Paternal Grandfather      heartattack-  at 58     HEART DISEASE Maternal Grandfather           HEART DISEASE Paternal Uncle      bypass-alive &well     Thyroid Disease Sister      hypothyroidism-alive             Allergies:     Allergies   Allergen Reactions     No Known Drug Allergy              Medications:       diltiazem  120 mg Oral Daily     levofloxacin  500 mg Intravenous Q24H     lisinopril  10 mg Oral QPM     lisinopril  20 mg Oral QAM     metroNIDAZOLE  500 mg Intravenous Q6H     primidone  250 mg Oral BID     ranitidine  300 mg Oral Daily     sodium chloride (PF)  3 mL Intracatheter Q8H             Review of Systems:   A  comprehensive greater than 10 system review of systems was carried out.  Pertinent positives and negatives are noted above.  Otherwise negative for contributory info.            Physical Exam:     Blood pressure 135/59, pulse 68, temperature 98  F (36.7  C), temperature source Oral, resp. rate 16, weight 135.2 kg (298 lb), SpO2 99 %, currently breastfeeding.  No intake or output data in the 24 hours ending 09/13/18 0940  EXAM:  GEN: Awake alert and oriented, appears her stated age, obese  PULM: Non-labored breathing with normal respiratory effort  CVS: irreg rate and rhythm,  ABD: Soft, obese mildly tender nthe RLQ, non-distended. no rebound or guarding   RECTAL: Rectal exam was not done  NEURO: CN II-XII grossly intact  MSK: extremeties with no clubbing, cyanosis or edema; able to ambulate  PSYCH: responsive, alert, cooperative; oriented x3; appropriate mood and affect  EXT/SKIN: inspection reveals no rashes, lesions or ulcers, normal coloring         Data Reviewed:     Recent Results (from the past 48 hour(s))   CT Abdomen Pelvis w Contrast    Narrative    CT ABDOMEN AND PELVIS WITH CONTRAST   9/12/2018 3:42 PM     HISTORY: Abdominal pain.     TECHNIQUE:  CT abdomen and pelvis with 100 mL Isovue-370 IV. Radiation  dose for this scan was reduced using automated exposure control,  adjustment of the mA and/or kV according to patient size, or iterative  reconstruction technique.    COMPARISON: CT abdomen and pelvis 8/1/2018.    FINDINGS:   Acute diverticulitis at the proximal sigmoid colon left lower quadrant  series 3 image 63, series 4 image 32. No abscess or free air. No bowel  obstruction.    Liver, gallbladder, adrenals, spleen, pancreas, and kidneys do not  demonstrate any acute abnormalities. Mild splenomegaly continues to  measure 14.2 cm, stable series 4 image 44. No focal splenic lesions.  No enlarged lymph nodes identified.    Ventral abdominal wall bulging appears stable.      Impression    IMPRESSION:  1.  Acute diverticulitis at the sigmoid colon again identified. No  abscess. Underlying colonic lesion is not excluded by this exam and  colonoscopy could provide further assessment.  2. Stable mild splenomegaly.    JULIAN HOLLINS MD         Recent Labs  Lab 09/13/18  0619 09/12/18  1345   WBC 4.0 6.4   HGB 12.2 13.9   HCT 37.8 42.8   MCV 92 92   PLT 68* 85*          Lab Results   Component Value Date     09/13/2018     09/12/2018     08/01/2018    Lab Results   Component Value Date    CHLORIDE 106 09/13/2018    CHLORIDE 104 09/12/2018    CHLORIDE 107 08/01/2018    Lab Results   Component Value Date    BUN 5 09/13/2018    BUN 7 09/12/2018    BUN 8 08/01/2018      Lab Results   Component Value Date    POTASSIUM 3.5 09/13/2018    POTASSIUM 3.6 09/12/2018    POTASSIUM 4.0 08/01/2018    Lab Results   Component Value Date    CO2 30 09/13/2018    CO2 30 09/12/2018    CO2 28 08/01/2018    Lab Results   Component Value Date    CR 0.70 09/13/2018    CR 0.75 09/12/2018    CR 0.73 08/01/2018        No results for input(s): INR in the last 168 hours.  No results for input(s): LACT in the last 168 hours.      Assessment and Plan:   Georgia is a 69 yo woman with a BMI of 51 and recurrent diverticulitis. She has thrombocytopenia of without clear cause.  Plan:  1. Admit for observation but if tolerating liquids and pain improving she may DC later and plan for elective surgery. She will need preoperative evaluation given her comorbidity but we will plan on continuing ABX until surgery.   2. Surgery: electively will plan for a laparoscopic possible open sigmoid resection and possible stoma.  3. Diet: fulls to low residue as tolerated  4. IV Fluids: per hospitalist  5. Antibiotics:  Cipro and flagyl oral for 14 days will need to renew this if surgery scheduled further out.  6. Medications:  Per hospitalist  7. I&O s:  strict I&O s  8. Labs:   - Reviewed: yes    9. Imaging:   - I have personally viewed: CT  abd/pelvis.  10. Activity:  As toelrated  11. DVT prophylaxis: per hostialist    Patient specific identified risk factors considered as part of today s evaluation include: super obesity, atrial fibrillation, thrombocytopenia     Additional history obtained from chart and patient.  Time spent on consultation: 40 min    Nieves Cordova MD  [facility]  Office Phone # [phone]

## 2018-09-13 NOTE — PLAN OF CARE
Problem: Patient Care Overview  Goal: Plan of Care/Patient Progress Review  Outcome: Improving  PRIMARY DIAGNOSIS: ACUTE PAIN R/T DIVERTICULITIS  OUTPATIENT/OBSERVATION GOALS TO BE MET BEFORE DISCHARGE:  1. Pain Status: Pain free.    2. Return to near baseline physical activity: Yes    3. Cleared for discharge by consultants (if involved): Yes, if she can tolerate a fulls to low residue diet.    VSS on RA. A&Ox4. Diet advanced to full liquids or low residue. Independently ambulating. Pt reports a pulling pain in her right abdomen but refuses any intervention because it is tolerable. Plan: see how the pt tolerates advanced diet, monitor pain    Discharge Planner Nurse   Safe discharge environment identified: Yes, home with   Barriers to discharge: Yes, awaiting to see if she can tolerate her advanced diet       Entered by: Andra Dumont 09/13/2018 9:58 AM     Please review provider order for any additional goals.   Nurse to notify provider when observation goals have been met and patient is ready for discharge.

## 2018-09-13 NOTE — PLAN OF CARE
Problem: Patient Care Overview  Goal: Plan of Care/Patient Progress Review  Outcome: Adequate for Discharge Date Met: 09/13/18  Patient's After Visit Summary was reviewed with patient.  Patient verbalized understanding of After Visit Summary, recommended follow up and was given an opportunity to ask questions.   Discharge medications sent home with patient/family: YES, Flagyl and levofloxacin were filled in the [place] discharge pharmacy and sent home with the pt   Discharged with spouse.    OBSERVATION patient END time: 6565

## 2018-09-13 NOTE — PLAN OF CARE
Problem: Patient Care Overview  Goal: Plan of Care/Patient Progress Review  PRIMARY DIAGNOSIS: ACUTE PAIN R/T DIVERTICULITIS  OUTPATIENT/OBSERVATION GOALS TO BE MET BEFORE DISCHARGE:  1. Pain Status: Pain free, reports only a pulling feeling in her right side that does not require intervention.     2. Return to near baseline physical activity: Yes     3. Cleared for discharge by consultants (if involved): Yes, if she can tolerate a fulls to low residue diet.     VSS on RA. A&Ox4. Tolerated breakfast of scrambled eggs and yogurt. Independently ambulating. Pt reports a pulling pain in her right abdomen but refuses any intervention because it is tolerable. Plan: finish flagyl, discharge     Discharge Planner Nurse   Safe discharge environment identified: Yes, home with   Barriers to discharge: No, pt tolerated diet         Please review provider order for any additional goals.   Nurse to notify provider when observation goals have been met and patient is ready for discharge.

## 2018-09-13 NOTE — DISCHARGE SUMMARY
[facility] Outpatient / Observation Unit  Discharge Summary        Gema Coombs MRN# [id number]   YOB: 1950 Age: 68 year old     Date of Admission:  9/12/2018  Date of Discharge:  9/13/2018  Admitting Physician:  Andra Kohli MD  Discharge Physician: Mariia De La Torre PA-C  Discharging Service: Hospitalist      Primary Provider: Flavia Alberts  Primary Care Physician Phone Number: [phone]         Primary Discharge Diagnoses:    Gema Coombs was admitted on 9/12/2018 for concerns of left sided abdominal pain.    1. Acute diverticulitis - recurrent, 4th episode since April. Symptoms improved with supportive cares and IV antibiotics. Colorectal surgery consulted and recommend partial colectomy but feel this can be set up as an outpatient. She tolerated PO intake so will discharge home on PO abx and pt already has pain medication and zofran. Follow up with CRS to schedule surgery. Will continue abx until surgery.         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 Antibiotics were started and colorectal surgery was   consulted. They recommended continued antibiotic treatment and will set   you up for a partial colectomy as an outpatient in the near future.                    Please refer to initial admission history and physical for further details.   Briefly, Gema Coombs was admitted on 9/12/2018 for concerns of acute recurrent left sided abdominal pain. Work up in ED did not show any evidence of bowel obstruction but did show recurrent diverticulitis. Pt was registered to  the Observation Unit for continued supportive therapy for acute diverticulitis.     Pt was resuscitated with IV fluids and continued on supportive measures including anti-emetics and pain control as needed. She was continued on IV abx and Colorectal surgery was consulted. Labs were reviewed and significant results addressed. Colorectal recommended partial colectomy given the recurrent nature of the diverticulitis and has been localized to the sigmoid colon only but this can be pursued as an outpatient.   On the day of discharge, symptoms were resolving and pt was able to tolerate PO intake. Vitals were stable, without evidence of orthostasis. IV abx was transitioned to PO meds. Medications were reviewed and adjustments made as necessary. Pt is instructed to follow up as below.            Significant Lab During Hospitalization:        Recent Labs  Lab 09/13/18 0619 09/12/18  1345   WBC 4.0 6.4   HGB 12.2 13.9   HCT 37.8 42.8   MCV 92 92   PLT 68* 85*       Recent Labs  Lab 09/13/18 0619 09/12/18  1345    141   POTASSIUM 3.5 3.6   CHLORIDE 106 104   CO2 30 30   ANIONGAP 7 7   * 98   BUN 5* 7   CR 0.70 0.75   GFRESTIMATED 84 78   GFRESTBLACK >90 >90   TONIA 8.4* 8.7   PROTTOTAL  --  6.7*   ALBUMIN  --  3.8   BILITOTAL  --  0.5   ALKPHOS  --  95   AST  --  16   ALT  --  12       Recent Labs  Lab 09/12/18  1345   LIPASE 116       Recent Labs  Lab 09/12/18  1437   COLOR Yellow   APPEARANCE Clear   URINEGLC Negative   URINEBILI Negative   URINEKETONE Negative   SG 1.012   UBLD Small*   URINEPH 5.0   PROTEIN Negative   NITRITE Negative   LEUKEST Trace*   RBCU 1   WBCU 2                Significant Imaging During Hospitalization:      Recent Results (from the past 48 hour(s))   CT Abdomen Pelvis w Contrast    Narrative    CT ABDOMEN AND PELVIS WITH CONTRAST   9/12/2018 3:42 PM     HISTORY: Abdominal pain.     TECHNIQUE:  CT abdomen and pelvis with 100 mL Isovue-370 IV. Radiation  dose for this scan was reduced  using automated exposure control,  adjustment of the mA and/or kV according to patient size, or iterative  reconstruction technique.    COMPARISON: CT abdomen and pelvis 8/1/2018.    FINDINGS:   Acute diverticulitis at the proximal sigmoid colon left lower quadrant  series 3 image 63, series 4 image 32. No abscess or free air. No bowel  obstruction.    Liver, gallbladder, adrenals, spleen, pancreas, and kidneys do not  demonstrate any acute abnormalities. Mild splenomegaly continues to  measure 14.2 cm, stable series 4 image 44. No focal splenic lesions.  No enlarged lymph nodes identified.    Ventral abdominal wall bulging appears stable.      Impression    IMPRESSION:  1. Acute diverticulitis at the sigmoid colon again identified. No  abscess. Underlying colonic lesion is not excluded by this exam and  colonoscopy could provide further assessment.  2. Stable mild splenomegaly.    JULIAN HOLLINS MD              Pending Results:        Unresulted Labs Ordered in the Past 30 Days of this Admission     No orders found for last 61 day(s).              Consultations This Hospital Stay:      Consultation during this admission received from Colorectal surgery         Discharge Instructions and Follow-Up:      Follow-up Appointments     Follow-up and recommended labs and tests        Follow up with primary care provider, Falvia Alberts, within 7 days   for hospital follow- up.  No follow up labs or test are needed.  Follow up with colorectal surgery to plan an outpatient surgery. Continue   antibiotics                  Pt instructed to follow up with PCP in 7 days and with Consultant if indicated.   Follow-up Labs None        Discharge Disposition:      Discharged to home         Discharge Medications:        Current Discharge Medication List      START taking these medications    Details   levofloxacin (LEVAQUIN) 750 MG tablet Take 1 tablet (750 mg) by mouth daily for 14 days  Qty: 14 tablet, Refills: 0    Associated  Diagnoses: Diverticulitis of colon      metroNIDAZOLE (FLAGYL) 500 MG tablet Take 1 tablet (500 mg) by mouth 3 times daily for 14 days  Qty: 42 tablet, Refills: 0    Associated Diagnoses: Diverticulitis of colon         CONTINUE these medications which have NOT CHANGED    Details   acetaminophen (TYLENOL) 500 MG tablet Take 500-1,000 mg by mouth every 6 hours as needed for mild pain      albuterol (PROAIR HFA/PROVENTIL HFA/VENTOLIN HFA) 108 (90 Base) MCG/ACT Inhaler Inhale 1 puff into the lungs every 4 hours as needed for shortness of breath / dyspnea or wheezing      diltiazem (TIAZAC) 120 MG 24 hr ER beaded capsule Take 1 capsule (120 mg) by mouth daily  Qty: 90 capsule, Refills: 3    Associated Diagnoses: Paroxysmal atrial fibrillation (H)      HYDROcodone-acetaminophen (NORCO) 5-325 MG per tablet Take 1 tablet by mouth every 6 hours as needed for severe pain  Qty: 10 tablet, Refills: 0      !! lisinopril (PRINIVIL/ZESTRIL) 10 MG tablet Take 20 mg by mouth every morning      !! lisinopril (PRINIVIL/ZESTRIL) 10 MG tablet Take 10 mg by mouth every evening      primidone (MYSOLINE) 250 MG tablet Take 250 mg by mouth 2 times daily      ranitidine HCl 300 MG CAPS Take 1 capsule by mouth daily      albuterol (2.5 MG/3ML) 0.083% neb solution Take 1 vial (2.5 mg) by nebulization every 6 hours as needed for shortness of breath / dyspnea  Qty: 1 Box, Refills: 1    Associated Diagnoses: Mild persistent asthma without complication       !! - Potential duplicate medications found. Please discuss with provider.              Allergies:         Allergies   Allergen Reactions     No Known Drug Allergy            Condition and Physical on Discharge:      Discharge condition: Stable   Vitals: Blood pressure 135/59, pulse 68, temperature 98  F (36.7  C), temperature source Oral, resp. rate 16, weight 135.2 kg (298 lb), SpO2 99 %, currently breastfeeding.  298 lbs 0 oz      GENERAL:  Comfortable.  PSYCH: pleasant, oriented, No acute  distress.  HEART:  RRR.  LUNGS:  Normal Respiratory effort.   EXTREMITIES:  Able to ambulate independently.  SKIN:  Dry to touch, No rash, wound or ulcerations.  NEUROLOGIC:  Grossly intact     Mariia De La Torre PA-C

## 2018-09-14 ENCOUNTER — CARE COORDINATION (OUTPATIENT)
Dept: FAMILY MEDICINE | Facility: CLINIC | Age: 68
End: 2018-09-14

## 2018-09-14 NOTE — PROGRESS NOTES
Care Coordination Assessment    PCP: Flavia Alberts    Referral Source:  ED/IP List     Clinical Data: Patient discharged from inpatient at [facility] 09/13/2018 with diverticulitis of colon.  Patient instructed to follow up with PCP for labs in 7 days along with colorectal     Plan: I will forward to Vilma in clinical support to assess and assist with appropriate follow up

## 2018-09-18 ENCOUNTER — PATIENT OUTREACH (OUTPATIENT)
Dept: CARE COORDINATION | Facility: CLINIC | Age: 68
End: 2018-09-18

## 2018-09-18 ENCOUNTER — TELEPHONE (OUTPATIENT)
Dept: CARE COORDINATION | Facility: CLINIC | Age: 68
End: 2018-09-18

## 2018-09-18 DIAGNOSIS — I10 ESSENTIAL HYPERTENSION, BENIGN: ICD-10-CM

## 2018-09-18 RX ORDER — LISINOPRIL 10 MG/1
TABLET ORAL
Qty: 90 TABLET | Refills: 1 | Status: SHIPPED | OUTPATIENT
Start: 2018-09-18 | End: 2018-10-15

## 2018-09-18 ASSESSMENT — ACTIVITIES OF DAILY LIVING (ADL): DEPENDENT_IADLS:: INDEPENDENT

## 2018-09-18 NOTE — PROGRESS NOTES
Spoke to patient and she states that she is doing alright, just nervous for the surgery. She does have a follow up appointment with Dr. Alberts for this Friday on 9/21/18. She had no further questions or concerns at this time.

## 2018-09-18 NOTE — PROGRESS NOTES
Clinic Care Coordination Contact    Clinic Care Coordination Contact  OUTREACH    Referral Information:  Referral Source: IP Report    Primary Diagnosis: GI Disorders  Date of Admission:                                  9/12/2018  Date of Discharge:                                  9/13/2018                Primary Discharge Diagnoses:     Gema Coombs was admitted on 9/12/2018 for concerns of left sided abdominal pain.     1. Acute diverticulitis - recurrent, 4th episode since April. Symptoms improved with supportive cares and IV antibiotics. Colorectal surgery consulted and recommend partial colectomy but feel this can be set up as an outpatient. She tolerated PO intake so will discharge home on PO abx and pt already has pain medication and zofran. Follow up with [facility] to schedule surgery. Will continue abx until surgery.          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 Antibiotics were started and colorectal surgery was   consulted. They recommended continued antibiotic treatment and will set   you up for a partial colectomy as an outpatient in the near future.                  Chief Complaint   Patient presents with     Clinic Care Coordination - Post Hospital        [place] Utilization: multiple ED and hospitalizations over past few months for recurrent diverticulitis.    Clinic Utilization  Difficulty keeping appointments:: No  Compliance Concerns: No  No-Show Concerns: No  No PCP office visit in Past Year: No  Utilization    Last refreshed: 9/17/2018  4:40 PM:  No Show Count (past  year) 0       Last refreshed: 9/17/2018  4:40 PM:  ED visits 3       Last refreshed: 9/17/2018  4:40 PM:  Hospital admissions 2          Current as of: 9/17/2018  4:40 PM             Clinical Concerns:  Current Medical Concerns:  Is trying to schedule the surgery and is waiting for a call.  She sent an email last evening.  She has been having so much pain and multiple hospitalizations throughout the summer due to diverticulitis.   Is at the point of wanting the surgery ASAP in hopes it corrects the problem.  She is eating a low residual diet of chicken, mashed potatoes, bananas, eggs, yogurt. She has found what she can tolerate, but is craving a steak, salad, and pizza.  She has history of A Fib and of having a low platelet count which concerns her with surgery.    Current Behavioral Concerns: Patient is anxious as she takes the next steps.  Her son is getting  in February in [place] and she wants to be there.  She is able to calm herself down when she gets anxious and nervious.  Offered referral to counseling if needed.      Education Provided to patient: reviewed role of care coordinator  Pain  Pain (GOAL):: No  Health Maintenance Reviewed: Due/Overdue   Health Maintenance Due   Topic Date Due     PHQ-2 Q1 YR  07/04/1962     DEXA SCAN SCREENING (SYSTEM ASSIGNED)  07/04/2017     PNEUMOCOCCAL (1 of 2 - PCV13) 07/04/2017     TETANUS IMMUNIZATION (SYSTEM ASSIGNED)  12/28/2017     LIPID MONITORING Q1 YEAR  03/24/2018     INFLUENZA VACCINE (1) 09/01/2018     ASTHMA ACTION PLAN Q1 YR  09/13/2018     FALL RISK ASSESSMENT  09/13/2018     Clinical Pathway: None    Medication Management:  No concerns noted.      Functional Status:  Dependent ADLs:: Independent, Ambulation-no assistive device  Dependent IADLs:: Independent  Bed or wheelchair confined:: No  Mobility Status: Independent  Fallen 2 or more times in the past year?: No  Any fall with injury in the past year?: No    Living Situation:  Current living  arrangement:: I live alone    Diet/Exercise/Sleep:  Diet:: Low Fiber  Inadequate nutrition (GOAL):: No  Food Insecurity: No  Tube Feeding: No  Inadequate activity/exercise (GOAL):: No  Significant changes in sleep pattern (GOAL): No    Transportation:  Transportation concerns (GOAL):: No  Transportation means:: Regular car     Psychosocial:  Mental health DX:: No  Mental health management concern (GOAL):: No  Informal Support system:: Family, Friends     Financial/Insurance:   Financial/Insurance concerns (GOAL):: No  No concerns noted.     Resources and Interventions:  Current Resources:    ;   Community Resources: None  Supplies used at home:: None  Equipment Currently Used at Home: none    Advance Care Plan/Directive  Advanced Care Plans/Directives on file:: No    Referrals Placed: None    Patient/Caregiver understanding: Patient will see PCP on 9-21 and would like CC to contact in one week to assess needs.  At this time, no other support from CC.     Outreach Frequency: weekly  Future Appointments              In 3 days Flavia Alberts MD [facility]          Plan: CC to call in one week to assess needs and to offer support.    Germaine Simon,   [facility]  [email or web address]  [phone]

## 2018-09-18 NOTE — TELEPHONE ENCOUNTER
"Pending Prescriptions:                       Disp   Refills    lisinopril (PRINIVIL/ZESTRIL) 10 MG table*90 tab*0            Sig: TAKE 2 TABLETS BY MOUTH EVERY MORNING AND TAKE 1           TABLET BY MOUTH EVERY EVENING    This is in pt chart as \"unknown\"  Pt does have a ov this Friday with you on 9-21  Please fax deny or advise  Deo  [phone] (home) NONE (work)    "

## 2018-09-18 NOTE — LETTER
Health Care Home - Access Care Plan    About Me  Patient Name:  Gema Coombs    YOB: 1950  Age:                             68 year old   [place] MRN:            [id number] Telephone Information:     Home Phone [phone]   Mobile [phone]       Address:    [address]  [place] [address] Email address:  [email or web address]      Emergency Contact(s)  Name Relationship Lgl Grd Work Phone Home Phone Mobile Phone   1. BURT COOMBS Spouse  NONE [phone]             Health Maintenance: Routine Health maintenance Reviewed: Due/Overdue   Health Maintenance Due   Topic Date Due     PHQ-2 Q1 YR  07/04/1962     DEXA SCAN SCREENING (SYSTEM ASSIGNED)  07/04/2017     PNEUMOCOCCAL (1 of 2 - PCV13) 07/04/2017     TETANUS IMMUNIZATION (SYSTEM ASSIGNED)  12/28/2017     LIPID MONITORING Q1 YEAR  03/24/2018     INFLUENZA VACCINE (1) 09/01/2018     ASTHMA ACTION PLAN Q1 YR  09/13/2018     FALL RISK ASSESSMENT  09/13/2018       My Access Plan  Medical Emergency 914   Questions or concerns during clinic hours Primary Clinic Line, I will call the clinic directly:   - [phone]   24 Hour Appointment Line [phone] or  [phone]) (toll free)   24 Hour Nurse Line [phone] (toll free)   Questions or concerns outside clinic hours 24 Hour Appointment Line, I will call the after-hours on-call line:   [facility] [phone]   Preferred Urgent Care [facility] [place], [phone]   Preferred Hospital [facility]  [phone]   Preferred Pharmacy [id number] - [place], [place] - [address]     Behavioral Health Crisis Line The National Suicide Prevention Lifeline at 1-405.100.7000 or 911     My Care Team Members  Patient Care Team       Relationship Specialty Notifications Start End    Flavia Alberts MD PCP - General   1/18/00     Phone: [phone] Fax:  [phone]         [address] [facility] [address]    Germaine Simon LSW Clinic Care Coordinator Primary Care - CC  9/18/18     Phone: [phone] Fax: [phone]               My Medical and Care Information  Problem List   Patient Active Problem List   Diagnosis     Myalgia and myositis     Breast disorder     Generalized nonconvulsive epilepsy (H)     Family history of other cardiovascular diseases     Esophageal reflux     Essential hypertension, benign     Mild persistent asthma     Hyperlipidemia     Diverticulitis of colon     ACP (advance care planning)     Sleep apnea, obstructive     Other specified cardiac dysrhythmias/pac's     Health Care Home     Morbid obesity with BMI of 50.0-59.9, adult (H)     Chronic idiopathic thrombocytopenia (H)     BCC (basal cell carcinoma), face     Seizure disorder (H)     Blurry vision     Mitral valve regurgitation/ repeat echocardiogram 8/2017     Acute diverticulitis     Diverticulitis      Current Medications and Allergies:    Current Outpatient Prescriptions   Medication     acetaminophen (TYLENOL) 500 MG tablet     albuterol (2.5 MG/3ML) 0.083% neb solution     albuterol (PROAIR HFA/PROVENTIL HFA/VENTOLIN HFA) 108 (90 Base) MCG/ACT Inhaler     diltiazem (TIAZAC) 120 MG 24 hr ER beaded capsule     HYDROcodone-acetaminophen (NORCO) 5-325 MG per tablet     levofloxacin (LEVAQUIN) 750 MG tablet     lisinopril (PRINIVIL/ZESTRIL) 10 MG tablet     lisinopril (PRINIVIL/ZESTRIL) 10 MG tablet     metroNIDAZOLE (FLAGYL) 500 MG tablet     primidone (MYSOLINE) 250 MG tablet     ranitidine HCl 300 MG CAPS     No current facility-administered medications for this visit.

## 2018-09-18 NOTE — LETTER
[place] CARE COORDINATION    [address]   [facility] [address]  September 18, 2018    Gema Coombs  [address]  [place] [address]      Dear Gema,    I am a clinic care coordinator who works with Flavia Alberts MD at [phone]. I wanted to thank you for spending the time to talk with me.  I wanted to introduce myself and provide you with my contact information so that you can call me with questions or concerns about your health care. Below is a description of clinic care coordination and how I can further assist you.     The clinic care coordinator is a registered nurse and/or  who understand the health care system. The goal of clinic care coordination is to help you manage your health and improve access to the [facility] system in the most efficient manner. The registered nurse can assist you in meeting your health care goals by providing education, coordinating services, and strengthening the communication among your providers. The  can assist you with financial, behavioral, psychosocial, chemical dependency, counseling, and/or psychiatric resources.    Please feel free to contact me at [phone], with any questions or concerns. We at [facility] are focused on providing you with the highest-quality healthcare experience possible and that all starts with you.     Sincerely,     Germaine Simon    Enclosed: I have enclosed a copy of a 24 Hour Access Plan. This has helpful phone numbers for you to call when needed. Please keep this in an easy to access place to use as needed.

## 2018-09-26 ENCOUNTER — PATIENT OUTREACH (OUTPATIENT)
Dept: CARE COORDINATION | Facility: CLINIC | Age: 68
End: 2018-09-26

## 2018-09-26 ASSESSMENT — ACTIVITIES OF DAILY LIVING (ADL): DEPENDENT_IADLS:: INDEPENDENT
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
## 2018-09-27 ENCOUNTER — OFFICE VISIT (OUTPATIENT)
Dept: FAMILY MEDICINE | Facility: CLINIC | Age: 68
End: 2018-09-27

## 2018-09-27 VITALS
WEIGHT: 293 LBS | OXYGEN SATURATION: 95 % | DIASTOLIC BLOOD PRESSURE: 86 MMHG | SYSTOLIC BLOOD PRESSURE: 128 MMHG | TEMPERATURE: 98.7 F | HEART RATE: 87 BPM | BODY MASS INDEX: 50.61 KG/M2

## 2018-09-27 DIAGNOSIS — Z23 NEED FOR VACCINATION: ICD-10-CM

## 2018-09-27 DIAGNOSIS — D69.6 THROMBOCYTOPENIA (H): ICD-10-CM

## 2018-09-27 DIAGNOSIS — Z09 HOSPITAL DISCHARGE FOLLOW-UP: Primary | ICD-10-CM

## 2018-09-27 DIAGNOSIS — R16.1 SPLENOMEGALY: ICD-10-CM

## 2018-09-27 DIAGNOSIS — K57.92 DIVERTICULITIS: ICD-10-CM

## 2018-09-27 PROCEDURE — 90670 PCV13 VACCINE IM: CPT | Performed by: FAMILY MEDICINE

## 2018-09-27 PROCEDURE — 90471 IMMUNIZATION ADMIN: CPT | Performed by: FAMILY MEDICINE

## 2018-09-27 PROCEDURE — 90686 IIV4 VACC NO PRSV 0.5 ML IM: CPT | Performed by: FAMILY MEDICINE

## 2018-09-27 PROCEDURE — 90714 TD VACC NO PRESV 7 YRS+ IM: CPT | Performed by: FAMILY MEDICINE

## 2018-09-27 PROCEDURE — 99495 TRANSJ CARE MGMT MOD F2F 14D: CPT | Mod: 25 | Performed by: FAMILY MEDICINE

## 2018-09-27 PROCEDURE — 90472 IMMUNIZATION ADMIN EACH ADD: CPT | Performed by: FAMILY MEDICINE

## 2018-09-27 RX ORDER — ONDANSETRON 4 MG/1
4 TABLET, ORALLY DISINTEGRATING ORAL EVERY 8 HOURS PRN
Refills: 0 | COMMUNITY
Start: 2018-09-19 | End: 2019-01-28

## 2018-09-27 RX ORDER — CIPROFLOXACIN 500 MG/1
TABLET, FILM COATED ORAL
COMMUNITY
Start: 2018-09-26 | End: 2018-10-15

## 2018-09-27 NOTE — PROGRESS NOTES
SUBJECTIVE:   Gema Coombs is a 68 year old female who presents to clinic today for the following health issues:          Hospital Follow-up Visit:    Hospital/Nursing Home/IP Rehab Facility: [facility]  Date of Admission: 9/12/18  Date of Discharge: 9/13/18  Reason(s) for Admission: Diverticulitis            Problems taking medications regularly:  None       Medication changes since discharge: just completed her antibiotics       Problems adhering to non-medication therapy:  None    Summary of hospitalization:  [facility] discharge summary reviewed    Recurrent diverticulitis since June  Other findings:  Stable mild splenomegally on CT  thrombocytopenia    Just completed levaquin and flagyl- refilled , stay on continous until surgery     Diagnostic Tests/Treatments reviewed.  Follow up needed: hematology consult for thrombocytopenia/  Other Healthcare Providers Involved in Patient s Care:         Dr Cordova- colon resection scheduled  Update since discharge: improved- stable  She denies bruising  Bloody nose two weeks ago- right nare- resolved easily  No blood stools    Current abdominal pain is 2/10    Post Discharge Medication Reconciliation: discharge medications reconciled, continue medications without change.  Plan of care communicated with patient     Coding guidelines for this visit:  Type of Medical   Decision Making Face-to-Face Visit       within 7 Days of discharge Face-to-Face Visit        within 14 days of discharge   Moderate Complexity [id number]   High Complexity 31507 08685              PROBLEMS TO ADD ON...  thrombocytopenia    Problem list and histories reviewed & adjusted, as indicated.  Additional history: as documented    Patient Active Problem List   Diagnosis     Myalgia and myositis     Breast disorder     Generalized nonconvulsive epilepsy (H)     Family history of other cardiovascular diseases     Esophageal reflux     Essential hypertension, benign     Mild  persistent asthma     Hyperlipidemia     Diverticulitis of colon     ACP (advance care planning)     Sleep apnea, obstructive     Other specified cardiac dysrhythmias/pac's     Health Care Home     Morbid obesity with BMI of 50.0-59.9, adult (H)     Chronic idiopathic thrombocytopenia (H)     BCC (basal cell carcinoma), face     Seizure disorder (H)     Blurry vision     Mitral valve regurgitation/ repeat echocardiogram 2017     Acute diverticulitis     Diverticulitis     Past Surgical History:   Procedure Laterality Date     BIOPSY OF BREAST, INCISIONAL      L breasts     BIOPSY OF BREAST, NEEDLE CORE  &    R breasts     C ANORECTAL MYOMECTOMY      uterine fibroids     C  DELIVERY ONLY      x 2     LAPAROSCOPY,LYSIS ADHESNS      pain R side poss r/t c-sections       Social History   Substance Use Topics     Smoking status: Never Smoker     Smokeless tobacco: Never Used     Alcohol use 0.0 oz/week     0 drink(s) per week      Comment: rare     Family History   Problem Relation Age of Onset     Cancer Maternal Aunt      breast-     Cancer Maternal Grandmother      breast-     HEART DISEASE Paternal Grandfather      heartattack-  at 58     HEART DISEASE Maternal Grandfather           HEART DISEASE Paternal Uncle      bypass-alive &well     Thyroid Disease Sister      hypothyroidism-alive         Current Outpatient Prescriptions   Medication Sig Dispense Refill     acetaminophen (TYLENOL) 500 MG tablet Take 500-1,000 mg by mouth every 6 hours as needed for mild pain       albuterol (2.5 MG/3ML) 0.083% neb solution Take 1 vial (2.5 mg) by nebulization every 6 hours as needed for shortness of breath / dyspnea 1 Box 1     albuterol (PROAIR HFA/PROVENTIL HFA/VENTOLIN HFA) 108 (90 Base) MCG/ACT Inhaler Inhale 1 puff into the lungs every 4 hours as needed for shortness of breath / dyspnea or wheezing       diltiazem (TIAZAC) 120 MG 24 hr ER beaded capsule Take  1 capsule (120 mg) by mouth daily 90 capsule 3     HYDROcodone-acetaminophen (NORCO) 5-325 MG per tablet Take 1 tablet by mouth every 6 hours as needed for severe pain 10 tablet 0     lisinopril (PRINIVIL/ZESTRIL) 10 MG tablet TAKE 2 TABLETS BY MOUTH EVERY MORNING AND TAKE 1 TABLET BY MOUTH EVERY EVENING 90 tablet 1     lisinopril (PRINIVIL/ZESTRIL) 10 MG tablet Take 20 mg by mouth every morning       lisinopril (PRINIVIL/ZESTRIL) 10 MG tablet Take 10 mg by mouth every evening       primidone (MYSOLINE) 250 MG tablet Take 250 mg by mouth 2 times daily       ranitidine HCl 300 MG CAPS Take 1 capsule by mouth daily       ciprofloxacin (CIPRO) 500 MG tablet        ondansetron (ZOFRAN-ODT) 4 MG ODT tab   0       Reviewed and updated as needed this visit by clinical staff  Tobacco  Allergies  Meds       Reviewed and updated as needed this visit by Provider         ROS:  CONSTITUTIONAL: NEGATIVE for fever, chills, change in weight  ENT/MOUTH: NEGATIVE for ear, mouth and throat problems  RESP: NEGATIVE for significant cough or SOB  CV: NEGATIVE for chest pain, palpitations or peripheral edema  GI: above  HEME/ALLERGY/IMMUNE: low platelet count- no bruising    OBJECTIVE:     /86 (BP Location: Left arm, Patient Position: Sitting, Cuff Size: Adult Large)  Pulse 87  Temp 98.7  F (37.1  C) (Oral)  Wt 133.8 kg (295 lb)  SpO2 95%  BMI 50.61 kg/m2  Body mass index is 50.61 kg/(m^2).   GENERAL: healthy, alert and no distress  NECK: no adenopathy, no asymmetry, masses, or scars and thyroid normal to palpation  RESP: lungs clear to auscultation - no rales, rhonchi or wheezes  CV: regular rate and rhythm,, no murmur, click or rub, no peripheral edema    ABDOMEN: soft, nontender, no palpable hepatosplenomegaly, no masses and bowel sounds normal  MS: no gross musculoskeletal defects noted, no edema  Skin: no dermatitis, ecchymosis    Diagnostic Test Results:  Normal chest -ray           ASSESSMENT/PLAN:      (Z09) [facility]  "discharge follow-up  (primary encounter diagnosis)  Comment doing well  Hematology consult before surgery    Platelet count 143,000 8/2016  Since then , low 100's or less      (K57.92) Diverticulitis  Comment: stable  Plan: Abdominal pain remains stable- continue antibiotics until surgery    (D69.6) Thrombocytopenia (H)  Comment:  68,000 last platelet count  Plan: ONC/HEME ADULT REFERRAL, HC XRAY CHEST, 2 VIEWS            (R16.1) Splenomegaly  Comment: mild  Plan: ONC/HEME ADULT REFERRAL, HC XRAY CHEST, 2 VIEWS            (Z23) Need for vaccination  Comment:   Plan: HC FLU VAC PRESRV FREE QUAD SPLIT VIR 3+YRS IM,        TD (ADULT, 7+) PRESERVE FREE, PNEUMOCOCCAL CONJ        VACCINE 13 VALENT IM, VACCINE ADMINISTRATION,         INITIAL, VACCINE ADMINISTRATION, EACH         ADDITIONAL          BMI:   Estimated body mass index is 50.61 kg/(m^2) as calculated from the following:    Height as of 6/18/18: 1.626 m (5' 4.02\").    Weight as of this encounter: 133.8 kg (295 lb).   Weight management plan: Discussed healthy diet and exercise guidelines and patient will follow up in 6 months in clinic to re-evaluate.    Needs hematology consult before scheduled surgery  Referral made       Flavia Alberts MD  [facility]        "

## 2018-09-27 NOTE — MR AVS SNAPSHOT
After Visit Summary   9/27/2018    Gema Coombs    MRN: [id number]           Patient Information     Date Of Birth          1950        Visit Information        Provider Department      9/27/2018 10:45 AM Flavia Alberts MD [facility]        Today's Diagnoses     Need for vaccination    -  1    Thrombocytopenia (H)        Splenomegaly           Follow-ups after your visit        Additional Services     ONC/HEME ADULT REFERRAL       Your provider has referred you to: N: [facility] [phone]   [email or web address]    Please be aware that coverage of these services is subject to the terms and limitations of your health insurance plan.  Call member services at your health plan with any benefit or coverage questions.      Please bring the following with you to your appointment:    (1) Any X-Rays, CTs or MRIs which have been performed.  Contact the facility where they were done to arrange for  prior to your scheduled appointment.   (2) List of current medications  (3) This referral request   (4) Any documents/labs given to you for this referral                  Your next 10 appointments already scheduled     Oct 15, 2018  3:30 PM CDT   Pre-Op physical with Flavia Alberts MD   [facility] ([facility])    [address]  [address]  [facility] [id number] [phone]            Oct 24, 2018   Procedure with Nieves Cordova MD   [facility] (--)    [address] [place] [id number] [phone]              Who to contact     If you have questions or need follow up information about today's clinic visit or your schedule please contact [facility] directly at [phone].  Normal or non-critical lab and imaging results will be communicated to you by MyChart, letter or phone  within 4 business days after the clinic has received the results. If you do not hear from us within 7 days, please contact the clinic through ApplyMap or phone. If you have a critical or abnormal lab result, we will notify you by phone as soon as possible.  Submit refill requests through ApplyMap or call your pharmacy and they will forward the refill request to us. Please allow 3 business days for your refill to be completed.          Additional Information About Your Visit        Buzz ReferralsharOpez Information     ApplyMap gives you secure access to your electronic health record. If you see a primary care provider, you can also send messages to your care team and make appointments. If you have questions, please call your primary care clinic.  If you do not have a primary care provider, please call [phone] and they will assist you.        Care EveryWhere ID     This is your Care EveryWhere ID. This could be used by other organizations to access your [place] medical records  [id number]        Your Vitals Were     Pulse Temperature Pulse Oximetry BMI (Body Mass Index)          87 98.7  F (37.1  C) (Oral) 95% 50.61 kg/m2         Blood Pressure from Last 3 Encounters:   09/27/18 128/86   09/13/18 140/52   08/01/18 122/59    Weight from Last 3 Encounters:   09/27/18 133.8 kg (295 lb)   09/12/18 135.2 kg (298 lb)   06/16/18 146.1 kg (322 lb 3.2 oz)              We Performed the Following     HC FLU VAC PRESRV FREE QUAD SPLIT VIR 3+YRS IM     ONC/HEME ADULT REFERRAL     PNEUMOCOCCAL CONJ VACCINE 13 VALENT IM     TD (ADULT, 7+) PRESERVE FREE     VACCINE ADMINISTRATION, EACH ADDITIONAL     VACCINE ADMINISTRATION, INITIAL        Primary Care Provider Office Phone # Fax #    Flavia Alberts [phone] [address] [address]        Goals        General    Improve chronic symptoms (pt-stated)     Notes - Note created  9/26/2018 11:48 AM by Germaine Simon LSW    Goal Statement: I  will get off antibiotics and resolve diverticulitis with surgery.  Measure of Success: I will feel well.  Supportive Steps to Achieve: I will have surgery.  Barriers: None identified.   Strengths: Wants to have surgery.  Has joined Facebook group for people with diverticulitis and has learned that many people have favorable outcomes after having surgery.   Date to Achieve By: 12-1-2018  Patient expressed understanding of goal: yes          Equal Access to Services     KYARA CHAPMAN : Hadii leah liu hadasho Sofrancescaali, waaxda luqadaha, qaybta kaalmada adejonasyada, simba willbridgerdaya portillo . So [facility] [phone].    ATENCIÓN: Si habla español, tiene a teresa disposición servicios gratuitos de asistencia lingüística. Llame al [phone].    We comply with applicable federal civil rights laws and [place] laws. We do not discriminate on the basis of race, color, national origin, age, disability, sex, sexual orientation, or gender identity.            Thank you!     Thank you for choosing [facility]  for your care. Our goal is always to provide you with excellent care. Hearing back from our patients is one way we can continue to improve our services. Please take a few minutes to complete the written survey that you may receive in the mail after your visit with us. Thank you!             Your Updated Medication List - Protect others around you: Learn how to safely use, store and throw away your medicines at www.disposemymeds.org.          This list is accurate as of 9/27/18 11:51 AM.  Always use your most recent med list.                   Brand Name Dispense Instructions for use Diagnosis    acetaminophen 500 MG tablet    TYLENOL     Take 500-1,000 mg by mouth every 6 hours as needed for mild pain        * albuterol 108 (90 Base) MCG/ACT inhaler    PROAIR HFA/PROVENTIL HFA/VENTOLIN HFA     Inhale 1 puff into the lungs every 4 hours as needed for shortness of breath / dyspnea or wheezing         * albuterol (2.5 MG/3ML) 0.083% neb solution     1 Box    Take 1 vial (2.5 mg) by nebulization every 6 hours as needed for shortness of breath / dyspnea    Mild persistent asthma without complication       ciprofloxacin 500 MG tablet    CIPRO          diltiazem 120 MG 24 hr ER beaded capsule    TIAZAC    90 capsule    Take 1 capsule (120 mg) by mouth daily    Paroxysmal atrial fibrillation (H)       HYDROcodone-acetaminophen 5-325 MG per tablet    NORCO    10 tablet    Take 1 tablet by mouth every 6 hours as needed for severe pain        levofloxacin 750 MG tablet    LEVAQUIN    14 tablet    Take 1 tablet (750 mg) by mouth daily for 14 days    Diverticulitis of colon       * lisinopril 10 MG tablet    PRINIVIL/ZESTRIL     Take 20 mg by mouth every morning        * lisinopril 10 MG tablet    PRINIVIL/ZESTRIL     Take 10 mg by mouth every evening        * lisinopril 10 MG tablet    PRINIVIL/ZESTRIL    90 tablet    TAKE 2 TABLETS BY MOUTH EVERY MORNING AND TAKE 1 TABLET BY MOUTH EVERY EVENING    Essential hypertension, benign       metroNIDAZOLE 500 MG tablet    FLAGYL    42 tablet    Take 1 tablet (500 mg) by mouth 3 times daily for 14 days    Diverticulitis of colon       ondansetron 4 MG ODT tab    ZOFRAN-ODT          primidone 250 MG tablet    MYSOLINE     Take 250 mg by mouth 2 times daily        ranitidine HCl 300 MG Caps      Take 1 capsule by mouth daily        * Notice:  This list has 5 medication(s) that are the same as other medications prescribed for you. Read the directions carefully, and ask your doctor or other care provider to review them with you.

## 2018-10-03 ENCOUNTER — MYC MEDICAL ADVICE (OUTPATIENT)
Dept: FAMILY MEDICINE | Facility: CLINIC | Age: 68
End: 2018-10-03

## 2018-10-03 NOTE — TELEPHONE ENCOUNTER
From: Gema Coombs  To: Flavia Alberts MD  Sent: 10/3/2018 12:20 AM CDT  Subject: A follow-up question for a visit within the past seven days    I received an email stating I had a new test result. To my surprise it was a result of a CXR which was done on 9/27/18, but I never had a CXR at my visit last week. I was given three vaccines, flu, pneumonia and tetanus and a referral to [facility] for my low platelet count, but did not have a CXR.    Georgia Coombs

## 2018-10-04 ENCOUNTER — TRANSFERRED RECORDS (OUTPATIENT)
Dept: FAMILY MEDICINE | Facility: CLINIC | Age: 68
End: 2018-10-04

## 2018-10-12 NOTE — PROGRESS NOTES
Order(s) created erroneously. Erroneous order ID: [id number]   Order canceled by: RADHIKA ARAGON   Order cancel date/time: 10/12/2018 10:38 AM

## 2018-10-15 ENCOUNTER — HOSPITAL ENCOUNTER (INPATIENT)
Facility: CLINIC | Age: 68
LOS: 3 days | Discharge: HOME | DRG: 287 | End: 2018-10-20
Attending: EMERGENCY MEDICINE | Admitting: INTERNAL MEDICINE
Payer: COMMERCIAL

## 2018-10-15 ENCOUNTER — TELEPHONE (OUTPATIENT)
Dept: FAMILY MEDICINE | Facility: CLINIC | Age: 68
End: 2018-10-15

## 2018-10-15 ENCOUNTER — OFFICE VISIT (OUTPATIENT)
Dept: FAMILY MEDICINE | Facility: CLINIC | Age: 68
End: 2018-10-15

## 2018-10-15 ENCOUNTER — APPOINTMENT (OUTPATIENT)
Dept: GENERAL RADIOLOGY | Facility: CLINIC | Age: 68
DRG: 287 | End: 2018-10-15
Attending: EMERGENCY MEDICINE
Payer: COMMERCIAL

## 2018-10-15 VITALS
DIASTOLIC BLOOD PRESSURE: 92 MMHG | SYSTOLIC BLOOD PRESSURE: 117 MMHG | TEMPERATURE: 97.6 F | HEIGHT: 65 IN | HEART RATE: 85 BPM | WEIGHT: 291 LBS | OXYGEN SATURATION: 96 % | BODY MASS INDEX: 48.48 KG/M2

## 2018-10-15 DIAGNOSIS — I48.0 PAROXYSMAL ATRIAL FIBRILLATION (H): ICD-10-CM

## 2018-10-15 DIAGNOSIS — I50.9 CONGESTIVE HEART FAILURE, UNSPECIFIED HF CHRONICITY, UNSPECIFIED HEART FAILURE TYPE (H): ICD-10-CM

## 2018-10-15 DIAGNOSIS — Z01.818 PREOPERATIVE EXAMINATION: ICD-10-CM

## 2018-10-15 DIAGNOSIS — D69.6 THROMBOCYTOPENIA (H): ICD-10-CM

## 2018-10-15 DIAGNOSIS — I48.91 UNCONTROLLED ATRIAL FIBRILLATION (H): Primary | ICD-10-CM

## 2018-10-15 LAB
% GRANULOCYTES: 64.1 %
ALBUMIN SERPL-MCNC: 4 G/DL (ref 3.4–5)
ALP SERPL-CCNC: 92 U/L (ref 40–150)
ALT SERPL W P-5'-P-CCNC: 47 U/L (ref 0–50)
ANION GAP SERPL CALCULATED.3IONS-SCNC: 8 MMOL/L (ref 3–14)
AST SERPL W P-5'-P-CCNC: 31 U/L (ref 0–45)
BASOPHILS # BLD AUTO: 0 10E9/L (ref 0–0.2)
BASOPHILS NFR BLD AUTO: 0.3 %
BILIRUB SERPL-MCNC: 0.4 MG/DL (ref 0.2–1.3)
BUN SERPL-MCNC: 15 MG/DL (ref 7–30)
CALCIUM SERPL-MCNC: 8.8 MG/DL (ref 8.5–10.1)
CHLORIDE SERPL-SCNC: 106 MMOL/L (ref 94–109)
CO2 SERPL-SCNC: 26 MMOL/L (ref 20–32)
CREAT SERPL-MCNC: 0.97 MG/DL (ref 0.52–1.04)
D DIMER PPP FEU-MCNC: 0.9 UG/ML FEU (ref 0–0.5)
DIFFERENTIAL METHOD BLD: ABNORMAL
EOSINOPHIL # BLD AUTO: 0 10E9/L (ref 0–0.7)
EOSINOPHIL NFR BLD AUTO: 0.1 %
ERYTHROCYTE [DISTWIDTH] IN BLOOD BY AUTOMATED COUNT: 14.3 % (ref 10–15)
GFR SERPL CREATININE-BSD FRML MDRD: 58 ML/MIN/1.7M2
GLUCOSE SERPL-MCNC: 109 MG/DL (ref 70–99)
HCT VFR BLD AUTO: 50.4 % (ref 35–47)
HCT VFR BLD AUTO: 53.3 % (ref 35–47)
HEMOGLOBIN: 16.8 G/DL (ref 11.7–15.7)
HGB BLD-MCNC: 17.5 G/DL (ref 11.7–15.7)
IMM GRANULOCYTES # BLD: 0.1 10E9/L (ref 0–0.4)
IMM GRANULOCYTES NFR BLD: 0.5 %
LACTATE BLD-SCNC: 1.3 MMOL/L (ref 0.7–2)
LYMPHOCYTES # BLD AUTO: 2.5 10E9/L (ref 0.8–5.3)
LYMPHOCYTES NFR BLD AUTO: 21.9 %
LYMPHOCYTES NFR BLD AUTO: 25.5 %
MCH RBC QN AUTO: 29.6 PG (ref 26–33)
MCH RBC QN AUTO: 30 PG (ref 26.5–33)
MCHC RBC AUTO-ENTMCNC: 32.8 G/DL (ref 31.5–36.5)
MCHC RBC AUTO-ENTMCNC: 33.3 G/DL (ref 31–36)
MCV RBC AUTO: 88.9 FL (ref 78–100)
MCV RBC AUTO: 91 FL (ref 78–100)
MONOCYTES # BLD AUTO: 1.1 10E9/L (ref 0–1.3)
MONOCYTES NFR BLD AUTO: 10.4 %
MONOCYTES NFR BLD AUTO: 9.1 %
NEUTROPHILS # BLD AUTO: 7.9 10E9/L (ref 1.6–8.3)
NEUTROPHILS NFR BLD AUTO: 68.1 %
NRBC # BLD AUTO: 0 10*3/UL
NRBC BLD AUTO-RTO: 0 /100
NT-PROBNP SERPL-MCNC: 2888 PG/ML (ref 0–900)
PLATELET # BLD AUTO: 198 10E9/L (ref 150–450)
PLATELET COUNT - QUEST: 194 10^9/L (ref 150–375)
POTASSIUM SERPL-SCNC: 3.8 MMOL/L (ref 3.4–5.3)
PROT SERPL-MCNC: 7.6 G/DL (ref 6.8–8.8)
RBC # BLD AUTO: 5.67 10*12/L (ref 3.8–5.2)
RBC # BLD AUTO: 5.83 10E12/L (ref 3.8–5.2)
SODIUM SERPL-SCNC: 140 MMOL/L (ref 133–144)
TROPONIN I SERPL-MCNC: <0.015 UG/L (ref 0–0.04)
TROPONIN I SERPL-MCNC: <0.015 UG/L (ref 0–0.04)
TSH SERPL DL<=0.005 MIU/L-ACNC: 2.83 MU/L (ref 0.4–4)
WBC # BLD AUTO: 11.6 10E9/L (ref 4–11)
WBC # BLD AUTO: 8.1 10*9/L (ref 4–11)

## 2018-10-15 PROCEDURE — 84443 ASSAY THYROID STIM HORMONE: CPT | Performed by: EMERGENCY MEDICINE

## 2018-10-15 PROCEDURE — 83605 ASSAY OF LACTIC ACID: CPT | Performed by: INTERNAL MEDICINE

## 2018-10-15 PROCEDURE — 25000132 ZZH RX MED GY IP 250 OP 250 PS 637: Performed by: INTERNAL MEDICINE

## 2018-10-15 PROCEDURE — 25000128 H RX IP 250 OP 636: Performed by: INTERNAL MEDICINE

## 2018-10-15 PROCEDURE — 36415 COLL VENOUS BLD VENIPUNCTURE: CPT | Performed by: FAMILY MEDICINE

## 2018-10-15 PROCEDURE — 93005 ELECTROCARDIOGRAM TRACING: CPT

## 2018-10-15 PROCEDURE — 99207 ZZC CDG-CODE CATEGORY CHANGED: CPT | Performed by: INTERNAL MEDICINE

## 2018-10-15 PROCEDURE — 96376 TX/PRO/DX INJ SAME DRUG ADON: CPT

## 2018-10-15 PROCEDURE — 84484 ASSAY OF TROPONIN QUANT: CPT | Performed by: INTERNAL MEDICINE

## 2018-10-15 PROCEDURE — 36415 COLL VENOUS BLD VENIPUNCTURE: CPT | Performed by: INTERNAL MEDICINE

## 2018-10-15 PROCEDURE — 93000 ELECTROCARDIOGRAM COMPLETE: CPT | Performed by: FAMILY MEDICINE

## 2018-10-15 PROCEDURE — 96365 THER/PROPH/DIAG IV INF INIT: CPT

## 2018-10-15 PROCEDURE — 83880 ASSAY OF NATRIURETIC PEPTIDE: CPT | Performed by: EMERGENCY MEDICINE

## 2018-10-15 PROCEDURE — 99285 EMERGENCY DEPT VISIT HI MDM: CPT | Mod: 25

## 2018-10-15 PROCEDURE — 25000125 ZZHC RX 250: Performed by: EMERGENCY MEDICINE

## 2018-10-15 PROCEDURE — 85025 COMPLETE CBC W/AUTO DIFF WBC: CPT | Performed by: FAMILY MEDICINE

## 2018-10-15 PROCEDURE — 96366 THER/PROPH/DIAG IV INF ADDON: CPT

## 2018-10-15 PROCEDURE — 85025 COMPLETE CBC W/AUTO DIFF WBC: CPT | Performed by: EMERGENCY MEDICINE

## 2018-10-15 PROCEDURE — 71045 X-RAY EXAM CHEST 1 VIEW: CPT

## 2018-10-15 PROCEDURE — 80048 BASIC METABOLIC PNL TOTAL CA: CPT | Performed by: FAMILY MEDICINE

## 2018-10-15 PROCEDURE — 80053 COMPREHEN METABOLIC PANEL: CPT | Performed by: EMERGENCY MEDICINE

## 2018-10-15 PROCEDURE — 99207 ZZC CDG-CHARGE REQUIRED MANUAL ENTRY: CPT | Performed by: INTERNAL MEDICINE

## 2018-10-15 PROCEDURE — 84484 ASSAY OF TROPONIN QUANT: CPT | Performed by: EMERGENCY MEDICINE

## 2018-10-15 PROCEDURE — 25000132 ZZH RX MED GY IP 250 OP 250 PS 637: Performed by: EMERGENCY MEDICINE

## 2018-10-15 PROCEDURE — G0378 HOSPITAL OBSERVATION PER HR: HCPCS

## 2018-10-15 PROCEDURE — 99220 ZZC INITIAL OBSERVATION CARE,LEVL III: CPT | Performed by: INTERNAL MEDICINE

## 2018-10-15 PROCEDURE — 25000128 H RX IP 250 OP 636: Performed by: EMERGENCY MEDICINE

## 2018-10-15 PROCEDURE — 99214 OFFICE O/P EST MOD 30 MIN: CPT | Performed by: FAMILY MEDICINE

## 2018-10-15 PROCEDURE — 85379 FIBRIN DEGRADATION QUANT: CPT | Performed by: INTERNAL MEDICINE

## 2018-10-15 RX ORDER — NALOXONE HYDROCHLORIDE 0.4 MG/ML
.1-.4 INJECTION, SOLUTION INTRAMUSCULAR; INTRAVENOUS; SUBCUTANEOUS
Status: DISCONTINUED | OUTPATIENT
Start: 2018-10-15 | End: 2018-10-19

## 2018-10-15 RX ORDER — POLYETHYLENE GLYCOL 3350 17 G/17G
17 POWDER, FOR SOLUTION ORAL DAILY PRN
Status: DISCONTINUED | OUTPATIENT
Start: 2018-10-15 | End: 2018-10-20 | Stop reason: HOSPADM

## 2018-10-15 RX ORDER — ACETAMINOPHEN 650 MG/1
650 SUPPOSITORY RECTAL EVERY 4 HOURS PRN
Status: DISCONTINUED | OUTPATIENT
Start: 2018-10-15 | End: 2018-10-20 | Stop reason: HOSPADM

## 2018-10-15 RX ORDER — SODIUM CHLORIDE 9 MG/ML
INJECTION, SOLUTION INTRAVENOUS CONTINUOUS
Status: DISCONTINUED | OUTPATIENT
Start: 2018-10-15 | End: 2018-10-18

## 2018-10-15 RX ORDER — BISACODYL 10 MG
10 SUPPOSITORY, RECTAL RECTAL DAILY PRN
Status: DISCONTINUED | OUTPATIENT
Start: 2018-10-15 | End: 2018-10-20 | Stop reason: HOSPADM

## 2018-10-15 RX ORDER — LIDOCAINE 40 MG/G
CREAM TOPICAL
Status: DISCONTINUED | OUTPATIENT
Start: 2018-10-15 | End: 2018-10-20

## 2018-10-15 RX ORDER — AMOXICILLIN 250 MG
1 CAPSULE ORAL 2 TIMES DAILY PRN
Status: DISCONTINUED | OUTPATIENT
Start: 2018-10-15 | End: 2018-10-20 | Stop reason: HOSPADM

## 2018-10-15 RX ORDER — NITROGLYCERIN 0.4 MG/1
0.4 TABLET SUBLINGUAL EVERY 5 MIN PRN
Status: DISCONTINUED | OUTPATIENT
Start: 2018-10-15 | End: 2018-10-20

## 2018-10-15 RX ORDER — ACETAMINOPHEN 500 MG
500 TABLET ORAL EVERY 4 HOURS PRN
Status: DISCONTINUED | OUTPATIENT
Start: 2018-10-15 | End: 2018-10-15

## 2018-10-15 RX ORDER — DILTIAZEM HYDROCHLORIDE 5 MG/ML
20 INJECTION INTRAVENOUS ONCE
Status: COMPLETED | OUTPATIENT
Start: 2018-10-15 | End: 2018-10-15

## 2018-10-15 RX ORDER — NITROGLYCERIN 0.4 MG/1
0.4 TABLET SUBLINGUAL EVERY 5 MIN PRN
Status: DISCONTINUED | OUTPATIENT
Start: 2018-10-15 | End: 2018-10-20 | Stop reason: HOSPADM

## 2018-10-15 RX ORDER — OXYCODONE HYDROCHLORIDE 5 MG/1
5 TABLET ORAL
Status: DISCONTINUED | OUTPATIENT
Start: 2018-10-15 | End: 2018-10-19

## 2018-10-15 RX ORDER — ONDANSETRON 4 MG/1
4 TABLET, ORALLY DISINTEGRATING ORAL EVERY 6 HOURS PRN
Status: DISCONTINUED | OUTPATIENT
Start: 2018-10-15 | End: 2018-10-20 | Stop reason: HOSPADM

## 2018-10-15 RX ORDER — AMOXICILLIN 250 MG
2 CAPSULE ORAL 2 TIMES DAILY PRN
Status: DISCONTINUED | OUTPATIENT
Start: 2018-10-15 | End: 2018-10-20 | Stop reason: HOSPADM

## 2018-10-15 RX ORDER — PRIMIDONE 250 MG/1
250 TABLET ORAL 2 TIMES DAILY
Status: DISCONTINUED | OUTPATIENT
Start: 2018-10-15 | End: 2018-10-20 | Stop reason: HOSPADM

## 2018-10-15 RX ORDER — ONDANSETRON 2 MG/ML
4 INJECTION INTRAMUSCULAR; INTRAVENOUS EVERY 6 HOURS PRN
Status: DISCONTINUED | OUTPATIENT
Start: 2018-10-15 | End: 2018-10-20 | Stop reason: HOSPADM

## 2018-10-15 RX ORDER — PROCHLORPERAZINE MALEATE 5 MG
5 TABLET ORAL EVERY 6 HOURS PRN
Status: DISCONTINUED | OUTPATIENT
Start: 2018-10-15 | End: 2018-10-20 | Stop reason: HOSPADM

## 2018-10-15 RX ORDER — ACETAMINOPHEN 325 MG/1
650 TABLET ORAL EVERY 4 HOURS PRN
Status: DISCONTINUED | OUTPATIENT
Start: 2018-10-15 | End: 2018-10-19

## 2018-10-15 RX ORDER — LEVALBUTEROL INHALATION SOLUTION 0.63 MG/3ML
0.63 SOLUTION RESPIRATORY (INHALATION) EVERY 8 HOURS PRN
Status: DISCONTINUED | OUTPATIENT
Start: 2018-10-15 | End: 2018-10-20 | Stop reason: HOSPADM

## 2018-10-15 RX ORDER — PROCHLORPERAZINE 25 MG
12.5 SUPPOSITORY, RECTAL RECTAL EVERY 12 HOURS PRN
Status: DISCONTINUED | OUTPATIENT
Start: 2018-10-15 | End: 2018-10-20 | Stop reason: HOSPADM

## 2018-10-15 RX ORDER — LIDOCAINE 40 MG/G
CREAM TOPICAL
Status: DISCONTINUED | OUTPATIENT
Start: 2018-10-15 | End: 2018-10-20 | Stop reason: HOSPADM

## 2018-10-15 RX ORDER — METOPROLOL TARTRATE 25 MG/1
25 TABLET, FILM COATED ORAL 2 TIMES DAILY
Status: DISCONTINUED | OUTPATIENT
Start: 2018-10-15 | End: 2018-10-17

## 2018-10-15 RX ADMIN — DILTIAZEM HYDROCHLORIDE 5 MG/HR: 5 INJECTION INTRAVENOUS at 17:09

## 2018-10-15 RX ADMIN — METOPROLOL TARTRATE 25 MG: 25 TABLET ORAL at 21:56

## 2018-10-15 RX ADMIN — RANITIDINE 150 MG: 150 TABLET ORAL at 21:56

## 2018-10-15 RX ADMIN — SODIUM CHLORIDE: 9 INJECTION, SOLUTION INTRAVENOUS at 21:58

## 2018-10-15 RX ADMIN — ACETAMINOPHEN 500 MG: 500 TABLET, FILM COATED ORAL at 19:15

## 2018-10-15 RX ADMIN — DILTIAZEM HYDROCHLORIDE 20 MG: 5 INJECTION INTRAVENOUS at 17:06

## 2018-10-15 RX ADMIN — PRIMIDONE 250 MG: 250 TABLET ORAL at 21:56

## 2018-10-15 ASSESSMENT — ENCOUNTER SYMPTOMS
NAUSEA: 0
SHORTNESS OF BREATH: 0
VOMITING: 0
PALPITATIONS: 1
DIARRHEA: 0

## 2018-10-15 NOTE — PHARMACY-ADMISSION MEDICATION HISTORY
Admission medication history interview status for this patient is complete. See [facility] admission navigator for allergy information, prior to admission medications and immunization status.     Medication history interview source(s):Patient  Medication history resources (including written lists, pill bottles, clinic record):Nora Mendes on Diff/Ibrahima  Primary pharmacy:-    Changes made to PTA medication list:  Added: -  Deleted: - cipro  Changed: -    Actions taken by pharmacist (provider contacted, etc):None     Additional medication history information:None    Medication reconciliation/reorder completed by provider prior to medication history? No    Do you take OTC medications (eg tylenol, ibuprofen, fish oil, eye/ear drops, etc)? cinthia(Y/N)    For patients on insulin therapy: no (Y/N)  Lantus/levemir/NPH/Mix 70/30 dose:   (Y/N) (see Med list for doses)   Sliding scale Novolog Y/N  If Yes, do you have a baseline novolog pre-meal dose:  units with meals  Patients eat three meals a day:   Y/N    How many episodes of hypoglycemia do you have per week: _______  How many missed doses do you have per week: ______  How many times do you check your blood glucose per day: _______  Do you have a Continuous glucose monitor (CGM)   Y/N (remind pt that not approved for hospital use)   Any Barriers to therapy - Be specific :  cost of medications, comfortable with giving injections (if applicable), comfortable and confident with current diabetes regimen: Y/N ______________      Prior to Admission medications    Medication Sig Last Dose Taking? Auth Provider   acetaminophen (TYLENOL) 500 MG tablet Take 500-1,000 mg by mouth every 6 hours as needed for mild pain  Yes Unknown, Entered By History   albuterol (2.5 MG/3ML) 0.083% neb solution Take 1 vial (2.5 mg) by nebulization every 6 hours as needed for shortness of breath / dyspnea  Yes Iveth Veras PA-C   albuterol (PROAIR HFA/PROVENTIL HFA/VENTOLIN HFA) 108 (90 Base)  MCG/ACT Inhaler Inhale 1 puff into the lungs every 4 hours as needed for shortness of breath / dyspnea or wheezing 10/15/2018 at Unknown time Yes Unknown, Entered By History   diltiazem (TIAZAC) 120 MG 24 hr ER beaded capsule Take 1 capsule (120 mg) by mouth daily 10/15/2018 at Unknown time Yes Flavia Alberts MD   HYDROcodone-acetaminophen (NORCO) 5-325 MG per tablet Take 1 tablet by mouth every 6 hours as needed for severe pain Past Week at Unknown time Yes Mike Mejia MD   lisinopril (PRINIVIL/ZESTRIL) 10 MG tablet Take 20 mg by mouth every morning 10/15/2018 at Unknown time Yes Unknown, Entered By History   lisinopril (PRINIVIL/ZESTRIL) 10 MG tablet Take 10 mg by mouth every evening 10/14/2018 at Unknown time Yes Unknown, Entered By History   primidone (MYSOLINE) 250 MG tablet Take 250 mg by mouth 2 times daily 10/15/2018 at x1 Yes Unknown, Entered By History   ranitidine HCl 300 MG CAPS Take 1 capsule by mouth daily 10/14/2018 at pm Yes Unknown, Entered By History   ondansetron (ZOFRAN-ODT) 4 MG ODT tab Take 4 mg by mouth every 8 hours as needed    Reported, Patient

## 2018-10-15 NOTE — ED PROVIDER NOTES
History     Chief Complaint:  Palpitations     HPI   Bee Coombs is a 68 year old female with history of paroxysmal atrial fibrillation who presents to the emergency department today via EMS for evaluation of palpitations. Patient was at clinic for pre-operative physical earlier today for upcoming laparoscopic assisted sigmoid colectomy for her diverticulitis with Dr. Cordova. Patient's heart rate was 169 at clinic and EKG revealed atrial fibrillation with rapid ventricular rate and was referred to the ED for further evaluation. Patient asserts she was given steroids and IVIG on 10/11 and adds she attributes her heart racing to the influx of medications as she started experiencing intermittent palpitations since yesterday. In addition, patient notes recent diminished quantity of sleep.  Patient notes symptoms have maybe started in the past 1-2 days, however she cannot feel the palpitations.  No orthopnea but definite PND.  No weight gain. Patient denies any chest pain, shortness of breath, nausea, emesis, or diarrhea. Of note, patient had an extensive cardiology workup in  with no cardioversion after an episode of atrial fibrillation with no remarkable findings.  No additional episodes of palpitations since 2016 until yesterday.   Most recent ECHO with an EF > 60% in 2016.  Patient is supposed to have a hemicolectomy in several weeks.    Name: BEE COOMBS  MRN: [id number]  : 1950  Study Date: 2016 07:26 AM  Age: 66 yrs  Gender: Female  [place] [facility] [place] For Study: Unspecified atrial fibrillation  Ordering Physician: BOBBY WILL  Referring Physician: BOBBY WILL  Performed By: Hiram Acuna RDCS     BSA: 2.3 m2  Height: 63 in  Weight: 306 lb  HR: 77  BP: 125/70 mmHg  ______________________________________________________________________________        Procedure  Complete Echo Adult. Contrast  Lumason.  ______________________________________________________________________________     Interpretation Summary     Left ventricular systolic function is normal.  The visual ejection fraction is estimated at 60-65%.  There is mild to moderate (1-2+) mitral regurgitation.  Suggest repeat echo in a year to reassess MR if clinically appropriate. The  study was technically difficult. There is no comparison study available.  ______________________________________________________________________________           Left Ventricle  The left ventricle is normal in size. There is normal left ventricular wall  thickness. Left ventricular systolic function is normal. The visual ejection  fraction is estimated at 60-65%. E by E prime ratio is between 8 and 15,  which is indeterminate for assessment of left ventricular filling pressures.  No regional wall motion abnormalities noted.     Right Ventricle  The right ventricle is normal size. The right ventricular systolic function  is normal.  Atria  Normal left atrial size. Right atrial size is normal. There is no color  Doppler evidence of an atrial shunt.     Mitral Valve  The mitral valve is not well visualized. There is mild to moderate (1-2+)  mitral regurgitation. Evaluation of regurgitation is inadequate. (due to  technical difficulty).     Tricuspid Valve  No tricuspid regurgitation. Right ventricular systolic pressure could not be  approximated due to inadequate tricuspid regurgitation. Normal IVC (1.5-  2.5cm) with >50% respiratory collapse; right atrial pressure is estimated at  5-10mmHg.     Aortic Valve  There is mild trileaflet aortic sclerosis. No aortic regurgitation is  present. No aortic stenosis is present.     Pulmonic Valve  The pulmonic valve is not well visualized.     Vessels  Borderline aortic root dilatation.  Pericardium  There is no pericardial effusion.     Rhythm  The rhythm was normal  sinus.     ______________________________________________________________________________  MMode/2D Measurements & Calculations  IVSd: 1.0 cm  LVIDd: 5.4 cm  LVIDs: 2.6 cm  LVPWd: 1.1 cm  FS: 51.2 %  EDV(Teich): 139.3 ml  ESV(Teich): 25.1 ml  LV mass(C)d: 220.6 grams  Ao root diam: 3.9 cm  LA dimension: 3.9 cm  asc Aorta Diam: 2.9 cm  LA/Ao: 1.0  LVOT diam: 2.3 cm  LVOT area: 4.0 cm2  LA Volume (BP): 58.0 ml     LA Volume Index (BP): 25.0 ml/m2        Doppler Measurements & Calculations  MV E max neil: 86.4 cm/sec  MV A max neil: 87.4 cm/sec  MV E/A: 0.99  MV dec time: 0.21 sec  Lateral E/e': 13.2  Medial E/e': 14.5    Cardiac/PE/DVT Risk Factors:  History of hypertension - Positive  History of hyperlipidemia - Positive   History of diabetes - Negative  History of smoking - Negative  Personal history of PE/DVT - Negative  Family history of PE/DVT - Negative  Family history of heart complications - Positive   Recent travel - Negative  Recent surgery - Negative  Other immobilizations - Negative  Cancer - Negative    Allergies:  No Known Drug Allergies    Medications:    Cipro  Prinivil  Tiazac  Mysoline   Ranitidine     Past Medical History:    Diverticulitis  Hypertension  Hyperlipidemia  Generalized non convulsive epilepsy   Paroxysmal atrial fibrillation - resolved    Past Surgical History:    Bilateral breast biopsy  Uterine fibroids   C section x2     Family History:    Maternal aunt: Breast cancer  Maternal grandmother: Breast cancer  Paternal grandfather: Myocardial infarction  Maternal grandfather: Heart disease  Paternal uncle: Heart bypass  Sister: Hypothyroidism     Social History:  The patient was accompanied to the ED by .  Smoking Status: Never Smoker  Smokeless Tobacco: Never Used  Alcohol Use: Positive  Marital Status:      Review of Systems   Respiratory: Negative for shortness of breath.    Cardiovascular: Positive for palpitations. Negative for chest pain.   Gastrointestinal: Negative  for diarrhea, nausea and vomiting.   All other systems reviewed and are negative.  Pt with onset palpitations yesterday.  Seen at clinic today and found to be a afib with rvr.    Physical Exam     Patient Vitals for the past 24 hrs:   BP Temp Temp src Pulse Heart Rate Resp SpO2 Weight   10/15/18 2000 112/83 - - - 133 12 100 % -   10/15/18 1945 118/78 - - - 134 - 97 % -   10/15/18 1930 (!) 118/91 - - - 124 12 98 % -   10/15/18 1915 101/85 - - - 144 18 97 % -   10/15/18 1907 - 98  F (36.7  C) Oral - - - - -   10/15/18 1901 (!) 111/95 - - - 130 20 99 % -   10/15/18 1842 - - - 101 - - - -   10/15/18 1747 92/61 - - - - - - -   10/15/18 1746 - - - - 120 9 96 % -   10/15/18 1745 (!) 81/61 - - - 104 12 100 % -   10/15/18 1730 96/64 - - - 105 16 94 % -   10/15/18 1715 91/58 - - - 108 16 94 % -   10/15/18 1700 113/78 - - - 161 10 96 % -   10/15/18 1650 122/87 - - 168 168 16 96 % 132 kg (291 lb)     Physical Exam  GEN: patient smiling, no distress  HEAD: atraumatic, normocephalic  EYES: pupils reactive (3plus to 2plus), extraocular muscles intact, conjunctivae normal  ENT: TMs flat and white bilaterally, oropharynx normal with no erythema or exudate, mucus membranes dry  NECK: no cervical LAD, 1cm of JVD  RESPIRATORY: no tachypnea, breath sounds clear to auscultation (no rales, wheezes, rhonchi), chest wall nontender, normal phonation  CVS: normal S1/S2, II/VI systolic ej murmurs, no rubs, S3 gallops, tachycardia  ABDOMEN: soft, nontender, no masses or organomegaly, no rebound, decreased bowel sounds  BACK: no costovertebral angle tenderness  EXTREMITIES: intact pulses x 4, full range of motion at joints, mild edema  MUSCULOSKELETAL: no deformities  SKIN: warm and dry  NEURO: GCS 15, cranial nerves intact.  Motor- moves all 4 extremities with 5/5 strength  Sensation- intact.  Coordination- ambulatory.  Overall symmetrical exam  HEME: no bruising or petechiae/contusions  LYMPH: no lymphadenopathy      Emergency Department  Course     ECG:  ECG taken at 1646, ECG read at 1646  Atrial fibrillation with rapid ventricular response with premature ventricular or aberrantly conducted complexes   Inferior infarction, age undetermined  Abnormal ECG  Rate 167 bpm. AR interval * ms. QRS duration 78 ms. QT/QTc 280/467 ms. P-R-T axes * -21 194.    Imaging:  Radiology findings were communicated with the patient who voiced understanding of the findings.    XR Chest Port 1 View  No acute abnormality.  ANNE MARIE RUST MD  Reading per radiology    Laboratory:  Laboratory findings were communicated with the patient who voiced understanding of the findings.    CBC: WBC 11.6 (H), HGB 17.5 (H),   CMP: Glucose 109 (H), GFR 58 (L) o/w WNL (Creatinine 0.97)  Nt probnp inpatient: 2888 (H)  Troponin (Collected 1648): <0.015  TSH with free T4 reflex: 2.83    Interventions:  Heplock  Cardiac/Sp02 monitoring  Oxygen by nasal cannula at 2L/min  1706 Cardizem 20 mg IV   1915 Tylenol 500 mg PO   1933 Cardizem 5 mg/hr IV    Emergency Department Course:    1641 Nursing notes and vitals reviewed.    1645 I performed an exam of the patient as documented above.     1648 IV was inserted and blood was drawn for laboratory testing, results above.    1720 The patient was sent for a x-ray while in the emergency department, results above.     1821 Awaiting to see if there is a bed here, admit requested. Dilt held.  /83  Pulse 101  Temp 98  F (36.7  C) (Oral)  Resp 12  Wt 132 kg (291 lb)  SpO2 100%  BMI 49.18 kg/m2    1856 I spoke with Dr. Izquierdo of the hospitalist service regarding patient's presentation, findings, and plan of care.    1906 Dilt back on at 2.5 mg/hr. Recheck and update. Updated patient.     1954 Awaiting admit.   /83  Pulse 101  Temp 98  F (36.7  C) (Oral)  Resp 12  Wt 132 kg (291 lb)  SpO2 100%  BMI 49.18 kg/m2     I personally reviewed the imaging and laboratory results with the patient and answered all related questions prior  to admission.    Patient updated prior to admission.    Impression & Plan      Medical Decision Making:  Gema Coombs is a 68 year old female who has had atrial fibrilation one time in the past who presents with palpitation and similar symptoms of such. Patient thinks that 24-36 hours ago, she started to have palpitations. She was seen at clinic today and found to have rapid atrial fibrillation. When she first presented, she was not hypotensive. An IV bolus was started and labs were collected. The patient's EKG showed rapid atrial fibrillation and portable chest x-ray was called for.     Labs were collected and sent. Troponin does not show any signs of ischemia. Probnp is 2800 which is slightly elevated, but creatinine and CBC is otherwise normal. Patient was given oxygen by nasal canula and started on a Diltiazem 20 mg bolus started at 2.5 mg per hour and up to 5.0 mg per hour for the Cardizem. No active chest pain or dyspnea to suggest pulmonary embolism.  Her blood pressure started to decrease so we did stop the Cardizem briefly. Heart rate briefly decreased around 100. Her blood pressure is starting to rebound with the medication cessation and diltiazem was started again at 2.5mg/hr and will be titrated up as BP allows. TSH has been sent and does not show any hyper/hypothyroidism. We are currently waiting on her urinalysis. Troponin does not show any sign of ischemia. Chest x-ray does not show any evidence of CHF or pnemonia (however BNP slightly elevated). A recheck of the patient demonstrates normal mentation. We were able to find her last echocardiogram which is noted two years ago and there was no evidence of systolic decreased dejection fraction.     No chest pain at the time of admit.    Diagnosis:    ICD-10-CM   1. Paroxysmal atrial fibrillation (H) I48.0   2. Congestive heart failure, unspecified HF chronicity, unspecified heart failure type (H) I50.9     Disposition:   The patient is admitted into the  care of Dr. Izquierdo.    Scribe Disclosure:  I, Yossi Solitario, am serving as a scribe at 4:42 PM on 10/15/2018 to document services personally performed by Carolina Mendez MD based on my observations and the provider's statements to me.    [facility] EMERGENCY DEPARTMENT       Carolina Mendez MD  10/15/18 [id number]

## 2018-10-15 NOTE — MR AVS SNAPSHOT
After Visit Summary   10/15/2018    Gema Coombs    MRN: [id number]           Patient Information     Date Of Birth          1950        Visit Information        Provider Department      10/15/2018 3:30 PM Flavia Alberts MD [facility]        Today's Diagnoses     Uncontrolled atrial fibrillation (H)    -  1    Preoperative examination        Thrombocytopenia (H)           Follow-ups after your visit        Additional Services     CARDIOLOGY EVAL ADULT REFERRAL       Preferred location:  [facility] [phone]   [email or web address]  Dr Salcedo is her usual cardiologist  Recent atrial fibrillation- scheduled for surgery- need management and preop clearance    Please be aware that coverage of these services is subject to the terms and limitations of your health insurance plan.  Call member services at your health plan with any benefit or coverage questions.      Please bring the following to your appointment:  Any x-rays, CTs or MRIs which have been performed. Contact the facility where they were done to arrange for  prior to your scheduled appointment.    List of current medications  This referral request   Any documents/labs given to you for this referral                  Your next 10 appointments already scheduled     Oct 16, 2018  1:15 PM CDT   EP NEW with Morales Carver MD   [facility] ([facility])    [address] [place], [place] [id number]  [facility] [address] [phone] OPT 2            Oct 24, 2018   Procedure with Nieves Cordova MD   [facility] (--)    [address]  [facility] [id number] [phone]              Future tests that were ordered for you today     Open Standing Orders        Priority Remaining Interval Expires Ordered    Oxygen: Nasal cannula STAT 98260/84705 CONTINUOUS  10/15/2018  "           Who to contact     If you have questions or need follow up information about today's clinic visit or your schedule please contact [facility] directly at [phone].  Normal or non-critical lab and imaging results will be communicated to you by MyChart, letter or phone within 4 business days after the clinic has received the results. If you do not hear from us within 7 days, please contact the clinic through MyChart or phone. If you have a critical or abnormal lab result, we will notify you by phone as soon as possible.  Submit refill requests through [employer] or call your pharmacy and they will forward the refill request to us. Please allow 3 business days for your refill to be completed.          Additional Information About Your Visit        [employer] Information     [employer] gives you secure access to your electronic health record. If you see a primary care provider, you can also send messages to your care team and make appointments. If you have questions, please call your primary care clinic.  If you do not have a primary care provider, please call [phone] and they will assist you.        Care EveryWhere ID     This is your Care EveryWhere ID. This could be used by other organizations to access your [place] medical records  [id number]        Your Vitals Were     Pulse Temperature Height Pulse Oximetry BMI (Body Mass Index)       85 97.6  F (36.4  C) (Oral) 1.638 m (5' 4.5\") 96% 49.18 kg/m2        Blood Pressure from Last 3 Encounters:   10/15/18 126/89   10/15/18 (!) 117/92   09/27/18 128/86    Weight from Last 3 Encounters:   10/15/18 133.3 kg (293 lb 14.6 oz)   10/15/18 132 kg (291 lb)   09/27/18 133.8 kg (295 lb)              We Performed the Following     BASIC METABOLIC PANEL ([facility])     CARDIOLOGY EVAL ADULT REFERRAL     CL AFF HEMOGRAM/PLATE/DIFF ([facility])     EKG 12-lead complete w/read - Clinics     VENOUS COLLECTION        Primary Care Provider Office Phone # Fax # "    Flavia Alberts [phone]       [address] [facility] [address]  [facility] [address]        Goals        General    Improve chronic symptoms (pt-stated)     Notes - Note created  9/26/2018 11:48 AM by Germaine Simon LSW    Goal Statement: I will get off antibiotics and resolve diverticulitis with surgery.  Measure of Success: I will feel well.  Supportive Steps to Achieve: I will have surgery.  Barriers: None identified.   Strengths: Wants to have surgery.  Has joined Facebook group for people with diverticulitis and has learned that many people have favorable outcomes after having surgery.   Date to Achieve By: 12-1-2018  Patient expressed understanding of goal: yes          Equal Access to Services     KYARA CHAPMAN : Ila Geiger, wasapphire benz, qaybgeorge kaalmarita shields, simba hernandez. So [facility] [phone].    ATENCIÓN: Si habla español, tiene a teresa disposición servicios gratuitos de asistencia lingüística. Llame al [phone].    We comply with applicable federal civil rights laws and [place] laws. We do not discriminate on the basis of race, color, national origin, age, disability, sex, sexual orientation, or gender identity.            Thank you!     Thank you for choosing [facility]  for your care. Our goal is always to provide you with excellent care. Hearing back from our patients is one way we can continue to improve our services. Please take a few minutes to complete the written survey that you may receive in the mail after your visit with us. Thank you!             Your Updated Medication List - Protect others around you: Learn how to safely use, store and throw away your medicines at www.disposemymeds.org.          This list is accurate as of 10/15/18  8:54 PM.  Always use your most recent med list.                   Brand Name Dispense Instructions for use Diagnosis    acetaminophen 500 MG tablet    TYLENOL      Take 500-1,000 mg by mouth every 6 hours as needed for mild pain        * albuterol 108 (90 Base) MCG/ACT inhaler    PROAIR HFA/PROVENTIL HFA/VENTOLIN HFA     Inhale 1 puff into the lungs every 4 hours as needed for shortness of breath / dyspnea or wheezing        * albuterol (2.5 MG/3ML) 0.083% neb solution     1 Box    Take 1 vial (2.5 mg) by nebulization every 6 hours as needed for shortness of breath / dyspnea    Mild persistent asthma without complication       diltiazem 120 MG 24 hr ER beaded capsule    TIAZAC    90 capsule    Take 1 capsule (120 mg) by mouth daily    Paroxysmal atrial fibrillation (H)       HYDROcodone-acetaminophen 5-325 MG per tablet    NORCO    10 tablet    Take 1 tablet by mouth every 6 hours as needed for severe pain        * lisinopril 10 MG tablet    PRINIVIL/ZESTRIL     Take 20 mg by mouth every morning        * lisinopril 10 MG tablet    PRINIVIL/ZESTRIL     Take 10 mg by mouth every evening        ondansetron 4 MG ODT tab    ZOFRAN-ODT     Take 4 mg by mouth every 8 hours as needed    Diverticulitis       primidone 250 MG tablet    MYSOLINE     Take 250 mg by mouth 2 times daily        ranitidine HCl 300 MG Caps      Take 1 capsule by mouth daily        * Notice:  This list has 4 medication(s) that are the same as other medications prescribed for you. Read the directions carefully, and ask your doctor or other care provider to review them with you.

## 2018-10-15 NOTE — IP AVS SNAPSHOT
[place] [facility] [address] [facility]    [address]    [facility] [address]    Phone:  [phone]    Fax:  [phone]                                       After Visit Summary   10/15/2018    Gema Coombs    MRN: [id number]           After Visit Summary Signature Page     I have received my discharge instructions, and my questions have been answered. I have discussed any challenges I see with this plan with the nurse or doctor.    ..........................................................................................................................................  Patient/Patient Representative Signature      ..........................................................................................................................................  Patient Representative Print Name and Relationship to Patient    ..................................................               ................................................  Date                                   Time    ..........................................................................................................................................  Reviewed by Signature/Title    ...................................................              ..............................................  Date                                               Time          22EPIC Rev 08/18

## 2018-10-15 NOTE — PROGRESS NOTES
[facility]  [address]  [address]  [facility] [address]  [phone]  Dept: [phone]    PRE-OP EVALUATION:  Today's date: 10/15/2018    Gema Coombs (: 1950) presents for pre-operative evaluation assessment as requested by Dr. Cordova.  She requires evaluation and anesthesia risk assessment prior to undergoing surgery/procedure for treatment of diverticulitis.    Proposed Surgery/ Procedure: LAPAROSCOPIC ASSISTED SIGMOID COLECTOMY  Date of Surgery/ Procedure: 10/24/18  Time of Surgery/ Procedure: 1:00   Hospital/Surgical Facility: [facility]  Can see in Epic  Primary Physician: Flavia Alberts  Type of Anesthesia Anticipated: General    Patient has a Health Care Directive or Living Will:  NO     Preop exam not completed this evening.  Gema reports that she has not felt well since receiving IVIG infusion five days ago followed by 40 mg of dexamethasone daily. She feels her heart racing- she denies dizziness or chest pain.    She has a history of atrial fibrillation, managed with a daily dose of diltiazem- to her knowledge the last time she had AFib was in 2016    HPI:     HPI related to upcoming procedure: chronic diverticulitis- has been on antibiotics for most of the summer.    Recent hematology consult for thrombocytopenia  Platelet count has improved with treatment  Tonight-   Platelet Count   Date Value Ref Range Status   10/15/2018 198 150 - 450 10e9/L Final            MEDICAL HISTORY:     Patient Active Problem List    Diagnosis Date Noted     Diverticulitis 2018     Priority: Medium     Acute diverticulitis 2018     Priority: Medium     Mitral valve regurgitation/ repeat echocardiogram 2017 08/15/2016     Priority: Medium     Blurry vision 2016     Priority: Medium     Seizure disorder (H) 2015     Priority: Medium     BCC (basal cell carcinoma), face 2014     Priority: Medium     Chronic  idiopathic thrombocytopenia (H) 11/16/2014     Priority: Medium     Morbid obesity with BMI of 50.0-59.9, adult (H) 04/07/2013     Priority: Medium     Other specified cardiac dysrhythmias/pac's 01/06/2012     Priority: Medium     normal sinus, with some pac/tends to tachycardia rate 100-120       Sleep apnea, obstructive 12/07/2011     Priority: Medium     Diverticulitis of colon 10/06/2011     Priority: Medium     Problem list name updated by automated process. Provider to review       Mild persistent asthma 03/09/2006     Priority: Medium     Hyperlipidemia 03/09/2006     Priority: Medium     Problem list name updated by automated process. Provider to review       Essential hypertension, benign 06/13/2004     Priority: Medium     Esophageal reflux 10/17/2003     Priority: Medium     Family history of other cardiovascular diseases 03/16/2002     Priority: Medium     Problem list name updated by automated process. Provider to review and confirm  Problem list name updated by automated process. Provider to review       Generalized nonconvulsive epilepsy (H)      Priority: Medium     Problem list name updated by automated process. Provider to review       Breast disorder      Priority: Medium     Problem list name updated by automated process. Provider to review       Myalgia and myositis      Priority: Medium     Problem list name updated by automated process. Provider to review       Health Care Home 09/13/2012     Priority: Low     Tier:  3  1/24/2013  [facility]    See Letters for H Care Plan           ACP (advance care planning) 12/07/2011     Priority: Low     Advance Care Planning:   ACP Review and Resources Provided:  Reviewed chart for advance care plan.  Gema YAS Coombs has no plan or code status on file. Discussed available resources and provided with information. Confirmed code status reflects current choices pending further ACP discussions.  Confirmed/documented designated decision maker(s). See permanent  comments section of demographics in clinical tab.   Added by Rosita Bowen on 2014    Advance Care Planning 2017: ACP Review of Chart / Resources Provided:  Reviewed chart for advance care plan.  Gema Coombs has no plan or code status on file. Discussed available resources and provided with information.   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      Hypertension      Paroxysmal atrial fibrillation (H) 18     Past Surgical History:   Procedure Laterality Date     BIOPSY OF BREAST, INCISIONAL      L breasts     BIOPSY OF BREAST, NEEDLE CORE  &    R breasts     C ANORECTAL MYOMECTOMY      uterine fibroids     C  DELIVERY ONLY      x 2     LAPAROSCOPY,LYSIS ADHESNS  1985    pain R side poss r/t c-sections     Current Outpatient Prescriptions   Medication Sig Dispense Refill     acetaminophen (TYLENOL) 500 MG tablet Take 500-1,000 mg by mouth every 6 hours as needed for mild pain       albuterol (2.5 MG/3ML) 0.083% neb solution Take 1 vial (2.5 mg) by nebulization every 6 hours as needed for shortness of breath / dyspnea 1 Box 1     albuterol (PROAIR HFA/PROVENTIL HFA/VENTOLIN HFA) 108 (90 Base) MCG/ACT Inhaler Inhale 1 puff into the lungs every 4 hours as needed for shortness of breath / dyspnea or wheezing       ciprofloxacin (CIPRO) 500 MG tablet        diltiazem (TIAZAC) 120 MG 24 hr ER beaded capsule Take 1 capsule (120 mg) by mouth daily 90 capsule 3     HYDROcodone-acetaminophen (NORCO) 5-325 MG per tablet Take 1 tablet by mouth every 6 hours as needed for severe pain 10 tablet 0     lisinopril (PRINIVIL/ZESTRIL) 10 MG tablet TAKE 2 TABLETS BY MOUTH EVERY MORNING AND TAKE 1 TABLET BY MOUTH EVERY EVENING 90 tablet 1     lisinopril (PRINIVIL/ZESTRIL) 10 MG tablet Take 20 mg by mouth every morning    "    lisinopril (PRINIVIL/ZESTRIL) 10 MG tablet Take 10 mg by mouth every evening       ondansetron (ZOFRAN-ODT) 4 MG ODT tab   0     primidone (MYSOLINE) 250 MG tablet Take 250 mg by mouth 2 times daily       ranitidine HCl 300 MG CAPS Take 1 capsule by mouth daily            Allergies   Allergen Reactions     No Known Drug Allergy            Social History   Substance Use Topics     Smoking status: Never Smoker     Smokeless tobacco: Never Used     Alcohol use 0.0 oz/week     0 drink(s) per week      Comment: rare     History   Drug Use No     Social history:  Son is getting  in February- feeling some stress to have surgery soon so she can recover before the surgery      REVIEW OF SYSTEMS:   CONSTITUTIONAL: NEGATIVE for fever, chills, change in weight  ENT/MOUTH: NEGATIVE for ear, mouth and throat problems  RESP: NEGATIVE for significant cough or SOB  CV: NEGATIVE for chest pain or dizziness- POSITIVE for palpitations   GI: She currently denies any abdominal pain    EXAM:   BP (!) 117/92 (BP Location: Left arm, Patient Position: Sitting, Cuff Size: Adult Large)  Pulse 85  Temp 97.6  F (36.4  C) (Oral)  Ht 1.638 m (5' 4.5\")  Wt 132 kg (291 lb)  SpO2 96%  BMI 49.18 kg/m2   Flushed-weepy- no acute distress  Repeat blood pressure 120/90  Pulse is irregular, thready- EKG confirms a HR of 165  Lungs are clear-  [facility] advised treatment at ER- for cardioversion-  Follow up cardiology consult for surgery clearance next week (? Need to start anticoagulation- ?? Best to delay surgery )    DIAGNOSTICS:   EKG: Atrial Fibrillation at rate of 169    Recent Labs   Lab Test  09/13/18   0619  09/12/18   1345   04/13/18   1034   08/01/16   0300   02/29/16   1303   02/14/12   0757   HGB  12.2  13.9   < >   --    < >   --    --   14.4   < >  14.3   PLT  68*  85*   < >   --    < >   --    --   98*   < >  90*   INR   --    --    --    --    --    --    --   0.97   --   0.99   NA  143  141   < >   --    < >   --    --  "  142   < >  138   POTASSIUM  3.5  3.6   < >   --    < >   --    --   3.9   < >  5.0   CR  0.70  0.75   < >   --    < >   --    --   0.81   < >  0.74   A1C   --    --    --   4.7   --   4.7   < >   --    --    --     < > = values in this interval not displayed.        IMPRESSION:   Reason for surgery/procedure: chronic diverticulitis    SURGERY APPROVAL NOT given today because of atrial fibrillation at uncontrolled rate    Thrombocytopenia- has improved with treatment- platelets now in normal range.    Gema was sent to ER for possible cardioversion  Follow up with cardiology later this week- will need cardiology clearance -for surgery  Return for preop exam after cardiology consult.            Signed Electronically by: Flavia Alberts MD    Copy of this evaluation report is provided to requesting physician.    [facility] Preop Guidelines    Revised Cardiac Risk Index

## 2018-10-15 NOTE — IP AVS SNAPSHOT
MRN:[id number]                      After Visit Summary   10/15/2018    Gema Coombs    MRN: [id number]           Thank you!     Thank you for choosing [facility] for your care. Our goal is always to provide you with excellent care. Hearing back from our patients is one way we can continue to improve our services. Please take a few minutes to complete the written survey that you may receive in the mail after you visit. If you would like to speak to someone directly about your visit please contact Patient Relations at [phone]. Thank you!          Patient Information     Date Of Birth          1950        Designated Caregiver       Most Recent Value    Caregiver    Will someone help with your care after discharge? yes    Name of designated caregiver     Phone number of caregiver Please ask    Caregiver address Please ask      About your hospital stay     You were admitted on:  October 15, 2018 You last received care in the:  [facility] [address] [facility]    You were discharged on:  October 20, 2018       Who to Call     For medical emergencies, please call 911.  For non-urgent questions about your medical care, please call your primary care provider or clinic, [phone]          Attending Provider     Provider Specialty    Carolina Mendez MD Emergency Medicine    Dameon Izquierdo,  Internal Medicine       Primary Care Provider Office Phone # Fax #    Flavia Alberts [phone]      After Care Instructions     Activity       Your activity upon discharge: activity as tolerated            Diet       Follow this diet upon discharge: Regular                  Follow-up Appointments     Follow-up and recommended labs and tests        Follow up with primary care provider, Flavia Alberts, within 6 days for hospital follow- up.  The following labs/tests are recommended: BMP and CBC in 6 days.                  Your next 10 appointments  "already scheduled     Oct 24, 2018   Procedure with Nieves Cordova MD   [facility] (--)    [address]  [facility] [address] [phone]              Pending Results     No orders found from 10/13/2018 to 10/16/2018.            Statement of Approval     Ordered          10/20/18 1213  I have reviewed and agree with all the recommendations and orders detailed in this document.  EFFECTIVE NOW     Approved and electronically signed by:  Dameon Izquierdo DO             Admission Information     Date & Time Provider Department Dept. Phone    10/15/2018 Dameon Izquierdo, DO [facility] [phone]      Your Vitals Were     Blood Pressure Pulse Temperature Respirations Height Weight    109/61 (BP Location: Left arm) 82 97.1  F (36.2  C) (Oral) 18 1.626 m (5' 4\") 133.3 kg (293 lb 14.6 oz)    Pulse Oximetry BMI (Body Mass Index)                99% 50.45 kg/m2          [employer] Information     [employer] gives you secure access to your electronic health record. If you see a primary care provider, you can also send messages to your care team and make appointments. If you have questions, please call your primary care clinic.  If you do not have a primary care provider, please call [phone] and they will assist you.        Care EveryWhere ID     This is your Care EveryWhere ID. This could be used by other organizations to access your [place] medical records  [id number]        Equal Access to Services     KYARA CHAPMAN AH: Hadii leah leyo Sobarry, waaxda luqadaha, qaybta kaalmada alyson, simba hernandez. So [facility] [phone].    ATENCIÓN: Si habla español, tiene a teresa disposición servicios gratuitos de asistencia lingüística. Llame al [phone].    We comply with applicable federal civil rights laws and [place] laws. We do not discriminate on the basis of race, color, national origin, age, disability, sex, sexual " orientation, or gender identity.               Review of your medicines      START taking        Dose / Directions    apixaban ANTICOAGULANT 5 MG tablet   Commonly known as:  ELIQUIS        Dose:  5 mg   Start taking on:  10/29/2018   Take 1 tablet (5 mg) by mouth 2 times daily   Quantity:  60 tablet   Refills:  0       atorvastatin 80 MG tablet   Commonly known as:  LIPITOR        Dose:  80 mg   Take 1 tablet (80 mg) by mouth every evening   Quantity:  30 tablet   Refills:  0       diltiazem 120 MG Cp12 12 hr SR capsule   Commonly known as:  CARDIZEM SR        Dose:  120 mg   Take 120 mg by mouth 2 times daily   Quantity:  60 capsule   Refills:  0       metoprolol tartrate 50 MG tablet   Commonly known as:  LOPRESSOR        Dose:  50 mg   Take 1 tablet (50 mg) by mouth 2 times daily   Quantity:  60 tablet   Refills:  0         CONTINUE these medicines which have NOT CHANGED        Dose / Directions    acetaminophen 500 MG tablet   Commonly known as:  TYLENOL        Dose:  500-1000 mg   Take 500-1,000 mg by mouth every 6 hours as needed for mild pain   Refills:  0       * albuterol 108 (90 Base) MCG/ACT inhaler   Commonly known as:  PROAIR HFA/PROVENTIL HFA/VENTOLIN HFA        Dose:  1 puff   Inhale 1 puff into the lungs every 4 hours as needed for shortness of breath / dyspnea or wheezing   Refills:  0       * albuterol (2.5 MG/3ML) 0.083% neb solution   Used for:  Mild persistent asthma without complication        Dose:  1 vial   Take 1 vial (2.5 mg) by nebulization every 6 hours as needed for shortness of breath / dyspnea   Quantity:  1 Box   Refills:  1       HYDROcodone-acetaminophen 5-325 MG per tablet   Commonly known as:  NORCO        Dose:  1 tablet   Take 1 tablet by mouth every 6 hours as needed for severe pain   Quantity:  10 tablet   Refills:  0       ondansetron 4 MG ODT tab   Commonly known as:  ZOFRAN-ODT   Used for:  Diverticulitis        Dose:  4 mg   Take 4 mg by mouth every 8 hours as needed    Refills:  0       primidone 250 MG tablet   Commonly known as:  MYSOLINE        Dose:  250 mg   Take 250 mg by mouth 2 times daily   Refills:  0       ranitidine HCl 300 MG Caps        Dose:  1 capsule   Take 1 capsule by mouth daily   Refills:  0       * Notice:  This list has 2 medication(s) that are the same as other medications prescribed for you. Read the directions carefully, and ask your doctor or other care provider to review them with you.      STOP taking     diltiazem 120 MG 24 hr ER beaded capsule   Commonly known as:  TIAZAC           lisinopril 10 MG tablet   Commonly known as:  PRINIVIL/ZESTRIL                Where to get your medicines      These medications were sent to [id number] [place] - [address]  [address], [place] [address]     Phone:  [phone]     apixaban ANTICOAGULANT 5 MG tablet    atorvastatin 80 MG tablet    diltiazem 120 MG Cp12 12 hr SR capsule    metoprolol tartrate 50 MG tablet                Protect others around you: Learn how to safely use, store and throw away your medicines at www.disposemymeds.org.             Medication List: This is a list of all your medications and when to take them. Check marks below indicate your daily home schedule. Keep this list as a reference.      Medications           Morning Afternoon Evening Bedtime As Needed    acetaminophen 500 MG tablet   Commonly known as:  TYLENOL   Take 500-1,000 mg by mouth every 6 hours as needed for mild pain   Last time this was given:  650 mg on 10/17/2018 11:16 PM                                * albuterol 108 (90 Base) MCG/ACT inhaler   Commonly known as:  PROAIR HFA/PROVENTIL HFA/VENTOLIN HFA   Inhale 1 puff into the lungs every 4 hours as needed for shortness of breath / dyspnea or wheezing                                * albuterol (2.5 MG/3ML) 0.083% neb solution   Take 1 vial (2.5 mg) by nebulization every 6 hours as needed for  shortness of breath / dyspnea                                apixaban ANTICOAGULANT 5 MG tablet   Commonly known as:  ELIQUIS   Take 1 tablet (5 mg) by mouth 2 times daily   Start taking on:  10/29/2018                                atorvastatin 80 MG tablet   Commonly known as:  LIPITOR   Take 1 tablet (80 mg) by mouth every evening                                diltiazem 120 MG Cp12 12 hr SR capsule   Commonly known as:  CARDIZEM SR   Take 120 mg by mouth 2 times daily   Last time this was given:  120 mg on 10/20/2018 11:44 AM                                HYDROcodone-acetaminophen 5-325 MG per tablet   Commonly known as:  NORCO   Take 1 tablet by mouth every 6 hours as needed for severe pain                                metoprolol tartrate 50 MG tablet   Commonly known as:  LOPRESSOR   Take 1 tablet (50 mg) by mouth 2 times daily   Last time this was given:  37.5 mg on 10/20/2018  8:50 AM                                ondansetron 4 MG ODT tab   Commonly known as:  ZOFRAN-ODT   Take 4 mg by mouth every 8 hours as needed                                primidone 250 MG tablet   Commonly known as:  MYSOLINE   Take 250 mg by mouth 2 times daily   Last time this was given:  250 mg on 10/20/2018  8:51 AM                                ranitidine HCl 300 MG Caps   Take 1 capsule by mouth daily                                * Notice:  This list has 2 medication(s) that are the same as other medications prescribed for you. Read the directions carefully, and ask your doctor or other care provider to review them with you.              More Information      About C. diff Infection  For Patients, Family and Visitors  What is C. diff (clostridium difficile)?  C. diff are germs (bacteria). These germs can live in the guts of healthy people. Antibiotic medicine can change the balance of germs in the gut, causing C. diff infection and loose, watery stools (diarrhea).  You can also get C. diff infection during a hospital  stay, after surgery, or if you have a weak immune system or IBD (inflammatory bowel disease).  For a video, visit https://youtu.be/[email or web address].  What are the symptoms?  If you have these symptoms, your doctor will ask for a stool (poop) sample for testing:    Diarrhea (loose, watery stools)    Belly pain, tenderness and cramping    Fever  How does it spread?  C. diff safely leaves your body as part of your stool. However, it can make you ill if:  1. You touch a surface that has C. diff germs, then  2. You touch food or objects that go in your mouth.  How can you prevent C. diff infections?     Wash hands often, especially in the hospital, after using the bathroom and before you eat.    Use antibiotics only when you need them. Don't ask for them if your doctor says you have a virus.    If you take antibiotics, follow the directions. Finish all the pills, even if you feel better.    If you have C. diff infection, try to use a separate restroom until you are well.    At home, clean countertops, sinks, faucets, bathroom doorknobs and toilets often. Use warm water with soap or cleaning products with bleach. (Don't use pure bleach. It's too strong.)    In the hospital, your care team should wear gloves and gowns. They should clean their hands before touching you and before leaving the room. If they don't, please remind them.  Hand washing  For this illness, soap and water works better than hand .    Wash hands with warm water and plenty of soap. Wash for 15 to 20 seconds.    Clean under nails, between fingers and up the wrists.    Rinse hands, letting water run down your fingers.    Dry hands well. Use a paper towel to turn off the faucet and open the door.   How is it treated?  Your doctor may change your antibiotics and give you medicine for diarrhea. Don't take other medicine for diarrhea. They will make things worse.  You may get extra fluids through an IV (small tube in the arm or hand).   Sometimes, the  infection comes back. If symptoms return, please call your doctor.   For informational purposes only. Not to replace the advice of your health care provider. Copyright   2014 [employer]. All rights reserved. [employer] 300360 - REV 09/17.

## 2018-10-16 ENCOUNTER — APPOINTMENT (OUTPATIENT)
Dept: CARDIOLOGY | Facility: CLINIC | Age: 68
DRG: 287 | End: 2018-10-16
Attending: INTERNAL MEDICINE
Payer: COMMERCIAL

## 2018-10-16 ENCOUNTER — APPOINTMENT (OUTPATIENT)
Dept: CT IMAGING | Facility: CLINIC | Age: 68
DRG: 287 | End: 2018-10-16
Attending: INTERNAL MEDICINE
Payer: COMMERCIAL

## 2018-10-16 LAB
ANION GAP SERPL CALCULATED.3IONS-SCNC: 5 MMOL/L (ref 3–14)
BUN SERPL-MCNC: 13 MG/DL (ref 7–30)
BUN SERPL-MCNC: 15 MG/DL (ref 7–25)
BUN/CREATININE RATIO: ABNORMAL (CALC) (ref 6–22)
CALCIUM SERPL-MCNC: 8.1 MG/DL (ref 8.5–10.1)
CALCIUM SERPL-MCNC: 9.2 MG/DL (ref 8.6–10.4)
CHLORIDE SERPL-SCNC: 107 MMOL/L (ref 94–109)
CHLORIDE SERPLBLD-SCNC: 104 MMOL/L (ref 98–110)
CO2 SERPL-SCNC: 25 MMOL/L (ref 20–32)
CO2 SERPL-SCNC: 30 MMOL/L (ref 20–32)
CREAT SERPL-MCNC: 0.8 MG/DL (ref 0.52–1.04)
CREAT SERPL-MCNC: 0.92 MG/DL (ref 0.5–0.99)
EGFR AFRICAN AMERICAN - QUEST: 75 ML/MIN/1.73M2
ERYTHROCYTE [DISTWIDTH] IN BLOOD BY AUTOMATED COUNT: 14.2 % (ref 10–15)
GFR SERPL CREATININE-BSD FRML MDRD: 65 ML/MIN/1.73M2
GFR SERPL CREATININE-BSD FRML MDRD: 72 ML/MIN/1.7M2
GLUCOSE - QUEST: 102 MG/DL (ref 65–99)
GLUCOSE SERPL-MCNC: 92 MG/DL (ref 70–99)
HCT VFR BLD AUTO: 45.2 % (ref 35–47)
HGB BLD-MCNC: 14.7 G/DL (ref 11.7–15.7)
INTERPRETATION ECG - MUSE: NORMAL
MCH RBC QN AUTO: 30 PG (ref 26.5–33)
MCHC RBC AUTO-ENTMCNC: 32.5 G/DL (ref 31.5–36.5)
MCV RBC AUTO: 92 FL (ref 78–100)
PLATELET # BLD AUTO: 131 10E9/L (ref 150–450)
POTASSIUM SERPL-SCNC: 3.5 MMOL/L (ref 3.4–5.3)
POTASSIUM SERPL-SCNC: 3.8 MMOL/L (ref 3.5–5.3)
RBC # BLD AUTO: 4.9 10E12/L (ref 3.8–5.2)
SODIUM SERPL-SCNC: 142 MMOL/L (ref 133–144)
SODIUM SERPL-SCNC: 142 MMOL/L (ref 135–146)
TROPONIN I SERPL-MCNC: <0.015 UG/L (ref 0–0.04)
WBC # BLD AUTO: 6.2 10E9/L (ref 4–11)

## 2018-10-16 PROCEDURE — 93306 TTE W/DOPPLER COMPLETE: CPT | Mod: 26 | Performed by: INTERNAL MEDICINE

## 2018-10-16 PROCEDURE — 25000125 ZZHC RX 250: Performed by: INTERNAL MEDICINE

## 2018-10-16 PROCEDURE — 84484 ASSAY OF TROPONIN QUANT: CPT | Performed by: INTERNAL MEDICINE

## 2018-10-16 PROCEDURE — 34300033 ZZH RX 343: Performed by: INTERNAL MEDICINE

## 2018-10-16 PROCEDURE — 99222 1ST HOSP IP/OBS MODERATE 55: CPT | Performed by: INTERNAL MEDICINE

## 2018-10-16 PROCEDURE — 85027 COMPLETE CBC AUTOMATED: CPT | Performed by: INTERNAL MEDICINE

## 2018-10-16 PROCEDURE — 80048 BASIC METABOLIC PNL TOTAL CA: CPT | Performed by: INTERNAL MEDICINE

## 2018-10-16 PROCEDURE — 36415 COLL VENOUS BLD VENIPUNCTURE: CPT | Performed by: INTERNAL MEDICINE

## 2018-10-16 PROCEDURE — 71260 CT THORAX DX C+: CPT

## 2018-10-16 PROCEDURE — G0463 HOSPITAL OUTPT CLINIC VISIT: HCPCS

## 2018-10-16 PROCEDURE — 99226 ZZC SUBSEQUENT OBSERVATION CARE,LEVEL III: CPT | Performed by: INTERNAL MEDICINE

## 2018-10-16 PROCEDURE — 25000128 H RX IP 250 OP 636: Performed by: INTERNAL MEDICINE

## 2018-10-16 PROCEDURE — 25000132 ZZH RX MED GY IP 250 OP 250 PS 637: Performed by: INTERNAL MEDICINE

## 2018-10-16 PROCEDURE — G0378 HOSPITAL OBSERVATION PER HR: HCPCS

## 2018-10-16 PROCEDURE — 25500064 ZZH RX 255 OP 636: Performed by: INTERNAL MEDICINE

## 2018-10-16 PROCEDURE — A9502 TC99M TETROFOSMIN: HCPCS | Performed by: INTERNAL MEDICINE

## 2018-10-16 PROCEDURE — 40000264 ECHO COMPLETE WITH OPTISON

## 2018-10-16 RX ORDER — IOPAMIDOL 755 MG/ML
500 INJECTION, SOLUTION INTRAVASCULAR ONCE
Status: COMPLETED | OUTPATIENT
Start: 2018-10-16 | End: 2018-10-16

## 2018-10-16 RX ORDER — DILTIAZEM HYDROCHLORIDE 30 MG/1
30 TABLET, FILM COATED ORAL EVERY 6 HOURS SCHEDULED
Status: DISCONTINUED | OUTPATIENT
Start: 2018-10-16 | End: 2018-10-16

## 2018-10-16 RX ORDER — METOPROLOL TARTRATE 1 MG/ML
5 INJECTION, SOLUTION INTRAVENOUS ONCE
Status: COMPLETED | OUTPATIENT
Start: 2018-10-16 | End: 2018-10-16

## 2018-10-16 RX ORDER — METOPROLOL TARTRATE 1 MG/ML
2.5 INJECTION, SOLUTION INTRAVENOUS EVERY 4 HOURS PRN
Status: DISCONTINUED | OUTPATIENT
Start: 2018-10-16 | End: 2018-10-20 | Stop reason: HOSPADM

## 2018-10-16 RX ADMIN — Medication 45 MG: at 23:05

## 2018-10-16 RX ADMIN — METOPROLOL TARTRATE 25 MG: 25 TABLET ORAL at 22:05

## 2018-10-16 RX ADMIN — METOPROLOL TARTRATE 25 MG: 25 TABLET ORAL at 07:49

## 2018-10-16 RX ADMIN — Medication 45 MG: at 18:37

## 2018-10-16 RX ADMIN — RANITIDINE 150 MG: 150 TABLET ORAL at 07:48

## 2018-10-16 RX ADMIN — SENNOSIDES AND DOCUSATE SODIUM 1 TABLET: 8.6; 5 TABLET ORAL at 07:56

## 2018-10-16 RX ADMIN — METOPROLOL TARTRATE 5 MG: 5 INJECTION, SOLUTION INTRAVENOUS at 17:05

## 2018-10-16 RX ADMIN — MICONAZOLE NITRATE: 2 POWDER TOPICAL at 22:12

## 2018-10-16 RX ADMIN — IOPAMIDOL 83 ML: 755 INJECTION, SOLUTION INTRAVENOUS at 12:06

## 2018-10-16 RX ADMIN — DILTIAZEM HYDROCHLORIDE 30 MG: 30 TABLET, FILM COATED ORAL at 12:31

## 2018-10-16 RX ADMIN — SODIUM CHLORIDE 90 ML: 9 INJECTION, SOLUTION INTRAVENOUS at 12:07

## 2018-10-16 RX ADMIN — PRIMIDONE 250 MG: 250 TABLET ORAL at 07:48

## 2018-10-16 RX ADMIN — Medication 33 MILLICURIE: at 10:11

## 2018-10-16 RX ADMIN — RANITIDINE 150 MG: 150 TABLET ORAL at 22:05

## 2018-10-16 RX ADMIN — PRIMIDONE 250 MG: 250 TABLET ORAL at 22:05

## 2018-10-16 RX ADMIN — SODIUM CHLORIDE: 9 INJECTION, SOLUTION INTRAVENOUS at 12:26

## 2018-10-16 RX ADMIN — HUMAN ALBUMIN MICROSPHERES AND PERFLUTREN 3 ML: 10; .22 INJECTION, SOLUTION INTRAVENOUS at 14:00

## 2018-10-16 NOTE — PLAN OF CARE
PRIMARY DIAGNOSIS: CHEST PAIN, a flib  OUTPATIENT/OBSERVATION GOALS TO BE MET BEFORE DISCHARGE:  1. Ruled out acute coronary syndrome (negative or stable Troponin):  Yes  2. Pain Status: Pain free.  3. Appropriate provocative testing performed: Yes  - Stress Test Procedure: none at his time  - Interpretation of cardiac rhythm per telemetry tech: a fib     4. Cleared by Consultants (if applicable):No  5. Return to near baseline physical activity: Yes  Discharge Planner Nurse   Safe discharge environment identified: Yes  Barriers to discharge: No       Entered by: Tonya Khoury 10/16/2018 1:34 AM  Please review provider order for any additional goals.   Nurse to notify provider when observation goals have been met and patient is ready for discharge.

## 2018-10-16 NOTE — CONSULTS
[facility]    Cardiology Consultation     Date of Admission:  10/15/2018    Assessment & Plan     The patient is a pleasant 68-year-old female with multiple comorbid medical conditions including chronic idiopathic thrombocytopenia and paroxysmal atrial fibrillation who is admitted with atrial fibrillation with a rapid ventricular response.  Her rate is controlled on a combination of diltiazem and metoprolol but anticoagulation is being withheld due to her idiopathic thrombocytopenia.  She is also manifesting chest heaviness concerning for possible underlying coronary disease.    I would recommend continued rate control unchanged for now.  I will plan to order a Lexiscan nuclear stress test for risk stratification of her chest discomfort.  A transthoracic echocardiogram is currently pending.  She will additionally require a hematology consultation to address the appropriateness of anticoagulation with her chronic idiopathic thrombocytopenia.    Shubham Knight MD    Primary Care Physician   Flavia Alberts    Reason for Consult   Reason for consult: I was asked by internal medicine to evaluate this patient for atrial fibrillation with a rapid ventricular response.    History of Present Illness   Gema Coombs is a 68 year old female who presents with with 2 days of palpitations.  The patient has a history of chronic idiopathic thrombocytopenia and was receiving IVIG for an upcoming colectomy for diverticulitis.  In addition she was placed on prednisone and this Sunday she began to notice palpitations next with headache.  She has a prior history of paroxysmal atrial fibrillation with an episode in 2016 at which time she was seen by EP cardiology.  Her atrial fibrillation had reverted back to sinus rhythm and had not recurred until now.  It appears it was precipitated by the IVIG infusion and steroids.  She presented to the emergency room because of persistent palpitations and was found to be in atrial  fibrillation with a rapid ventricular response.  A TSH was normal.  Troponins have been negative.  Chest x-ray was clear.  Her ECG showed atrial fibrillation with a rapid ventricular response.  She was continued on p.o. diltiazem which she takes as an outpatient and placed on metoprolol tartrate.  This morning her heart rate is in the 60s.  Anticoagulation has been withheld due to her history of idiopathic thrombocytopenia.    Patient Active Problem List   Diagnosis     Myalgia and myositis     Breast disorder     Generalized nonconvulsive epilepsy (H)     Family history of other cardiovascular diseases     Esophageal reflux     Essential hypertension, benign     Mild persistent asthma     Hyperlipidemia     Diverticulitis of colon     ACP (advance care planning)     Sleep apnea, obstructive     Other specified cardiac dysrhythmias/[facility]'s     Health Care Home     Morbid obesity with BMI of 50.0-59.9, adult (H)     Chronic idiopathic thrombocytopenia (H)     BCC (basal cell carcinoma), face     Seizure disorder (H)     Blurry vision     Mitral valve regurgitation/ repeat echocardiogram 2017     Acute diverticulitis     Diverticulitis     Atrial fibrillation (H)       Past Medical History   I have reviewed this patient's medical history and updated it with pertinent information if needed.   Past Medical History:   Diagnosis Date     Diverticulitis      Extrinsic asthma, unspecified      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      Hypertension      Paroxysmal atrial fibrillation (H) 16       Past Surgical History   I have reviewed this patient's surgical history and updated it with pertinent information if needed.  Past Surgical History:   Procedure Laterality Date     BIOPSY OF BREAST, INCISIONAL      L breasts     BIOPSY OF BREAST, NEEDLE CORE  &    R breasts     C ANORECTAL MYOMECTOMY      uterine fibroids     C  DELIVERY ONLY      x 2      LAPAROSCOPY,LYSIS ADHESNS  1985    pain R side poss r/t c-sections       Prior to Admission Medications   Prior to Admission Medications   Prescriptions Last Dose Informant Patient Reported? Taking?   HYDROcodone-acetaminophen (NORCO) 5-325 MG per tablet Past Week at Unknown time  No Yes   Sig: Take 1 tablet by mouth every 6 hours as needed for severe pain   acetaminophen (TYLENOL) 500 MG tablet  Self Yes Yes   Sig: Take 500-1,000 mg by mouth every 6 hours as needed for mild pain   albuterol (2.5 MG/3ML) 0.083% neb solution  Self No Yes   Sig: Take 1 vial (2.5 mg) by nebulization every 6 hours as needed for shortness of breath / dyspnea   albuterol (PROAIR HFA/PROVENTIL HFA/VENTOLIN HFA) 108 (90 Base) MCG/ACT Inhaler 10/15/2018 at Unknown time Self Yes Yes   Sig: Inhale 1 puff into the lungs every 4 hours as needed for shortness of breath / dyspnea or wheezing   diltiazem (TIAZAC) 120 MG 24 hr ER beaded capsule 10/15/2018 at Unknown time Self No Yes   Sig: Take 1 capsule (120 mg) by mouth daily   lisinopril (PRINIVIL/ZESTRIL) 10 MG tablet 10/15/2018 at Unknown time Self Yes Yes   Sig: Take 20 mg by mouth every morning   lisinopril (PRINIVIL/ZESTRIL) 10 MG tablet 10/14/2018 at Unknown time Self Yes Yes   Sig: Take 10 mg by mouth every evening   ondansetron (ZOFRAN-ODT) 4 MG ODT tab   Yes No   Sig: Take 4 mg by mouth every 8 hours as needed    primidone (MYSOLINE) 250 MG tablet 10/15/2018 at x1 Self Yes Yes   Sig: Take 250 mg by mouth 2 times daily   ranitidine HCl 300 MG CAPS 10/14/2018 at pm Self Yes Yes   Sig: Take 1 capsule by mouth daily      Facility-Administered Medications: None     Current Facility-Administered Medications   Medication Dose Route Frequency     diltiazem  30 mg Oral Q6H [facility]     metoprolol tartrate  25 mg Oral BID     primidone  250 mg Oral BID     ranitidine  150 mg Oral BID     sodium chloride (PF)  3 mL Intracatheter Q8H     sodium chloride (PF)  3 mL Intracatheter Q8H     Current  "Facility-Administered Medications   Medication Last Rate     sodium chloride 75 mL/hr at 10/16/18 0718     Allergies   Allergies   Allergen Reactions     No Known Drug Allergy        Social History    reports that she has never smoked. She has never used smokeless tobacco. She reports that she drinks alcohol. She reports that she does not use illicit drugs.    Family History   Family History   Problem Relation Age of Onset     Cancer Maternal Aunt      breast-     Cancer Maternal Grandmother      breast-     HEART DISEASE Paternal Grandfather      heartattack-  at 58     HEART DISEASE Maternal Grandfather           HEART DISEASE Paternal Uncle      bypass-alive &well     Thyroid Disease Sister      hypothyroidism-alive       Review of Systems   The comprehensive 10 point Review of Systems is negative other than noted in the HPI or here.     Physical Exam   Vital Signs with Ranges  Temp:  [97.1  F (36.2  C)-98.2  F (36.8  C)] 97.1  F (36.2  C)  Pulse:  [] 101  Heart Rate:  [] 89  Resp:  [9-20] 18  BP: ()/(48-95) 110/72  SpO2:  [94 %-100 %] 95 %  Wt Readings from Last 4 Encounters:   10/15/18 133.3 kg (293 lb 14.6 oz)   10/15/18 132 kg (291 lb)   18 133.8 kg (295 lb)   18 135.2 kg (298 lb)     I/O last 3 completed shifts:  In: 896.78 [P.O.:240; I.V.:656.78]  Out: 550 [Urine:550]      Vitals: /72 (BP Location: Left arm)  Pulse 101  Temp 97.1  F (36.2  C) (Oral)  Resp 18  Ht 1.626 m (5' 4\")  Wt 133.3 kg (293 lb 14.6 oz)  SpO2 95%  BMI 50.45 kg/m2    Morbidly obese woman in no acute distress  JVD cannot be assessed  Heart is distant and irregular but not tachycardic  Lungs are clear  Abdomen is obese  Lower extremities show trace bilateral pitting edema  She is alert and oriented  Her affect is normal  Musculoskeletal exam does not reveal any gross abnormalities of her major joints  Dermatologic exam does not reveal any significant areas of " ecchymoses      Recent Labs  Lab 10/16/18  0140 10/15/18  2148 10/15/18  1648   TROPI <0.015 <0.015 <0.015         Recent Labs  Lab 10/16/18  0812 10/16/18  0140 10/15/18  2148 10/15/18  1648 10/15/18  1647 10/15/18   WBC 6.2  --   --  11.6*  --  8.1   HGB 14.7  --   --  17.5*  --  16.8*   MCV 92  --   --  91  --  88.9   *  --   --  198  --  194     --   --  140 142  --    POTASSIUM 3.5  --   --  3.8 3.8  --    CHLORIDE 107  --   --  106 104  --    CO2 30  --   --  26 25  --    BUN 13  --   --  15 15  NOT APPLICABLE  --    CR 0.80  --   --  0.97 0.92  --    GFRESTIMATED 72  --   --  58* 65  --    GFRESTBLACK 87  --   --  70  --   --    ANIONGAP 5  --   --  8  --   --    TONIA 8.1*  --   --  8.8 9.2  --    GLC 92  --   --  109* 102*  --    ALBUMIN  --   --   --  4.0  --   --    PROTTOTAL  --   --   --  7.6  --   --    BILITOTAL  --   --   --  0.4  --   --    ALKPHOS  --   --   --  92  --   --    ALT  --   --   --  47  --   --    AST  --   --   --  31  --   --    TROPI  --  <0.015 <0.015 <0.015  --   --      Recent Labs   Lab Test  03/24/17   0838  03/04/16   1146   CHOL  232*  248*   HDL  51  50   LDL  155*  165*   TRIG  129  163*   CHOLHDLRATIO  4.5  5.0       Recent Labs  Lab 10/16/18  0812 10/15/18  1648 10/15/18   WBC 6.2 11.6* 8.1   HGB 14.7 17.5* 16.8*   HCT 45.2 53.3* 50.4*   MCV 92 91 88.9   * 198 194     No results for input(s): PH, PHV, PO2, PO2V, SAT, PCO2, PCO2V, HCO3, HCO3V in the last 168 hours.    Recent Labs  Lab 10/15/18  1648   NTBNPI 2888*       Recent Labs  Lab 10/15/18  2148   DD 0.9*     No results for input(s): SED, CRP in the last 168 hours.    Recent Labs  Lab 10/16/18  0812 10/15/18  1648 10/15/18   * 198 194       Recent Labs  Lab 10/15/18  1648   TSH 2.83     No results for input(s): COLOR, APPEARANCE, URINEGLC, URINEBILI, URINEKETONE, SG, UBLD, URINEPH, PROTEIN, UROBILINOGEN, NITRITE, LEUKEST, RBCU, WBCU in the last 168 hours.    Imaging:  Recent Results (from  the past 48 hour(s))   XR Chest Port 1 View    Narrative    XR PORTABLE CHEST ONE VIEW   10/15/2018 5:39 PM     HISTORY: New atrial fibrillation.      COMPARISON: 9/3/2007.    FINDINGS: Upright portable chest. The heart size is normal. The  thoracic aorta is calcified. The lungs are clear. No pneumothorax.      Impression    IMPRESSION: No acute abnormality.    ANNE MARIE RUST MD       Echo:  No results found for this or any previous visit (from the past 4320 hour(s)).

## 2018-10-16 NOTE — PROVIDER NOTIFICATION
Notified MD at 1630 that patient's heart rate has been more elevated in the last hour. Heart rates between 100-130 bpm.     Addendum: IVP metoprolol given    Paged admitting hospitalist at 2030: patient heart rates still elevated intermittently from 100-130's.

## 2018-10-16 NOTE — ED NOTES
[facility]  ED Nurse Handoff Report    Gema Coombs is a 68 year old female   ED Chief complaint: Palpitations  . ED Diagnosis:   Final diagnoses:   Paroxysmal atrial fibrillation (H)   Congestive heart failure, unspecified HF chronicity, unspecified heart failure type (H)     Allergies:   Allergies   Allergen Reactions     No Known Drug Allergy        Code Status: Full Code  Activity level - Baseline/Home:  Stand with Assist. Activity Level - Current:   Stand with Assist. Lift room needed: No. Bariatric: No   Needed: No   Isolation: No. Infection: Not Applicable.     Vital Signs:   Vitals:    10/15/18 1745 10/15/18 1746 10/15/18 1747 10/15/18 1842   BP: (!) 81/61  92/61    Pulse:    101   Resp: 12 9     SpO2: 100% 96%     Weight:           Cardiac Rhythm:  ,   Cardiac  Cardiac Rhythm: Atrial fibrillation  Pain level: 0-10 Pain Scale: 0  Patient confused: No. Patient Falls Risk: Yes.   Elimination Status: has not voided   Patient Report - Initial Complaint: palpitations since yesterday. Focused Assessment: palipatations   Tests Performed:   XR Chest Port 1 View   Final Result   IMPRESSION: No acute abnormality.      ANNE MARIE RUST MD        . Abnormal Results:   Labs Ordered and Resulted from Time of ED Arrival Up to the Time of Departure from the ED   CBC WITH PLATELETS DIFFERENTIAL - Abnormal; Notable for the following:        Result Value    WBC 11.6 (*)     RBC Count 5.83 (*)     Hemoglobin 17.5 (*)     Hematocrit 53.3 (*)     All other components within normal limits   COMPREHENSIVE METABOLIC PANEL - Abnormal; Notable for the following:     Glucose 109 (*)     GFR Estimate 58 (*)     All other components within normal limits   NT PROBNP INPATIENT - Abnormal; Notable for the following:     N-Terminal Pro BNP Inpatient 2888 (*)     All other components within normal limits   TROPONIN I   TSH WITH FREE T4 REFLEX   ROUTINE UA WITH MICROSCOPIC   PERIPHERAL IV CATHETER   CARDIAC CONTINUOUS  MONITORING   VITAL SIGNS     .   Treatments provided: cardizem  Family Comments: present  OBS brochure/video discussed/provided to patient:  N/A  ED Medications:   Medications   diltiazem (CARDIZEM) 125 mg in sodium chloride 0.9 % 125 mL infusion (2.5 mg/hr Intravenous Restarted 10/15/18 1856)   diltiazem (CARDIZEM) injection 20 mg (20 mg Intravenous Given 10/15/18 1706)     Drips infusing:  Yes  For the majority of the shift, the patient's behavior Green. Interventions performed were cardizem.     Severe Sepsis OR Septic Shock Diagnosis Present: No      ED Nurse Name/Phone Number: Rufus Wadeley,   7:02 PM    RECEIVING UNIT ED HANDOFF REVIEW    Above ED Nurse Handoff Report was reviewed: Yes  Reviewed by: Rose Hi on October 15, 2018 at 7:41 PM

## 2018-10-16 NOTE — H&P
[facility]  Hospitalist H&P    Name: Gema Coombs      MRN: [id number]  YOB: 1950    Age: 68 year old  Date of admission: 10/15/2018  Primary care provider: Flavia Alberts            Assessment and Plan:   Gema Coombs is a 68 year old female with a history of hypertension, paroxysmal atrial fibrillation, seizure disorder, asthma, and diverticulitis who presents with atrial fibrillation with rapid ventricular response.    1.  Atrial fibrillation with rapid ventricular response.  Continues to be tachycardic despite IV diltiazem.  Continue IV diltiazem drip.  Add metoprolol 25 mg twice a day.  No evidence of bronchospasm at this time.  Asthma has been under good control recently.  Monitor on telemetry.  Check echocardiogram.  Cardiology consult.  Check serial troponins.  Check d-dimer.  Does have a onn5ze5 vasc score of 3.  However, no anticoagulation at this time due to chronic thrombocytopenia.  If cardiology does recommend chronic anticoagulation for her paroxysmal atrial fibrillation, would recommend discussing with hematology.    2.  Chronic asthma.  No acute exacerbation.  Will change from albuterol as needed to Xopenex as needed due to atrial fibrillation with rapid ventricular response.    3.  Hypertension.  Initially did become highly hypotensive after IV diltiazem.  Hold lisinopril.  Continue to monitor closely.    4.  Chronic thrombocytopenia.  Platelets are currently improved.  Recheck CBC tomorrow.    5.  GERD.  Zantac 150 mg twice a day.    Code status: Full code.    Admit to observation.  Prophylaxis: Ambulate.              Chief Complaint:   Palpitations.         History of Present Illness:   Gema Coombs is a 68 year old female who presents with palpitations.  Began having palpitations yesterday.  Continue today.  Presented to her primary care physician earlier.  EKG showed atrial fibrillation with rapid ventricular response.  She was directed to emergency room  for further evaluation.  She has not had chest pain with this.  No shortness of breath.  No dizziness or lightheadedness.  She was recently seen in consultation by hematology in the outpatient setting for chronic thrombocytopenia.  Had been given a dose of IVIG.  Had a reaction to IVIG.  Was then given dexamethasone prescription for several days.  Was only able to take dexamethasone for 2 days because it made her feel quite jittery.  She was unable to sleep on this medication.  Has felt quite sleep deprived now for the past few days.  No other complaints.            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      Hypertension      Paroxysmal atrial fibrillation (H) 16             Past Surgical History:     Past Surgical History:   Procedure Laterality Date     BIOPSY OF BREAST, INCISIONAL      L breasts     BIOPSY OF BREAST, NEEDLE CORE  &    R breasts     C ANORECTAL MYOMECTOMY      uterine fibroids     C  DELIVERY ONLY      x 2     LAPAROSCOPY,LYSIS ADHESNS      pain R side poss r/t c-sections             Social History:     Social History   Substance Use Topics     Smoking status: Never Smoker     Smokeless tobacco: Never Used     Alcohol use 0.0 oz/week     0 drink(s) per week      Comment: rare             Family History:   The family history was fully reviewed and non-contributory in this case.         Allergies:     Allergies   Allergen Reactions     No Known Drug Allergy              Medications:     Prior to Admission medications    Medication Sig Last Dose Taking? Auth Provider   acetaminophen (TYLENOL) 500 MG tablet Take 500-1,000 mg by mouth every 6 hours as needed for mild pain  Yes Unknown, Entered By History   albuterol (2.5 MG/3ML) 0.083% neb solution Take 1 vial (2.5 mg) by nebulization every 6 hours as needed for  "shortness of breath / dyspnea  Yes Iveth Veras PA-C   albuterol (PROAIR HFA/PROVENTIL HFA/VENTOLIN HFA) 108 (90 Base) MCG/ACT Inhaler Inhale 1 puff into the lungs every 4 hours as needed for shortness of breath / dyspnea or wheezing 10/15/2018 at Unknown time Yes Unknown, Entered By History   diltiazem (TIAZAC) 120 MG 24 hr ER beaded capsule Take 1 capsule (120 mg) by mouth daily 10/15/2018 at Unknown time Yes Flavia Alberts MD   HYDROcodone-acetaminophen (NORCO) 5-325 MG per tablet Take 1 tablet by mouth every 6 hours as needed for severe pain Past Week at Unknown time Yes Mike Mejia MD   lisinopril (PRINIVIL/ZESTRIL) 10 MG tablet Take 20 mg by mouth every morning 10/15/2018 at Unknown time Yes Unknown, Entered By History   lisinopril (PRINIVIL/ZESTRIL) 10 MG tablet Take 10 mg by mouth every evening 10/14/2018 at Unknown time Yes Unknown, Entered By History   primidone (MYSOLINE) 250 MG tablet Take 250 mg by mouth 2 times daily 10/15/2018 at x1 Yes Unknown, Entered By History   ranitidine HCl 300 MG CAPS Take 1 capsule by mouth daily 10/14/2018 at pm Yes Unknown, Entered By History   ondansetron (ZOFRAN-ODT) 4 MG ODT tab Take 4 mg by mouth every 8 hours as needed    Reported, Patient             Review of Systems:   A Comprehensive greater than 10 system review of systems was carried out.  Pertinent positives and negatives are noted above.  Otherwise negative for contributory information.           Physical Exam:   Blood pressure 126/89, pulse 101, temperature 98.2  F (36.8  C), temperature source Oral, resp. rate 16, height 1.626 m (5' 4\"), weight 133.3 kg (293 lb 14.6 oz), SpO2 96 %, currently breastfeeding.  Wt Readings from Last 1 Encounters:   10/15/18 133.3 kg (293 lb 14.6 oz)     Exam:  GENERAL: No apparent distress. Awake, alert, and fully oriented.  HEENT: Normocephalic, atraumatic. Extraocular movements intact.  CARDIOVASCULAR: Irregular.  Tachycardic.  PULMONARY: Clear to " auscultation bilaterally.  ABDOMINAL: Soft, non-tender, non-distended. Bowel sounds normoactive.   EXTREMITIES: No cyanosis or clubbing. No appreciable edema.  NEUROLOGICAL: CN 2-12 grossly intact, no focal neurological deficits.  DERMATOLOGICAL: No rash, ulcer, bruising, nor jaundice.          Data:   EKG:  Personally reviewed.  A. fib with RVR.  Does appear to have very minimal ST elevation in aVR.  This appears unchanged from previous EKGs.    Laboratory:    Recent Labs  Lab 10/15/18  1648 10/15/18   WBC 11.6* 8.1   HGB 17.5* 16.8*   HCT 53.3* 50.4*   MCV 91 88.9    194       Recent Labs  Lab 10/15/18  1648      POTASSIUM 3.8   CHLORIDE 106   CO2 26   ANIONGAP 8   *   BUN 15   CR 0.97   GFRESTIMATED 58*   GFRESTBLACK 70   TONIA 8.8     No results for input(s): CULT in the last 168 hours.    Imaging:  Recent Results (from the past 24 hour(s))   XR Chest Port 1 View    Narrative    XR PORTABLE CHEST ONE VIEW   10/15/2018 5:39 PM     HISTORY: New atrial fibrillation.      COMPARISON: 9/3/2007.    FINDINGS: Upright portable chest. The heart size is normal. The  thoracic aorta is calcified. The lungs are clear. No pneumothorax.      Impression    IMPRESSION: No acute abnormality.    ANNE MARIE RUST MD

## 2018-10-16 NOTE — PLAN OF CARE
"Problem: Patient Care Overview  Goal: Plan of Care/Patient Progress Review  Outcome: No Change  Patient admitted to the floor at 2100.   VS stable except for variable  Heart rate. A & O x 4.   Tele:Afib RVR- ranging from 100-140's (see tele strips).   Diet:low fat/low salt.   Ambulates:SBA.   IV meds:Dilt drip originally running at 5 ml/hr, titrated up at 2130 to 10 ml/hr as heart rates were still elevated above 120's. BP remaining stable so far.   Pain:denies   Abnormal labs: BNP elevated. WBC slightly elevated. Troponin negative, D-dimer slightly elevated 0.9, MD paged   Abnormal assessments:lung sounds diminished, patient is obese .    Intake & output: only voided once, patient does need UA collected.   Discharge plan: not yet in place, patient is observation status . Will continue to monitor and review plan of care.       PRIMARY DIAGNOSIS: \"GENERIC\" NURSING  OUTPATIENT/OBSERVATION GOALS TO BE MET BEFORE DISCHARGE:  1. ADLs back to baseline: No    2. Activity and level of assistance: Up with standby assistance.    3. Pain status: Pain free.    4. Return to near baseline physical activity: No     Discharge Planner Nurse   Safe discharge environment identified: Yes  Barriers to discharge: Yes, not yet cleared by MD, patient still in afib RVR       Entered by: Rose Hi 10/15/2018 10:44 PM     Please review provider order for any additional goals.   Nurse to notify provider when observation goals have been met and patient is ready for discharge.      "

## 2018-10-16 NOTE — PLAN OF CARE
Problem: Patient Care Overview  Goal: Plan of Care/Patient Progress Review  Outcome: No Change  PRIMARY DIAGNOSIS: CHEST PAIN/ AFIB  OUTPATIENT/OBSERVATION GOALS TO BE MET BEFORE DISCHARGE:  1. Ruled out acute coronary syndrome (negative or stable Troponin):  Yes  2. Pain Status: Pain free.  3. Appropriate provocative testing performed: Yes  - Stress Test Procedure: Lesiscan  - Interpretation of cardiac rhythm per telemetry tech: Afib RVR    4. Cleared by Consultants (if applicable):No  5. Return to near baseline physical activity: No  Discharge Planner Nurse   Safe discharge environment identified: Yes  Barriers to discharge: Yes, not cleared by MD, waiting on lesiscan tomorrow        Entered by: Rose Hi 10/16/2018 4:15 PM     Please review provider order for any additional goals.   Nurse to notify provider when observation goals have been met and patient is ready for discharge.

## 2018-10-16 NOTE — PROGRESS NOTES
[facility] Nurse Inpatient Skin Assessment     Assessment of:   Groin/pannus folds, breast folds, gluteal cleft        Data:   Patient History:      Gema Coombs is a 68 year old female with a history of hypertension, paroxysmal atrial fibrillation, seizure disorder, asthma, and diverticulitis who presents with atrial fibrillation with rapid ventricular response.       Moisture Management:  InterDry, underwear      Current Diet / Nutrition:           Active Diet Order      Combination Diet Low Saturated Fat Na <2400mg Diet, No Caffeine Diet             Abel Assessment and sub scores:   Abel Score  Av  Min: 20  Max: 20       Mattress:  Standard , Atmos Air mattress      Labs:   Recent Labs   Lab Test  10/16/18   0812  10/15/18   1648   18   0648   18   1034   16   1303   ALBUMIN   --   4.0   < >   --    < >   --    < >   --    HGB  14.7  17.5*   < >  13.4   < >   --    < >  14.4   RBC  4.90  5.83*   < >  4.62   < >   --    < >  4.79   WBC  6.2  11.6*   < >  4.2   < >   --    < >  4.3   PLT  131*  198   < >  88*   < >   --    < >  98*   INR   --    --    --    --    --    --    --   0.97   A1C   --    --    --    --    --   4.7   < >   --    CRP   --    --    --   69.9*   < >   --    --    --     < > = values in this interval not displayed.          Skin Assessment (location):   Groin/pannus/breast folds; gluteal cleft  History:  Pt obese with deep skin folds.  States she usually blow-dries folds at home.  Wears cotton underwear.  Has noticed pain to gluteal cleft in past day or so, after wiping a lot due to recent loose stools.     Skin:   1.  Groin/pannus folds:  Large bright pink patches of moist, denuded rashy tissue scattered along folds.    2.  Breast folds:  L>R, pink moist denuded rashy tissue  3.  Gluteal cleft:  Inner buttocks and perineal area slightly pink and moist, with linear slits/cracks along base of cleft.  Coccyx area with deep crease but clear and  "intact.      Odor: mild yeasty    Pain:  Raw areas appropriately tender          Intervention:     Patient's chart evaluated.      Cares performed: skin cleansed with Cornell perineal lotion; InterDry applied    Orders  Written    Supplies  reviewed    Discussed plan of care with Patient and Nurse          Assessment:      Deep obese skin folds with scattered denudement and fungal rash.  Gluteal cleft with superficial splits in tissue from moisture/friction and mechanical strain related to wiping.  No pressure injuries.  InterDry appropriate but may also need antifungal powder since the InterDry not likely to stay in place to all areas.  Pt mobile and able to help with cares, and seems motivated to improve skin health.          Plan:   Nursing to notify the Provider(s) and re-consult the [facility] Nurse if skin deteriorate(s).      Skin care plan to groin/pannus/breast folds, and gluteal cleft: BID and prn:  1.  Cleanse all areas gently with Cornell perineal lotion and soft dry wipes.    2.  Apply antifungal powder to all folds and moist/rashy areas, RUB IN WELL so there is just an even, thin, transparent layer everywhere.   3.  Apply a thin layer of Critic-aid barrier paste to gluteal cleft/ perianal area only.    4.  May apply pieces of InterDry to deep folds.  Cut a large enough piece so it will stick out from fold about 2\" when pt sitting up.  Place the fabric into base of fold, in a single flat layer.  Do not bunch up.  Change when saturated or soiled.  May wash/dry and reuse for a few days but will not have silver properties after washing.    5.  If pt wearing underwear, ensure is clean and dry.    6.  Encourage mobility and repositioning every 1-2 hrs, to maximize air flow and to relieve pressure.        [facility] Nurse will return: weekly and prn         "

## 2018-10-16 NOTE — PROGRESS NOTES
"SPIRITUAL HEALTH SERVICES  SPIRITUAL ASSESSMENT Progress Note  [facility]    PRIMARY FOCUS:     Goals of care    Emotional/spiritual/Jainism distress    Support for coping    ILLNESS CIRCUMSTANCES:   Saw pt [place] per her request for  support.  Assessed emotional/spiritual needs and resources while offering reflective conversation, which integrated elements of illness and family narratives.     Context of Serious Illness/Symptom(s) - [place] reported that she has a history of diverticulitis that has continued to flair up and therefore planned to have a colectomy later this month; however her platelets were low and was given IVIG to which she had a reaction.  She was given a steroid which kept her awake, and she came to the hospital when she began to have heart palpitations (she has a history of a-fib).     Persons/Resources Involved - She named her , two children, sisters, and a large Tribe of friends.    DISTRESS:     Emotional/Existential/Relational Distress - Georgia shared that she \"lost it\" earlier today because of the complications and the various test she undergoing while waiting for answers.  She planned to have the colectomy now in order to recover in time for her son's wedding in February; Georgia is beginning to doubt that she will be able to have the surgery and fears that she will not be able to attend her son's wedding.    Spiritual/Caodaism Distress - Georgia, who normally has a strong ruth life, feels \"weak in her ruth\" at this time and has not felt motivated to pray.  She shared that many people are praying for her.  Invited her to trust in the prayers of others and invited her to explore a couple of contemplative breathing practices as another way to pray when words fail.  She became tearful when engaging in one of contemplative practices.    Social/Cultural/Economic Distress - none named.    SPIRIT (Coping):     Jehovah's witness/Ruth - Judaism Latter-day; affiliated with [employer] " [employer].    Spiritual Practice(s) - Prayer and reading the scriptures (Psalm 91 is one of her favorite psalms) is central to her support system.  She welcomed prayer.    Emotional/Existential/Relational Connections - Her sisters and children have called her and her Facebook group \"[employer]\" are praying for her (Georgia raises horses).      SENSE-MAKING:    Goals of Care - Georgia expects that she will be here at least another day to finish her stress test.  Informed her how to request further  support.    Meaning/Sense-Making - Georgia processed her thoughts and feelings about her illness and her cindy.    PLAN: No further plans; I and other chaplains remain available per pt/family request.    Mathew Roberts M.Div., [facility]  Staff   Pager [phone]  "

## 2018-10-16 NOTE — PLAN OF CARE
Problem: Patient Care Overview  Goal: Plan of Care/Patient Progress Review  Outcome: Improving  PRIMARY DIAGNOSIS: CHEST PAIN  OUTPATIENT/OBSERVATION GOALS TO BE MET BEFORE DISCHARGE:  1. Ruled out acute coronary syndrome (negative or stable Troponin):  Yes  2. Pain Status: Pain free.  3. Appropriate provocative testing performed: No  - Stress Test Procedure: Echo to be done this afternoon  - Interpretation of cardiac rhythm per telemetry tech: A-Fib, CVR HR 70s-80s    4. Cleared by Consultants (if applicable):No  5. Return to near baseline physical activity: No  Discharge Planner Nurse   Safe discharge environment identified: Yes  Barriers to discharge: Yes       Entered by: Christy Randall 10/16/2018 9:17 AM     Please review provider order for any additional goals.   Nurse to notify provider when observation goals have been met and patient is ready for discharge.

## 2018-10-16 NOTE — PLAN OF CARE
PRIMARY DIAGNOSIS: CHEST PAIN, a flib  OUTPATIENT/OBSERVATION GOALS TO BE MET BEFORE DISCHARGE:  1. Ruled out acute coronary syndrome (negative or stable Troponin):  Yes  2. Pain Status: Pain free.  3. Appropriate provocative testing performed: Yes  - Stress Test Procedure: none at his time  - Interpretation of cardiac rhythm per telemetry tech: a fib    4. Cleared by Consultants (if applicable):No  5. Return to near baseline physical activity: Yes  Discharge Planner Nurse   Safe discharge environment identified: Yes  Barriers to discharge: No       Entered by: Tonya Khoury 10/16/2018 1:34 AM     Please review provider order for any additional goals.   Nurse to notify provider when observation goals have been met and patient is ready for discharge.    Pt alert and oriented, blood pressure soft. Diltiazem drip infusing at 5 mg/hour. Will continue to monitor on IMC status

## 2018-10-16 NOTE — PROGRESS NOTES
"[facility]  Hospitalist Progress Note  Dameon Izquierdo, DO 10/16/2018    Reason for Stay (Diagnosis): Atrial fibrillation with rapid ventricular response.         Assessment and Plan:      Summary of Stay: Gema Coombs is a 68 year old female admitted on 10/15/2018 with atrial fibrillation with rapid ventricular response.  Problem List:   1. Atrial fibrillation with rapid ventricular response.  Rate control much better today.  Change continuous IV diltiazem infusion to oral diltiazem 30 mg every 6 hours.  Continue metoprolol 25 mg twice a day.  Appreciate cardiology input.  Needs hematology consult to discuss possibility of anticoagulation with her known chronic idiopathic thrombocytopenia.  2. Chronic idiopathic thrombocytopenia.  Hematology consult.  3. Chest pain.  Lexiscan cardiac stress test.  4. GERD.  Ranitidine 150 mg twice a day.  5. Asthma.  No acute exacerbation.  Xopenex if needed.  6. Incidental pulmonary nodule.  Seen on CT scan.  Low risk.  Should not need any particular follow-up.  DVT Prophylaxis: Pneumatic Compression Devices  Code Status: Full Code  Discharge Dispo: Home  Estimated Disch Date / # of Days until Disch: 1-2        Interval History (Subjective):      No chest pain today.  Denies shortness of breath, fevers, chills, nausea, vomiting, or diarrhea.                  Physical Exam:      Last Vital Signs:  BP (!) 122/99 (BP Location: Left arm)  Pulse 101  Temp 97.2  F (36.2  C) (Oral)  Resp 16  Ht 1.626 m (5' 4\")  Wt 133.3 kg (293 lb 14.6 oz)  SpO2 93%  BMI 50.45 kg/m2    Gen:  NAD, A&Ox3.  Eyes:  PERRL, sclera anicteric.  OP:  MMM, no lesions.  Neck:  Supple.  CV:  Irregular, no murmurs.  Lung:  CTA b/l, normal effort.  Ab:  +BS, soft.  Skin:  Warm, dry to touch.  No rash.  Ext:  No pitting edema LE b/l.           Medications:      All current medications were reviewed with changes reflected in problem list.         Data:      All new lab and imaging data was " reviewed.   Labs:    Recent Labs  Lab 10/16/18  0812      POTASSIUM 3.5   CHLORIDE 107   CO2 30   ANIONGAP 5   GLC 92   BUN 13   CR 0.80   GFRESTIMATED 72   GFRESTBLACK 87   TONIA 8.1*       Recent Labs  Lab 10/16/18  0812   WBC 6.2   HGB 14.7   HCT 45.2   MCV 92   *      Imaging:   Recent Results (from the past 24 hour(s))   XR Chest Port 1 View    Narrative    XR PORTABLE CHEST ONE VIEW   10/15/2018 5:39 PM     HISTORY: New atrial fibrillation.      COMPARISON: 9/3/2007.    FINDINGS: Upright portable chest. The heart size is normal. The  thoracic aorta is calcified. The lungs are clear. No pneumothorax.      Impression    IMPRESSION: No acute abnormality.    ANNE MARIE RUST MD   CT Chest Pulmonary Embolism w Contrast    Narrative    CT CHEST PULMONARY EMBOLISM WITH CONTRAST   10/16/2018 12:19 PM     HISTORY: Evaluate for PE. Palpitations.    TECHNIQUE: 83 mL Isovue-370. Radiation dose for this scan was reduced  using automated exposure control, adjustment of the mA and/or kV  according to patient size, or iterative reconstruction technique.    COMPARISON: None.    FINDINGS: No CT evidence of pulmonary embolism or acute thoracic  aortic abnormality. No pneumothorax. No pleural or pericardial  effusion. No thoracic or axillary adenopathy. There is a 3 mm nodule  in the right lower lobe (series 8, image 130). No other pulmonary  nodule or mass.      Impression    IMPRESSION:  1. No evidence of pulmonary embolism.  2. Incidental 3 mm pulmonary nodule in the right lower lobe.  Recommendations for one or multiple incidental lung nodules < 6 mm:    Low risk patients: No routine follow-up.    High risk patients: Optional follow-up CT at 12 months; if  unchanged, no further follow-up.    *Low Risk: Minimal or absent history of smoking or other known risk  factors.  *Nonsolid (ground glass) or partly solid nodules may require longer  follow-up to exclude indolent adenocarcinoma.  *Recommendations based on  Guidelines for the Management of Incidental  Pulmonary Nodules Detected at CT: From the Fleischner Society 2017,  Radiology 2017.    KEENAN JENSEN MD

## 2018-10-16 NOTE — PLAN OF CARE
Problem: Patient Care Overview  Goal: Plan of Care/Patient Progress Review  Outcome: No Change  PRIMARY DIAGNOSIS: CHEST PAIN  OUTPATIENT/OBSERVATION GOALS TO BE MET BEFORE DISCHARGE:  1. Ruled out acute coronary syndrome (negative or stable Troponin):  Yes  2. Pain Status: Pain free.  3. Appropriate provocative testing performed: No  - Stress Test Procedure: Lexiscan, Echo, Chest CT to be done  - Interpretation of cardiac rhythm per telemetry tech: A-Fib, CVR    4. Cleared by Consultants (if applicable):No  5. Return to near baseline physical activity: No  Discharge Planner Nurse   Safe discharge environment identified: Yes  Barriers to discharge: Yes       Entered by: Christy Randall 10/16/2018 11:27 AM    A&O X4. Up SBA in room. VSS on RA. Tele A-Fib, CVR. NS 75 mL/hr. PO Diltiazem, drip stopped at 1330. Echocardiogram, Lexiscan, and Chest CT to rule out PE - all pending. D dimer 0.9. Cardiology following, Hem/Onc consult to address anticoagulation. Discharge pending resolution of tests.     Addendum: WOC consult placed, chaffing/bleeding between gluteal folds (barrier cream applied), rash underneath breasts. Miconazole powder ordered and inter-dry placed underneath breasts.

## 2018-10-17 LAB
ANION GAP SERPL CALCULATED.3IONS-SCNC: 7 MMOL/L (ref 3–14)
BUN SERPL-MCNC: 9 MG/DL (ref 7–30)
CALCIUM SERPL-MCNC: 8 MG/DL (ref 8.5–10.1)
CHLORIDE SERPL-SCNC: 108 MMOL/L (ref 94–109)
CO2 SERPL-SCNC: 28 MMOL/L (ref 20–32)
CREAT SERPL-MCNC: 0.68 MG/DL (ref 0.52–1.04)
GFR SERPL CREATININE-BSD FRML MDRD: 87 ML/MIN/1.7M2
GLUCOSE SERPL-MCNC: 132 MG/DL (ref 70–99)
POTASSIUM SERPL-SCNC: 3.5 MMOL/L (ref 3.4–5.3)
SODIUM SERPL-SCNC: 143 MMOL/L (ref 133–144)

## 2018-10-17 PROCEDURE — G0378 HOSPITAL OBSERVATION PER HR: HCPCS

## 2018-10-17 PROCEDURE — 25000128 H RX IP 250 OP 636

## 2018-10-17 PROCEDURE — 80048 BASIC METABOLIC PNL TOTAL CA: CPT | Performed by: INTERNAL MEDICINE

## 2018-10-17 PROCEDURE — 40000903 ZZH STATISTIC WOC PT EDUCATION, 31-45 MIN

## 2018-10-17 PROCEDURE — 25000128 H RX IP 250 OP 636: Performed by: INTERNAL MEDICINE

## 2018-10-17 PROCEDURE — 99233 SBSQ HOSP IP/OBS HIGH 50: CPT | Performed by: INTERNAL MEDICINE

## 2018-10-17 PROCEDURE — 25000132 ZZH RX MED GY IP 250 OP 250 PS 637: Performed by: INTERNAL MEDICINE

## 2018-10-17 PROCEDURE — 99232 SBSQ HOSP IP/OBS MODERATE 35: CPT | Performed by: INTERNAL MEDICINE

## 2018-10-17 PROCEDURE — 93017 CV STRESS TEST TRACING ONLY: CPT

## 2018-10-17 PROCEDURE — 12000007 ZZH R&B INTERMEDIATE

## 2018-10-17 PROCEDURE — 25000125 ZZHC RX 250: Performed by: INTERNAL MEDICINE

## 2018-10-17 PROCEDURE — 36415 COLL VENOUS BLD VENIPUNCTURE: CPT | Performed by: INTERNAL MEDICINE

## 2018-10-17 RX ORDER — REGADENOSON 0.08 MG/ML
INJECTION, SOLUTION INTRAVENOUS
Status: COMPLETED
Start: 2018-10-17 | End: 2018-10-17

## 2018-10-17 RX ORDER — AMINOPHYLLINE 25 MG/ML
INJECTION, SOLUTION INTRAVENOUS
Status: DISCONTINUED
Start: 2018-10-17 | End: 2018-10-19 | Stop reason: HOSPADM

## 2018-10-17 RX ORDER — METOPROLOL TARTRATE 50 MG
50 TABLET ORAL 2 TIMES DAILY
Status: DISCONTINUED | OUTPATIENT
Start: 2018-10-17 | End: 2018-10-17

## 2018-10-17 RX ADMIN — Medication 45 MG: at 06:24

## 2018-10-17 RX ADMIN — RANITIDINE 150 MG: 150 TABLET ORAL at 09:08

## 2018-10-17 RX ADMIN — Medication 37.5 MG: at 22:11

## 2018-10-17 RX ADMIN — ACETAMINOPHEN 650 MG: 325 TABLET, FILM COATED ORAL at 23:16

## 2018-10-17 RX ADMIN — METOPROLOL TARTRATE 2.5 MG: 5 INJECTION, SOLUTION INTRAVENOUS at 09:34

## 2018-10-17 RX ADMIN — SODIUM CHLORIDE: 9 INJECTION, SOLUTION INTRAVENOUS at 01:15

## 2018-10-17 RX ADMIN — ACETAMINOPHEN 650 MG: 325 TABLET, FILM COATED ORAL at 09:33

## 2018-10-17 RX ADMIN — SODIUM CHLORIDE: 9 INJECTION, SOLUTION INTRAVENOUS at 17:55

## 2018-10-17 RX ADMIN — Medication 45 MG: at 18:06

## 2018-10-17 RX ADMIN — PRIMIDONE 250 MG: 250 TABLET ORAL at 09:08

## 2018-10-17 RX ADMIN — RANITIDINE 150 MG: 150 TABLET ORAL at 22:11

## 2018-10-17 RX ADMIN — PRIMIDONE 250 MG: 250 TABLET ORAL at 22:11

## 2018-10-17 RX ADMIN — METOPROLOL TARTRATE 25 MG: 25 TABLET ORAL at 09:08

## 2018-10-17 RX ADMIN — REGADENOSON 0.4 MG: 0.08 INJECTION, SOLUTION INTRAVENOUS at 08:17

## 2018-10-17 RX ADMIN — Medication 45 MG: at 12:27

## 2018-10-17 ASSESSMENT — ACTIVITIES OF DAILY LIVING (ADL)
SWALLOWING: 0-->SWALLOWS FOODS/LIQUIDS WITHOUT DIFFICULTY
RETIRED_EATING: 0-->INDEPENDENT
FALL_HISTORY_WITHIN_LAST_SIX_MONTHS: NO
BATHING: 0-->INDEPENDENT
ADLS_ACUITY_SCORE: 10
TRANSFERRING: 0-->INDEPENDENT
RETIRED_COMMUNICATION: 0-->UNDERSTANDS/COMMUNICATES WITHOUT DIFFICULTY
ADLS_ACUITY_SCORE: 10
COGNITION: 0 - NO COGNITION ISSUES REPORTED
DRESS: 0-->INDEPENDENT
AMBULATION: 0-->INDEPENDENT
TOILETING: 0-->INDEPENDENT

## 2018-10-17 ASSESSMENT — PAIN DESCRIPTION - DESCRIPTORS: DESCRIPTORS: ACHING

## 2018-10-17 NOTE — PROGRESS NOTES
"Cardiology Progress Note  Dee Aldridge, APRN          Assessment and Plan:   Admit (10/15) sent by PMD due to Afib w/ RVR w/ associated palpitations/CP for past 2 days   Awaiting surgery for hemicolectomy for diverticulitis    PMH:  PAF (2016), chronic idiopathic thrombocytopenia, morbid obesity, RAFI, asthma, HTN, GERD, epilepsy, hyperlipidemia    Persistent Atrial Fibrillation   -previous PAF (2016) w/ spontaneous conversion  -HRs controlled this am with oral Diltiazem/Metoprolol.  Did receive some IV lopressor last night for RVR  Consider increasing Metoprolol to 50mg BID  -not on AC due to underlying chronic thrombocytopenia--> to be addressed by heme-onc today    CP:  -NL troponin/ nonspecific ST abnormalities  -ECHO:  LVEF 55% w/o RWMA  -no previous hx of CAD but multiple CVRF  -Plan for ambrocio NUC today    Chronic idiopathic Thrombocytopenia  --140 for past few years  -recently given steroids    HTN:  -intermittent elevated DBPs  -home dose Lisinopril held    Diverticulitis  -upcoming lap hemicolectomy planned    Untreated Hyperlipidemia:  - last year  -consider statin               Interval History:   Off floor for NUC                Medications:       aminophylline         diltiazem  45 mg Oral Q6H GEOFFREY     metoprolol tartrate  25 mg Oral BID     primidone  250 mg Oral BID     ranitidine  150 mg Oral BID     sodium chloride (PF)  3 mL Intracatheter Q8H     sodium chloride (PF)  3 mL Intracatheter Q8H            Physical Exam:   Blood pressure (!) 123/104, pulse 101, temperature 96.9  F (36.1  C), temperature source Oral, resp. rate 18, height 1.626 m (5' 4\"), weight 133.3 kg (293 lb 14.6 oz), SpO2 99 %, currently breastfeeding.  Wt Readings from Last 3 Encounters:   10/15/18 133.3 kg (293 lb 14.6 oz)   10/15/18 132 kg (291 lb)   09/27/18 133.8 kg (295 lb)     I/O last 3 completed shifts:  In: 2028.5 [P.O.:200; I.V.:1828.5]  Out: 1450 [Urine:1450]               Data:   TELE:  Afib  HR 80s this " am    CBC    Recent Labs  Lab 10/16/18  0812 10/15/18  1648   WBC 6.2 11.6*   HGB 14.7 17.5*   * 198       BMP    Recent Labs  Lab 10/16/18  0812 10/15/18  1648 10/15/18  1647    140 142   POTASSIUM 3.5 3.8 3.8   CHLORIDE 107 106 104   TONIA 8.1* 8.8 9.2   CO2 30 26 25   BUN 13 15 15  NOT APPLICABLE   CR 0.80 0.97 0.92   GLC 92 109* 102*     Recent Labs   Lab Test  03/24/17   0838  03/04/16   1146   CHOL  232*  248*   HDL  51  50   LDL  155*  165*   TRIG  129  163*   CHOLHDLRATIO  4.5  5.0       TROP  Lab Results   Component Value Date    TROPI <0.015 10/16/2018    TROPI <0.015 10/15/2018    TROPI <0.015 10/15/2018    TROPI 0.017 02/29/2016    TROPONIN 0.00 02/29/2016       BNP    Recent Labs  Lab 10/15/18  1648   NTBNPI 2888*

## 2018-10-17 NOTE — PROGRESS NOTES
"[facility]  Hospitalist Progress Note  Dameon Izquierdo, DO 10/17/2018    Reason for Stay (Diagnosis): A fib with RVR         Assessment and Plan:      Summary of Stay: Gema Coombs is a 68 year old female admitted on 10/15/2018 with atrial fibrillation with rapid ventricular response.  Problem List:   1. Atrial fibrillation with rapid ventricular response.  Does need continued medication adjustment to try to achieve better rate control.  Will admit to inpatient..  Continue diltiazem 45 mg every 6 hours.  Increase metoprolol to 37.5 mg twice a day.  Appreciate cardiology input.  Hematology consult to discuss possibility of anticoagulation with her known chronic idiopathic thrombocytopenia.  2. Chronic idiopathic thrombocytopenia.  Hematology consult.  3. Chest pain.  Lexiscan cardiac stress test.  4. GERD.  Ranitidine 150 mg twice a day.  5. Asthma.  No acute exacerbation.  Xopenex if needed.  6. Incidental pulmonary nodule.  Seen on CT scan.  Low risk.  Should not need any particular follow-up.  7. Diarrhea.  Multiple recent courses of antibiotics.  Check Clostridium difficile toxin.  8. Previous diverticulitis.  Plan for surgery next week with colorectal.  DVT Prophylaxis: Pneumatic Compression Devices  Code Status: Full Code  Discharge Dispo: Home  Estimated Disch Date / # of Days until Disch: 1-2        Interval History (Subjective):      Having diarrhea.  Denies chest pain, shortness of breath, fevers, chills, nausea, vomiting.                  Physical Exam:      Last Vital Signs:  /73 (BP Location: Left arm)  Pulse 101  Temp 97.2  F (36.2  C) (Oral)  Resp 18  Ht 1.626 m (5' 4\")  Wt 133.3 kg (293 lb 14.6 oz)  SpO2 95%  BMI 50.45 kg/m2    Gen:  NAD, A&Ox3.  Eyes:  PERRL, sclera anicteric.  OP:  MMM, no lesions.  Neck:  Supple.  CV:  Irregular, no murmurs.  Lung:  CTA b/l, normal effort.  Ab:  +BS, soft.  Skin:  Warm, dry to touch.  No rash.  Ext:  No pitting edema LE b/l.         "   Medications:      All current medications were reviewed with changes reflected in problem list.         Data:      All new lab and imaging data was reviewed.   Labs:    Recent Labs  Lab 10/16/18  0812      POTASSIUM 3.5   CHLORIDE 107   CO2 30   ANIONGAP 5   GLC 92   BUN 13   CR 0.80   GFRESTIMATED 72   GFRESTBLACK 87   TONIA 8.1*       Recent Labs  Lab 10/16/18  0812   WBC 6.2   HGB 14.7   HCT 45.2   MCV 92   *      Imaging:   No results found for this or any previous visit (from the past 24 hour(s)).

## 2018-10-17 NOTE — PROGRESS NOTES
[facility] Nurse Ostomy Marking and Education         Data:   History:  [facility] received Ostomy Clinic consult for stoma marking for impending surgery 10/24 with Dr Cordova. History of chronic divertic with possible Colostomy next Wednesday. Pt now admitted on 10/15/2018 with atrial fibrillation with rapid ventricular response.     Pt referred by Dr. cordova  Who is present for marking and education: pt alone  Type of ostomy surgery:  Possible Colostomy  Abdomen:  Large rounded; pt has lost large amount in the last few months in preparation of surgery           Intervention:     Patient's chart evaluated; discussion with pt and call to Dr Cordova.    Education: booklet provided to pt to review the upcoming surgery, stoma construction, post-op expectations, general ostomy cares/concerns including: diet, bathing, odor, output,  activity, appliances and emptying.  All questions answered    Patient not marked today but will wait to hear from MD about scheduled surgery.           Assessment:       Learning needs/ comprehension: no prior experience; pt had a lot of questions; will continue to meet with the pt to answer questions until Dr Cordova makes the decision about surgery         Plan:     Plan:   ? Surgery scheduled for:  Next Wednesday?    Will plan to follow patient to continue eduction and answer all questions.

## 2018-10-17 NOTE — PROGRESS NOTES
Patient follows Dr. Daron Patel in [facility].     Brian Bass ,  Division of Hematology, Oncology & Transplantation  [facility].

## 2018-10-17 NOTE — PLAN OF CARE
Problem: Patient Care Overview  Goal: Plan of Care/Patient Progress Review  Outcome: No Change  A&O X4. Up SBA. VSS on RA. Tele A-Fib CVR/RVR, variable. IV Metoprolol given X1, scheduled PO Diltiazem. Denies chest pain, dizziness. Plan for nuclear stress test tomorrow AM, NPO at midnight. IVFs 75 mL/hr. Cardiology, Hem/Onc following. WOC following, see flowsheet for details. Discharge plan TBD on results tomorrow.     Addendum: Enteric precautions placed for loose stools, history of diverticulitis. Education provided, sample needing to be collected.

## 2018-10-17 NOTE — PROGRESS NOTES
I was called for atrial fibrillation with rapid ventricular response.  IV diltiazem drip was changed to oral diltiazem today.  Patient is also on oral metoprolol.  Rates of crept up a little.  I added IV metoprolol for use as needed.

## 2018-10-17 NOTE — PLAN OF CARE
Problem: Patient Care Overview  Goal: Plan of Care/Patient Progress Review  Outcome: No Change  PRIMARY DIAGNOSIS: CHEST PAIN  OUTPATIENT/OBSERVATION GOALS TO BE MET BEFORE DISCHARGE:  1. Ruled out acute coronary syndrome (negative or stable Troponin):  Yes  2. Pain Status: Improved-controlled with oral pain medications.  3. Appropriate provocative testing performed: No  - Stress Test Procedure: Nuclear to be done tomorrow AM. Unable to perform today.   - Interpretation of cardiac rhythm per telemetry tech: A-Fib, RVR.     4. Cleared by Consultants (if applicable):No  5. Return to near baseline physical activity: No  Discharge Planner Nurse   Safe discharge environment identified: Yes  Barriers to discharge: Yes       Entered by: Christy Randall 10/17/2018 11:01 AM     Please review provider order for any additional goals.   Nurse to notify provider when observation goals have been met and patient is ready for discharge.

## 2018-10-17 NOTE — PLAN OF CARE
Problem: Patient Care Overview  Goal: Plan of Care/Patient Progress Review  Outcome: No Change  PRIMARY DIAGNOSIS: CHEST PAIN/ afib  OUTPATIENT/OBSERVATION GOALS TO BE MET BEFORE DISCHARGE:  1. Ruled out acute coronary syndrome (negative or stable Troponin):  Yes  2. Pain Status: Pain free.  3. Appropriate provocative testing performed: Yes  - Stress Test Procedure: Lesiscan  - Interpretation of cardiac rhythm per telemetry tech: afib RVR     4. Cleared by Consultants (if applicable):No  5. Return to near baseline physical activity: No  Discharge Planner Nurse   Safe discharge environment identified: Yes  Barriers to discharge: Yes       Entered by: Rose Hi 10/16/2018 11:36 PM     Please review provider order for any additional goals.   Nurse to notify provider when observation goals have been met and patient is ready for discharge.        VS stable. A & O x 4.   Tele: afib RVR, heart rates continue to be between 100-130, however not sustaining higher rates. Scheduled PO dilt and PO metorpolol. 5 mg IVP given once today. Did not give additional PRN IVP metoprolol due to timing of oral medications as well as patient not sustaining rates above 130 for more than a few seconds  Diet: low fat/low salt.   Ambulates:SBA.   IV meds: IVF running at 75 ml/hr  Pain:denies   Abnormal labs:platelets 131- hemat/onc following.   Abnormal assessments: skin rash under pannus, breast folds, and gluteal fold. Followed [facility] skin care orders and new inter dry applied    Intake & output: 2 loose stools, patient states this happens to her frequently from her diverticulitis. Voiding appropriately.   Discharge plan: 1-2 days. Will continue to monitor and review plan of care.

## 2018-10-17 NOTE — CONSULTS
Hematology / Oncology Consultation     Gema Coombs MRN# [id number]   YOB: 1950 Age: 68 year old   Date of Admission: 10/15/2018     Reason for consult: ITP           Assessment and Plan:   #1 Chronic ITP  #2 Upcoming colectomy for recurrent diverticulitis  #3 Atrial fibrillation    The patient's Plt count has increased from 67 to 131 after IVIG (only received 1/3 of planned dosage due to infusion reaction) and dexamethasone x 3 days.  With the higher platelet count, her risk of bleeding should now be lower for her upcoming surgical procedure.    The cause of recurrent Afib is unclear, but the timing suggests dexamethasone may have played a role.    PLAN:  - Would check platelet count again tomorrow  - No further steroid therapy prior to surgery if Plt remains stable.  - Appreciate cardiology consult and recommendations.    Daron Patel M.D.  [facility]  [phone]               Chief Complaint:   Chronic ITP         History of Present Illness:   This is a very pleasant 67 yo F with chronic thrombocytopenia, recently down to the 60-70 range.  She is scheduled for colectomy for recurrent diverticulitis on 10/24.  I decided to give the patient IVIG 1 g/kg ideal body weight followed by 4 days of dexamethasone 40 mg daily for presumed ITP, in order to get her platelet count higher prior to surgery.    She was only able to received 1/3 of the prescribed dosage of IVIG due to an infusion reaction, and she took dexamethasone last Friday, Saturday, and Sunday.  She was admitted to the hospital on Monday for Afib with RVR, which is currently being managed by cardiology.    Plt has now increased to 131.            Past Medical History:   I have reviewed this patient's past medical history          Past Surgical History:   I have reviewed this patient's past surgical history          Medications:     Prescriptions Prior to Admission   Medication Sig Dispense Refill Last Dose     acetaminophen (TYLENOL)  500 MG tablet Take 500-1,000 mg by mouth every 6 hours as needed for mild pain   Taking     albuterol (2.5 MG/3ML) 0.083% neb solution Take 1 vial (2.5 mg) by nebulization every 6 hours as needed for shortness of breath / dyspnea 1 Box 1 Taking     albuterol (PROAIR HFA/PROVENTIL HFA/VENTOLIN HFA) 108 (90 Base) MCG/ACT Inhaler Inhale 1 puff into the lungs every 4 hours as needed for shortness of breath / dyspnea or wheezing   10/15/2018 at Unknown time     diltiazem (TIAZAC) 120 MG 24 hr ER beaded capsule Take 1 capsule (120 mg) by mouth daily 90 capsule 3 10/15/2018 at Unknown time     HYDROcodone-acetaminophen (NORCO) 5-325 MG per tablet Take 1 tablet by mouth every 6 hours as needed for severe pain 10 tablet 0 Past Week at Unknown time     lisinopril (PRINIVIL/ZESTRIL) 10 MG tablet Take 20 mg by mouth every morning   10/15/2018 at Unknown time     lisinopril (PRINIVIL/ZESTRIL) 10 MG tablet Take 10 mg by mouth every evening   10/14/2018 at Unknown time     primidone (MYSOLINE) 250 MG tablet Take 250 mg by mouth 2 times daily   10/15/2018 at x1     ranitidine HCl 300 MG CAPS Take 1 capsule by mouth daily   10/14/2018 at pm     ondansetron (ZOFRAN-ODT) 4 MG ODT tab Take 4 mg by mouth every 8 hours as needed   0 Taking             Review of Systems:   The 10 point Review of Systems is negative other than noted in the HPI           Physical Exam:   Vitals were reviewed  Temp: 97.2  F (36.2  C) Temp src: Oral BP: 114/73   Heart Rate: 91 Resp: 18 SpO2: 95 % O2 Device: None (Room air)    General:  Awake, alert, and oriented  Skin:  No bruising or rash noted  Mouth:  Moist mucous membranes with no ulceration  Heart: Irregularly irregular.  No murmurs.          Data:     Lab Results   Component Value Date    WBC 6.2 10/16/2018    HGB 14.7 10/16/2018    HCT 45.2 10/16/2018     (L) 10/16/2018     10/16/2018    POTASSIUM 3.5 10/16/2018    CHLORIDE 107 10/16/2018    CO2 30 10/16/2018    BUN 13 10/16/2018    CR  0.80 10/16/2018    GLC 92 10/16/2018    SED 9 06/16/2018    DD 0.9 (H) 10/15/2018    NTBNPI 2888 (H) 10/15/2018    TROPONIN 0.00 02/29/2016    TROPI <0.015 10/16/2018    AST 31 10/15/2018    ALT 47 10/15/2018    ALKPHOS 92 10/15/2018    BILITOTAL 0.4 10/15/2018    BILIDIRECT 0.1 03/04/2016    INR 0.97 02/29/2016

## 2018-10-17 NOTE — PROGRESS NOTES
Pre-procedure:  Are you having any pain or shortness of breath (prior to starting)? n  Initial vital signs: /119, , RR 20  Allergies reviewed: y   Rhythm: afib  Medications taken within 48 hours of procedure:    Last Caffeine:   Lung sounds: CTA, no wheezing, crackles or rtx  Health History (COPD, Asthma, etc):            Procedure: Lexiscan  Reaction/symptoms after receiving Brittni injection: Headache  Intensity of Pain:   Rhythm: afib  1. Vital Signs:/96, , RR 20  2. Vital Signs:/105, , RR 20     Reversal agent: N/A    Post:   Resolution of symptoms?: YES  Vital signs: /104, , RR 20  Vital signs: /104, , RR 20  Rhythm: afib  Walk: NO  Comment:   Return to Radiology

## 2018-10-18 ENCOUNTER — APPOINTMENT (OUTPATIENT)
Dept: NUCLEAR MEDICINE | Facility: CLINIC | Age: 68
DRG: 287 | End: 2018-10-18
Attending: INTERNAL MEDICINE
Payer: COMMERCIAL

## 2018-10-18 LAB
ANION GAP SERPL CALCULATED.3IONS-SCNC: 6 MMOL/L (ref 3–14)
BUN SERPL-MCNC: 8 MG/DL (ref 7–30)
CALCIUM SERPL-MCNC: 8.1 MG/DL (ref 8.5–10.1)
CHLORIDE SERPL-SCNC: 109 MMOL/L (ref 94–109)
CO2 SERPL-SCNC: 29 MMOL/L (ref 20–32)
CREAT SERPL-MCNC: 0.71 MG/DL (ref 0.52–1.04)
ERYTHROCYTE [DISTWIDTH] IN BLOOD BY AUTOMATED COUNT: 13.9 % (ref 10–15)
GFR SERPL CREATININE-BSD FRML MDRD: 82 ML/MIN/1.7M2
GLUCOSE SERPL-MCNC: 95 MG/DL (ref 70–99)
HCT VFR BLD AUTO: 43.1 % (ref 35–47)
HGB BLD-MCNC: 14.2 G/DL (ref 11.7–15.7)
MCH RBC QN AUTO: 30.1 PG (ref 26.5–33)
MCHC RBC AUTO-ENTMCNC: 32.9 G/DL (ref 31.5–36.5)
MCV RBC AUTO: 92 FL (ref 78–100)
PLATELET # BLD AUTO: 103 10E9/L (ref 150–450)
POTASSIUM SERPL-SCNC: 3.5 MMOL/L (ref 3.4–5.3)
RBC # BLD AUTO: 4.71 10E12/L (ref 3.8–5.2)
SODIUM SERPL-SCNC: 144 MMOL/L (ref 133–144)
WBC # BLD AUTO: 5.3 10E9/L (ref 4–11)

## 2018-10-18 PROCEDURE — 85027 COMPLETE CBC AUTOMATED: CPT | Performed by: INTERNAL MEDICINE

## 2018-10-18 PROCEDURE — 25000128 H RX IP 250 OP 636: Performed by: INTERNAL MEDICINE

## 2018-10-18 PROCEDURE — 25000132 ZZH RX MED GY IP 250 OP 250 PS 637: Performed by: INTERNAL MEDICINE

## 2018-10-18 PROCEDURE — 12000007 ZZH R&B INTERMEDIATE

## 2018-10-18 PROCEDURE — 25000128 H RX IP 250 OP 636

## 2018-10-18 PROCEDURE — 93016 CV STRESS TEST SUPVJ ONLY: CPT | Performed by: INTERNAL MEDICINE

## 2018-10-18 PROCEDURE — 93017 CV STRESS TEST TRACING ONLY: CPT

## 2018-10-18 PROCEDURE — 80048 BASIC METABOLIC PNL TOTAL CA: CPT | Performed by: INTERNAL MEDICINE

## 2018-10-18 PROCEDURE — 40000902 ZZH STATISTIC WOC PT EDUCATION, 16-30 MIN

## 2018-10-18 PROCEDURE — 78452 HT MUSCLE IMAGE SPECT MULT: CPT

## 2018-10-18 PROCEDURE — 36415 COLL VENOUS BLD VENIPUNCTURE: CPT | Performed by: INTERNAL MEDICINE

## 2018-10-18 PROCEDURE — 34300033 ZZH RX 343: Performed by: INTERNAL MEDICINE

## 2018-10-18 PROCEDURE — 78452 HT MUSCLE IMAGE SPECT MULT: CPT | Mod: 26 | Performed by: INTERNAL MEDICINE

## 2018-10-18 PROCEDURE — 99233 SBSQ HOSP IP/OBS HIGH 50: CPT | Performed by: INTERNAL MEDICINE

## 2018-10-18 PROCEDURE — A9502 TC99M TETROFOSMIN: HCPCS | Performed by: INTERNAL MEDICINE

## 2018-10-18 PROCEDURE — 99232 SBSQ HOSP IP/OBS MODERATE 35: CPT | Performed by: INTERNAL MEDICINE

## 2018-10-18 PROCEDURE — 93018 CV STRESS TEST I&R ONLY: CPT | Performed by: INTERNAL MEDICINE

## 2018-10-18 RX ORDER — ONDANSETRON 2 MG/ML
4 INJECTION INTRAMUSCULAR; INTRAVENOUS EVERY 4 HOURS PRN
Status: DISCONTINUED | OUTPATIENT
Start: 2018-10-18 | End: 2018-10-19 | Stop reason: HOSPADM

## 2018-10-18 RX ORDER — PRASUGREL 10 MG/1
10-60 TABLET, FILM COATED ORAL
Status: DISCONTINUED | OUTPATIENT
Start: 2018-10-18 | End: 2018-10-19 | Stop reason: HOSPADM

## 2018-10-18 RX ORDER — DOBUTAMINE HYDROCHLORIDE 200 MG/100ML
2-20 INJECTION INTRAVENOUS CONTINUOUS PRN
Status: DISCONTINUED | OUTPATIENT
Start: 2018-10-18 | End: 2018-10-19 | Stop reason: HOSPADM

## 2018-10-18 RX ORDER — LIDOCAINE HYDROCHLORIDE 10 MG/ML
1-10 INJECTION, SOLUTION EPIDURAL; INFILTRATION; INTRACAUDAL; PERINEURAL
Status: DISCONTINUED | OUTPATIENT
Start: 2018-10-18 | End: 2018-10-19 | Stop reason: HOSPADM

## 2018-10-18 RX ORDER — BUPIVACAINE HYDROCHLORIDE 2.5 MG/ML
1-10 INJECTION, SOLUTION EPIDURAL; INFILTRATION; INTRACAUDAL
Status: DISCONTINUED | OUTPATIENT
Start: 2018-10-18 | End: 2018-10-19 | Stop reason: HOSPADM

## 2018-10-18 RX ORDER — POTASSIUM CHLORIDE 1500 MG/1
20-40 TABLET, EXTENDED RELEASE ORAL
Status: DISCONTINUED | OUTPATIENT
Start: 2018-10-18 | End: 2018-10-20 | Stop reason: HOSPADM

## 2018-10-18 RX ORDER — FLUMAZENIL 0.1 MG/ML
0.2 INJECTION, SOLUTION INTRAVENOUS
Status: DISCONTINUED | OUTPATIENT
Start: 2018-10-18 | End: 2018-10-19 | Stop reason: HOSPADM

## 2018-10-18 RX ORDER — AMINOPHYLLINE 25 MG/ML
INJECTION, SOLUTION INTRAVENOUS
Status: DISCONTINUED
Start: 2018-10-18 | End: 2018-10-19 | Stop reason: HOSPADM

## 2018-10-18 RX ORDER — ADENOSINE 3 MG/ML
12-12000 INJECTION, SOLUTION INTRAVENOUS
Status: DISCONTINUED | OUTPATIENT
Start: 2018-10-18 | End: 2018-10-19 | Stop reason: HOSPADM

## 2018-10-18 RX ORDER — PROTAMINE SULFATE 10 MG/ML
25-100 INJECTION, SOLUTION INTRAVENOUS EVERY 5 MIN PRN
Status: DISCONTINUED | OUTPATIENT
Start: 2018-10-18 | End: 2018-10-19 | Stop reason: HOSPADM

## 2018-10-18 RX ORDER — NALOXONE HYDROCHLORIDE 0.4 MG/ML
0.4 INJECTION, SOLUTION INTRAMUSCULAR; INTRAVENOUS; SUBCUTANEOUS EVERY 5 MIN PRN
Status: DISCONTINUED | OUTPATIENT
Start: 2018-10-18 | End: 2018-10-19 | Stop reason: HOSPADM

## 2018-10-18 RX ORDER — METOPROLOL TARTRATE 1 MG/ML
5 INJECTION, SOLUTION INTRAVENOUS EVERY 5 MIN PRN
Status: DISCONTINUED | OUTPATIENT
Start: 2018-10-18 | End: 2018-10-19 | Stop reason: HOSPADM

## 2018-10-18 RX ORDER — ENALAPRILAT 1.25 MG/ML
1.25-2.5 INJECTION INTRAVENOUS
Status: DISCONTINUED | OUTPATIENT
Start: 2018-10-18 | End: 2018-10-19 | Stop reason: HOSPADM

## 2018-10-18 RX ORDER — HYDRALAZINE HYDROCHLORIDE 20 MG/ML
10-20 INJECTION INTRAMUSCULAR; INTRAVENOUS
Status: DISCONTINUED | OUTPATIENT
Start: 2018-10-18 | End: 2018-10-19 | Stop reason: HOSPADM

## 2018-10-18 RX ORDER — POTASSIUM CHLORIDE 1.5 G/1.58G
20-40 POWDER, FOR SOLUTION ORAL
Status: DISCONTINUED | OUTPATIENT
Start: 2018-10-18 | End: 2018-10-20 | Stop reason: HOSPADM

## 2018-10-18 RX ORDER — NICARDIPINE HYDROCHLORIDE 2.5 MG/ML
100 INJECTION INTRAVENOUS
Status: DISCONTINUED | OUTPATIENT
Start: 2018-10-18 | End: 2018-10-19 | Stop reason: HOSPADM

## 2018-10-18 RX ORDER — NITROGLYCERIN 20 MG/100ML
.07-1.5 INJECTION INTRAVENOUS CONTINUOUS PRN
Status: DISCONTINUED | OUTPATIENT
Start: 2018-10-18 | End: 2018-10-19 | Stop reason: HOSPADM

## 2018-10-18 RX ORDER — DOPAMINE HYDROCHLORIDE 160 MG/100ML
2-20 INJECTION, SOLUTION INTRAVENOUS CONTINUOUS PRN
Status: DISCONTINUED | OUTPATIENT
Start: 2018-10-18 | End: 2018-10-19 | Stop reason: HOSPADM

## 2018-10-18 RX ORDER — NITROGLYCERIN 5 MG/ML
100-200 VIAL (ML) INTRAVENOUS
Status: DISCONTINUED | OUTPATIENT
Start: 2018-10-18 | End: 2018-10-19 | Stop reason: HOSPADM

## 2018-10-18 RX ORDER — FENTANYL CITRATE 50 UG/ML
25-50 INJECTION, SOLUTION INTRAMUSCULAR; INTRAVENOUS
Status: DISCONTINUED | OUTPATIENT
Start: 2018-10-18 | End: 2018-10-19 | Stop reason: HOSPADM

## 2018-10-18 RX ORDER — LORAZEPAM 2 MG/ML
.5-2 INJECTION INTRAMUSCULAR EVERY 4 HOURS PRN
Status: DISCONTINUED | OUTPATIENT
Start: 2018-10-18 | End: 2018-10-19 | Stop reason: HOSPADM

## 2018-10-18 RX ORDER — DIPHENHYDRAMINE HYDROCHLORIDE 50 MG/ML
25-50 INJECTION INTRAMUSCULAR; INTRAVENOUS
Status: DISCONTINUED | OUTPATIENT
Start: 2018-10-18 | End: 2018-10-19 | Stop reason: HOSPADM

## 2018-10-18 RX ORDER — POTASSIUM CHLORIDE 7.45 MG/ML
10 INJECTION INTRAVENOUS
Status: DISCONTINUED | OUTPATIENT
Start: 2018-10-18 | End: 2018-10-20 | Stop reason: HOSPADM

## 2018-10-18 RX ORDER — POTASSIUM CHLORIDE 29.8 MG/ML
20 INJECTION INTRAVENOUS
Status: DISCONTINUED | OUTPATIENT
Start: 2018-10-18 | End: 2018-10-20 | Stop reason: HOSPADM

## 2018-10-18 RX ORDER — DEXTROSE MONOHYDRATE 25 G/50ML
12.5-5 INJECTION, SOLUTION INTRAVENOUS EVERY 30 MIN PRN
Status: DISCONTINUED | OUTPATIENT
Start: 2018-10-18 | End: 2018-10-19 | Stop reason: HOSPADM

## 2018-10-18 RX ORDER — ASPIRIN 81 MG/1
81-324 TABLET, CHEWABLE ORAL
Status: DISCONTINUED | OUTPATIENT
Start: 2018-10-18 | End: 2018-10-19 | Stop reason: HOSPADM

## 2018-10-18 RX ORDER — LIDOCAINE HYDROCHLORIDE 10 MG/ML
30 INJECTION, SOLUTION EPIDURAL; INFILTRATION; INTRACAUDAL; PERINEURAL
Status: DISCONTINUED | OUTPATIENT
Start: 2018-10-18 | End: 2018-10-19 | Stop reason: HOSPADM

## 2018-10-18 RX ORDER — MORPHINE SULFATE 2 MG/ML
1-2 INJECTION, SOLUTION INTRAMUSCULAR; INTRAVENOUS EVERY 5 MIN PRN
Status: DISCONTINUED | OUTPATIENT
Start: 2018-10-18 | End: 2018-10-19 | Stop reason: HOSPADM

## 2018-10-18 RX ORDER — PHENYLEPHRINE HCL IN 0.9% NACL 1 MG/10 ML
20-100 SYRINGE (ML) INTRAVENOUS
Status: DISCONTINUED | OUTPATIENT
Start: 2018-10-18 | End: 2018-10-19 | Stop reason: HOSPADM

## 2018-10-18 RX ORDER — VERAPAMIL HYDROCHLORIDE 2.5 MG/ML
1-2.5 INJECTION, SOLUTION INTRAVENOUS
Status: DISCONTINUED | OUTPATIENT
Start: 2018-10-18 | End: 2018-10-19 | Stop reason: HOSPADM

## 2018-10-18 RX ORDER — NITROGLYCERIN 0.4 MG/1
0.4 TABLET SUBLINGUAL EVERY 5 MIN PRN
Status: DISCONTINUED | OUTPATIENT
Start: 2018-10-18 | End: 2018-10-19 | Stop reason: HOSPADM

## 2018-10-18 RX ORDER — CLOPIDOGREL BISULFATE 75 MG/1
300-600 TABLET ORAL
Status: DISCONTINUED | OUTPATIENT
Start: 2018-10-18 | End: 2018-10-19 | Stop reason: HOSPADM

## 2018-10-18 RX ORDER — CLOPIDOGREL BISULFATE 75 MG/1
75 TABLET ORAL
Status: DISCONTINUED | OUTPATIENT
Start: 2018-10-18 | End: 2018-10-19 | Stop reason: HOSPADM

## 2018-10-18 RX ORDER — METHYLPREDNISOLONE SODIUM SUCCINATE 125 MG/2ML
125 INJECTION, POWDER, LYOPHILIZED, FOR SOLUTION INTRAMUSCULAR; INTRAVENOUS
Status: DISCONTINUED | OUTPATIENT
Start: 2018-10-18 | End: 2018-10-19 | Stop reason: HOSPADM

## 2018-10-18 RX ORDER — NITROGLYCERIN 5 MG/ML
100-500 VIAL (ML) INTRAVENOUS
Status: DISCONTINUED | OUTPATIENT
Start: 2018-10-18 | End: 2018-10-19 | Stop reason: HOSPADM

## 2018-10-18 RX ORDER — REGADENOSON 0.08 MG/ML
INJECTION, SOLUTION INTRAVENOUS
Status: COMPLETED
Start: 2018-10-18 | End: 2018-10-18

## 2018-10-18 RX ORDER — NIFEDIPINE 10 MG/1
10 CAPSULE ORAL
Status: DISCONTINUED | OUTPATIENT
Start: 2018-10-18 | End: 2018-10-19 | Stop reason: HOSPADM

## 2018-10-18 RX ORDER — FUROSEMIDE 10 MG/ML
20-100 INJECTION INTRAMUSCULAR; INTRAVENOUS
Status: DISCONTINUED | OUTPATIENT
Start: 2018-10-18 | End: 2018-10-19 | Stop reason: HOSPADM

## 2018-10-18 RX ORDER — POTASSIUM CL/LIDO/0.9 % NACL 10MEQ/0.1L
10 INTRAVENOUS SOLUTION, PIGGYBACK (ML) INTRAVENOUS
Status: DISCONTINUED | OUTPATIENT
Start: 2018-10-18 | End: 2018-10-20 | Stop reason: HOSPADM

## 2018-10-18 RX ORDER — ATROPINE SULFATE 0.1 MG/ML
.5-1 INJECTION INTRAVENOUS
Status: DISCONTINUED | OUTPATIENT
Start: 2018-10-18 | End: 2018-10-19 | Stop reason: HOSPADM

## 2018-10-18 RX ORDER — POTASSIUM CHLORIDE 7.45 MG/ML
10 INJECTION INTRAVENOUS
Status: DISCONTINUED | OUTPATIENT
Start: 2018-10-18 | End: 2018-10-18

## 2018-10-18 RX ORDER — HEPARIN SODIUM 1000 [USP'U]/ML
1000-10000 INJECTION, SOLUTION INTRAVENOUS; SUBCUTANEOUS EVERY 5 MIN PRN
Status: DISCONTINUED | OUTPATIENT
Start: 2018-10-18 | End: 2018-10-19 | Stop reason: HOSPADM

## 2018-10-18 RX ORDER — ASPIRIN 325 MG
325 TABLET ORAL
Status: DISCONTINUED | OUTPATIENT
Start: 2018-10-18 | End: 2018-10-19 | Stop reason: HOSPADM

## 2018-10-18 RX ORDER — PROTAMINE SULFATE 10 MG/ML
1-5 INJECTION, SOLUTION INTRAVENOUS
Status: DISCONTINUED | OUTPATIENT
Start: 2018-10-18 | End: 2018-10-19 | Stop reason: HOSPADM

## 2018-10-18 RX ORDER — SODIUM NITROPRUSSIDE 25 MG/ML
100-200 INJECTION INTRAVENOUS
Status: DISCONTINUED | OUTPATIENT
Start: 2018-10-18 | End: 2018-10-19 | Stop reason: HOSPADM

## 2018-10-18 RX ORDER — POTASSIUM CHLORIDE 29.8 MG/ML
20 INJECTION INTRAVENOUS
Status: DISCONTINUED | OUTPATIENT
Start: 2018-10-18 | End: 2018-10-18

## 2018-10-18 RX ORDER — EPINEPHRINE 1 MG/ML
0.3 INJECTION, SOLUTION, CONCENTRATE INTRAVENOUS
Status: DISCONTINUED | OUTPATIENT
Start: 2018-10-18 | End: 2018-10-19 | Stop reason: HOSPADM

## 2018-10-18 RX ADMIN — RANITIDINE 150 MG: 150 TABLET ORAL at 21:02

## 2018-10-18 RX ADMIN — Medication 37.5 MG: at 21:03

## 2018-10-18 RX ADMIN — Medication 37.5 MG: at 09:28

## 2018-10-18 RX ADMIN — Medication 34 MILLICURIE: at 07:22

## 2018-10-18 RX ADMIN — SODIUM CHLORIDE: 9 INJECTION, SOLUTION INTRAVENOUS at 09:28

## 2018-10-18 RX ADMIN — Medication 45 MG: at 18:50

## 2018-10-18 RX ADMIN — Medication 45 MG: at 11:34

## 2018-10-18 RX ADMIN — REGADENOSON 0.4 MG: 0.08 INJECTION, SOLUTION INTRAVENOUS at 07:19

## 2018-10-18 RX ADMIN — POTASSIUM CHLORIDE 20 MEQ: 1500 TABLET, EXTENDED RELEASE ORAL at 22:51

## 2018-10-18 RX ADMIN — PRIMIDONE 250 MG: 250 TABLET ORAL at 09:28

## 2018-10-18 RX ADMIN — PRIMIDONE 250 MG: 250 TABLET ORAL at 21:02

## 2018-10-18 RX ADMIN — RANITIDINE 150 MG: 150 TABLET ORAL at 07:47

## 2018-10-18 RX ADMIN — Medication 45 MG: at 00:57

## 2018-10-18 RX ADMIN — Medication 45 MG: at 06:02

## 2018-10-18 ASSESSMENT — ACTIVITIES OF DAILY LIVING (ADL)
ADLS_ACUITY_SCORE: 10
ADLS_ACUITY_SCORE: 11
ADLS_ACUITY_SCORE: 11

## 2018-10-18 NOTE — PROGRESS NOTES
Infection Prevention:    Patient placed on Enteric precautions because of possible Cdiff. Please contact Infection Prevention with any questions/concerns at *[phone].    Lilibeth Magallon, ICP

## 2018-10-18 NOTE — PROGRESS NOTES
Oncology/Hematology Follow Up Note:    Gema was at nuclear stress test this morning when I visited.  I noted her Plt count is down to 103 today.    PLAN:  - Recheck platelet count tomorrow  - If trending down again tomorrow, would consider restarting steroids, if her surgery is still scheduled on 10/24.    Daron Patel M.D.  [facility]  [phone]

## 2018-10-18 NOTE — PLAN OF CARE
Problem: Patient Care Overview  Goal: Plan of Care/Patient Progress Review  Outcome: No Change  MD notified at 1042: Patient has had no loose stools since precautions placed, just had a formed stool. Do you want to keep the precautions for the full 24 hours, or discontinue? Thank you.

## 2018-10-18 NOTE — PLAN OF CARE
Problem: Patient Care Overview  Goal: Plan of Care/Patient Progress Review  Outcome: No Change  A&O X4. Up independent in room. VSS on RA. Denies pain, dizziness. Tele A-Fib, CVR with PO Diltiazem, Metoprolol. Nuclear lexiscan today, see results. Cardiology following. Plan for angiogram tomorrow AM. Patient and spouse declined education at this time. Enteric precautions discontinued, formed stools. Hem/Onc, WOC following. Discharge plan unclear at this time.

## 2018-10-18 NOTE — PLAN OF CARE
Problem: Patient Care Overview  Goal: Plan of Care/Patient Progress Review  Outcome: No Change  Pt. AO x4 VSS. SBA. IV fluids at 75cc/hr. NPO as of midnight. Scheduled diltiazem administered. Denies of any pain, SOB and chest pain at time this time. Oncology and cardiology following.

## 2018-10-18 NOTE — PROGRESS NOTES
Cardiology Progress Note          Assessment and Plan:   Gema Coombs is a 68 year old woman with history of PAF (2016), chronic idiopathic thrombocytopenia, morbid obesity, RAFI, asthma, HTN, GERD, epilepsy, hyperlipidemia.    Admitted on 10/15/18 from PMD due to Afib w/ RVR with associated palpitations and CP for 2 days.   Awaiting surgery for hemicolectomy for diverticulitis.       Persistent Atrial Fibrillation   -previous PAF (2016) w/ spontaneous conversion  -now may be in part from recent dexamethasone, but also morbid obesity, RAFI, HTN  -remains in afib, HRs a bit variable from 100-150s this AM on oral Diltiazem 45 mg QID and Metoprolol 37.5 mg BID (though did not receive AM metoprolol).  May need to titrate BB  -not on AC due to underlying chronic thrombocytopenia--> to be addressed by heme-onc     CP  -no previous hx of CAD but multiple CVRF  -NL troponin  -nonspecific ST abnormalities  -ECHO:  LVEF 55% w/o RWMA  -Plan for ambrocio NUC today    Chronic idiopathic Thrombocytopenia  --140 for past few years  -recently given IVIG (1 of 3 doses) and dexamethasone x 3 days  -heme onc consulted    HTN  -home meds dilt  mg, lisinopril 20 mg in AM and 10 mg PM  -now on Diltiazem 45 mg QID and Metoprolol 37.5 mg BID. Holding home lisinopril to allow rate controlling agents.  -Intermittent elevated DBPs     Diverticulitis  -upcoming lap hemicolectomy planned    Untreated Hyperlipidemia  - last year  -consider statin                   Interval History:   Off floor for NUC    TELE:  Afib  -150s this AM                  Medications:       aminophylline         diltiazem  45 mg Oral Q6H GEOFFREY     metoprolol tartrate  37.5 mg Oral BID     primidone  250 mg Oral BID     ranitidine  150 mg Oral BID     sodium chloride (PF)  3 mL Intracatheter Q8H     sodium chloride (PF)  3 mL Intracatheter Q8H            Physical Exam:   Blood pressure 135/85, pulse 101, temperature 96.7  F (35.9  C), temperature  "source Oral, resp. rate 18, height 1.626 m (5' 4\"), weight 133.3 kg (293 lb 14.6 oz), SpO2 95 %, currently breastfeeding.  Wt Readings from Last 3 Encounters:   10/15/18 133.3 kg (293 lb 14.6 oz)   10/15/18 132 kg (291 lb)   09/27/18 133.8 kg (295 lb)     I/O last 3 completed shifts:  In: 645 [P.O.:240; I.V.:405]  Out: 1100 [Urine:1100]               Data:   CBC    Recent Labs  Lab 10/18/18  0620 10/16/18  0812   WBC 5.3 6.2   HGB 14.2 14.7   * 131*       BMP    Recent Labs  Lab 10/18/18  0620 10/17/18  1505 10/16/18  0812 10/15/18  1648    143 142 140   POTASSIUM 3.5 3.5 3.5 3.8   CHLORIDE 109 108 107 106   TONIA 8.1* 8.0* 8.1* 8.8   CO2 29 28 30 26   BUN 8 9 13 15   CR 0.71 0.68 0.80 0.97   GLC 95 132* 92 109*     Recent Labs   Lab Test  03/24/17   0838  03/04/16   1146   CHOL  232*  248*   HDL  51  50   LDL  155*  165*   TRIG  129  163*   CHOLHDLRATIO  4.5  5.0       TROP  Lab Results   Component Value Date    TROPI <0.015 10/16/2018    TROPI <0.015 10/15/2018    TROPI <0.015 10/15/2018    TROPI 0.017 02/29/2016    TROPONIN 0.00 02/29/2016       BNP    Recent Labs  Lab 10/15/18  1648   NTBNPI 2888*             "

## 2018-10-18 NOTE — PROGRESS NOTES
Pre-procedure:  Are you having any pain or shortness of breath (prior to starting)? no  Initial vital signs: /91, , RR 16  Allergies reviewed: yes   Rhythm: A. fib  Medications taken within 48 hours of procedure: none   Last Caffeine: yesterday  Lung sounds: CTA, no wheezing, crackles or rtx  Health History (COPD, Asthma, etc): yes asthma           Procedure: Lexiscan  Reaction/symptoms after receiving Brittni injection: felt a rush and then back to normal  Intensity of Pain: 2  Rhythm: afib  1. Vital Signs:/74, , RR 18  2. Vital Signs:/55, , RR 18     Reversal agent: N/A    Post:   Resolution of symptoms?: YES  Vital signs: /75, , RR 16  Vital signs: /76, , RR 16  Rhythm: afib  Walk: NO  Comment: pt tolerated procedure well. No complaints. HR elevated to 141 but returned to 120's which pt was at before brittni.  Returned to room.   Return to Radiology

## 2018-10-18 NOTE — PROGRESS NOTES
Focus: ostomy surgery  D/I:  Pt expected to have colo-rectal surgery next October 24 by Dr Cordova at 1pm. WOC consulted for stoma sight marking but pt admitted now with AF and heart issues.   Continued discussion regarding Colostomy surgery with pt and spouse.  A/P: pt unsure if surgery will be scheduled or Cardiology MD will delay. WOC card information provided and asked pt to call WO if she is discharged before tomorrow.   Pt understands and will plan accordingly.

## 2018-10-18 NOTE — PROGRESS NOTES
"[facility]  Hospitalist Progress Note  Dameon Izquierdo, DO 10/18/2018    Reason for Stay (Diagnosis): Atrial fibrillation.         Assessment and Plan:      Summary of Stay: Gema Coombs is a 68 year old female admitted on 10/15/2018 with atrial fibrillation with rapid ventricular response.  Problem List:   1. Atrial fibrillation with rapid ventricular response.  Continue diltiazem 45 mg every 6 hours and metoprolol 37.5 mg twice a day.  Appreciate cardiology input.  Hematology consult to discuss possibility of anticoagulation with her known chronic idiopathic thrombocytopenia.  2. Chronic idiopathic thrombocytopenia.  Hematology consult.  3. Possible coronary artery disease.  Lexiscan cardiac stress test is abnormal.  Cardiology planning for coronary angiogram tomorrow.  4. GERD.  Ranitidine 150 mg twice a day.  5. Asthma.  No acute exacerbation.  Xopenex if needed.  6. Incidental pulmonary nodule.  Seen on CT scan.  Low risk.  Should not need any particular follow-up.  7. Diarrhea.  Resolved.  8. Previous diverticulitis.  Plan for surgery next week with colorectal.  DVT Prophylaxis: Pneumatic Compression Devices  Code Status: Full Code  Discharge Dispo: Home  Estimated Disch Date / # of Days until Disch: 1-2           Interval History (Subjective):      Denies chest pain, shortness of breath, fevers, chills, nausea, vomiting, or diarrhea.                  Physical Exam:      Last Vital Signs:  /65 (BP Location: Left arm)  Pulse 115  Temp 96.7  F (35.9  C) (Oral)  Resp 18  Ht 1.626 m (5' 4\")  Wt 133.3 kg (293 lb 14.6 oz)  SpO2 95%  BMI 50.45 kg/m2    Gen:  NAD, A&Ox3.  Eyes:  PERRL, sclera anicteric.  OP:  MMM, no lesions.  Neck:  Supple.  CV:  Mildly irregular, no murmurs.  Lung:  CTA b/l, normal effort.  Ab:  +BS, soft.  Skin:  Warm, dry to touch.  No rash.  Ext:  No pitting edema LE b/l.           Medications:      All current medications were reviewed with changes reflected in " problem list.         Data:      All new lab and imaging data was reviewed.   Labs:    Recent Labs  Lab 10/18/18  0620      POTASSIUM 3.5   CHLORIDE 109   CO2 29   ANIONGAP 6   GLC 95   BUN 8   CR 0.71   GFRESTIMATED 82   GFRESTBLACK >90   TONIA 8.1*       Recent Labs  Lab 10/18/18  0620   WBC 5.3   HGB 14.2   HCT 43.1   MCV 92   *      Imaging:   Recent Results (from the past 24 hour(s))   NM MPI w Lexiscan    Narrative    GATED MYOCARDIAL PERFUSION SCINTIGRAPHY WITH INTRAVENOUS PHARMACOLOGIC  VASODILATATION LEXISCAN -TWO DAY STUDY     10/18/2018 8:26 AM  BEE HALE  68 years  Female  1950.    Indication/Clinical History: Chest heaviness    Impression  1.  Myocardial perfusion imaging using single isotope technique  demonstrated small to moderate area of inferior/inferolateral wall  ischemia. The TID was 1.33.  2. Gated images demonstrated normal wall motion with a calculated  ejection fraction of 68%.    3. There is no prior nuclear study available for comparison.    Procedure  Pharmacologic stress testing was performed with Lexiscan at a rate of  0.08 mg/ml rapid bolus injection, for 15 seconds, 0.4 mg/5ml  intravenously. Low-level exercise was not performed along with the  vasodilator infusion.  The heart rate was 115 at baseline and elizabeth to  144 beats per minute during the Lexiscan infusion. The rest blood  pressure was 129/74 mmHg and was 161/55 mm Hg during Lexiscan  infusion. The patient experienced shortness of breath  during the  test.    Myocardial perfusion imaging was performed at rest, approximately 45  minutes after the injection intravenously of 33.0 mCi of Tc-99m  Myoview. At peak pharmacologic effect, 10-20 seconds after Lexiscan,   the patient was injected intravenously with 34.0 mCi of  Tc-99m  Myoview. The post-stress tomographic imaging was performed  approximately 60 minutes after stress.    EKG Findings  The resting EKG demonstrated atrial fibrillation with a  rapid  ventricular response and nonspecific ST-T wave changes. The stress EKG  demonstrated slight worsening of the baseline ST-T wave changes with  Lexiscan infusion.    Tomographic Findings  Overall, the study quality is fair . On the stress images, there is a  small to moderate area of mild to moderate decreased tracer uptake  noted in the inferior/inferolateral wall. On the rest images, there is  near uniform tracer uptake noted . Gated images demonstrated normal  wall motion. The left ventricular ejection fraction was calculated to  be 68%. TID was 1.33.    JOYA MCFARLANE MD

## 2018-10-18 NOTE — CONSULTS
I have examined the patient, reviewed the history, medications and pre procedural tests. Chest pain with abnormal nuclear stress test, pre op colon resection for very symptomatic diverticulitis that will need surgery prior to one year from now. If stenting needed, BMS is planned.  I have explained to the patient the risks of death, MI, stroke, hematoma, possible urgent bypass surgery for failed PCI, use of stents, thienopyridine agents, possible peripheral vascular complications, arrhythmia, the use of FFR in clinical decision-making and alternative of medical therapy alone in regards to left heart catheterization, left ventriculography, coronary angiography, and possible percutaneous coronary intervention. The patient voiced understanding and wishes to proceed. The patient has a good right radial pulse, normal ulnar pulse and a normal Lj's sign.

## 2018-10-19 ENCOUNTER — APPOINTMENT (OUTPATIENT)
Dept: CARDIOLOGY | Facility: CLINIC | Age: 68
DRG: 287 | End: 2018-10-19
Attending: INTERNAL MEDICINE
Payer: COMMERCIAL

## 2018-10-19 LAB
ANION GAP SERPL CALCULATED.3IONS-SCNC: 6 MMOL/L (ref 3–14)
BUN SERPL-MCNC: 8 MG/DL (ref 7–30)
CALCIUM SERPL-MCNC: 8.2 MG/DL (ref 8.5–10.1)
CHLORIDE SERPL-SCNC: 107 MMOL/L (ref 94–109)
CO2 SERPL-SCNC: 29 MMOL/L (ref 20–32)
CREAT SERPL-MCNC: 0.71 MG/DL (ref 0.52–1.04)
ERYTHROCYTE [DISTWIDTH] IN BLOOD BY AUTOMATED COUNT: 13.9 % (ref 10–15)
GFR SERPL CREATININE-BSD FRML MDRD: 82 ML/MIN/1.7M2
GLUCOSE SERPL-MCNC: 101 MG/DL (ref 70–99)
HCT VFR BLD AUTO: 42.8 % (ref 35–47)
HGB BLD-MCNC: 14 G/DL (ref 11.7–15.7)
MCH RBC QN AUTO: 30 PG (ref 26.5–33)
MCHC RBC AUTO-ENTMCNC: 32.7 G/DL (ref 31.5–36.5)
MCV RBC AUTO: 92 FL (ref 78–100)
PLATELET # BLD AUTO: 98 10E9/L (ref 150–450)
POTASSIUM SERPL-SCNC: 3.6 MMOL/L (ref 3.4–5.3)
RBC # BLD AUTO: 4.67 10E12/L (ref 3.8–5.2)
SODIUM SERPL-SCNC: 142 MMOL/L (ref 133–144)
WBC # BLD AUTO: 5.2 10E9/L (ref 4–11)

## 2018-10-19 PROCEDURE — 99232 SBSQ HOSP IP/OBS MODERATE 35: CPT | Performed by: INTERNAL MEDICINE

## 2018-10-19 PROCEDURE — 27210802 ZZH SHEATH CR1

## 2018-10-19 PROCEDURE — 25000132 ZZH RX MED GY IP 250 OP 250 PS 637: Performed by: INTERNAL MEDICINE

## 2018-10-19 PROCEDURE — 85027 COMPLETE CBC AUTOMATED: CPT | Performed by: INTERNAL MEDICINE

## 2018-10-19 PROCEDURE — B2151ZZ FLUOROSCOPY OF LEFT HEART USING LOW OSMOLAR CONTRAST: ICD-10-PCS | Performed by: INTERNAL MEDICINE

## 2018-10-19 PROCEDURE — 27210946 ZZH KIT HC TOTES DISP CR8

## 2018-10-19 PROCEDURE — 80048 BASIC METABOLIC PNL TOTAL CA: CPT | Performed by: INTERNAL MEDICINE

## 2018-10-19 PROCEDURE — B2111ZZ FLUOROSCOPY OF MULTIPLE CORONARY ARTERIES USING LOW OSMOLAR CONTRAST: ICD-10-PCS | Performed by: INTERNAL MEDICINE

## 2018-10-19 PROCEDURE — 99152 MOD SED SAME PHYS/QHP 5/>YRS: CPT

## 2018-10-19 PROCEDURE — 99207 ZZC CDG-MDM COMPONENT: MEETS LOW - DOWN CODED: CPT | Performed by: INTERNAL MEDICINE

## 2018-10-19 PROCEDURE — 12000007 ZZH R&B INTERMEDIATE

## 2018-10-19 PROCEDURE — 25000128 H RX IP 250 OP 636: Performed by: INTERNAL MEDICINE

## 2018-10-19 PROCEDURE — 36415 COLL VENOUS BLD VENIPUNCTURE: CPT | Performed by: INTERNAL MEDICINE

## 2018-10-19 PROCEDURE — 93458 L HRT ARTERY/VENTRICLE ANGIO: CPT | Mod: 26 | Performed by: INTERNAL MEDICINE

## 2018-10-19 PROCEDURE — 93458 L HRT ARTERY/VENTRICLE ANGIO: CPT

## 2018-10-19 PROCEDURE — 25000125 ZZHC RX 250: Performed by: INTERNAL MEDICINE

## 2018-10-19 PROCEDURE — 4A023N7 MEASUREMENT OF CARDIAC SAMPLING AND PRESSURE, LEFT HEART, PERCUTANEOUS APPROACH: ICD-10-PCS | Performed by: INTERNAL MEDICINE

## 2018-10-19 PROCEDURE — C1769 GUIDE WIRE: HCPCS

## 2018-10-19 PROCEDURE — 27210827 ZZH KIT ACIST INJECTOR CR6

## 2018-10-19 PROCEDURE — 99232 SBSQ HOSP IP/OBS MODERATE 35: CPT | Mod: 25 | Performed by: INTERNAL MEDICINE

## 2018-10-19 PROCEDURE — 99152 MOD SED SAME PHYS/QHP 5/>YRS: CPT | Performed by: INTERNAL MEDICINE

## 2018-10-19 PROCEDURE — 99153 MOD SED SAME PHYS/QHP EA: CPT

## 2018-10-19 PROCEDURE — 27210742 ZZH CATH CR1

## 2018-10-19 RX ORDER — ATORVASTATIN CALCIUM 40 MG/1
80 TABLET, FILM COATED ORAL EVERY EVENING
Status: DISCONTINUED | OUTPATIENT
Start: 2018-10-19 | End: 2018-10-20 | Stop reason: HOSPADM

## 2018-10-19 RX ORDER — NICARDIPINE HYDROCHLORIDE 2.5 MG/ML
100 INJECTION INTRAVENOUS
Status: DISCONTINUED | OUTPATIENT
Start: 2018-10-19 | End: 2018-10-19 | Stop reason: HOSPADM

## 2018-10-19 RX ORDER — POTASSIUM CHLORIDE 7.45 MG/ML
10 INJECTION INTRAVENOUS
Status: DISCONTINUED | OUTPATIENT
Start: 2018-10-19 | End: 2018-10-19 | Stop reason: HOSPADM

## 2018-10-19 RX ORDER — FLUMAZENIL 0.1 MG/ML
0.2 INJECTION, SOLUTION INTRAVENOUS
Status: DISCONTINUED | OUTPATIENT
Start: 2018-10-19 | End: 2018-10-19 | Stop reason: HOSPADM

## 2018-10-19 RX ORDER — NITROGLYCERIN 5 MG/ML
100-200 VIAL (ML) INTRAVENOUS
Status: DISCONTINUED | OUTPATIENT
Start: 2018-10-19 | End: 2018-10-19 | Stop reason: HOSPADM

## 2018-10-19 RX ORDER — NALOXONE HYDROCHLORIDE 0.4 MG/ML
.1-.4 INJECTION, SOLUTION INTRAMUSCULAR; INTRAVENOUS; SUBCUTANEOUS
Status: DISCONTINUED | OUTPATIENT
Start: 2018-10-19 | End: 2018-10-20 | Stop reason: HOSPADM

## 2018-10-19 RX ORDER — IOPAMIDOL 755 MG/ML
100 INJECTION, SOLUTION INTRAVASCULAR ONCE
Status: COMPLETED | OUTPATIENT
Start: 2018-10-19 | End: 2018-10-19

## 2018-10-19 RX ORDER — FLUMAZENIL 0.1 MG/ML
0.2 INJECTION, SOLUTION INTRAVENOUS
Status: ACTIVE | OUTPATIENT
Start: 2018-10-19 | End: 2018-10-20

## 2018-10-19 RX ORDER — DILTIAZEM HYDROCHLORIDE 30 MG/1
60 TABLET, FILM COATED ORAL EVERY 6 HOURS SCHEDULED
Status: DISCONTINUED | OUTPATIENT
Start: 2018-10-19 | End: 2018-10-20

## 2018-10-19 RX ORDER — LORAZEPAM 0.5 MG/1
0.5 TABLET ORAL
Status: DISCONTINUED | OUTPATIENT
Start: 2018-10-19 | End: 2018-10-19 | Stop reason: HOSPADM

## 2018-10-19 RX ORDER — FUROSEMIDE 10 MG/ML
20-100 INJECTION INTRAMUSCULAR; INTRAVENOUS
Status: DISCONTINUED | OUTPATIENT
Start: 2018-10-19 | End: 2018-10-19 | Stop reason: HOSPADM

## 2018-10-19 RX ORDER — PHENYLEPHRINE HCL IN 0.9% NACL 1 MG/10 ML
20-100 SYRINGE (ML) INTRAVENOUS
Status: DISCONTINUED | OUTPATIENT
Start: 2018-10-19 | End: 2018-10-19 | Stop reason: HOSPADM

## 2018-10-19 RX ORDER — LORAZEPAM 2 MG/ML
0.5 INJECTION INTRAMUSCULAR
Status: DISCONTINUED | OUTPATIENT
Start: 2018-10-19 | End: 2018-10-19 | Stop reason: HOSPADM

## 2018-10-19 RX ORDER — HEPARIN SODIUM 1000 [USP'U]/ML
1000-10000 INJECTION, SOLUTION INTRAVENOUS; SUBCUTANEOUS EVERY 5 MIN PRN
Status: DISCONTINUED | OUTPATIENT
Start: 2018-10-19 | End: 2018-10-19 | Stop reason: HOSPADM

## 2018-10-19 RX ORDER — LORAZEPAM 2 MG/ML
.5-2 INJECTION INTRAMUSCULAR EVERY 4 HOURS PRN
Status: DISCONTINUED | OUTPATIENT
Start: 2018-10-19 | End: 2018-10-19 | Stop reason: HOSPADM

## 2018-10-19 RX ORDER — NITROGLYCERIN 0.4 MG/1
0.4 TABLET SUBLINGUAL EVERY 5 MIN PRN
Status: DISCONTINUED | OUTPATIENT
Start: 2018-10-19 | End: 2018-10-19 | Stop reason: HOSPADM

## 2018-10-19 RX ORDER — METHYLPREDNISOLONE SODIUM SUCCINATE 125 MG/2ML
125 INJECTION, POWDER, LYOPHILIZED, FOR SOLUTION INTRAMUSCULAR; INTRAVENOUS
Status: DISCONTINUED | OUTPATIENT
Start: 2018-10-19 | End: 2018-10-19 | Stop reason: HOSPADM

## 2018-10-19 RX ORDER — SODIUM CHLORIDE 9 MG/ML
INJECTION, SOLUTION INTRAVENOUS CONTINUOUS
Status: DISCONTINUED | OUTPATIENT
Start: 2018-10-19 | End: 2018-10-20 | Stop reason: HOSPADM

## 2018-10-19 RX ORDER — ATROPINE SULFATE 0.1 MG/ML
0.5 INJECTION INTRAVENOUS EVERY 5 MIN PRN
Status: ACTIVE | OUTPATIENT
Start: 2018-10-19 | End: 2018-10-20

## 2018-10-19 RX ORDER — ACETAMINOPHEN 325 MG/1
325-650 TABLET ORAL EVERY 4 HOURS PRN
Status: DISCONTINUED | OUTPATIENT
Start: 2018-10-19 | End: 2018-10-20 | Stop reason: HOSPADM

## 2018-10-19 RX ORDER — NITROGLYCERIN 20 MG/100ML
.07-1.5 INJECTION INTRAVENOUS CONTINUOUS PRN
Status: DISCONTINUED | OUTPATIENT
Start: 2018-10-19 | End: 2018-10-19 | Stop reason: HOSPADM

## 2018-10-19 RX ORDER — FENTANYL CITRATE 50 UG/ML
25-50 INJECTION, SOLUTION INTRAMUSCULAR; INTRAVENOUS
Status: DISCONTINUED | OUTPATIENT
Start: 2018-10-19 | End: 2018-10-19 | Stop reason: HOSPADM

## 2018-10-19 RX ORDER — ASPIRIN 325 MG
325 TABLET ORAL
Status: DISCONTINUED | OUTPATIENT
Start: 2018-10-19 | End: 2018-10-19 | Stop reason: HOSPADM

## 2018-10-19 RX ORDER — VERAPAMIL HYDROCHLORIDE 2.5 MG/ML
1-2.5 INJECTION, SOLUTION INTRAVENOUS
Status: DISCONTINUED | OUTPATIENT
Start: 2018-10-19 | End: 2018-10-19 | Stop reason: HOSPADM

## 2018-10-19 RX ORDER — DOBUTAMINE HYDROCHLORIDE 200 MG/100ML
2-20 INJECTION INTRAVENOUS CONTINUOUS PRN
Status: DISCONTINUED | OUTPATIENT
Start: 2018-10-19 | End: 2018-10-19 | Stop reason: HOSPADM

## 2018-10-19 RX ORDER — LIDOCAINE 40 MG/G
CREAM TOPICAL
Status: DISCONTINUED | OUTPATIENT
Start: 2018-10-19 | End: 2018-10-20

## 2018-10-19 RX ORDER — DEXTROSE MONOHYDRATE 25 G/50ML
12.5-5 INJECTION, SOLUTION INTRAVENOUS EVERY 30 MIN PRN
Status: DISCONTINUED | OUTPATIENT
Start: 2018-10-19 | End: 2018-10-19 | Stop reason: HOSPADM

## 2018-10-19 RX ORDER — NALOXONE HYDROCHLORIDE 0.4 MG/ML
.2-.4 INJECTION, SOLUTION INTRAMUSCULAR; INTRAVENOUS; SUBCUTANEOUS
Status: DISCONTINUED | OUTPATIENT
Start: 2018-10-19 | End: 2018-10-19

## 2018-10-19 RX ORDER — EPINEPHRINE 1 MG/ML
0.3 INJECTION, SOLUTION, CONCENTRATE INTRAVENOUS
Status: DISCONTINUED | OUTPATIENT
Start: 2018-10-19 | End: 2018-10-19 | Stop reason: HOSPADM

## 2018-10-19 RX ORDER — FENTANYL CITRATE 50 UG/ML
25-50 INJECTION, SOLUTION INTRAMUSCULAR; INTRAVENOUS
Status: ACTIVE | OUTPATIENT
Start: 2018-10-19 | End: 2018-10-20

## 2018-10-19 RX ORDER — HYDRALAZINE HYDROCHLORIDE 20 MG/ML
10-20 INJECTION INTRAMUSCULAR; INTRAVENOUS
Status: DISCONTINUED | OUTPATIENT
Start: 2018-10-19 | End: 2018-10-19 | Stop reason: HOSPADM

## 2018-10-19 RX ORDER — PRASUGREL 10 MG/1
10-60 TABLET, FILM COATED ORAL
Status: DISCONTINUED | OUTPATIENT
Start: 2018-10-19 | End: 2018-10-19 | Stop reason: HOSPADM

## 2018-10-19 RX ORDER — MORPHINE SULFATE 2 MG/ML
1-2 INJECTION, SOLUTION INTRAMUSCULAR; INTRAVENOUS EVERY 5 MIN PRN
Status: DISCONTINUED | OUTPATIENT
Start: 2018-10-19 | End: 2018-10-19 | Stop reason: HOSPADM

## 2018-10-19 RX ORDER — DOPAMINE HYDROCHLORIDE 160 MG/100ML
2-20 INJECTION, SOLUTION INTRAVENOUS CONTINUOUS PRN
Status: DISCONTINUED | OUTPATIENT
Start: 2018-10-19 | End: 2018-10-19 | Stop reason: HOSPADM

## 2018-10-19 RX ORDER — LIDOCAINE HYDROCHLORIDE 10 MG/ML
INJECTION, SOLUTION EPIDURAL; INFILTRATION; INTRACAUDAL; PERINEURAL
Status: DISCONTINUED
Start: 2018-10-19 | End: 2018-10-19 | Stop reason: HOSPADM

## 2018-10-19 RX ORDER — SODIUM CHLORIDE 9 MG/ML
INJECTION, SOLUTION INTRAVENOUS CONTINUOUS
Status: DISCONTINUED | OUTPATIENT
Start: 2018-10-19 | End: 2018-10-19 | Stop reason: HOSPADM

## 2018-10-19 RX ORDER — CLOPIDOGREL BISULFATE 75 MG/1
300-600 TABLET ORAL
Status: DISCONTINUED | OUTPATIENT
Start: 2018-10-19 | End: 2018-10-19 | Stop reason: HOSPADM

## 2018-10-19 RX ORDER — NIFEDIPINE 10 MG/1
10 CAPSULE ORAL
Status: DISCONTINUED | OUTPATIENT
Start: 2018-10-19 | End: 2018-10-19 | Stop reason: HOSPADM

## 2018-10-19 RX ORDER — PROTAMINE SULFATE 10 MG/ML
25-100 INJECTION, SOLUTION INTRAVENOUS EVERY 5 MIN PRN
Status: DISCONTINUED | OUTPATIENT
Start: 2018-10-19 | End: 2018-10-19 | Stop reason: HOSPADM

## 2018-10-19 RX ORDER — CLOPIDOGREL BISULFATE 75 MG/1
75 TABLET ORAL
Status: DISCONTINUED | OUTPATIENT
Start: 2018-10-19 | End: 2018-10-19 | Stop reason: HOSPADM

## 2018-10-19 RX ORDER — FENTANYL CITRATE 50 UG/ML
INJECTION, SOLUTION INTRAMUSCULAR; INTRAVENOUS
Status: DISCONTINUED
Start: 2018-10-19 | End: 2018-10-19 | Stop reason: HOSPADM

## 2018-10-19 RX ORDER — POTASSIUM CHLORIDE 1500 MG/1
20 TABLET, EXTENDED RELEASE ORAL
Status: DISCONTINUED | OUTPATIENT
Start: 2018-10-19 | End: 2018-10-19 | Stop reason: HOSPADM

## 2018-10-19 RX ORDER — LIDOCAINE HYDROCHLORIDE 10 MG/ML
1-10 INJECTION, SOLUTION EPIDURAL; INFILTRATION; INTRACAUDAL; PERINEURAL
Status: COMPLETED | OUTPATIENT
Start: 2018-10-19 | End: 2018-10-19

## 2018-10-19 RX ORDER — HEPARIN SODIUM 1000 [USP'U]/ML
INJECTION, SOLUTION INTRAVENOUS; SUBCUTANEOUS
Status: DISCONTINUED
Start: 2018-10-19 | End: 2018-10-19 | Stop reason: WASHOUT

## 2018-10-19 RX ORDER — ADENOSINE 3 MG/ML
12-12000 INJECTION, SOLUTION INTRAVENOUS
Status: DISCONTINUED | OUTPATIENT
Start: 2018-10-19 | End: 2018-10-19 | Stop reason: HOSPADM

## 2018-10-19 RX ORDER — ASPIRIN 81 MG/1
81-324 TABLET, CHEWABLE ORAL
Status: DISCONTINUED | OUTPATIENT
Start: 2018-10-19 | End: 2018-10-19 | Stop reason: HOSPADM

## 2018-10-19 RX ORDER — ONDANSETRON 2 MG/ML
4 INJECTION INTRAMUSCULAR; INTRAVENOUS EVERY 4 HOURS PRN
Status: DISCONTINUED | OUTPATIENT
Start: 2018-10-19 | End: 2018-10-19 | Stop reason: HOSPADM

## 2018-10-19 RX ORDER — METOPROLOL TARTRATE 1 MG/ML
5 INJECTION, SOLUTION INTRAVENOUS EVERY 5 MIN PRN
Status: DISCONTINUED | OUTPATIENT
Start: 2018-10-19 | End: 2018-10-19 | Stop reason: HOSPADM

## 2018-10-19 RX ORDER — ENALAPRILAT 1.25 MG/ML
1.25-2.5 INJECTION INTRAVENOUS
Status: DISCONTINUED | OUTPATIENT
Start: 2018-10-19 | End: 2018-10-19 | Stop reason: HOSPADM

## 2018-10-19 RX ORDER — BUPIVACAINE HYDROCHLORIDE 2.5 MG/ML
1-10 INJECTION, SOLUTION EPIDURAL; INFILTRATION; INTRACAUDAL
Status: DISCONTINUED | OUTPATIENT
Start: 2018-10-19 | End: 2018-10-19 | Stop reason: HOSPADM

## 2018-10-19 RX ORDER — LIDOCAINE 40 MG/G
CREAM TOPICAL
Status: DISCONTINUED | OUTPATIENT
Start: 2018-10-19 | End: 2018-10-19 | Stop reason: HOSPADM

## 2018-10-19 RX ORDER — SODIUM NITROPRUSSIDE 25 MG/ML
100-200 INJECTION INTRAVENOUS
Status: DISCONTINUED | OUTPATIENT
Start: 2018-10-19 | End: 2018-10-19 | Stop reason: HOSPADM

## 2018-10-19 RX ORDER — HYDROCODONE BITARTRATE AND ACETAMINOPHEN 5; 325 MG/1; MG/1
1-2 TABLET ORAL EVERY 4 HOURS PRN
Status: DISCONTINUED | OUTPATIENT
Start: 2018-10-19 | End: 2018-10-20 | Stop reason: HOSPADM

## 2018-10-19 RX ORDER — DIPHENHYDRAMINE HYDROCHLORIDE 50 MG/ML
25-50 INJECTION INTRAMUSCULAR; INTRAVENOUS
Status: DISCONTINUED | OUTPATIENT
Start: 2018-10-19 | End: 2018-10-19 | Stop reason: HOSPADM

## 2018-10-19 RX ORDER — NITROGLYCERIN 5 MG/ML
VIAL (ML) INTRAVENOUS
Status: DISCONTINUED
Start: 2018-10-19 | End: 2018-10-19 | Stop reason: HOSPADM

## 2018-10-19 RX ORDER — PROTAMINE SULFATE 10 MG/ML
1-5 INJECTION, SOLUTION INTRAVENOUS
Status: DISCONTINUED | OUTPATIENT
Start: 2018-10-19 | End: 2018-10-19 | Stop reason: HOSPADM

## 2018-10-19 RX ORDER — NALOXONE HYDROCHLORIDE 0.4 MG/ML
0.4 INJECTION, SOLUTION INTRAMUSCULAR; INTRAVENOUS; SUBCUTANEOUS EVERY 5 MIN PRN
Status: DISCONTINUED | OUTPATIENT
Start: 2018-10-19 | End: 2018-10-19 | Stop reason: HOSPADM

## 2018-10-19 RX ORDER — NITROGLYCERIN 5 MG/ML
100-500 VIAL (ML) INTRAVENOUS
Status: DISCONTINUED | OUTPATIENT
Start: 2018-10-19 | End: 2018-10-19 | Stop reason: HOSPADM

## 2018-10-19 RX ORDER — POTASSIUM CHLORIDE 29.8 MG/ML
20 INJECTION INTRAVENOUS
Status: DISCONTINUED | OUTPATIENT
Start: 2018-10-19 | End: 2018-10-19 | Stop reason: HOSPADM

## 2018-10-19 RX ORDER — ATROPINE SULFATE 0.1 MG/ML
.5-1 INJECTION INTRAVENOUS
Status: DISCONTINUED | OUTPATIENT
Start: 2018-10-19 | End: 2018-10-19 | Stop reason: HOSPADM

## 2018-10-19 RX ORDER — LIDOCAINE HYDROCHLORIDE 10 MG/ML
30 INJECTION, SOLUTION EPIDURAL; INFILTRATION; INTRACAUDAL; PERINEURAL
Status: DISCONTINUED | OUTPATIENT
Start: 2018-10-19 | End: 2018-10-19 | Stop reason: HOSPADM

## 2018-10-19 RX ADMIN — PRIMIDONE 250 MG: 250 TABLET ORAL at 09:03

## 2018-10-19 RX ADMIN — Medication 37.5 MG: at 09:03

## 2018-10-19 RX ADMIN — PRIMIDONE 250 MG: 250 TABLET ORAL at 22:25

## 2018-10-19 RX ADMIN — DILTIAZEM HYDROCHLORIDE 60 MG: 30 TABLET, FILM COATED ORAL at 18:29

## 2018-10-19 RX ADMIN — IOPAMIDOL 80 ML: 755 INJECTION, SOLUTION INTRAVASCULAR at 14:45

## 2018-10-19 RX ADMIN — Medication 45 MG: at 06:36

## 2018-10-19 RX ADMIN — SODIUM CHLORIDE: 9 INJECTION, SOLUTION INTRAVENOUS at 06:41

## 2018-10-19 RX ADMIN — POTASSIUM CHLORIDE 20 MEQ: 1500 TABLET, EXTENDED RELEASE ORAL at 10:17

## 2018-10-19 RX ADMIN — MIDAZOLAM HYDROCHLORIDE 2 MG: 1 INJECTION, SOLUTION INTRAMUSCULAR; INTRAVENOUS at 15:12

## 2018-10-19 RX ADMIN — Medication 37.5 MG: at 22:25

## 2018-10-19 RX ADMIN — Medication 45 MG: at 00:41

## 2018-10-19 RX ADMIN — FENTANYL CITRATE 100 MCG: 50 INJECTION INTRAMUSCULAR; INTRAVENOUS at 15:11

## 2018-10-19 RX ADMIN — RANITIDINE 150 MG: 150 TABLET ORAL at 09:03

## 2018-10-19 RX ADMIN — ASPIRIN 325 MG: 325 TABLET, DELAYED RELEASE ORAL at 12:26

## 2018-10-19 RX ADMIN — RANITIDINE 150 MG: 150 TABLET ORAL at 22:25

## 2018-10-19 RX ADMIN — LIDOCAINE HYDROCHLORIDE 10 ML: 10 INJECTION, SOLUTION EPIDURAL; INFILTRATION; INTRACAUDAL; PERINEURAL at 15:10

## 2018-10-19 RX ADMIN — Medication 45 MG: at 12:26

## 2018-10-19 ASSESSMENT — ACTIVITIES OF DAILY LIVING (ADL)
ADLS_ACUITY_SCORE: 10
ADLS_ACUITY_SCORE: 11

## 2018-10-19 NOTE — PROGRESS NOTES
Oncology/Hematology Follow Up Note:    Assessment and Plan:  Gema is a 68 year old woman with chronic ITP who was recently treated with IVIG and steroids in anticipation to her upcoming colectomy next week for recurrent diverticulitis.  She is currently hospitalized for Afib with RVR.    #1 Chronic ITP  Plt 98 today.  Unclear if colectomy will still happen next week.    - No steroids at this time until we know the plan for surgery  - Outside of the perioperative setting, ITP does not need to be treated unless Plt<30 or if the patient has clinically significant bleeding    #2 Anticoagulation for atrial fibrillation    - OK to use anticoagulation from my standpoint.  Would hold anticoagulation if Plt <50.    Daron Patel M.D.  [facility]  [phone]      Subjective:    Patient is scheduled for a coronary angiogram today.  Remains in afib with HR in the  range.  No longer experiences any palpitations.    Scheduled Medications:  Reviewed active medications    Labs:  CBC RESULTS:   Recent Labs   Lab Test  10/19/18   0619  10/18/18   0620  10/16/18   0812   WBC  5.2  5.3  6.2   HGB  14.0  14.2  14.7   HCT  42.8  43.1  45.2   MCV  92  92  92   PLT  98*  103*  131*       CMP  Recent Labs  Lab 10/19/18  0619 10/18/18  0620 10/17/18  1505 10/16/18  0812 10/15/18  1648    144 143 142 140   POTASSIUM 3.6 3.5 3.5 3.5 3.8   CHLORIDE 107 109 108 107 106   CO2 29 29 28 30 26   ANIONGAP 6 6 7 5 8   * 95 132* 92 109*   BUN 8 8 9 13 15   CR 0.71 0.71 0.68 0.80 0.97   GFRESTIMATED 82 82 87 72 58*   GFRESTBLACK >90 >90 >90 87 70   TONIA 8.2* 8.1* 8.0* 8.1* 8.8   PROTTOTAL  --   --   --   --  7.6   ALBUMIN  --   --   --   --  4.0   BILITOTAL  --   --   --   --  0.4   ALKPHOS  --   --   --   --  92   AST  --   --   --   --  31   ALT  --   --   --   --  47       INRNo lab results found in last 7 days.    Objective/Physical Exam:  Blood pressure 109/70, pulse 82, temperature 97.3  F (36.3  C), temperature source  "Oral, resp. rate 18, height 1.626 m (5' 4\"), weight 133.3 kg (293 lb 14.6 oz), SpO2 98 %, currently breastfeeding.  General:  Awake, alert, and oriented  Skin:  No bruising or rash noted  Mouth:  Moist mucous membranes with no ulceration  Heart: Irregularly irregular.  No murmurs.    Daron Patel MD  [facility]  10/19/2018 12:00 PM        "

## 2018-10-19 NOTE — PROGRESS NOTES
"[facility]  Hospitalist Progress Note  Dameon Izquierdo, DO 10/19/2018    Reason for Stay (Diagnosis): Atrial fibrillation.         Assessment and Plan:      Summary of Stay: Gema Coombs is a 68 year old female admitted on 10/15/2018 with atrial fibrillation with rapid ventricular response.  Problem List:   1. Atrial fibrillation with rapid ventricular response.  Increase diltiazem to 60 mg every 6 hours.  Continue metoprolol 37.5 mg twice a day.  Appreciate cardiology input.  Hematology consult appreciated.  Okay to use anticoagulation as needed.   2. Chronic idiopathic thrombocytopenia.  Hematology consult appreciated.  3. Possible coronary artery disease.  Lexiscan cardiac stress test is abnormal.  Cardiology planning for coronary angiogram today.  Continue metoprolol.  Start aspirin.  Start atorvastatin.  4. GERD.  Ranitidine 150 mg twice a day.  5. Asthma.  No acute exacerbation.  Xopenex if needed.  6. Incidental pulmonary nodule.  Seen on CT scan.  Low risk.  Should not need any particular follow-up.  7. Diarrhea.  Resolved.  8. Previous diverticulitis.  Plan for surgery with colorectal as outpatient.  DVT Prophylaxis: Pneumatic Compression Devices  Code Status: Full Code  Discharge Dispo: Home  Estimated Disch Date / # of Days until Disch: 1-2           Interval History (Subjective):      Stools loose.  Denies chest pain, shortness of breath, fevers, chills, nausea, vomiting.                  Physical Exam:      Last Vital Signs:  BP (!) 121/104  Pulse 82  Temp 97.3  F (36.3  C) (Oral)  Resp 18  Ht 1.626 m (5' 4\")  Wt 133.3 kg (293 lb 14.6 oz)  SpO2 98%  BMI 50.45 kg/m2    Gen:  NAD, A&Ox3.  Eyes:  PERRL, sclera anicteric.  OP:  MMM, no lesions.  Neck:  Supple.  CV:  Irregular, no murmurs.  Lung:  CTA b/l, normal effort.  Ab:  +BS, soft.  Skin:  Warm, dry to touch.  No rash.  Ext:  No pitting edema LE b/l.           Medications:      All current medications were reviewed with " changes reflected in problem list.         Data:      All new lab and imaging data was reviewed.   Labs:    Recent Labs  Lab 10/19/18  0619      POTASSIUM 3.6   CHLORIDE 107   CO2 29   ANIONGAP 6   *   BUN 8   CR 0.71   GFRESTIMATED 82   GFRESTBLACK >90   TONIA 8.2*       Recent Labs  Lab 10/19/18  0619   WBC 5.2   HGB 14.0   HCT 42.8   MCV 92   PLT 98*      Imaging:   No results found for this or any previous visit (from the past 24 hour(s)).

## 2018-10-19 NOTE — PLAN OF CARE
Problem: Patient Care Overview  Goal: Plan of Care/Patient Progress Review  Outcome: No Change  BP elevated at times, given scheduled cardiac meds, recheck 139/86. Ind in room, angio tomorrow, spoke with on call MD and we are holding aspirin this evening for the angio protocol.  MD will consult with hem/onc tomorrow about this. K+ 3.5, oral replacement given per angio protocol, recheck in AM. Pt refusing education on angio, LS clear, WOC, hem/onc and cards following.

## 2018-10-19 NOTE — PROGRESS NOTES
Cardiology Progress Note          Assessment and Plan:   Gema Coombs is a 68 year old woman with history of PAF (2016), chronic idiopathic thrombocytopenia, morbid obesity, RAFI, asthma, HTN, GERD, epilepsy, hyperlipidemia.    Admitted on 10/15/18 from PMD due to Afib w/ RVR with associated palpitations, 3 days after IVIG for low plts/chronic ITP (and had infusion reaction).   She has recurrent diverticulitis and is scheduled to undergo hemicolectomy next week.   Due to chest pain, she underwent nuclear study 10/18/18 that was abnormal showing inferior ischemia.        Persistent Atrial Fibrillation   -previous PAF (2016) w/ spontaneous conversion  -now may be in part from recent dexamethasone, but also morbid obesity, RAFI, HTN.  Also had chest pain.  Nuc done and abnormal.  Cardiac cath today, see below  -remains in afib, HRs a bit variable from 70s-140s this AM (looks like average could be 100s) on oral Diltiazem 45 mg QID and Metoprolol 37.5 mg BID (though did not receive AM metoprolol).  She has BP room, consider titrating dilt to 60 mg QID vs titrate metoprolol to 50 mg BID  -[id number] score is at least 3 (HTN, age, female) indicating she should be on chronic anticoagulation.  Not on AC due to underlying chronic thrombocytopenia (and surgery scheduled 10/24/18)--> appreciate heme-onc recs re chronic anticoagulation - ok with anticoagulation if needed    CP reported in notes but she declines any CP   -no previous hx of CAD but multiple CVRF  -NL troponin  -nonspecific ST abnormalities  -ECHO:  LVEF 55% w/o RWMA  - Nuc 10/18/18 abnormal showing possible inferior ischemia  - Cardiac cath today  * R and B discussed with patient by Dr. Titus  * She has chronic ITP.  She received IVIG (1/3 doses - stopped due to infusion reaction) and dexamethasone and plts responded nicely, however they are trending back down, 98 today.  Immediate risk of bleeding from procedure itself is low. More complicated if needs  "PCI.  Will proceed with cardiac cath to define anatomy due to abnormal nuclear and chest pain.  Will attempt to treat any noncritical disease medically. If stenting is recommended, BMS will be considered.    Chronic ITP  --140 for past few years  -recently given IVIG (1 of 3 doses, stopped due to allergic reaction) and dexamethasone x 3 days  -heme onc consulted and following.  Dr. Izquierdo discussed with heme-onc and they are ok with Plavix and anticoagulation if needed. They will continue to follow plts and if <50, will give steroids.    HTN  -home meds dilt  mg, lisinopril 20 mg in AM and 10 mg PM  -now on Diltiazem 45 mg QID and Metoprolol 37.5 mg BID. Holding home lisinopril to allow rate controlling agents.    Diverticulitis, recurrent  -upcoming lap hemicolectomy planned 10/24.  Timing pending need for DAPT    Untreated Hyperlipidemia  - last year  -consider statin                   Interval History:   Feeling ok. No CP. Breathing ok.   Awaiting cardiac cath today.    TELE:  Afib  HR 70s-140s this AM                  Medications:       acetylcholine (MIOCHOL-E) 2000 mcg in  mL  20 mcg INTRACORONARY Once    Followed by     acetylcholine (MIOCHOL-E) 2000 mcg in  mL  50 mcg INTRACORONARY Once    Followed by     acetylcholine (MIOCHOL-E) 2000 mcg in  mL  50 mcg INTRACORONARY Once     acetylcholine (MIOCHOL-E) 2000 mcg in  mL  20 mcg INTRACORONARY Once    Followed by     acetylcholine (MIOCHOL-E) 2000 mcg in  mL  50 mcg INTRACORONARY Once     diltiazem  45 mg Oral Q6H GEOFFREY     metoprolol tartrate  37.5 mg Oral BID     primidone  250 mg Oral BID     ranitidine  150 mg Oral BID     sodium chloride (PF)  3 mL Intracatheter Q8H     sodium chloride (PF)  3 mL Intracatheter Q8H            Physical Exam:   Blood pressure 117/77, pulse 82, temperature 97.3  F (36.3  C), temperature source Oral, resp. rate 18, height 1.626 m (5' 4\"), weight 133.3 kg (293 lb 14.6 oz), SpO2 98 " %, currently breastfeeding.  Wt Readings from Last 3 Encounters:   10/15/18 133.3 kg (293 lb 14.6 oz)   10/15/18 132 kg (291 lb)   09/27/18 133.8 kg (295 lb)     I/O last 3 completed shifts:  In: 3 [I.V.:3]  Out: -       Morbidly obese woman in no acute distress  JVD cannot be assessed  Heart is distant and irregular but not tachycardic at present  Lungs are clear  Abdomen is obese  Lower extremities show trace bilateral pitting edema  She is alert and oriented  Her affect is normal  Musculoskeletal exam does not reveal any gross abnormalities of her major joints  Dermatologic exam does not reveal any significant areas of ecchymoses                Data:   CBC    Recent Labs  Lab 10/19/18  0619 10/18/18  0620   WBC 5.2 5.3   HGB 14.0 14.2   PLT 98* 103*       BMP    Recent Labs  Lab 10/19/18  0619 10/18/18  0620 10/17/18  1505 10/16/18  0812    144 143 142   POTASSIUM 3.6 3.5 3.5 3.5   CHLORIDE 107 109 108 107   TONIA 8.2* 8.1* 8.0* 8.1*   CO2 29 29 28 30   BUN 8 8 9 13   CR 0.71 0.71 0.68 0.80   * 95 132* 92     Recent Labs   Lab Test  03/24/17   0838  03/04/16   1146   CHOL  232*  248*   HDL  51  50   LDL  155*  165*   TRIG  129  163*   CHOLHDLRATIO  4.5  5.0       TROP  Lab Results   Component Value Date    TROPI <0.015 10/16/2018    TROPI <0.015 10/15/2018    TROPI <0.015 10/15/2018    TROPI 0.017 02/29/2016    TROPONIN 0.00 02/29/2016       BNP    Recent Labs  Lab 10/15/18  1648   NTBNPI 2888*

## 2018-10-19 NOTE — PLAN OF CARE
Problem: Patient Care Overview  Goal: Plan of Care/Patient Progress Review  Outcome: No Change  Pt. AO x4. VSS. LS clear. Denies of any pain, SOB or chest pain. NPO. Loose stools x1. Tele Afib CVR. Angio scheduled for today. Hematology, cardiology and oncology following.

## 2018-10-20 VITALS
WEIGHT: 293 LBS | TEMPERATURE: 97.1 F | HEART RATE: 82 BPM | HEIGHT: 64 IN | BODY MASS INDEX: 50.02 KG/M2 | DIASTOLIC BLOOD PRESSURE: 61 MMHG | SYSTOLIC BLOOD PRESSURE: 109 MMHG | OXYGEN SATURATION: 99 % | RESPIRATION RATE: 18 BRPM

## 2018-10-20 PROCEDURE — 99239 HOSP IP/OBS DSCHRG MGMT >30: CPT | Performed by: INTERNAL MEDICINE

## 2018-10-20 PROCEDURE — 99232 SBSQ HOSP IP/OBS MODERATE 35: CPT | Performed by: INTERNAL MEDICINE

## 2018-10-20 PROCEDURE — 25000132 ZZH RX MED GY IP 250 OP 250 PS 637: Performed by: INTERNAL MEDICINE

## 2018-10-20 RX ORDER — DILTIAZEM HYDROCHLORIDE 60 MG/1
120 CAPSULE, EXTENDED RELEASE ORAL 2 TIMES DAILY
Status: DISCONTINUED | OUTPATIENT
Start: 2018-10-20 | End: 2018-10-20 | Stop reason: HOSPADM

## 2018-10-20 RX ORDER — METOPROLOL TARTRATE 50 MG
50 TABLET ORAL 2 TIMES DAILY
Qty: 60 TABLET | Refills: 0 | Status: SHIPPED | OUTPATIENT
Start: 2018-10-20 | End: 2018-11-15

## 2018-10-20 RX ORDER — METOPROLOL TARTRATE 50 MG
50 TABLET ORAL 2 TIMES DAILY
Status: DISCONTINUED | OUTPATIENT
Start: 2018-10-20 | End: 2018-10-20 | Stop reason: HOSPADM

## 2018-10-20 RX ORDER — ATORVASTATIN CALCIUM 80 MG/1
80 TABLET, FILM COATED ORAL EVERY EVENING
Qty: 30 TABLET | Refills: 0 | Status: SHIPPED | OUTPATIENT
Start: 2018-10-20 | End: 2018-11-16

## 2018-10-20 RX ORDER — DILTIAZEM HYDROCHLORIDE 120 MG/1
120 CAPSULE, EXTENDED RELEASE ORAL 2 TIMES DAILY
Qty: 60 CAPSULE | Refills: 0 | Status: SHIPPED | OUTPATIENT
Start: 2018-10-20 | End: 2018-11-15

## 2018-10-20 RX ADMIN — Medication 37.5 MG: at 08:50

## 2018-10-20 RX ADMIN — RANITIDINE 150 MG: 150 TABLET ORAL at 08:51

## 2018-10-20 RX ADMIN — DILTIAZEM HYDROCHLORIDE 60 MG: 30 TABLET, FILM COATED ORAL at 00:12

## 2018-10-20 RX ADMIN — DILTIAZEM HYDROCHLORIDE 120 MG: 60 CAPSULE, EXTENDED RELEASE ORAL at 11:44

## 2018-10-20 RX ADMIN — DILTIAZEM HYDROCHLORIDE 60 MG: 30 TABLET, FILM COATED ORAL at 06:45

## 2018-10-20 RX ADMIN — PRIMIDONE 250 MG: 250 TABLET ORAL at 08:51

## 2018-10-20 ASSESSMENT — ACTIVITIES OF DAILY LIVING (ADL)
ADLS_ACUITY_SCORE: 10

## 2018-10-20 NOTE — PLAN OF CARE
Patient meets criteria for discharge. No c/o pain/discomfort. Alert and oriented X 4. Patient given AVS and discussed with patient. Patient verbalized understanding. Discharge medications Sent to primary pharmacy. Patient to discharge w/daughter by personal vehicle.

## 2018-10-20 NOTE — DISCHARGE SUMMARY
Discharge Summary  Hospitalist Service    Gema Coombs MRN# [id number]   YOB: 1950 Age: 68 year old     Date of Admission:  10/15/2018  Date of Discharge:  10/20/2018  Admitting Physician:  Dameon Izquierdo DO  Discharge Physician: Dameon Izquierdo DO  Discharging Service: Hospitalist Service     Primary Provider: Flavia Alberts  Primary Care Physician Phone Number: [phone]         Discharge Diagnoses/Problem Oriented Hospital Course (Providers):    Gema Coombs was admitted on 10/15/2018 by Dameon Izquierdo DO and I would refer you to their history and physical.  The following problems were addressed during her hospitalization:  1. Atrial fibrillation with rapid ventricular response.  Was seen in consultation by cardiology and hematology during hospital stay.  Continue with diltiazem 120 mg extended release twice a day.  Continue metoprolol 50 mg twice a day.  She is to start apixaban 5 mg twice a day when it is okay with her colorectal surgeon after her upcoming colon surgery.  He was explained in detail on the day of discharge that she has not to start apixaban until after colorectal surgeon has approved the medication.  2. Chronic idiopathic thrombocytopenia.  Hematology consult appreciated.  It is okay to start apixaban from their standpoint.  3. Possible coronary artery disease.  Coronary angiogram performed during hospital stay with minimal disease.  4. GERD.  Ranitidine.  5. Asthma.  No acute exacerbation.   6. Incidental pulmonary nodule.  Seen on CT scan.  Low risk.  Should not need any particular follow-up.  7. Diarrhea.  Resolved.  8. Previous diverticulitis.  Plan for surgery with colorectal as outpatient next week.         Code Status:      Full Code          Pending Results:        Unresulted Labs Ordered in the Past 30 Days of this Admission     No orders found from 8/16/2018 to 10/16/2018.            Discharge Instructions and Follow-Up:      Follow-up  Appointments     Follow-up and recommended labs and tests        Follow up with primary care provider, Flavia Alberts, within 6 days   for hospital follow- up.  The following labs/tests are recommended: BMP   and CBC in 6 days.                      Discharge Disposition:      Discharged to home         Discharge Medications:        Current Discharge Medication List      START taking these medications    Details   apixaban ANTICOAGULANT (ELIQUIS) 5 MG tablet Take 1 tablet (5 mg) by mouth 2 times daily  Qty: 60 tablet, Refills: 0    Comments: Do not start taking until cleared to do so by surgeon after upcoming colon surgery.  Associated Diagnoses: Paroxysmal atrial fibrillation (H)      atorvastatin (LIPITOR) 80 MG tablet Take 1 tablet (80 mg) by mouth every evening  Qty: 30 tablet, Refills: 0    Associated Diagnoses: Paroxysmal atrial fibrillation (H)      diltiazem (CARDIZEM SR) 120 MG CP12 12 hr SR capsule Take 120 mg by mouth 2 times daily  Qty: 60 capsule, Refills: 0    Associated Diagnoses: Paroxysmal atrial fibrillation (H)      metoprolol tartrate (LOPRESSOR) 50 MG tablet Take 1 tablet (50 mg) by mouth 2 times daily  Qty: 60 tablet, Refills: 0    Associated Diagnoses: Paroxysmal atrial fibrillation (H)         CONTINUE these medications which have NOT CHANGED    Details   acetaminophen (TYLENOL) 500 MG tablet Take 500-1,000 mg by mouth every 6 hours as needed for mild pain      albuterol (2.5 MG/3ML) 0.083% neb solution Take 1 vial (2.5 mg) by nebulization every 6 hours as needed for shortness of breath / dyspnea  Qty: 1 Box, Refills: 1    Associated Diagnoses: Mild persistent asthma without complication      albuterol (PROAIR HFA/PROVENTIL HFA/VENTOLIN HFA) 108 (90 Base) MCG/ACT Inhaler Inhale 1 puff into the lungs every 4 hours as needed for shortness of breath / dyspnea or wheezing      HYDROcodone-acetaminophen (NORCO) 5-325 MG per tablet Take 1 tablet by mouth every 6 hours as needed for severe  "pain  Qty: 10 tablet, Refills: 0      primidone (MYSOLINE) 250 MG tablet Take 250 mg by mouth 2 times daily      ranitidine HCl 300 MG CAPS Take 1 capsule by mouth daily      ondansetron (ZOFRAN-ODT) 4 MG ODT tab Take 4 mg by mouth every 8 hours as needed   Refills: 0    Associated Diagnoses: Diverticulitis         STOP taking these medications       diltiazem (TIAZAC) 120 MG 24 hr ER beaded capsule Comments:   Reason for Stopping:         lisinopril (PRINIVIL/ZESTRIL) 10 MG tablet Comments:   Reason for Stopping:         lisinopril (PRINIVIL/ZESTRIL) 10 MG tablet Comments:   Reason for Stopping:                 Allergies:         Allergies   Allergen Reactions     No Known Drug Allergy            Condition and Physical on Discharge:      Discharge condition: Stable   Vitals: Blood pressure 109/61, pulse 82, temperature 97.1  F (36.2  C), temperature source Oral, resp. rate 18, height 1.626 m (5' 4\"), weight 133.3 kg (293 lb 14.6 oz), SpO2 99 %, currently breastfeeding.   Gen:  NAD, A&Ox3.  Eyes:  PERRL, sclera anicteric.  OP:  MMM, no lesions.  Neck:  Supple.  CV:  Regular, no murmurs.  Lung:  CTA b/l, normal effort.  Ab:  +BS, soft.  Skin:  Warm, dry to touch.  No rash.  Ext:  No pitting edema LE b/l.        Discharge Time:      I spent 40 minutes with Ms. Coombs and working on discharge on 10/20/18.        Image Results From This Hospital Stay (For Non-EPIC Providers):        Results for orders placed or performed during the hospital encounter of 10/15/18   XR Chest Port 1 View    Narrative    XR PORTABLE CHEST ONE VIEW   10/15/2018 5:39 PM     HISTORY: New atrial fibrillation.      COMPARISON: 9/3/2007.    FINDINGS: Upright portable chest. The heart size is normal. The  thoracic aorta is calcified. The lungs are clear. No pneumothorax.      Impression    IMPRESSION: No acute abnormality.    ANNE MARIE RUST MD   NM MPI w Lexiscan    Narrative    GATED MYOCARDIAL PERFUSION SCINTIGRAPHY WITH INTRAVENOUS " PHARMACOLOGIC  VASODILATATION LEXISCAN -TWO DAY STUDY     10/18/2018 8:26 AM  BEE HALE  68 years  Female  1950.    Indication/Clinical History: Chest heaviness    Impression  1.  Myocardial perfusion imaging using single isotope technique  demonstrated small to moderate area of inferior/inferolateral wall  ischemia. The TID was 1.33.  2. Gated images demonstrated normal wall motion with a calculated  ejection fraction of 68%.    3. There is no prior nuclear study available for comparison.    Procedure  Pharmacologic stress testing was performed with Lexiscan at a rate of  0.08 mg/ml rapid bolus injection, for 15 seconds, 0.4 mg/5ml  intravenously. Low-level exercise was not performed along with the  vasodilator infusion.  The heart rate was 115 at baseline and elizabeth to  144 beats per minute during the Lexiscan infusion. The rest blood  pressure was 129/74 mmHg and was 161/55 mm Hg during Lexiscan  infusion. The patient experienced shortness of breath  during the  test.    Myocardial perfusion imaging was performed at rest, approximately 45  minutes after the injection intravenously of 33.0 mCi of Tc-99m  Myoview. At peak pharmacologic effect, 10-20 seconds after Lexiscan,   the patient was injected intravenously with 34.0 mCi of  Tc-99m  Myoview. The post-stress tomographic imaging was performed  approximately 60 minutes after stress.    EKG Findings  The resting EKG demonstrated atrial fibrillation with a rapid  ventricular response and nonspecific ST-T wave changes. The stress EKG  demonstrated slight worsening of the baseline ST-T wave changes with  Lexiscan infusion.    Tomographic Findings  Overall, the study quality is fair . On the stress images, there is a  small to moderate area of mild to moderate decreased tracer uptake  noted in the inferior/inferolateral wall. On the rest images, there is  near uniform tracer uptake noted . Gated images demonstrated normal  wall motion. The left ventricular  ejection fraction was calculated to  be 68%. TID was 1.33.    JOYA MCFARLANE MD   CT Chest Pulmonary Embolism w Contrast    Narrative    CT CHEST PULMONARY EMBOLISM WITH CONTRAST   10/16/2018 12:19 PM     HISTORY: Evaluate for PE. Palpitations.    TECHNIQUE: 83 mL Isovue-370. Radiation dose for this scan was reduced  using automated exposure control, adjustment of the mA and/or kV  according to patient size, or iterative reconstruction technique.    COMPARISON: None.    FINDINGS: No CT evidence of pulmonary embolism or acute thoracic  aortic abnormality. No pneumothorax. No pleural or pericardial  effusion. No thoracic or axillary adenopathy. There is a 3 mm nodule  in the right lower lobe (series 8, image 130). No other pulmonary  nodule or mass.      Impression    IMPRESSION:  1. No evidence of pulmonary embolism.  2. Incidental 3 mm pulmonary nodule in the right lower lobe.  Recommendations for one or multiple incidental lung nodules < 6 mm:    Low risk patients: No routine follow-up.    High risk patients: Optional follow-up CT at 12 months; if  unchanged, no further follow-up.    *Low Risk: Minimal or absent history of smoking or other known risk  factors.  *Nonsolid (ground glass) or partly solid nodules may require longer  follow-up to exclude indolent adenocarcinoma.  *Recommendations based on Guidelines for the Management of Incidental  Pulmonary Nodules Detected at CT: From the Fleischner Society 2017,  Radiology 2017.    KEENAN JENSEN MD           Most Recent Lab Results In EPIC (For Non-EPIC Providers):    Most Recent 3 CBC's:  Recent Labs   Lab Test  10/19/18   0619  10/18/18   0620  10/16/18   0812   WBC  5.2  5.3  6.2   HGB  14.0  14.2  14.7   MCV  92  92  92   PLT  98*  103*  131*      Most Recent 3 BMP's:  Recent Labs   Lab Test  10/19/18   0619  10/18/18   0620  10/17/18   1505   NA  142  144  143   POTASSIUM  3.6  3.5  3.5   CHLORIDE  107  109  108   CO2  29  29  28   BUN  8  8  9   CR  0.71   0.71  0.68   ANIONGAP  6  6  7   TONIA  8.2*  8.1*  8.0*   GLC  101*  95  132*     Most Recent 3 Troponin's:No lab results found.  Most Recent 3 INR's:  Recent Labs   Lab Test  02/29/16   1303  02/14/12   0757   INR  0.97  0.99     Most Recent 2 LFT's:  Recent Labs   Lab Test  10/15/18   1648  09/12/18   1345   AST  31  16   ALT  47  12   ALKPHOS  92  95   BILITOTAL  0.4  0.5     Most Recent Cholesterol Panel:  Recent Labs   Lab Test  03/24/17   0838   CHOL  232*   LDL  155*   HDL  51   TRIG  129     Most Recent 6 Bacteria Isolates From Any Culture (See EPIC Reports for Culture Details):  Recent Labs   Lab Test  09/22/14   1003  03/26/13   1130  06/18/11   1437   CULT  10,000 to 50,000 colonies/mL mixed urogenital lois  No growth  50 to 100,000 colonies/mL Klebsiella pneumoniae     Most Recent TSH, T4 and HgbA1c:   Recent Labs   Lab Test  10/15/18   1648  04/13/18   1034   TSH  2.83   --    A1C   --   4.7

## 2018-10-20 NOTE — PLAN OF CARE
Problem: Patient Care Overview  Goal: Plan of Care/Patient Progress Review  Outcome: Therapy, progress toward functional goals as expected  Orientation: Alert and oriented x4  VSS. 97% on RA. afebrile.   Tele: a fib cvr. HR 80s-90s.   LS: clear and equal bilaterally  GI: Passing gas. no BM. Denies N/V.   : Adequate urine output. Clear yellow.  Skin: right angio site. bandaid in place. C/D/I. Bruises and excoriation noted throughout. Red, dry, flaky, sore skin to skin folds. Powder and intradry in place.   Activity: SBA. Pt slept comfortably throughout shift.   Pain: 0/10. Denies pain throughout shift. No prn interventions utilized.   Plan: Continue with current cares. Possible discharge home later today

## 2018-10-20 NOTE — PROGRESS NOTES
Cardiology Progress Note          Assessment and Plan:     69yo female with persistent AF, abnormal nuclear scan likely false positive- normal coronaries by angiogram  1. Recommend rate control with diltiazem and metoprolol (switched to LA for convenience)  2. Start anticoagulation with Eliquis 5mg BID AFTER hemicolectomy  3. Continue Eliquis for 4 weeks, and follow up with [facility] heart clinic in 1-2 mos for consideration of DCCV of AFib.        Interval History:   no new complaints                Medications:   I have reviewed this patient's current medications         Physical Exam:         Vital Sign Ranges  Temperature Temp  Av.1  F (36.2  C)  Min: 96.7  F (35.9  C)  Max: 97.5  F (36.4  C)   Blood pressure Systolic (24hrs), Av , Min:98 , Max:139        Diastolic (24hrs), Av, Min:61, Max:104      Pulse No Data Recorded   Respirations Resp  Av.8  Min: 16  Max: 18   Pulse oximetry SpO2  Av.1 %  Min: 95 %  Max: 99 %         Intake/Output Summary (Last 24 hours) at 10/20/18 1005  Last data filed at 10/19/18 1836   Gross per 24 hour   Intake              906 ml   Output                0 ml   Net              906 ml       Constitutional:   in no apparent distress     Skin:   normal     Neck:   supple, symmetrical, trachea midline     Chest:   Normal Symmetry and no tenderness     Lungs:   normal     Cardiovascular:   irregular     Extremities and Back:   no cyanosis or clubbing     Neurological:   No gross or focal neurologic abnormalities              Data:     Results for orders placed or performed during the hospital encounter of 10/15/18 (from the past 24 hour(s))   Inpatient Coronary Angiography Adult Order    Narrative    LEFT HEART CATHETERIZATION 10/19/2018 3:17 PM     HISTORY 68-year-old woman with multiple cardiac risk factors here for  medical problems including ITP and colon problems. She went in rapid  atrial fibrillation which prompted a nuclear stress which suggested  inferior  ischemia but of note she is a heavyset woman. Heart  catheterization is requested.    RESULTS:    Left main artery: The left main artery is short but normal.    Left anterior descending: The LAD has trivial narrowing under 5%.    Left circumflex: The left circumflex is normal.    Right coronary artery: The proximal right coronary artery has a 5-10%  narrowing. The mid RCA at the acute margin of the heart has a smooth  10-15% narrowing or less.    Left ventriculogram: The left ventricular size and systolic function  is normal because of the PVCs and rapid A fib. It is hard to assess  ejection fraction, visually I would suggest it is 50% or higher. There  is mitral insufficiency but it is probably catheter induced or ectopic  reduced, it is not severe MR.    LV pressure 141/22, there is no gradient on pullback.    Patient fully assessed by me prior to procedure and deemed safe to  proceed with conscious sedation and angiography. Procedure time 30  minutes. She received 2 mg of Versed, 100 mcg of fentanyl. Heart rate,  blood pressure, oxygen saturation and patient response monitored by  the nurse under my supervision and there were no complications.    CONCLUSION:   1. Trivial coronary artery disease and preserved ejection fraction.   2. Rapid atrial fibrillation. Of note the patient is not on  anticoagulation because of ITP and potential risk of stroke. This was  understood by the team already.

## 2018-10-20 NOTE — PLAN OF CARE
Problem: Patient Care Overview  Goal: Plan of Care/Patient Progress Review  Outcome: Improving  Orientation: Alert and oriented x4. Awake, alert, cooperative, no apparent distress.  Sitting in bed  VSS. 99% on RA. Afebrile.  IVF: SL  Tele: Afib RVR/CVR  LS: clear and equal bilaterally. Clear to auscultation bilaterally   No wheezing  GI: Passing gas. 1 loose BM. Denies N/V.  Normal bowel sounds, soft, non-distended, non-tender  : Adequate urine output. Clear yellow.   Skin: bruising noted, Gluteal fold excoriation Interdry applied, skin folds washed w/soap and water, Skin otherwise intact. No rashes, no cyanosis, dry to touch  Activity: Independent, Pt to cath lab this afternoon, no interventions, Right groin approach, site WDL,CMS+   Pain: No c/o Pain  DC Plan: Continue with current cares. K+ replaced, recheck in AM

## 2018-10-20 NOTE — PLAN OF CARE
Problem: Patient Care Overview  Goal: Plan of Care/Patient Progress Review  Outcome: Improving  Orientation: Alert and oriented x4. Awake, alert, cooperative, no apparent distress.  Sitting in chair   VSS. 99% on RA. Afebrile.  IVF: SL  Tele:Afib CVR  LS: clear and equal bilaterally. No wheezing  GI: Passing gas. No BM. Denies N/V.  Normal bowel sounds, soft, non-distended, non-tender  : Adequate urine output. Clear yellow.   Skin: bruising noted, gluteal folds healing, intrdry applied, Skin otherwise intact. dry to touch  Activity: Independent   Pain: No c/o pain  DC Plan: Continue with current cares.

## 2018-10-22 ENCOUNTER — PATIENT OUTREACH (OUTPATIENT)
Dept: CARE COORDINATION | Facility: CLINIC | Age: 68
End: 2018-10-22

## 2018-10-22 ENCOUNTER — TELEPHONE (OUTPATIENT)
Dept: CARDIOLOGY | Facility: CLINIC | Age: 68
End: 2018-10-22

## 2018-10-22 NOTE — PROGRESS NOTES
Clinic Care Coordination Contact    Clinic Care Coordination Contact  OUTREACH    Referral Information:       Primary Diagnosis: GI Disorders    Chief Complaint   Patient presents with     Clinic Care Coordination - Follow-up        Universal Utilization: Discharged from hospital:  Discharge Summary  Hospitalist Service     Gema Coombs MRN# [id number]   YOB: 1950 Age: 68 year old      Date of Admission:                                10/15/2018  Date of Discharge:                                10/20/2018                 Discharge Diagnoses/Problem Oriented Hospital Course (Providers):     Gema Coombs was admitted on 10/15/2018 by Dameon Izquierdo DO and I would refer you to their history and physical.  The following problems were addressed during her hospitalization:  1. Atrial fibrillation with rapid ventricular response.  Was seen in consultation by cardiology and hematology during hospital stay.  Continue with diltiazem 120 mg extended release twice a day.  Continue metoprolol 50 mg twice a day.  She is to start apixaban 5 mg twice a day when it is okay with her colorectal surgeon after her upcoming colon surgery.  He was explained in detail on the day of discharge that she has not to start apixaban until after colorectal surgeon has approved the medication.  2. Chronic idiopathic thrombocytopenia.  Hematology consult appreciated.  It is okay to start apixaban from their standpoint.  3. Possible coronary artery disease.  Coronary angiogram performed during hospital stay with minimal disease.  4. GERD.  Ranitidine.  5. Asthma.  No acute exacerbation.   6. Incidental pulmonary nodule.  Seen on CT scan.  Low risk.  Should not need any particular follow-up.  7. Diarrhea.  Resolved.  8. Previous diverticulitis.  Plan for surgery with colorectal as outpatient next week.         Utilization    Last refreshed: 10/20/2018  4:02 PM:  No Show Count (past year) 3       Last refreshed: 10/20/2018  4:02  PM:  ED visits 2       Last refreshed: 10/20/2018  4:02 PM:  Hospital admissions 3          Current as of: 10/20/2018  4:02 PM           Clinical Concerns:  Current Medical Concerns:  Patient is waiting for a call back from Dr. Cordova to discuss upcoming surgery. She was discharged from \[facility]"" two days ago and is unsure if she wants to have another procedure on 10-24.  The cardiologists have cleared her for the surgery. Her angiogram showed no blockages and she was cleared from cardiac diet.     Current Behavioral Concerns: Is concerned and frightened about whether or not she should have surgery and what the risks are.       Diet/Exercise/Sleep:  Diet:: Low Fiber     Goals:   Goals        General    Improve chronic symptoms (pt-stated)     Notes - Note created  9/26/2018 11:48 AM by Germaine Simon LSW    Goal Statement: I will get off antibiotics and resolve diverticulitis with surgery.  Measure of Success: I will feel well.  Supportive Steps to Achieve: I will have surgery.  Barriers: None identified.   Strengths: Wants to have surgery.  Has joined Facebook group for people with diverticulitis and has learned that many people have favorable outcomes after having surgery.   Date to Achieve By: 12-1-2018  Patient expressed understanding of goal: yes            Patient/Caregiver understanding: Patient will discuss with Dr. Cordova about her scheduled surgery and if she should post pone it until she recovers from recent hospitalization.  She would like to discuss her options with Dr. Alberts after that.  Her sister is currently in the hospital with A fib.      Outreach Frequency: 2 weeks    Plan: CC will call in two weeks to assess needs and to offer support.

## 2018-10-22 NOTE — TELEPHONE ENCOUNTER
Patient was evaluated by cardiology while inpatient for palpitations, persistent PAF-presents with A. Fib with RVR. PMH includes ITP and diverticulosis. Nuclear stress showed inferior ischemia, so Coronary Angiogram completed, which showed trivial CAD. Rate controlled now with po Diltiazem and Metoprolol. Started on Eliquis. Plan is for scheduled hemicolectomy on 10/24/18 for diverticulitis, and to f/u in one month post surgery, at [facility] to discuss possible DCCV. Called patient to discuss any post hospital d/c questions she may have, review medication changes, and confirm f/u appts.  Patient denied any SOB, chest pain, or light headedness. RFA cardiac cath site is without bleeding, swelling, redness or tenderness. Denies fever. Pt states she was instructed to not start Eliquis until after her hemicolectomy completed, and per recommendations of surgeon. RN confirmed with patient that she needs to schedule f/u OV with cardiology BO 1-2 months post hemicolectomy after she has been on OAC x 4 weeks consistently. Order placed. Patient advised to call clinic with any cardiac related questions or concerns prior to this bo't. Patient verbalized understanding and agreed with plan. Pt will call me back once discharged to home post op. ERICA Trevino RN.

## 2018-10-23 NOTE — H&P (VIEW-ONLY)
"[facility]  Hospitalist Progress Note  Dameon Izquierdo, DO 10/19/2018    Reason for Stay (Diagnosis): Atrial fibrillation.         Assessment and Plan:      Summary of Stay: Gema Coombs is a 68 year old female admitted on 10/15/2018 with atrial fibrillation with rapid ventricular response.  Problem List:   1. Atrial fibrillation with rapid ventricular response.  Increase diltiazem to 60 mg every 6 hours.  Continue metoprolol 37.5 mg twice a day.  Appreciate cardiology input.  Hematology consult appreciated.  Okay to use anticoagulation as needed.   2. Chronic idiopathic thrombocytopenia.  Hematology consult appreciated.  3. Possible coronary artery disease.  Lexiscan cardiac stress test is abnormal.  Cardiology planning for coronary angiogram today.  Continue metoprolol.  Start aspirin.  Start atorvastatin.  4. GERD.  Ranitidine 150 mg twice a day.  5. Asthma.  No acute exacerbation.  Xopenex if needed.  6. Incidental pulmonary nodule.  Seen on CT scan.  Low risk.  Should not need any particular follow-up.  7. Diarrhea.  Resolved.  8. Previous diverticulitis.  Plan for surgery with colorectal as outpatient.  DVT Prophylaxis: Pneumatic Compression Devices  Code Status: Full Code  Discharge Dispo: Home  Estimated Disch Date / # of Days until Disch: 1-2           Interval History (Subjective):      Stools loose.  Denies chest pain, shortness of breath, fevers, chills, nausea, vomiting.                  Physical Exam:      Last Vital Signs:  BP (!) 121/104  Pulse 82  Temp 97.3  F (36.3  C) (Oral)  Resp 18  Ht 1.626 m (5' 4\")  Wt 133.3 kg (293 lb 14.6 oz)  SpO2 98%  BMI 50.45 kg/m2    Gen:  NAD, A&Ox3.  Eyes:  PERRL, sclera anicteric.  OP:  MMM, no lesions.  Neck:  Supple.  CV:  Irregular, no murmurs.  Lung:  CTA b/l, normal effort.  Ab:  +BS, soft.  Skin:  Warm, dry to touch.  No rash.  Ext:  No pitting edema LE b/l.           Medications:      All current medications were reviewed with " changes reflected in problem list.         Data:      All new lab and imaging data was reviewed.   Labs:    Recent Labs  Lab 10/19/18  0619      POTASSIUM 3.6   CHLORIDE 107   CO2 29   ANIONGAP 6   *   BUN 8   CR 0.71   GFRESTIMATED 82   GFRESTBLACK >90   TONIA 8.2*       Recent Labs  Lab 10/19/18  0619   WBC 5.2   HGB 14.0   HCT 42.8   MCV 92   PLT 98*      Imaging:   No results found for this or any previous visit (from the past 24 hour(s)).

## 2018-10-23 NOTE — H&P (VIEW-ONLY)
Cardiology Progress Note          Assessment and Plan:     69yo female with persistent AF, abnormal nuclear scan likely false positive- normal coronaries by angiogram  1. Recommend rate control with diltiazem and metoprolol (switched to LA for convenience)  2. Start anticoagulation with Eliquis 5mg BID AFTER hemicolectomy  3. Continue Eliquis for 4 weeks, and follow up with [facility] heart clinic in 1-2 mos for consideration of DCCV of AFib.        Interval History:   no new complaints                Medications:   I have reviewed this patient's current medications         Physical Exam:         Vital Sign Ranges  Temperature Temp  Av.1  F (36.2  C)  Min: 96.7  F (35.9  C)  Max: 97.5  F (36.4  C)   Blood pressure Systolic (24hrs), Av , Min:98 , Max:139        Diastolic (24hrs), Av, Min:61, Max:104      Pulse No Data Recorded   Respirations Resp  Av.8  Min: 16  Max: 18   Pulse oximetry SpO2  Av.1 %  Min: 95 %  Max: 99 %         Intake/Output Summary (Last 24 hours) at 10/20/18 1005  Last data filed at 10/19/18 1836   Gross per 24 hour   Intake              906 ml   Output                0 ml   Net              906 ml       Constitutional:   in no apparent distress     Skin:   normal     Neck:   supple, symmetrical, trachea midline     Chest:   Normal Symmetry and no tenderness     Lungs:   normal     Cardiovascular:   irregular     Extremities and Back:   no cyanosis or clubbing     Neurological:   No gross or focal neurologic abnormalities              Data:     Results for orders placed or performed during the hospital encounter of 10/15/18 (from the past 24 hour(s))   Inpatient Coronary Angiography Adult Order    Narrative    LEFT HEART CATHETERIZATION 10/19/2018 3:17 PM     HISTORY 68-year-old woman with multiple cardiac risk factors here for  medical problems including ITP and colon problems. She went in rapid  atrial fibrillation which prompted a nuclear stress which suggested  inferior  ischemia but of note she is a heavyset woman. Heart  catheterization is requested.    RESULTS:    Left main artery: The left main artery is short but normal.    Left anterior descending: The LAD has trivial narrowing under 5%.    Left circumflex: The left circumflex is normal.    Right coronary artery: The proximal right coronary artery has a 5-10%  narrowing. The mid RCA at the acute margin of the heart has a smooth  10-15% narrowing or less.    Left ventriculogram: The left ventricular size and systolic function  is normal because of the PVCs and rapid A fib. It is hard to assess  ejection fraction, visually I would suggest it is 50% or higher. There  is mitral insufficiency but it is probably catheter induced or ectopic  reduced, it is not severe MR.    LV pressure 141/22, there is no gradient on pullback.    Patient fully assessed by me prior to procedure and deemed safe to  proceed with conscious sedation and angiography. Procedure time 30  minutes. She received 2 mg of Versed, 100 mcg of fentanyl. Heart rate,  blood pressure, oxygen saturation and patient response monitored by  the nurse under my supervision and there were no complications.    CONCLUSION:   1. Trivial coronary artery disease and preserved ejection fraction.   2. Rapid atrial fibrillation. Of note the patient is not on  anticoagulation because of ITP and potential risk of stroke. This was  understood by the team already.

## 2018-10-23 NOTE — H&P (VIEW-ONLY)
Oncology/Hematology Follow Up Note:    Assessment and Plan:  Gema is a 68 year old woman with chronic ITP who was recently treated with IVIG and steroids in anticipation to her upcoming colectomy next week for recurrent diverticulitis.  She is currently hospitalized for Afib with RVR.    #1 Chronic ITP  Plt 98 today.  Unclear if colectomy will still happen next week.    - No steroids at this time until we know the plan for surgery  - Outside of the perioperative setting, ITP does not need to be treated unless Plt<30 or if the patient has clinically significant bleeding    #2 Anticoagulation for atrial fibrillation    - OK to use anticoagulation from my standpoint.  Would hold anticoagulation if Plt <50.    Daron Patel M.D.  [facility]  [phone]      Subjective:    Patient is scheduled for a coronary angiogram today.  Remains in afib with HR in the  range.  No longer experiences any palpitations.    Scheduled Medications:  Reviewed active medications    Labs:  CBC RESULTS:   Recent Labs   Lab Test  10/19/18   0619  10/18/18   0620  10/16/18   0812   WBC  5.2  5.3  6.2   HGB  14.0  14.2  14.7   HCT  42.8  43.1  45.2   MCV  92  92  92   PLT  98*  103*  131*       CMP  Recent Labs  Lab 10/19/18  0619 10/18/18  0620 10/17/18  1505 10/16/18  0812 10/15/18  1648    144 143 142 140   POTASSIUM 3.6 3.5 3.5 3.5 3.8   CHLORIDE 107 109 108 107 106   CO2 29 29 28 30 26   ANIONGAP 6 6 7 5 8   * 95 132* 92 109*   BUN 8 8 9 13 15   CR 0.71 0.71 0.68 0.80 0.97   GFRESTIMATED 82 82 87 72 58*   GFRESTBLACK >90 >90 >90 87 70   TONIA 8.2* 8.1* 8.0* 8.1* 8.8   PROTTOTAL  --   --   --   --  7.6   ALBUMIN  --   --   --   --  4.0   BILITOTAL  --   --   --   --  0.4   ALKPHOS  --   --   --   --  92   AST  --   --   --   --  31   ALT  --   --   --   --  47       INRNo lab results found in last 7 days.    Objective/Physical Exam:  Blood pressure 109/70, pulse 82, temperature 97.3  F (36.3  C), temperature source  "Oral, resp. rate 18, height 1.626 m (5' 4\"), weight 133.3 kg (293 lb 14.6 oz), SpO2 98 %, currently breastfeeding.  General:  Awake, alert, and oriented  Skin:  No bruising or rash noted  Mouth:  Moist mucous membranes with no ulceration  Heart: Irregularly irregular.  No murmurs.    Daron Patel MD  [facility]  10/19/2018 12:00 PM        "

## 2018-10-24 ENCOUNTER — APPOINTMENT (OUTPATIENT)
Dept: GENERAL RADIOLOGY | Facility: CLINIC | Age: 68
DRG: 330 | End: 2018-10-24
Attending: ANESTHESIOLOGY
Payer: COMMERCIAL

## 2018-10-24 ENCOUNTER — HOSPITAL ENCOUNTER (INPATIENT)
Facility: CLINIC | Age: 68
LOS: 3 days | Discharge: HOME | DRG: 330 | End: 2018-10-27
Attending: COLON & RECTAL SURGERY | Admitting: COLON & RECTAL SURGERY
Payer: COMMERCIAL

## 2018-10-24 ENCOUNTER — ANESTHESIA EVENT (OUTPATIENT)
Dept: SURGERY | Facility: CLINIC | Age: 68
DRG: 330 | End: 2018-10-24
Payer: COMMERCIAL

## 2018-10-24 ENCOUNTER — ANESTHESIA (OUTPATIENT)
Dept: SURGERY | Facility: CLINIC | Age: 68
DRG: 330 | End: 2018-10-24
Payer: COMMERCIAL

## 2018-10-24 DIAGNOSIS — K57.92 ACUTE DIVERTICULITIS: Primary | ICD-10-CM

## 2018-10-24 LAB
CREAT SERPL-MCNC: 0.72 MG/DL (ref 0.52–1.04)
GFR SERPL CREATININE-BSD FRML MDRD: 80 ML/MIN/1.7M2
PLATELET # BLD AUTO: 142 10E9/L (ref 150–450)

## 2018-10-24 PROCEDURE — 0DBK8ZZ EXCISION OF ASCENDING COLON, VIA NATURAL OR ARTIFICIAL OPENING ENDOSCOPIC: ICD-10-PCS | Performed by: COLON & RECTAL SURGERY

## 2018-10-24 PROCEDURE — 25000128 H RX IP 250 OP 636: Performed by: NURSE ANESTHETIST, CERTIFIED REGISTERED

## 2018-10-24 PROCEDURE — 36000067 ZZH SURGERY LEVEL 5 1ST 30 MIN: Performed by: COLON & RECTAL SURGERY

## 2018-10-24 PROCEDURE — 40000275 ZZH STATISTIC RCP TIME EA 10 MIN

## 2018-10-24 PROCEDURE — 25800025 ZZH RX 258: Performed by: COLON & RECTAL SURGERY

## 2018-10-24 PROCEDURE — 12000000 ZZH R&B MED SURG/OB

## 2018-10-24 PROCEDURE — 25000566 ZZH SEVOFLURANE, EA 15 MIN: Performed by: COLON & RECTAL SURGERY

## 2018-10-24 PROCEDURE — 85049 AUTOMATED PLATELET COUNT: CPT | Performed by: COLON & RECTAL SURGERY

## 2018-10-24 PROCEDURE — 0DBM8ZZ EXCISION OF DESCENDING COLON, VIA NATURAL OR ARTIFICIAL OPENING ENDOSCOPIC: ICD-10-PCS | Performed by: COLON & RECTAL SURGERY

## 2018-10-24 PROCEDURE — 37000008 ZZH ANESTHESIA TECHNICAL FEE, 1ST 30 MIN: Performed by: COLON & RECTAL SURGERY

## 2018-10-24 PROCEDURE — 71000012 ZZH RECOVERY PHASE 1 LEVEL 1 FIRST HR: Performed by: COLON & RECTAL SURGERY

## 2018-10-24 PROCEDURE — 36415 COLL VENOUS BLD VENIPUNCTURE: CPT | Performed by: COLON & RECTAL SURGERY

## 2018-10-24 PROCEDURE — 25000125 ZZHC RX 250: Performed by: NURSE ANESTHETIST, CERTIFIED REGISTERED

## 2018-10-24 PROCEDURE — 25000128 H RX IP 250 OP 636: Performed by: COLON & RECTAL SURGERY

## 2018-10-24 PROCEDURE — 82565 ASSAY OF CREATININE: CPT | Performed by: COLON & RECTAL SURGERY

## 2018-10-24 PROCEDURE — 25000125 ZZHC RX 250: Performed by: COLON & RECTAL SURGERY

## 2018-10-24 PROCEDURE — 88307 TISSUE EXAM BY PATHOLOGIST: CPT | Performed by: COLON & RECTAL SURGERY

## 2018-10-24 PROCEDURE — 0DTN0ZZ RESECTION OF SIGMOID COLON, OPEN APPROACH: ICD-10-PCS | Performed by: COLON & RECTAL SURGERY

## 2018-10-24 PROCEDURE — 94660 CPAP INITIATION&MGMT: CPT

## 2018-10-24 PROCEDURE — 27210794 ZZH OR GENERAL SUPPLY STERILE: Performed by: COLON & RECTAL SURGERY

## 2018-10-24 PROCEDURE — 71000013 ZZH RECOVERY PHASE 1 LEVEL 1 EA ADDTL HR: Performed by: COLON & RECTAL SURGERY

## 2018-10-24 PROCEDURE — 36000069 ZZH SURGERY LEVEL 5 EA 15 ADDTL MIN: Performed by: COLON & RECTAL SURGERY

## 2018-10-24 PROCEDURE — 0DBL8ZZ EXCISION OF TRANSVERSE COLON, VIA NATURAL OR ARTIFICIAL OPENING ENDOSCOPIC: ICD-10-PCS | Performed by: COLON & RECTAL SURGERY

## 2018-10-24 PROCEDURE — 88305 TISSUE EXAM BY PATHOLOGIST: CPT | Performed by: COLON & RECTAL SURGERY

## 2018-10-24 PROCEDURE — 40000306 ZZH STATISTIC PRE PROC ASSESS II: Performed by: COLON & RECTAL SURGERY

## 2018-10-24 PROCEDURE — 37000009 ZZH ANESTHESIA TECHNICAL FEE, EACH ADDTL 15 MIN: Performed by: COLON & RECTAL SURGERY

## 2018-10-24 PROCEDURE — 88307 TISSUE EXAM BY PATHOLOGIST: CPT | Mod: 26 | Performed by: COLON & RECTAL SURGERY

## 2018-10-24 PROCEDURE — 25000132 ZZH RX MED GY IP 250 OP 250 PS 637: Performed by: COLON & RECTAL SURGERY

## 2018-10-24 PROCEDURE — 40000902 ZZH STATISTIC WOC PT EDUCATION, 16-30 MIN

## 2018-10-24 PROCEDURE — 25000128 H RX IP 250 OP 636: Performed by: ANESTHESIOLOGY

## 2018-10-24 PROCEDURE — 88305 TISSUE EXAM BY PATHOLOGIST: CPT | Mod: 26 | Performed by: COLON & RECTAL SURGERY

## 2018-10-24 PROCEDURE — 40000986 XR CHEST PORT 1 VW

## 2018-10-24 RX ORDER — CIPROFLOXACIN 2 MG/ML
400 INJECTION, SOLUTION INTRAVENOUS EVERY 12 HOURS
Status: COMPLETED | OUTPATIENT
Start: 2018-10-25 | End: 2018-10-25

## 2018-10-24 RX ORDER — CIPROFLOXACIN 2 MG/ML
400 INJECTION, SOLUTION INTRAVENOUS SEE ADMIN INSTRUCTIONS
Status: DISCONTINUED | OUTPATIENT
Start: 2018-10-24 | End: 2018-10-24 | Stop reason: HOSPADM

## 2018-10-24 RX ORDER — SODIUM CHLORIDE, SODIUM LACTATE, POTASSIUM CHLORIDE, CALCIUM CHLORIDE 600; 310; 30; 20 MG/100ML; MG/100ML; MG/100ML; MG/100ML
INJECTION, SOLUTION INTRAVENOUS CONTINUOUS
Status: DISCONTINUED | OUTPATIENT
Start: 2018-10-24 | End: 2018-10-26

## 2018-10-24 RX ORDER — DEXAMETHASONE SODIUM PHOSPHATE 4 MG/ML
4 INJECTION, SOLUTION INTRA-ARTICULAR; INTRALESIONAL; INTRAMUSCULAR; INTRAVENOUS; SOFT TISSUE
Status: DISCONTINUED | OUTPATIENT
Start: 2018-10-24 | End: 2018-10-24 | Stop reason: HOSPADM

## 2018-10-24 RX ORDER — METOCLOPRAMIDE 5 MG/1
5 TABLET ORAL EVERY 6 HOURS PRN
Status: DISCONTINUED | OUTPATIENT
Start: 2018-10-24 | End: 2018-10-27 | Stop reason: HOSPADM

## 2018-10-24 RX ORDER — HEPARIN SODIUM 5000 [USP'U]/.5ML
2500 INJECTION, SOLUTION INTRAVENOUS; SUBCUTANEOUS ONCE
Status: COMPLETED | OUTPATIENT
Start: 2018-10-24 | End: 2018-10-24

## 2018-10-24 RX ORDER — ONDANSETRON 2 MG/ML
INJECTION INTRAMUSCULAR; INTRAVENOUS PRN
Status: DISCONTINUED | OUTPATIENT
Start: 2018-10-24 | End: 2018-10-24

## 2018-10-24 RX ORDER — EPHEDRINE SULFATE 50 MG/ML
INJECTION, SOLUTION INTRAMUSCULAR; INTRAVENOUS; SUBCUTANEOUS PRN
Status: DISCONTINUED | OUTPATIENT
Start: 2018-10-24 | End: 2018-10-24

## 2018-10-24 RX ORDER — HYDRALAZINE HYDROCHLORIDE 20 MG/ML
2.5-5 INJECTION INTRAMUSCULAR; INTRAVENOUS EVERY 10 MIN PRN
Status: DISCONTINUED | OUTPATIENT
Start: 2018-10-24 | End: 2018-10-24 | Stop reason: HOSPADM

## 2018-10-24 RX ORDER — NALOXONE HYDROCHLORIDE 0.4 MG/ML
.1-.4 INJECTION, SOLUTION INTRAMUSCULAR; INTRAVENOUS; SUBCUTANEOUS
Status: ACTIVE | OUTPATIENT
Start: 2018-10-24 | End: 2018-10-25

## 2018-10-24 RX ORDER — LIDOCAINE HYDROCHLORIDE 10 MG/ML
INJECTION, SOLUTION INFILTRATION; PERINEURAL PRN
Status: DISCONTINUED | OUTPATIENT
Start: 2018-10-24 | End: 2018-10-24

## 2018-10-24 RX ORDER — NALOXONE HYDROCHLORIDE 0.4 MG/ML
.1-.4 INJECTION, SOLUTION INTRAMUSCULAR; INTRAVENOUS; SUBCUTANEOUS
Status: DISCONTINUED | OUTPATIENT
Start: 2018-10-24 | End: 2018-10-27 | Stop reason: HOSPADM

## 2018-10-24 RX ORDER — DEXAMETHASONE SODIUM PHOSPHATE 4 MG/ML
INJECTION, SOLUTION INTRA-ARTICULAR; INTRALESIONAL; INTRAMUSCULAR; INTRAVENOUS; SOFT TISSUE PRN
Status: DISCONTINUED | OUTPATIENT
Start: 2018-10-24 | End: 2018-10-24

## 2018-10-24 RX ORDER — LIDOCAINE 40 MG/G
CREAM TOPICAL
Status: DISCONTINUED | OUTPATIENT
Start: 2018-10-24 | End: 2018-10-27 | Stop reason: HOSPADM

## 2018-10-24 RX ORDER — ATORVASTATIN CALCIUM 40 MG/1
80 TABLET, FILM COATED ORAL EVERY EVENING
Status: DISCONTINUED | OUTPATIENT
Start: 2018-10-24 | End: 2018-10-27 | Stop reason: HOSPADM

## 2018-10-24 RX ORDER — METOCLOPRAMIDE HYDROCHLORIDE 5 MG/ML
5 INJECTION INTRAMUSCULAR; INTRAVENOUS EVERY 6 HOURS PRN
Status: DISCONTINUED | OUTPATIENT
Start: 2018-10-24 | End: 2018-10-27 | Stop reason: HOSPADM

## 2018-10-24 RX ORDER — PROPOFOL 10 MG/ML
INJECTION, EMULSION INTRAVENOUS PRN
Status: DISCONTINUED | OUTPATIENT
Start: 2018-10-24 | End: 2018-10-24

## 2018-10-24 RX ORDER — SODIUM CHLORIDE, SODIUM LACTATE, POTASSIUM CHLORIDE, CALCIUM CHLORIDE 600; 310; 30; 20 MG/100ML; MG/100ML; MG/100ML; MG/100ML
INJECTION, SOLUTION INTRAVENOUS CONTINUOUS PRN
Status: DISCONTINUED | OUTPATIENT
Start: 2018-10-24 | End: 2018-10-24

## 2018-10-24 RX ORDER — METOPROLOL TARTRATE 1 MG/ML
5 INJECTION, SOLUTION INTRAVENOUS EVERY 6 HOURS
Status: DISCONTINUED | OUTPATIENT
Start: 2018-10-25 | End: 2018-10-26

## 2018-10-24 RX ORDER — ACETAMINOPHEN 325 MG/1
975 TABLET ORAL ONCE
Status: COMPLETED | OUTPATIENT
Start: 2018-10-24 | End: 2018-10-24

## 2018-10-24 RX ORDER — ALVIMOPAN 12 MG/1
12 CAPSULE ORAL ONCE
Status: COMPLETED | OUTPATIENT
Start: 2018-10-24 | End: 2018-10-24

## 2018-10-24 RX ORDER — ACETAMINOPHEN 325 MG/1
975 TABLET ORAL EVERY 8 HOURS
Status: DISCONTINUED | OUTPATIENT
Start: 2018-10-24 | End: 2018-10-27 | Stop reason: HOSPADM

## 2018-10-24 RX ORDER — ALBUTEROL SULFATE 90 UG/1
1 AEROSOL, METERED RESPIRATORY (INHALATION) EVERY 4 HOURS PRN
Status: DISCONTINUED | OUTPATIENT
Start: 2018-10-24 | End: 2018-10-27 | Stop reason: HOSPADM

## 2018-10-24 RX ORDER — ALVIMOPAN 12 MG/1
12 CAPSULE ORAL 2 TIMES DAILY
Status: DISCONTINUED | OUTPATIENT
Start: 2018-10-25 | End: 2018-10-27

## 2018-10-24 RX ORDER — ONDANSETRON 2 MG/ML
4 INJECTION INTRAMUSCULAR; INTRAVENOUS EVERY 30 MIN PRN
Status: DISCONTINUED | OUTPATIENT
Start: 2018-10-24 | End: 2018-10-24 | Stop reason: HOSPADM

## 2018-10-24 RX ORDER — ONDANSETRON 2 MG/ML
4 INJECTION INTRAMUSCULAR; INTRAVENOUS EVERY 6 HOURS PRN
Status: DISCONTINUED | OUTPATIENT
Start: 2018-10-24 | End: 2018-10-27 | Stop reason: HOSPADM

## 2018-10-24 RX ORDER — LIDOCAINE 40 MG/G
CREAM TOPICAL
Status: DISCONTINUED | OUTPATIENT
Start: 2018-10-24 | End: 2018-10-24 | Stop reason: HOSPADM

## 2018-10-24 RX ORDER — CIPROFLOXACIN 2 MG/ML
400 INJECTION, SOLUTION INTRAVENOUS
Status: COMPLETED | OUTPATIENT
Start: 2018-10-24 | End: 2018-10-24

## 2018-10-24 RX ORDER — ONDANSETRON 4 MG/1
4 TABLET, ORALLY DISINTEGRATING ORAL EVERY 30 MIN PRN
Status: DISCONTINUED | OUTPATIENT
Start: 2018-10-24 | End: 2018-10-24 | Stop reason: HOSPADM

## 2018-10-24 RX ORDER — NALOXONE HYDROCHLORIDE 0.4 MG/ML
.1-.4 INJECTION, SOLUTION INTRAMUSCULAR; INTRAVENOUS; SUBCUTANEOUS
Status: DISCONTINUED | OUTPATIENT
Start: 2018-10-24 | End: 2018-10-24

## 2018-10-24 RX ORDER — ALBUTEROL SULFATE 0.83 MG/ML
2.5 SOLUTION RESPIRATORY (INHALATION) EVERY 6 HOURS PRN
Status: DISCONTINUED | OUTPATIENT
Start: 2018-10-24 | End: 2018-10-27 | Stop reason: HOSPADM

## 2018-10-24 RX ORDER — GLYCOPYRROLATE 0.2 MG/ML
INJECTION, SOLUTION INTRAMUSCULAR; INTRAVENOUS PRN
Status: DISCONTINUED | OUTPATIENT
Start: 2018-10-24 | End: 2018-10-24

## 2018-10-24 RX ORDER — LABETALOL HYDROCHLORIDE 5 MG/ML
10 INJECTION, SOLUTION INTRAVENOUS
Status: DISCONTINUED | OUTPATIENT
Start: 2018-10-24 | End: 2018-10-24 | Stop reason: HOSPADM

## 2018-10-24 RX ORDER — BUPIVACAINE HYDROCHLORIDE AND EPINEPHRINE 2.5; 5 MG/ML; UG/ML
INJECTION, SOLUTION EPIDURAL; INFILTRATION; INTRACAUDAL; PERINEURAL PRN
Status: DISCONTINUED | OUTPATIENT
Start: 2018-10-24 | End: 2018-10-24 | Stop reason: HOSPADM

## 2018-10-24 RX ORDER — MEPERIDINE HYDROCHLORIDE 50 MG/ML
12.5 INJECTION INTRAMUSCULAR; INTRAVENOUS; SUBCUTANEOUS EVERY 5 MIN PRN
Status: DISCONTINUED | OUTPATIENT
Start: 2018-10-24 | End: 2018-10-24 | Stop reason: HOSPADM

## 2018-10-24 RX ORDER — SODIUM CHLORIDE, SODIUM LACTATE, POTASSIUM CHLORIDE, CALCIUM CHLORIDE 600; 310; 30; 20 MG/100ML; MG/100ML; MG/100ML; MG/100ML
INJECTION, SOLUTION INTRAVENOUS CONTINUOUS
Status: DISCONTINUED | OUTPATIENT
Start: 2018-10-24 | End: 2018-10-24 | Stop reason: HOSPADM

## 2018-10-24 RX ORDER — DIMENHYDRINATE 50 MG/ML
25 INJECTION, SOLUTION INTRAMUSCULAR; INTRAVENOUS
Status: DISCONTINUED | OUTPATIENT
Start: 2018-10-24 | End: 2018-10-24 | Stop reason: HOSPADM

## 2018-10-24 RX ORDER — FENTANYL CITRATE 50 UG/ML
25-50 INJECTION, SOLUTION INTRAMUSCULAR; INTRAVENOUS
Status: DISCONTINUED | OUTPATIENT
Start: 2018-10-24 | End: 2018-10-24 | Stop reason: HOSPADM

## 2018-10-24 RX ORDER — HEPARIN SODIUM 5000 [USP'U]/.5ML
5000 INJECTION, SOLUTION INTRAVENOUS; SUBCUTANEOUS
Status: COMPLETED | OUTPATIENT
Start: 2018-10-24 | End: 2018-10-24

## 2018-10-24 RX ORDER — KETOROLAC TROMETHAMINE 15 MG/ML
15 INJECTION, SOLUTION INTRAMUSCULAR; INTRAVENOUS
Status: DISCONTINUED | OUTPATIENT
Start: 2018-10-24 | End: 2018-10-24 | Stop reason: HOSPADM

## 2018-10-24 RX ORDER — FENTANYL CITRATE 50 UG/ML
INJECTION, SOLUTION INTRAMUSCULAR; INTRAVENOUS PRN
Status: DISCONTINUED | OUTPATIENT
Start: 2018-10-24 | End: 2018-10-24

## 2018-10-24 RX ORDER — HYDROMORPHONE HYDROCHLORIDE 1 MG/ML
.3-.5 INJECTION, SOLUTION INTRAMUSCULAR; INTRAVENOUS; SUBCUTANEOUS EVERY 5 MIN PRN
Status: DISCONTINUED | OUTPATIENT
Start: 2018-10-24 | End: 2018-10-24 | Stop reason: HOSPADM

## 2018-10-24 RX ADMIN — PHENYLEPHRINE HYDROCHLORIDE 100 MCG: 10 INJECTION, SOLUTION INTRAMUSCULAR; INTRAVENOUS; SUBCUTANEOUS at 15:46

## 2018-10-24 RX ADMIN — PROPOFOL 170 MG: 10 INJECTION, EMULSION INTRAVENOUS at 13:10

## 2018-10-24 RX ADMIN — PHENYLEPHRINE HYDROCHLORIDE 100 MCG: 10 INJECTION, SOLUTION INTRAMUSCULAR; INTRAVENOUS; SUBCUTANEOUS at 15:07

## 2018-10-24 RX ADMIN — PHENYLEPHRINE HYDROCHLORIDE 100 MCG: 10 INJECTION, SOLUTION INTRAMUSCULAR; INTRAVENOUS; SUBCUTANEOUS at 16:42

## 2018-10-24 RX ADMIN — DEXAMETHASONE SODIUM PHOSPHATE 4 MG: 4 INJECTION, SOLUTION INTRA-ARTICULAR; INTRALESIONAL; INTRAMUSCULAR; INTRAVENOUS; SOFT TISSUE at 13:24

## 2018-10-24 RX ADMIN — LIDOCAINE HYDROCHLORIDE 50 MG: 10 INJECTION, SOLUTION INFILTRATION; PERINEURAL at 13:10

## 2018-10-24 RX ADMIN — PHENYLEPHRINE HYDROCHLORIDE 100 MCG: 10 INJECTION, SOLUTION INTRAMUSCULAR; INTRAVENOUS; SUBCUTANEOUS at 14:59

## 2018-10-24 RX ADMIN — Medication 5 MG: at 17:05

## 2018-10-24 RX ADMIN — ACETAMINOPHEN 975 MG: 325 TABLET, FILM COATED ORAL at 21:57

## 2018-10-24 RX ADMIN — PHENYLEPHRINE HYDROCHLORIDE 200 MCG: 10 INJECTION, SOLUTION INTRAMUSCULAR; INTRAVENOUS; SUBCUTANEOUS at 13:30

## 2018-10-24 RX ADMIN — HYDROMORPHONE HYDROCHLORIDE 0.5 MG: 1 INJECTION, SOLUTION INTRAMUSCULAR; INTRAVENOUS; SUBCUTANEOUS at 15:43

## 2018-10-24 RX ADMIN — DEXAMETHASONE SODIUM PHOSPHATE 4 MG: 4 INJECTION, SOLUTION INTRA-ARTICULAR; INTRALESIONAL; INTRAMUSCULAR; INTRAVENOUS; SOFT TISSUE at 13:10

## 2018-10-24 RX ADMIN — HEPARIN SODIUM 2500 UNITS: 5000 INJECTION, SOLUTION INTRAVENOUS; SUBCUTANEOUS at 12:53

## 2018-10-24 RX ADMIN — PHENYLEPHRINE HYDROCHLORIDE 100 MCG: 10 INJECTION, SOLUTION INTRAMUSCULAR; INTRAVENOUS; SUBCUTANEOUS at 13:50

## 2018-10-24 RX ADMIN — ROCURONIUM BROMIDE 15 MG: 10 INJECTION INTRAVENOUS at 16:34

## 2018-10-24 RX ADMIN — SODIUM CHLORIDE, POTASSIUM CHLORIDE, SODIUM LACTATE AND CALCIUM CHLORIDE: 600; 310; 30; 20 INJECTION, SOLUTION INTRAVENOUS at 15:01

## 2018-10-24 RX ADMIN — CIPROFLOXACIN 400 MG: 2 INJECTION, SOLUTION INTRAVENOUS at 13:20

## 2018-10-24 RX ADMIN — FENTANYL CITRATE 50 MCG: 50 INJECTION, SOLUTION INTRAMUSCULAR; INTRAVENOUS at 17:16

## 2018-10-24 RX ADMIN — GLYCOPYRROLATE 0.2 MG: 0.2 INJECTION, SOLUTION INTRAMUSCULAR; INTRAVENOUS at 13:10

## 2018-10-24 RX ADMIN — PHENYLEPHRINE HYDROCHLORIDE 150 MCG: 10 INJECTION, SOLUTION INTRAMUSCULAR; INTRAVENOUS; SUBCUTANEOUS at 14:36

## 2018-10-24 RX ADMIN — SODIUM CHLORIDE, POTASSIUM CHLORIDE, SODIUM LACTATE AND CALCIUM CHLORIDE: 600; 310; 30; 20 INJECTION, SOLUTION INTRAVENOUS at 12:58

## 2018-10-24 RX ADMIN — PHENYLEPHRINE HYDROCHLORIDE 100 MCG: 10 INJECTION, SOLUTION INTRAMUSCULAR; INTRAVENOUS; SUBCUTANEOUS at 13:43

## 2018-10-24 RX ADMIN — SODIUM CHLORIDE, POTASSIUM CHLORIDE, SODIUM LACTATE AND CALCIUM CHLORIDE: 600; 310; 30; 20 INJECTION, SOLUTION INTRAVENOUS at 15:36

## 2018-10-24 RX ADMIN — HYDROMORPHONE HYDROCHLORIDE 0.5 MG: 1 INJECTION, SOLUTION INTRAMUSCULAR; INTRAVENOUS; SUBCUTANEOUS at 16:17

## 2018-10-24 RX ADMIN — SUGAMMADEX 500 MG: 100 INJECTION, SOLUTION INTRAVENOUS at 18:36

## 2018-10-24 RX ADMIN — ROCURONIUM BROMIDE 50 MG: 10 INJECTION INTRAVENOUS at 13:10

## 2018-10-24 RX ADMIN — FAMOTIDINE 20 MG: 10 INJECTION, SOLUTION INTRAVENOUS at 21:52

## 2018-10-24 RX ADMIN — FENTANYL CITRATE 100 MCG: 50 INJECTION, SOLUTION INTRAMUSCULAR; INTRAVENOUS at 13:10

## 2018-10-24 RX ADMIN — MIDAZOLAM 2 MG: 1 INJECTION INTRAMUSCULAR; INTRAVENOUS at 12:58

## 2018-10-24 RX ADMIN — HEPARIN SODIUM 5000 UNITS: 5000 INJECTION, SOLUTION INTRAVENOUS; SUBCUTANEOUS at 11:52

## 2018-10-24 RX ADMIN — ROCURONIUM BROMIDE 10 MG: 10 INJECTION INTRAVENOUS at 14:12

## 2018-10-24 RX ADMIN — FENTANYL CITRATE 50 MCG: 50 INJECTION, SOLUTION INTRAMUSCULAR; INTRAVENOUS at 14:49

## 2018-10-24 RX ADMIN — PHENYLEPHRINE HYDROCHLORIDE 100 MCG: 10 INJECTION, SOLUTION INTRAMUSCULAR; INTRAVENOUS; SUBCUTANEOUS at 13:54

## 2018-10-24 RX ADMIN — PHENYLEPHRINE HYDROCHLORIDE 100 MCG: 10 INJECTION, SOLUTION INTRAMUSCULAR; INTRAVENOUS; SUBCUTANEOUS at 15:03

## 2018-10-24 RX ADMIN — SODIUM CHLORIDE, POTASSIUM CHLORIDE, SODIUM LACTATE AND CALCIUM CHLORIDE: 600; 310; 30; 20 INJECTION, SOLUTION INTRAVENOUS at 14:04

## 2018-10-24 RX ADMIN — METRONIDAZOLE 500 MG: 500 INJECTION, SOLUTION INTRAVENOUS at 22:01

## 2018-10-24 RX ADMIN — PHENYLEPHRINE HYDROCHLORIDE 100 MCG: 10 INJECTION, SOLUTION INTRAMUSCULAR; INTRAVENOUS; SUBCUTANEOUS at 14:01

## 2018-10-24 RX ADMIN — ALVIMOPAN 12 MG: 12 CAPSULE ORAL at 12:24

## 2018-10-24 RX ADMIN — PHENYLEPHRINE HYDROCHLORIDE 100 MCG: 10 INJECTION, SOLUTION INTRAMUSCULAR; INTRAVENOUS; SUBCUTANEOUS at 14:29

## 2018-10-24 RX ADMIN — Medication 5 MG: at 15:12

## 2018-10-24 RX ADMIN — PHENYLEPHRINE HYDROCHLORIDE 100 MCG: 10 INJECTION, SOLUTION INTRAMUSCULAR; INTRAVENOUS; SUBCUTANEOUS at 13:21

## 2018-10-24 RX ADMIN — FENTANYL CITRATE 50 MCG: 50 INJECTION, SOLUTION INTRAMUSCULAR; INTRAVENOUS at 14:44

## 2018-10-24 RX ADMIN — METRONIDAZOLE 500 MG: 500 INJECTION, SOLUTION INTRAVENOUS at 12:58

## 2018-10-24 RX ADMIN — ONDANSETRON 4 MG: 2 INJECTION INTRAMUSCULAR; INTRAVENOUS at 18:50

## 2018-10-24 RX ADMIN — ACETAMINOPHEN 975 MG: 325 TABLET, FILM COATED ORAL at 11:52

## 2018-10-24 RX ADMIN — ROCURONIUM BROMIDE 15 MG: 10 INJECTION INTRAVENOUS at 17:37

## 2018-10-24 RX ADMIN — DEXAMETHASONE SODIUM PHOSPHATE 4 MG: 4 INJECTION, SOLUTION INTRA-ARTICULAR; INTRALESIONAL; INTRAMUSCULAR; INTRAVENOUS; SOFT TISSUE at 14:32

## 2018-10-24 RX ADMIN — PHENYLEPHRINE HYDROCHLORIDE 100 MCG: 10 INJECTION, SOLUTION INTRAMUSCULAR; INTRAVENOUS; SUBCUTANEOUS at 14:05

## 2018-10-24 RX ADMIN — ATORVASTATIN CALCIUM 80 MG: 40 TABLET, FILM COATED ORAL at 21:57

## 2018-10-24 RX ADMIN — PHENYLEPHRINE HYDROCHLORIDE 200 MCG: 10 INJECTION, SOLUTION INTRAMUSCULAR; INTRAVENOUS; SUBCUTANEOUS at 13:38

## 2018-10-24 RX ADMIN — Medication 5 MG: at 17:48

## 2018-10-24 RX ADMIN — PROPOFOL 80 MG: 10 INJECTION, EMULSION INTRAVENOUS at 14:10

## 2018-10-24 RX ADMIN — Medication: at 21:12

## 2018-10-24 RX ADMIN — Medication 5 MG: at 15:28

## 2018-10-24 RX ADMIN — ROCURONIUM BROMIDE 40 MG: 10 INJECTION INTRAVENOUS at 14:32

## 2018-10-24 RX ADMIN — SODIUM CHLORIDE, POTASSIUM CHLORIDE, SODIUM LACTATE AND CALCIUM CHLORIDE: 600; 310; 30; 20 INJECTION, SOLUTION INTRAVENOUS at 14:10

## 2018-10-24 RX ADMIN — ROCURONIUM BROMIDE 20 MG: 10 INJECTION INTRAVENOUS at 15:40

## 2018-10-24 ASSESSMENT — ENCOUNTER SYMPTOMS: DYSRHYTHMIAS: 1

## 2018-10-24 ASSESSMENT — PAIN DESCRIPTION - DESCRIPTORS: DESCRIPTORS: DISCOMFORT

## 2018-10-24 NOTE — IP AVS SNAPSHOT
[place] [facility] [id number] [facility]    [address]    [facility] [address]    Phone:  [phone]    Fax:  [phone]                                       After Visit Summary   10/24/2018    Gema Coombs    MRN: [id number]           After Visit Summary Signature Page     I have received my discharge instructions, and my questions have been answered. I have discussed any challenges I see with this plan with the nurse or doctor.    ..........................................................................................................................................  Patient/Patient Representative Signature      ..........................................................................................................................................  Patient Representative Print Name and Relationship to Patient    ..................................................               ................................................  Date                                   Time    ..........................................................................................................................................  Reviewed by Signature/Title    ...................................................              ..............................................  Date                                               Time          22EPIC Rev 08/18

## 2018-10-24 NOTE — ANESTHESIA PREPROCEDURE EVALUATION
PAC NOTE:       ANESTHESIA PRE EVALUATION:  Anesthesia Evaluation     . Pt has had prior anesthetic. Type: General           ROS/MED HX    ENT/Pulmonary:     (+)sleep apnea, Intermittent asthma Treatment: Inhaler prn,  uses CPAP , . .    Neurologic:  - neg neurologic ROS     Cardiovascular: Comment:  LEFT HEART CATHETERIZATION 10/19/2018 3:17 PM      HISTORY 68-year-old woman with multiple cardiac risk factors here for  medical problems including ITP and colon problems. She went in rapid  atrial fibrillation which prompted a nuclear stress which suggested  inferior ischemia but of note she is a heavyset woman. Heart  catheterization is requested.     RESULTS:    Left main artery: The left main artery is short but normal.     Left anterior descending: The LAD has trivial narrowing under 5%.     Left circumflex: The left circumflex is normal.     Right coronary artery: The proximal right coronary artery has a 5-10%  narrowing. The mid RCA at the acute margin of the heart has a smooth  10-15% narrowing or less.     Left ventriculogram: The left ventricular size and systolic function  is normal because of the PVCs and rapid A fib. It is hard to assess  ejection fraction, visually I would suggest it is 50% or higher. There  is mitral insufficiency but it is probably catheter induced or ectopic  reduced, it is not severe MR.     LV pressure 141/22, there is no gradient on pullback.     Patient fully assessed by me prior to procedure and deemed safe to  proceed with conscious sedation and angiography. Procedure time 30  minutes. She received 2 mg of Versed, 100 mcg of fentanyl. Heart rate,  blood pressure, oxygen saturation and patient response monitored by  the nurse under my supervision and there were no complications.     CONCLUSION:   1. Trivial coronary artery disease and preserved ejection fraction.   2. Rapid atrial fibrillation. Of note the patient is not on  anticoagulation because of ITP and potential risk of  stroke. This was  understood by the team already.        (+) hypertension----. : . . . :. dysrhythmias a-fib, .       METS/Exercise Tolerance:     Hematologic:  - neg hematologic  ROS       Musculoskeletal:  - neg musculoskeletal ROS       GI/Hepatic:     (+) GERD Asymptomatic on medication,       Renal/Genitourinary:  - ROS Renal section negative       Endo:     (+) Obesity, .      Psychiatric:         Infectious Disease:  - neg infectious disease ROS       Malignancy:         Other:    (+) No chance of pregnancy C-spine cleared: N/A, no H/O Chronic Pain,no other significant disability                    Physical Exam  Normal systems: cardiovascular, pulmonary and dental    Airway   Mallampati: II  TM distance: >3 FB  Neck ROM: full    Dental     Cardiovascular       Pulmonary              Anesthesia Plan      History & Physical Review  History and physical reviewed and following examination; no interval change.    ASA Status:  3 .    NPO Status:  > 8 hours    Plan for General and ETT with Intravenous induction. Maintenance will be Balanced.    PONV prophylaxis:  Ondansetron (or other 5HT-3) and Dexamethasone or Solumedrol       Postoperative Care  Postoperative pain management:  IV analgesics.      Consents  Anesthetic plan, risks, benefits and alternatives discussed with:  Patient.  Use of blood products discussed: Yes.   Use of blood products discussed with Patient.  Consented to blood products.  .                            .

## 2018-10-24 NOTE — BRIEF OP NOTE
[facility] Brief Operative Note    Pre-operative diagnosis: diverticulitis   Post-operative diagnosis diverticulitis     Procedure: Procedure(s):  interoperative colonoscopy with laparoscopic assisted sigmoid colectomy  COLONOSCOPY, bilateral TAP block   Surgeon(s): Surgeon(s) and Role:     * Nieves Cordova MD - Primary   50ml    Specimens:   ID Type Source Tests Collected by Time Destination   A : Ascending colon, polyps X2 Tissue Large Intestine, Right/Ascending SURGICAL PATHOLOGY EXAM Nieves Cordova MD 10/24/2018  1:30 PM    B : Transverse colon, polyp X1 Tissue Large Intestine, Transverse SURGICAL PATHOLOGY EXAM Nieves Cordova MD 10/24/2018  1:38 PM    C : Descending colon, polyps X2 Tissue Large Intestine, Left/Descending SURGICAL PATHOLOGY EXAM Nieves Cordova MD 10/24/2018  1:45 PM    D : Sigmoid colon and anamostic rings Tissue Large Intestine, Sigmoid SURGICAL PATHOLOGY EXAM Nieves Cordova MD 10/24/2018  5:47 PM       Findings: Adhesions of sigmoid colon to lateral and anterior side wall.  Fibrotic sigmoid colon very adherent to lateral abdominal side wall.  Left ureter not visualized after extensive dissection.  Takedown of splenic flexure. End to end 29mm stapled anastomosis. Leak test negative.    Preoperative colonoscopy performed. 2 cecal polyps, 2 AC polyp, 1 TC polyp 1.5cm, 1 descending colon polyps    Condition on discharge from OR: Satisfactory    Sherry Mccain MD   [facility]   [phone].        ADDENDUM:    PATIENT DATA  Indicate Y or N:  Home O2 No  Hemodialysis  No  Transplant patient  No  Cirrhosis  No  Steroids in last 30 days  Yes  Immunomodulators in last 30 days  No  Anticoagulation at time of surgery  No   List medication na  Prior abdominal surgery  Yes  Pelvic irradiation  No    Albumin within 30 days if known n/a  Hgb within 30 days if known 14.0   Hemoglobin   Date Value Ref Range Status   10/19/2018 14.0 11.7 -  15.7 g/dL Final   ]  Cr within 30 days if known 0.71   Creatinine   Date Value Ref Range Status   10/19/2018 0.71 0.52 - 1.04 mg/dL Final   ]  Body mass index is 49.78 kg/(m^2).      OR DATA  Emergent  No   <24 hours  No   <1 week  No  Bowel Prep Yes  Antibiotics  Yes  DVT prophylaxis    Heparin  Yes   SCD  Yes   None  No  Drain  No  ASA (1,2,3,4) 3  OR time (min) 237  Stents  No  Transfuse >/= 2U  No  Anastomosis   Stapled  Yes   Handsewn  No  Leak Test    Positive  No   Negative  Yes   Not done  No             Route (Have a good day)

## 2018-10-24 NOTE — IP AVS SNAPSHOT
MRN:[id number]                      After Visit Summary   10/24/2018    Gema Coombs    MRN: [id number]           Thank you!     Thank you for choosing [facility] for your care. Our goal is always to provide you with excellent care. Hearing back from our patients is one way we can continue to improve our services. Please take a few minutes to complete the written survey that you may receive in the mail after you visit. If you would like to speak to someone directly about your visit please contact Patient Relations at [phone]. Thank you!          Patient Information     Date Of Birth          1950        Designated Caregiver       Most Recent Value    Caregiver    Will someone help with your care after discharge? yes    Name of designated caregiver Shreyas ()    Phone number of caregiver [phone]    Caregiver address [address] [place] [id number]      About your hospital stay     You were admitted on:  October 24, 2018 You last received care in the:  [place] [facility] [address] [facility]    You were discharged on:  October 27, 2018        Reason for your hospital stay       You were in the hospital to have surgery.                  Who to Call     For medical emergencies, please call 911.  For non-urgent questions about your medical care, please call your primary care provider or clinic, [phone]  For questions related to your surgery, please call your surgery clinic        Attending Provider     Provider Specialty    Nieves Cordova MD Colon and Rectal Surgery       Primary Care Provider Office Phone # Fax #    Flavia Alberts [phone]      After Care Instructions     Activity       Your activity upon discharge: No heavy lifting or straining over 15-20 lbs for 6 weeks. No driving while taking narcotic pain medications.            Diet       Follow this diet upon discharge:   Continue a low residue diet for 6 weeks or at least  "until your follow up clinic visit.  Avoid nuts, seeds, popcorn, raw fruits and veggies with peels.                  Follow-up Appointments     Follow-up and recommended labs and tests        Follow up in the office in 4 weeks with Dr. Cordova or at your already scheduled post op visit. Call [phone] to schedule your appointment. Call the office at [phone] if you develop fever >101, uncontrolled pain, bleeding, nausea, vomiting, or constipation (no stool for 4-5 days).                  Pending Results     Date and Time Order Name Status Description    10/24/2018 1331 Surgical pathology exam In process             Statement of Approval     Ordered          10/27/18 8155  I have reviewed and agree with all the recommendations and orders detailed in this document.  EFFECTIVE NOW     Approved and electronically signed by:  Jose Jay MD             Admission Information     Date & Time Provider Department Dept. Phone    10/24/2018 Nieves Cordova MD [place] [address] [facility] [phone]      Your Vitals Were     Blood Pressure Temperature Respirations Height Weight Pulse Oximetry    125/54 (BP Location: Left arm) 98.1  F (36.7  C) (Oral) 18 1.626 m (5' 4\") 131.5 kg (290 lb) 96%    BMI (Body Mass Index)                   49.78 kg/m2           [facility] Information     [employer] gives you secure access to your electronic health record. If you see a primary care provider, you can also send messages to your care team and make appointments. If you have questions, please call your primary care clinic.  If you do not have a primary care provider, please call [phone] and they will assist you.        Care EveryWhere ID     This is your Care EveryWhere ID. This could be used by other organizations to access your [facility] medical records  [id number]        Equal Access to Services     KYARA CHAPMAN AH: Ila Geiger, dot benz, simba bernard " ascencion lanier la'aan ah. So [facility] [phone].    ATENCIÓN: Si roderick robb, tiene a teresa disposición servicios gratuitos de asistencia lingüística. Rachael al [phone].    We comply with applicable federal civil rights laws and [place] laws. We do not discriminate on the basis of race, color, national origin, age, disability, sex, sexual orientation, or gender identity.               Review of your medicines      START taking        Dose / Directions    oxyCODONE IR 5 MG tablet   Commonly known as:  ROXICODONE   Used for:  Acute diverticulitis        Dose:  5 mg   Take 1 tablet (5 mg) by mouth every 4 hours as needed for moderate to severe pain   Quantity:  6 tablet   Refills:  0         CONTINUE these medicines which have NOT CHANGED        Dose / Directions    acetaminophen 500 MG tablet   Commonly known as:  TYLENOL        Dose:  500-1000 mg   Take 500-1,000 mg by mouth every 6 hours as needed for mild pain   Refills:  0       * albuterol 108 (90 Base) MCG/ACT inhaler   Commonly known as:  PROAIR HFA/PROVENTIL HFA/VENTOLIN HFA        Dose:  1 puff   Inhale 1 puff into the lungs every 4 hours as needed for shortness of breath / dyspnea or wheezing   Refills:  0       * albuterol (2.5 MG/3ML) 0.083% neb solution   Used for:  Mild persistent asthma without complication        Dose:  1 vial   Take 1 vial (2.5 mg) by nebulization every 6 hours as needed for shortness of breath / dyspnea   Quantity:  1 Box   Refills:  1       apixaban ANTICOAGULANT 5 MG tablet   Commonly known as:  ELIQUIS   Used for:  Paroxysmal atrial fibrillation (H)        Dose:  5 mg   Start taking on:  10/29/2018   Take 1 tablet (5 mg) by mouth 2 times daily   Quantity:  60 tablet   Refills:  0       atorvastatin 80 MG tablet   Commonly known as:  LIPITOR   Used for:  Paroxysmal atrial fibrillation (H)        Dose:  80 mg   Take 1 tablet (80 mg) by mouth every evening   Quantity:  30 tablet   Refills:  0       diltiazem 120 MG Cp12 12 hr SR  capsule   Commonly known as:  CARDIZEM SR   Used for:  Paroxysmal atrial fibrillation (H)        Dose:  120 mg   Take 120 mg by mouth 2 times daily   Quantity:  60 capsule   Refills:  0       HYDROcodone-acetaminophen 5-325 MG per tablet   Commonly known as:  NORCO        Dose:  1 tablet   Take 1 tablet by mouth every 6 hours as needed for severe pain   Quantity:  10 tablet   Refills:  0       metoprolol tartrate 50 MG tablet   Commonly known as:  LOPRESSOR   Used for:  Paroxysmal atrial fibrillation (H)        Dose:  50 mg   Take 1 tablet (50 mg) by mouth 2 times daily   Quantity:  60 tablet   Refills:  0       ondansetron 4 MG ODT tab   Commonly known as:  ZOFRAN-ODT   Used for:  Diverticulitis        Dose:  4 mg   Take 4 mg by mouth every 8 hours as needed   Refills:  0       primidone 250 MG tablet   Commonly known as:  MYSOLINE        Dose:  250 mg   Take 250 mg by mouth 2 times daily   Refills:  0       ranitidine HCl 300 MG Caps        Dose:  300 mg   Take 300 mg by mouth daily   Refills:  0       * Notice:  This list has 2 medication(s) that are the same as other medications prescribed for you. Read the directions carefully, and ask your doctor or other care provider to review them with you.         Where to get your medicines      Some of these will need a paper prescription and others can be bought over the counter. Ask your nurse if you have questions.     Bring a paper prescription for each of these medications     oxyCODONE IR 5 MG tablet                Protect others around you: Learn how to safely use, store and throw away your medicines at www.disposemymeds.org.        Information about OPIOIDS     PRESCRIPTION OPIOIDS: WHAT YOU NEED TO KNOW   We gave you an opioid (narcotic) pain medicine. It is important to manage your pain, but opioids are not always the best choice. You should first try all the other options your care team gave you. Take this medicine for as short a time (and as few doses) as  possible.    Some activities can increase your pain, such as bandage changes or therapy sessions. It may help to take your pain medicine 30 to 60 minutes before these activities. Reduce your stress by getting enough sleep, working on hobbies you enjoy and practicing relaxation or meditation. Talk to your care team about ways to manage your pain beyond prescription opioids.    These medicines have risks:    DO NOT drive when on new or higher doses of pain medicine. These medicines can affect your alertness and reaction times, and you could be arrested for driving under the influence (DUI). If you need to use opioids long-term, talk to your care team about driving.    DO NOT operate heavy machinery    DO NOT do any other dangerous activities while taking these medicines.    DO NOT drink any alcohol while taking these medicines.     If the opioid prescribed includes acetaminophen, DO NOT take with any other medicines that contain acetaminophen. Read all labels carefully. Look for the word  acetaminophen  or  Tylenol.  Ask your pharmacist if you have questions or are unsure.    You can get addicted to pain medicines, especially if you have a history of addiction (chemical, alcohol or substance dependence). Talk to your care team about ways to reduce this risk.    All opioids tend to cause constipation. Drink plenty of water and eat foods that have a lot of fiber, such as fruits, vegetables, prune juice, apple juice and high-fiber cereal. Take a laxative (Miralax, milk of magnesia, Colace, Senna) if you don t move your bowels at least every other day. Other side effects include upset stomach, sleepiness, dizziness, throwing up, tolerance (needing more of the medicine to have the same effect), physical dependence and slowed breathing.    Store your pills in a secure place, locked if possible. We will not replace any lost or stolen medicine. If you don t finish your medicine, please throw away (dispose) as directed by your  pharmacist. The [place] Pollution Control Agency has more information about safe disposal: https://www.pca.state.[place]/living-green/managing-unwanted-medications             Medication List: This is a list of all your medications and when to take them. Check marks below indicate your daily home schedule. Keep this list as a reference.      Medications           Morning Afternoon Evening Bedtime As Needed    acetaminophen 500 MG tablet   Commonly known as:  TYLENOL   Take 500-1,000 mg by mouth every 6 hours as needed for mild pain   Last time this was given:  975 mg on 10/27/2018  6:47 AM   Last time this was given:  10/27/2018  6:47 AM                                * albuterol 108 (90 Base) MCG/ACT inhaler   Commonly known as:  PROAIR HFA/PROVENTIL HFA/VENTOLIN HFA   Inhale 1 puff into the lungs every 4 hours as needed for shortness of breath / dyspnea or wheezing   Last time this was given:  1 puff on 10/27/2018  4:00 AM                                * albuterol (2.5 MG/3ML) 0.083% neb solution   Take 1 vial (2.5 mg) by nebulization every 6 hours as needed for shortness of breath / dyspnea   Last time this was given:  Not given during hospitalization                                apixaban ANTICOAGULANT 5 MG tablet   Commonly known as:  ELIQUIS   Take 1 tablet (5 mg) by mouth 2 times daily   Start taking on:  10/29/2018   Last time this was given:  Not given during hospitalization                                 atorvastatin 80 MG tablet   Commonly known as:  LIPITOR   Take 1 tablet (80 mg) by mouth every evening   Last time this was given:  80 mg on 10/26/2018  8:03 PM   Last time this was given:  10/26/2018  8:03 PM   Next Dose Due:  10/27/2018  PM                                diltiazem 120 MG Cp12 12 hr SR capsule   Commonly known as:  CARDIZEM SR   Take 120 mg by mouth 2 times daily   Last time this was given:  120 mg on 10/26/2018  8:03 PM   Last time this was given:  10/26/2018  8:03 PM   Next Dose Due:   10/27/2018  PM                                HYDROcodone-acetaminophen 5-325 MG per tablet   Commonly known as:  NORCO   Take 1 tablet by mouth every 6 hours as needed for severe pain   Last time this was given:  Not given during hospitalization                                 metoprolol tartrate 50 MG tablet   Commonly known as:  LOPRESSOR   Take 1 tablet (50 mg) by mouth 2 times daily   Last time this was given:  50 mg on 10/26/2018  8:03 PM   Last time this was given:  10/26/2018  8:03 PM   Next Dose Due:  10/27/2018 PM                                ondansetron 4 MG ODT tab   Commonly known as:  ZOFRAN-ODT   Take 4 mg by mouth every 8 hours as needed   Last time this was given:  Not given during hospitalization                                 oxyCODONE IR 5 MG tablet   Commonly known as:  ROXICODONE   Take 1 tablet (5 mg) by mouth every 4 hours as needed for moderate to severe pain   Last time this was given:  Not given during hospitalization                                 primidone 250 MG tablet   Commonly known as:  MYSOLINE   Take 250 mg by mouth 2 times daily   Last time this was given:  250 mg on 10/27/2018  8:39 AM   Last time this was given:  10/27/2018  8:39 AM   Next Dose Due:  10/27/2018  2100                                ranitidine HCl 300 MG Caps   Take 300 mg by mouth daily   Last time this was given:  Not given during hospitalization                                 * Notice:  This list has 2 medication(s) that are the same as other medications prescribed for you. Read the directions carefully, and ask your doctor or other care provider to review them with you.              More Information        Colorectal Surgery: Recovering in the Hospital and at Home     Your healthcare provider will help you take short walks soon after surgery.     When the surgery is done, you ll be taken to the recovery room (also called the post-anesthesia care unit or PACU). Here, your blood pressure, pulse, and  breathing will be carefully monitored. You ll also receive pain medicine to keep you comfortable. When you re ready, you ll be moved to a regular hospital room. You ll then be monitored closely to be sure you re healing well. Your hospital stay may last from a few days to a week, or longer. Once home, follow instructions to help ensure a full recovery.  Right after surgery  If you have a urinary catheter, it will probably be removed shortly after surgery. Your intravenous (IV) line will remain in place for a few days to give you fluids. And you ll continue to receive medicine for pain. Soon after surgery, you ll be up and walking around. This helps improve blood flow and prevent blood clots. It also helps your bowels return to normal. You ll be given breathing exercises to keep your lungs clear.  If a stoma was created during surgery, your healthcare providers will show you how to care for your stoma.   Eating again  You won t eat or drink anything until your colon begins working again. When this happens, you ll begin with a liquid diet. After that, you ll be given solid foods according to your healthcare provider s instructions.  Recovering at home  In most cases, you ll visit your healthcare provider shortly after leaving the hospital. You can get back to your normal routine about a month or two after surgery. Full recovery may take 4 to 6 weeks or longer. While your body heals, you may tire more easily. You also are likely to have some bloating. Loose stools and more frequent bowel movements are common after bowel surgery. This may get better over time, but may never disappear completely.  Resuming everyday activities  Being active helps your body heal. But you must protect your healing incisions. Follow these guidelines:    Walk as much as you feel up to.    Avoid heavy lifting or vigorous exercise until your healthcare provider says it s OK. Follow your healthcare provider s advice about climbing stairs and  bathing.    You can drive when you re no longer taking pain medicines and when you're able to depress the brake pedal without hesitation.   When to call your healthcare provider  Call your healthcare provider if you have any of the following after surgery:    Fever of 100.4 F (38 C) or higher, or as directed by your healthcare provider     Persistent nausea or vomiting    Unusual redness, swelling, or pain around your incision    Severe constipation or diarrhea    Worsening pain in the belly or around the stoma   Date Last Reviewed: 8/1/2016 2000-2017 [employer]. [address] [place] [id number]. All rights reserved. This information is not intended as a substitute for professional medical care. Always follow your healthcare professional's instructions.

## 2018-10-24 NOTE — ANESTHESIA PROCEDURE NOTES
CENTRAL LINE INSERTION PROCEDURE NOTE:   Pre-Procedure  Performed by JOSE BEARDEN  Location: OR    Procedure Times:10/24/2018 1:41 PM and 10/24/2018 1:59 PM  Pre-Anesthestic Checklist: patient identified, IV checked, site marked, risks and benefits discussed, informed consent, monitors and equipment checked, pre-op evaluation, at physician/surgeon's request and post-op pain management    Timeout  Correct Patient: Yes   Correct Procedure: Yes   Correct Site: Yes   Correct Laterality: Yes   Correct Position: Yes   Site Marked: Yes   .   Procedure Documentation   Procedure: central line  Position: supine  Patient Prep;all elements of maximal sterile barrier technique followed, mask, hat, sterile gown, sterile gloves, draped, hand hygiene, chlorhexidine gluconate and isopropyl alcohol  Diagnosis:diverticulosis, intraop hypotension    Insertion Site:right, internal jugular  Using U/S with sterile probe cover and sterile gel, vein evaluated for patency/adequacy of cortis insertion is adequate, and using realtime U/S imaging the gregory was punctured, and needle was observed entering vein on U/S. A permanent image is entered into the patient's record.     Catheter: 12 Fr, 16 cm, T.L.  Assessment/Narrative         Secured by suture and anchor securement device  Tegaderm dressing used.  blood aspirated from all lumens  All lumens flushed: Yes

## 2018-10-24 NOTE — PROGRESS NOTES
"SPIRITUAL HEALTH SERVICES Progress Note  [facility] Pre-op     consult per pt's request for  support.   Met with pt, Georgia, and her spouse, Shreyas, prior to her bowel surgery. She shares that she is more concerned about \"other medical things that could make this complicated\" than the surgery itself.   Georgia describes herself as Yazidi and invites prayer. Shared Psalm 91 and shared in prayer together. Pt was tearful afterwards. Emotional support provided.   Oriented family how to request  support once arrived on the floor.     TAIWO Armenta.  Staff    Pager #[phone]     "

## 2018-10-24 NOTE — PHARMACY-ADMISSION MEDICATION HISTORY
Med rec complete per preadmitting RN:  Changed by Time of change       Nurse Complete Maryanne Diana, RN richi Oct 23, 2018  3:24 PM           Prior to Admission medications    Medication Sig Last Dose Taking? Auth Provider   acetaminophen (TYLENOL) 500 MG tablet Take 500-1,000 mg by mouth every 6 hours as needed for mild pain Past Week at Unknown time Yes Unknown, Entered By History   albuterol (PROAIR HFA/PROVENTIL HFA/VENTOLIN HFA) 108 (90 Base) MCG/ACT Inhaler Inhale 1 puff into the lungs every 4 hours as needed for shortness of breath / dyspnea or wheezing Past Week at Unknown time Yes Unknown, Entered By History   diltiazem (CARDIZEM SR) 120 MG CP12 12 hr SR capsule Take 120 mg by mouth 2 times daily 10/24/2018 at 0905 Yes Dameon Izquierdo,    metoprolol tartrate (LOPRESSOR) 50 MG tablet Take 1 tablet (50 mg) by mouth 2 times daily 10/24/2018 at 0905 Yes Dameon Izquierdo, DO   ondansetron (ZOFRAN-ODT) 4 MG ODT tab Take 4 mg by mouth every 8 hours as needed  Past Month at Unknown time Yes Reported, Patient   primidone (MYSOLINE) 250 MG tablet Take 250 mg by mouth 2 times daily 10/24/2018 at 0905 Yes Unknown, Entered By History   ranitidine HCl 300 MG CAPS Take 300 mg by mouth daily  10/23/2018 at 2300 Yes Unknown, Entered By History   albuterol (2.5 MG/3ML) 0.083% neb solution Take 1 vial (2.5 mg) by nebulization every 6 hours as needed for shortness of breath / dyspnea More than a month at Unknown time  Iveth Veras PA-C   apixaban ANTICOAGULANT (ELIQUIS) 5 MG tablet Take 1 tablet (5 mg) by mouth 2 times daily Unknown at Unknown time  Dameon Izquierdo,    atorvastatin (LIPITOR) 80 MG tablet Take 1 tablet (80 mg) by mouth every evening Unknown at Unknown time  Dameon Izquierdo,    HYDROcodone-acetaminophen (NORCO) 5-325 MG per tablet Take 1 tablet by mouth every 6 hours as needed for severe pain More than a month at Unknown time  Mike Mejia MD

## 2018-10-24 NOTE — PROGRESS NOTES
"[facility]  WO Nurse Ostomy Marking and Education         Data:   History:    divertic with impending surgery, pt recently admitted for AF RVR with Angiogram 10/19/2018.      Pt referred by Dr. Cordova  Who is present for marking and education: spouse and pt  Type of ostomy surgery:  unsure  Abdomen:  Large, rounded, skin folds           Intervention:     Patient's chart evaluated.      Assessments done today:  Pt observed lying, sitting on cart    Education: Reviewed upcoming surgery during past admission regarding: stoma construction,post-op expectations, general ostomy cares/concerns including: differenced between colostomy/ileostomy, diet, bathing, odor, output,  activity, etc.     Patient marked with \"x\" using surgical marker  And marked LLQ; OR nurse will have Dr Cordova wilmar other side if needed          Assessment:       Learning needs/ comprehension: no prior experience, pt is ready for surgery. Pt was seen during last admission for ostomy education but was delayed due to need for angiogram and possible stenting. Pt was very nervous and requested no marking until after the angio. Pt was instructed to call WO for marking if needed. She unfortunately did not call WOC and came in as expected for the surgery. Marking done as able due to pt size and unable to stand for proper siting.          Plan:     Plan:   ? Surgery scheduled for:  now    Will plan to follow patient post operatively.              "

## 2018-10-24 NOTE — ANESTHESIA PROCEDURE NOTES
ARTERIAL LINE PROCEDURE NOTE:   Pre-Procedure  Performed by JOSE BEARDEN  Location: OR    Procedure Times:10/24/2018 2:02 PM and 10/24/2018 2:18 PM  Pre-Anesthestic Checklist: patient identified, IV checked, site marked, risks and benefits discussed, informed consent, monitors and equipment checked, pre-op evaluation, at physician/surgeon's request and post-op pain management    Timeout  Correct Patient: Yes   Correct Procedure: Yes   Correct Site: Yes   Correct Laterality: Yes   Correct Position: Yes   Site Marked: Yes   .   Procedure Documentation  Procedure: arterial line  Diagnosis:diverticulosis ASA 3    Insertion Site:left, brachial.Injection technique: ultrasound guided, Seldinger Technique and Lj's test completed .  Artery evaluated via ultrasound confirming patency.  Using realtime imaging the artery was punctured and needle was observed entering artery..  Patient Prep;all elements of maximal sterile barrier technique followed, mask, hat, sterile gown, sterile gloves, draped, hand hygiene, chlorhexidine gluconate and isopropyl alcohol    Assessment/Narrative    Catheter: 20 gauge, 12 cm     Secured by suture  Tegaderm and Biopatch dressing used.    Arterial waveform: Yes IBP within 10% of NIBP: Yes

## 2018-10-24 NOTE — LETTER
Transition Communication Hand-off for Care Transitions to Next Level of Care Provider    Name: Gema Coombs  : 1950  MRN #: [id number]  Primary Care Provider: Flavia Alberts     Primary Clinic: [facility] [address] [address]     Reason for Hospitalization:  diverticulitis  Diverticulitis of colon  Admit Date/Time: 10/24/2018 11:15 AM  Discharge Date: 10/27/18  Payor Source: Payor: PREFERREDONE / Plan: [employer] NON [facility] [employer] / Product Type: HMO /     Readmission Assessment Measure (SKYE) Risk Score/category: Elevated    Reason for Communication Hand-off Referral: Other Elevated SKYE    Discharge Plan: Home       Concern for non-adherence with plan of care:   No  Discharge Needs Assessment:      Already enrolled in Tele-monitoring program and name of program:  NA  Follow-up specialty is recommended: No    Follow-up plan:  No future appointments.    Any outstanding tests or procedures:  No    Key Recommendations:  Pt was admitted for scheduled elective Hemicolectomy d/t history of diverticulitis.  SKYE ELEVATED.  Pt was hospitalized 10/15 to 10/20 for AFIB RVR.  She did connect with Care Coordinator with  [facility] post hospitalization.  Patient was discharged on Eliquis and it is recommended to have a CBC check in month.     Savi Quan & Nieves Hernandez    AVS/Discharge Summary is the source of truth; this is a helpful guide for improved communication of patient story

## 2018-10-24 NOTE — ANESTHESIA CARE TRANSFER NOTE
Patient: Gema Coombs    Procedure(s):  interoperative colonoscopy with laparoscopic assisted sigmoid colectomy  COLONOSCOPY    Diagnosis: diverticulitis  Diagnosis Additional Information: No value filed.    Anesthesia Type:   General, ETT     Note:  Airway :Face Mask  Patient transferred to:PACU  Comments: Pt SV good tidal volume, op sxn cuff down extubated.  Transfer to pacu.  Report to PACU RN transfer care. Handoff Report: Identifed the Patient, Identified the Reponsible Provider, Reviewed the pertinent medical history, Discussed the surgical course, Reviewed Intra-OP anesthesia mangement and issues during anesthesia, Set expectations for post-procedure period and Allowed opportunity for questions and acknowledgement of understanding      Vitals: (Last set prior to Anesthesia Care Transfer)    CRNA VITALS  10/24/2018 1816 - 10/24/2018 1853      10/24/2018             Pulse: 67    SpO2: 100 %    Resp Rate (observed): 15                Electronically Signed By: NINI Silver CRNA  October 24, 2018  6:53 PM  
oral

## 2018-10-25 ENCOUNTER — CARE COORDINATION (OUTPATIENT)
Dept: FAMILY MEDICINE | Facility: CLINIC | Age: 68
End: 2018-10-25

## 2018-10-25 LAB
ANION GAP SERPL CALCULATED.3IONS-SCNC: 6 MMOL/L (ref 3–14)
BUN SERPL-MCNC: 8 MG/DL (ref 7–30)
CALCIUM SERPL-MCNC: 7.9 MG/DL (ref 8.5–10.1)
CHLORIDE SERPL-SCNC: 105 MMOL/L (ref 94–109)
CO2 SERPL-SCNC: 30 MMOL/L (ref 20–32)
CREAT SERPL-MCNC: 0.85 MG/DL (ref 0.52–1.04)
ERYTHROCYTE [DISTWIDTH] IN BLOOD BY AUTOMATED COUNT: 14.5 % (ref 10–15)
GFR SERPL CREATININE-BSD FRML MDRD: 67 ML/MIN/1.7M2
GLUCOSE SERPL-MCNC: 119 MG/DL (ref 70–99)
HCT VFR BLD AUTO: 36.9 % (ref 35–47)
HGB BLD-MCNC: 12 G/DL (ref 11.7–15.7)
MAGNESIUM SERPL-MCNC: 1.7 MG/DL (ref 1.6–2.3)
MCH RBC QN AUTO: 30.5 PG (ref 26.5–33)
MCHC RBC AUTO-ENTMCNC: 32.5 G/DL (ref 31.5–36.5)
MCV RBC AUTO: 94 FL (ref 78–100)
PLATELET # BLD AUTO: 129 10E9/L (ref 150–450)
POTASSIUM SERPL-SCNC: 4.2 MMOL/L (ref 3.4–5.3)
RBC # BLD AUTO: 3.93 10E12/L (ref 3.8–5.2)
SODIUM SERPL-SCNC: 141 MMOL/L (ref 133–144)
WBC # BLD AUTO: 9.6 10E9/L (ref 4–11)

## 2018-10-25 PROCEDURE — 25000125 ZZHC RX 250: Performed by: COLON & RECTAL SURGERY

## 2018-10-25 PROCEDURE — 25000128 H RX IP 250 OP 636: Performed by: COLON & RECTAL SURGERY

## 2018-10-25 PROCEDURE — 83735 ASSAY OF MAGNESIUM: CPT | Performed by: COLON & RECTAL SURGERY

## 2018-10-25 PROCEDURE — 99222 1ST HOSP IP/OBS MODERATE 55: CPT | Performed by: INTERNAL MEDICINE

## 2018-10-25 PROCEDURE — 36415 COLL VENOUS BLD VENIPUNCTURE: CPT | Performed by: COLON & RECTAL SURGERY

## 2018-10-25 PROCEDURE — 80048 BASIC METABOLIC PNL TOTAL CA: CPT | Performed by: COLON & RECTAL SURGERY

## 2018-10-25 PROCEDURE — 99207 ZZC CONSULT E&M CHANGED TO INITIAL LEVEL: CPT | Performed by: INTERNAL MEDICINE

## 2018-10-25 PROCEDURE — 25000132 ZZH RX MED GY IP 250 OP 250 PS 637: Performed by: COLON & RECTAL SURGERY

## 2018-10-25 PROCEDURE — 85027 COMPLETE CBC AUTOMATED: CPT | Performed by: COLON & RECTAL SURGERY

## 2018-10-25 PROCEDURE — 40000901 ZZH STATISTIC WOC PT EDUCATION, 0-15 MIN

## 2018-10-25 PROCEDURE — 12000000 ZZH R&B MED SURG/OB

## 2018-10-25 RX ORDER — LORAZEPAM 2 MG/ML
2 INJECTION INTRAMUSCULAR
Status: DISCONTINUED | OUTPATIENT
Start: 2018-10-25 | End: 2018-10-27 | Stop reason: HOSPADM

## 2018-10-25 RX ADMIN — ALVIMOPAN 12 MG: 12 CAPSULE ORAL at 08:34

## 2018-10-25 RX ADMIN — ACETAMINOPHEN 975 MG: 325 TABLET, FILM COATED ORAL at 12:58

## 2018-10-25 RX ADMIN — ALBUTEROL SULFATE 1 PUFF: 90 AEROSOL, METERED RESPIRATORY (INHALATION) at 21:02

## 2018-10-25 RX ADMIN — METOPROLOL TARTRATE 5 MG: 5 INJECTION, SOLUTION INTRAVENOUS at 19:42

## 2018-10-25 RX ADMIN — ACETAMINOPHEN 975 MG: 325 TABLET, FILM COATED ORAL at 05:40

## 2018-10-25 RX ADMIN — SODIUM CHLORIDE, POTASSIUM CHLORIDE, SODIUM LACTATE AND CALCIUM CHLORIDE: 600; 310; 30; 20 INJECTION, SOLUTION INTRAVENOUS at 05:41

## 2018-10-25 RX ADMIN — ENOXAPARIN SODIUM 40 MG: 40 INJECTION SUBCUTANEOUS at 17:12

## 2018-10-25 RX ADMIN — ALVIMOPAN 12 MG: 12 CAPSULE ORAL at 21:40

## 2018-10-25 RX ADMIN — FAMOTIDINE 20 MG: 10 INJECTION, SOLUTION INTRAVENOUS at 21:01

## 2018-10-25 RX ADMIN — CIPROFLOXACIN 400 MG: 2 INJECTION, SOLUTION INTRAVENOUS at 13:43

## 2018-10-25 RX ADMIN — METRONIDAZOLE 500 MG: 500 INJECTION, SOLUTION INTRAVENOUS at 12:57

## 2018-10-25 RX ADMIN — CIPROFLOXACIN 400 MG: 2 INJECTION, SOLUTION INTRAVENOUS at 01:12

## 2018-10-25 RX ADMIN — Medication: at 13:57

## 2018-10-25 RX ADMIN — FAMOTIDINE 20 MG: 10 INJECTION, SOLUTION INTRAVENOUS at 08:34

## 2018-10-25 RX ADMIN — METRONIDAZOLE 500 MG: 500 INJECTION, SOLUTION INTRAVENOUS at 05:49

## 2018-10-25 RX ADMIN — ATORVASTATIN CALCIUM 80 MG: 40 TABLET, FILM COATED ORAL at 19:42

## 2018-10-25 RX ADMIN — SODIUM CHLORIDE, POTASSIUM CHLORIDE, SODIUM LACTATE AND CALCIUM CHLORIDE 500 ML: 600; 310; 30; 20 INJECTION, SOLUTION INTRAVENOUS at 06:49

## 2018-10-25 RX ADMIN — ACETAMINOPHEN 975 MG: 325 TABLET, FILM COATED ORAL at 21:01

## 2018-10-25 RX ADMIN — METOPROLOL TARTRATE 5 MG: 5 INJECTION, SOLUTION INTRAVENOUS at 13:45

## 2018-10-25 ASSESSMENT — ACTIVITIES OF DAILY LIVING (ADL)
ADLS_ACUITY_SCORE: 10
ADLS_ACUITY_SCORE: 11

## 2018-10-25 ASSESSMENT — PAIN DESCRIPTION - DESCRIPTORS
DESCRIPTORS: DISCOMFORT
DESCRIPTORS: ACHING

## 2018-10-25 NOTE — PROGRESS NOTES
Care Coordination Assessment    PCP: Flavia Alberts    Referral Source:  ED/IP List    Clinical Data: Patient discharged from inpatient at [facility] 10/20/2018 with paroxysmal Atrial Fibrillation.  Patient discharged home with instructions to follow up with PCP    Plan: I will forward to Vilma in clinical support to assess and assist with appropriate follow up.

## 2018-10-25 NOTE — CONSULTS
Hematology / Oncology Consultation     Gema Coombs MRN# [id number]   YOB: 1950 Age: 68 year old   Date of Admission: 10/24/2018     Reason for consult: ITP           Assessment and Plan:   #1 Chronic ITP  #2 Atrial fibrillation  #3 Recurrent diverticulitis s/p sigmoid colectomy 10/24/2018    Gema's surgery went well and she's currently on Lovenox for post-op DVT prophylaxis.  Her platelet count is currently 142.    I had a chance to briefly discuss her case with Dr. Cordova this morning.    PLAN:  - Reasonable to continue Lovenox daily for DVT prophylaxis for the next 2-3 days until Dr. Cordova's team feels comfortable from a bleeding standpoint to switch to full dose anticoagulation for atrial fibrillation.  - Can then discontinue Lovenox and start Eliquis at 5 mg BID (no bridging needed)  - Would continue to monitor platelet count daily or every other day while she is hospitalized  - After starting Eliquis, would check CBC monthly in the outpatient setting, so we can hold it if her platelet count goes below 50.    Daron Patel M.D.  [facility]  [phone]               Chief Complaint:   ITP         History of Present Illness:   This is a very pleasant 68 year old lady with chronic ITP who received IVIG and steroids last week with improvement in platelet count prior to sigmoid colectomy yesterday for recurrent diverticulitis.  Of note, she was hospitalized last week for atrial fibrillation with RVR.  The plan is to get the patient started on Eliquis for stroke prevention after her surgery.    Gema's surgery went well yesterday.  Pain is well controlled.  Tolerating ice chips.  No flatus/BM yet.    Most recent platelet count 142 last night.            Past Medical History:   I have reviewed this patient's past medical history          Past Surgical History:   I have reviewed this patient's past surgical history           Social History:     Social History   Substance Use Topics     Smoking  status: Never Smoker     Smokeless tobacco: Never Used     Alcohol use 0.0 oz/week     0 Standard drinks or equivalent per week      Comment: rare             Family History:     Family History   Problem Relation Age of Onset     Cancer Maternal Aunt      breast-     Cancer Maternal Grandmother      breast-     HEART DISEASE Paternal Grandfather      heartattack-  at 58     HEART DISEASE Maternal Grandfather           HEART DISEASE Paternal Uncle      bypass-alive &well     Thyroid Disease Sister      hypothyroidism-alive             Medications:     Prescriptions Prior to Admission   Medication Sig Dispense Refill Last Dose     acetaminophen (TYLENOL) 500 MG tablet Take 500-1,000 mg by mouth every 6 hours as needed for mild pain   Past Week at Unknown time     albuterol (PROAIR HFA/PROVENTIL HFA/VENTOLIN HFA) 108 (90 Base) MCG/ACT Inhaler Inhale 1 puff into the lungs every 4 hours as needed for shortness of breath / dyspnea or wheezing   Past Week at Unknown time     diltiazem (CARDIZEM SR) 120 MG CP12 12 hr SR capsule Take 120 mg by mouth 2 times daily 60 capsule 0 10/24/2018 at 0905     metoprolol tartrate (LOPRESSOR) 50 MG tablet Take 1 tablet (50 mg) by mouth 2 times daily 60 tablet 0 10/24/2018 at 0905     ondansetron (ZOFRAN-ODT) 4 MG ODT tab Take 4 mg by mouth every 8 hours as needed   0 Past Month at Unknown time     primidone (MYSOLINE) 250 MG tablet Take 250 mg by mouth 2 times daily   10/24/2018 at 0905     ranitidine HCl 300 MG CAPS Take 300 mg by mouth daily    10/23/2018 at 2300     albuterol (2.5 MG/3ML) 0.083% neb solution Take 1 vial (2.5 mg) by nebulization every 6 hours as needed for shortness of breath / dyspnea 1 Box 1 More than a month at Unknown time     [START ON 10/29/2018] apixaban ANTICOAGULANT (ELIQUIS) 5 MG tablet Take 1 tablet (5 mg) by mouth 2 times daily 60 tablet 0 Unknown at Unknown time     atorvastatin (LIPITOR) 80 MG tablet Take 1 tablet (80  mg) by mouth every evening 30 tablet 0 Unknown at Unknown time     HYDROcodone-acetaminophen (NORCO) 5-325 MG per tablet Take 1 tablet by mouth every 6 hours as needed for severe pain 10 tablet 0 More than a month at Unknown time             Review of Systems:   The 10 point Review of Systems is negative other than noted in the HPI           Physical Exam:   Vitals were reviewed  Temp: (P) 96.8  F (36  C) Temp src: (P) Oral BP: (P) 124/58   Heart Rate: (P) 68 Resp: (P) 15 SpO2: (P) 99 % O2 Device: (P) None (Room air)    General:  Awake, alert, and oriented  Heart:  Irregularly irregular.  Abdomen:  Nontender, nondistended.  Laparascopic incisions clean, dry, and intact.  No evidence of infection or bleeding          Data:     Lab Results   Component Value Date    WBC 5.2 10/19/2018    HGB 14.0 10/19/2018    HCT 42.8 10/19/2018     (L) 10/24/2018     10/19/2018    POTASSIUM 3.6 10/19/2018    CHLORIDE 107 10/19/2018    CO2 29 10/19/2018    BUN 8 10/19/2018    CR 0.72 10/24/2018     (H) 10/19/2018    SED 9 06/16/2018    DD 0.9 (H) 10/15/2018    NTBNPI 2888 (H) 10/15/2018    TROPONIN 0.00 02/29/2016    TROPI <0.015 10/16/2018    AST 31 10/15/2018    ALT 47 10/15/2018    ALKPHOS 92 10/15/2018    BILITOTAL 0.4 10/15/2018    BILIDIRECT 0.1 03/04/2016    INR 0.97 02/29/2016

## 2018-10-25 NOTE — PLAN OF CARE
"Problem: Patient Care Overview  Goal: Plan of Care/Patient Progress Review  Outcome: Improving  POD#1 lap sigmoid colectomy  Patient was comfortable through the night  NPO except ice and meds, no N/V  + BS, + flatus, abd incisions CD&I with dermabond  PCA dilaudid 0.2, scheduled tylenol, rest, and ice for pain  Triple lumen RIJ, dressing with dried drainage, reinforced   Celaya 200 ml zachery output over night - removed at 0600, PRN bolus admin   Paged RT at 0000 to apply CPAP, was refused by patient O2 sats maintained > 92% on RA  Patient ambulated to bathroom without difficulty, SBA with transfers  LR 75 ml/hr - Cipro/Flagyl IV  IS and PCDs for prevention  Chronic afib, Eliquis, Cardizem, and Lopressor PTA, on Lovenox for prophylaxis  Hemoc, hosp consults    /40 (BP Location: Right arm)  Temp 97.6  F (36.4  C) (Oral)  Resp 15  Ht 1.626 m (5' 4\")  Wt 131.5 kg (290 lb)  SpO2 99%  BMI 49.78 kg/m2        "

## 2018-10-25 NOTE — CONSULTS
[facility] Hospitalist Consult     Gema Coombs MRN# [id number]   YOB: 1950 Age: 68 year old   Date of Admission: 10/24/2018  PCP is Flavia Alberts  Date of Service: 10/25/2018    Referring MD & Reason for Visit: I was asked by Nieves Cordova MD, to manage chronic medical problems.  Internal Medicine Physician Assistant: Amanda Escobedo         Assessment and Plan:   Gema Coombs is a 68 year old female with a history of RAFI, Asthma, HTN, Paroxysmal Atrial Fibrillation, Seizure Disorder, GERD, Chronic Idiopathic Thrombocytopenia, Recurrent Diverticulitis who is admitted s/p Interoperative Colonoscopy with Laparoscopic Sigmoid Colectomy.     Recurrent Diverticulitis s/p Intraoperative colonoscopy with laparoscopic-assisted sigmoid colectomy - POD #1. Doing well.     - will defer diet, activity, DVT ppx, and pain control to primary team.   - currently on Ciprofloxacin and Flagyl    Chronic ITP - followed by Hematology.  S/p IVIG and steroids last week.        Atrial Fibrillation, HTN - previous PAF 2016 with spontaneous conversion, now persistent.  Hospitalized last week 2/2 Afib with RVR.  Discharged on Cardizem SR 120mg bid, Lopressor 50mg bid and plans to start Eliquis after her surgery.  HR overnight 60s on scheduled IV Metoprolol.  - Heme consulted for anticoagulation recs - plan to continue Lovenox for 2-3 days then switch to Eliquis  - diet advanced to clear liquids this morning.  Will plan to transition from IV Metoprolol back to po Cardizem and Lopressor if she tolerates po well.    HLD - continue Atorvastatin when tolerating po    GERD - continue IV Pepcid for now and transition to po Ranitidine when tolerating po well    Seizure Disorder - first episodes at age 17 following a fall from a horse.  Last seizure 1984.  Resume home Primidone when tolerating po well.    RAFI - does not use cpap    Asthma - no signs of acute exacerbation.  Continue home albuterol prn      CODE:  Dull  Diet/IVF: clear liquid diet  GI ppx:  Pepcid  DVT ppx: Lovenox      Amanda Escobedo MS, PA-C  Internal Medicine Physician Assistant  [facility]  Pager: [phone]           Chief Complaint:   Abdominal Pain         HPI:   History is obtained from the patient and medical record. This patient is a 68 year old female with a history of RAFI, Asthma, HTN, Paroxysmal Atrial Fibrillation, Seizure Disorder, GERD, Chronic Idiopathic Thrombocytopenia, Recurrent Diverticulitis who is admitted s/p Interoperative Colonoscopy with Laparoscopic Sigmoid Colectomy.  Internal Medicine service was asked to see for management of chronic medical problems.     Patient reports multiple episodes of Diverticulitis this year now s/p colectomy.  She reports mild abdominal pain.  Denies nausea, emesis.  She has not passed flatus.  Tolerating water and ice chips well this morning.  Denies chest pain, shortness of breath, palpitations.           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     none for 3 years but EEG still shows seizure activity     Hypertension      Paroxysmal atrial fibrillation (H) 16     Sleep apnea     Doesn't use a CPAP          Past Surgical History:     Past Surgical History:   Procedure Laterality Date     BIOPSY OF BREAST, INCISIONAL      L breasts     BIOPSY OF BREAST, NEEDLE CORE  &    R breasts     C ANORECTAL MYOMECTOMY      uterine fibroids     C  DELIVERY ONLY      x 2     LAPAROSCOPY,LYSIS ADHESNS  1985    pain R side poss r/t c-sections          Social History:     Social History     Social History     Marital status:      Spouse name: Jalen     Number of children: 2     Years of education: 14     Occupational History     [employer]  Hospital     Social History Main Topics     Smoking status: Never Smoker     Smokeless tobacco: Never Used     Alcohol use 0.0 oz/week     0 Standard drinks or equivalent per week      Comment: rare     Drug use: No     Sexual activity: Yes     Partners: Male      Comment: postmenopause     Other Topics Concern     Exercise Yes     Seat Belt Yes     Self-Exams Yes     Social History Narrative          Family History:     Family History   Problem Relation Age of Onset     Cancer Maternal Aunt      breast-     Cancer Maternal Grandmother      breast-     HEART DISEASE Paternal Grandfather      heartattack-  at 58     HEART DISEASE Maternal Grandfather           HEART DISEASE Paternal Uncle      bypass-alive &well     Thyroid Disease Sister      hypothyroidism-alive          Allergies:      Allergies   Allergen Reactions     No Known Drug Allergy           Medications:     Prior to Admission medications    Medication Sig Last Dose Taking? Auth Provider   acetaminophen (TYLENOL) 500 MG tablet Take 500-1,000 mg by mouth every 6 hours as needed for mild pain Past Week at Unknown time Yes Unknown, Entered By History   albuterol (PROAIR HFA/PROVENTIL HFA/VENTOLIN HFA) 108 (90 Base) MCG/ACT Inhaler Inhale 1 puff into the lungs every 4 hours as needed for shortness of breath / dyspnea or wheezing Past Week at Unknown time Yes Unknown, Entered By History   diltiazem (CARDIZEM SR) 120 MG CP12 12 hr SR capsule Take 120 mg by mouth 2 times daily 10/24/2018 at 0905 Yes Dameon Izquierdo, DO   metoprolol tartrate (LOPRESSOR) 50 MG tablet Take 1 tablet (50 mg) by mouth 2 times daily 10/24/2018 at 0905 Yes Dameon Izquierdo, DO   ondansetron (ZOFRAN-ODT) 4 MG ODT tab Take 4 mg by mouth every 8 hours as needed  Past Month at Unknown time Yes Reported, Patient   primidone (MYSOLINE) 250 MG tablet Take 250 mg by mouth 2 times daily 10/24/2018 at 0905 Yes Unknown, Entered By History   ranitidine  "HCl 300 MG CAPS Take 300 mg by mouth daily  10/23/2018 at 2300 Yes Unknown, Entered By History   albuterol (2.5 MG/3ML) 0.083% neb solution Take 1 vial (2.5 mg) by nebulization every 6 hours as needed for shortness of breath / dyspnea More than a month at Unknown time  Iveth Veras PA-C   apixaban ANTICOAGULANT (ELIQUIS) 5 MG tablet Take 1 tablet (5 mg) by mouth 2 times daily Unknown at Unknown time  Dameon Izquierdo, DO   atorvastatin (LIPITOR) 80 MG tablet Take 1 tablet (80 mg) by mouth every evening Unknown at Unknown time  Dameon Izquierdo, DO   HYDROcodone-acetaminophen (NORCO) 5-325 MG per tablet Take 1 tablet by mouth every 6 hours as needed for severe pain More than a month at Unknown time  Mike Mejia MD          Review of Systems:   A complete ROS was performed and is negative other than what is stated in the HPI.       Physical Exam:   Blood pressure 124/58, temperature 96.8  F (36  C), temperature source Oral, resp. rate 15, height 1.626 m (5' 4\"), weight 131.5 kg (290 lb), SpO2 99 %, currently breastfeeding.  General: Alert, interactive, NAD, sitting up in bed, pleasant and cooperative.  HEENT: AT/NC, sclera anicteric, PERRL, EOMI  Chest/Resp: clear to auscultation bilaterally, no crackles or wheezes  Heart/CV: regular rate and rhythm, no murmur  Abdomen/GI: Soft, mild tenderness, nondistended. Decreased BS.  No rebound or guarding.  Extremities/MSK: No LE edema  Skin: Warm and dry  Neuro: Alert & oriented x 3, moves all extremities equally         Labs:   ROUTINE ICU LABS (Last four results)  CMP  Recent Labs  Lab 10/24/18  2328 10/19/18  0619   NA  --  142   POTASSIUM  --  3.6   CHLORIDE  --  107   CO2  --  29   ANIONGAP  --  6   GLC  --  101*   BUN  --  8   CR 0.72 0.71   GFRESTIMATED 80 82   GFRESTBLACK >90 >90   TONIA  --  8.2*     CBC  Recent Labs  Lab 10/24/18  2328 10/19/18  0619   WBC  --  5.2   RBC  --  4.67   HGB  --  14.0   HCT  --  42.8   MCV  --  92   MCH  --  30.0   MCHC "  --  32.7   RDW  --  13.9   * 98*

## 2018-10-25 NOTE — ANESTHESIA POSTPROCEDURE EVALUATION
Patient: Gema Coombs    Procedure(s):  interoperative colonoscopy with laparoscopic assisted sigmoid colectomy  COLONOSCOPY    Diagnosis:diverticulitis  Diagnosis Additional Information: Pre-operative diagnosis: diverticulitis  Post-operative diagnosis diverticulitis    Procedure: Procedure(s):  interoperative colonoscopy with laparoscopic assisted sigmoid colectomy  COLONOSCOPY, bilateral TAP block        Anesthesia Type:  General, ETT    Note:  Anesthesia Post Evaluation    Patient location during evaluation: PACU  Patient participation: Able to fully participate in evaluation  Level of consciousness: awake  Pain management: adequate  Airway patency: patent  Cardiovascular status: acceptable  Respiratory status: acceptable  Hydration status: euvolemic  PONV: controlled     Anesthetic complications: None          Last vitals:  Vitals:    10/24/18 1945 10/24/18 2000 10/24/18 2015   BP: 103/72 97/78 96/71   Resp: 12 10 12   Temp:  96.9  F (36.1  C)    SpO2: 100% 100% 100%         Electronically Signed By: Castillo Fuentes MD  October 24, 2018  8:32 PM

## 2018-10-25 NOTE — PROGRESS NOTES
"COLON & RECTAL SURGERY PROGRESS NOTE    Post-op Day #1 s/p laparoscopic sigmoid colectomy and intraoperative colonoscopy w polypectomy.    SUBJECTIVE:  Pain minimal and controlled on oral/IV analgesics.  Tolerating ice chips with no nausea/vomiting.  Not yet passing flatus/BM.  Celaya removed early this am and has had \"dribbled\" since then.  Denies fever, chills, chest pain, shortness of breath.        OBJECTIVE:  Temp:  [96  F (35.6  C)-97.6  F (36.4  C)] 97.6  F (36.4  C)  Heart Rate:  [55-70] 70  Resp:  [10-31] 15  BP: ()/() 109/40  MAP:  [83 mmHg-90 mmHg] 87 mmHg  Arterial Line BP: (117-128)/(58-65) 122/62  FiO2 (%):  [30 %] 30 %  SpO2:  [98 %-100 %] 99 %      Intake/Output Summary (Last 24 hours) at 10/25/18 0732  Last data filed at 10/25/18 0600   Gross per 24 hour   Intake             6490 ml   Output              350 ml   Net             6140 ml       Physical Exam:  GEN: obese, in no acute distress  HEENT: NCAT, sclerae anicteric, EOMi  Neck: TLC in right internal jugular vein, clean  CV: normal rate, regular (not currently in Afib)  PULM: non-labored respirations  ABD: soft, mild but appropriate tenderness to palpation, non distended, laparoscopic Incisions clean, dry, intact with no surrounding erythema or induration  EXT: warm, well perfused, no edema, no calf tenderness  NEURO: A&Ox4, non-focal  PSYCH: cooperative    LABS:   Lab Results   Component Value Date    WBC 5.2 10/19/2018    HGB 14.0 10/19/2018    HCT 42.8 10/19/2018     (L) 10/24/2018     10/19/2018    POTASSIUM 3.6 10/19/2018    CHLORIDE 107 10/19/2018    CO2 29 10/19/2018    BUN 8 10/19/2018    CR 0.72 10/24/2018     (H) 10/19/2018    SED 9 06/16/2018    DD 0.9 (H) 10/15/2018    NTBNPI 2888 (H) 10/15/2018    TROPONIN 0.00 02/29/2016    TROPI <0.015 10/16/2018    AST 31 10/15/2018    ALT 47 10/15/2018    ALKPHOS 92 10/15/2018    BILITOTAL 0.4 10/15/2018    BILIDIRECT 0.1 03/04/2016    INR 0.97 02/29/2016 "       ASSESSMENT/PLAN: Post-op Day #1 s/p laparoscopic sigmoid colectomy and intraoperative colonoscopy w polypectomy.    - Advance to CLD  - Once demonstrates ability to tolerate CLD, will transition to home po medications (metop, dilt, ranitidine, primidone)  - Cont current iv/po analgesia   - Appreciate hospatalist and hematology consultation assistance  - Will discuss w hematology the recommendations for DVT ppx in patient at high risk for DVT w thrombocytopenia  - Encourage ambulation, IS  - Will f/u AM labs     Patient discussed with Dr. Cordova.     --  Sherry Mccain MD  Colon and Rectal Surgery Fellow  [facility]  Pager: [phone]

## 2018-10-25 NOTE — PLAN OF CARE
Problem: Patient Care Overview  Goal: Plan of Care/Patient Progress Review  NEURO: A/O X 4, pleasant and cooperative  VS:VSS, Capnometry monitoring in place  TELE: N/A  RESP: No O2, pt. Stable on RA.  LS clear, equal bilaterally, IS done correctly and independently by pt.  GI: Passing flatus, no BM, BS are hypoactive and audible in all 4 quads, fluids and ambulation encouraged often  : Urinating without issues, up and ambulating to BR.    SKIN:  Incision sites are approximated and dressed with Dermabond only.  Sites look C/D/I, no concerns.  Multiple bruises along both hands, forearms and upper arms, PCDs lower leg on and active most of this shift.    PAIN: Pt. Denied pain for most of shift.  Pain is generalized in the abdominal area, PCA pump in use with minimal patient initiated dosing this shift, see active orders.  IV:Internal jugular catheter in use for IVF, sterile dressing change performed this a.m., infusing, dressing C/D/I.  TRANSFER: stand by assist  DIET: clear liquids, tolerating well  PLAN: TBD

## 2018-10-25 NOTE — OP NOTE
Procedure Date: 10/24/2018      DATE OF SERVICE: 10/24/2018      PREOPERATIVE DIAGNOSIS:  Recurrent diverticulitis.      POSTOPERATIVE DIAGNOSIS:  Recurrent diverticulitis.      PROCEDURE:  Intraoperative colonoscopy with laparoscopic-assisted sigmoid colectomy.      SURGEON:  Nieves Cordova MD      ASSISTANT:  Sherry Mccain MD ([facility] Fellow)      INDICATIONS:  Gema is a 68-year-old woman who has had numerous attacks of diverticulitis dating back to at least 2013.  She has had several CT scans demonstrating sigmoid diverticulitis.  These have been uncomplicated, but has had numerous courses of antibiotics this year and had not had a surveillance colonoscopy due to having an attack of diverticulitis with each time she has been scheduled.  She has a baseline history of epilepsy, asthma, hypertension, and paroxysmal AFib.  She has had a partial hysterectomy and 2 C-sections.      We discussed in the office the plan for laparoscopic possible open sigmoid colectomy and possible colostomy.  We discussed that given her elevated BMI, she is at increased risk.  She also has multiple medical problems.  She had an opportunity to ask questions and wished to proceed.  She was admitted to the hospital last week and had cardiac evaluation including a stress test and catheterization, and was felt to be cleared for surgery without reversible problem.  Similarly, she has known low platelet count and was evaluated by Hematology/Oncology.  She was given an infusion of IVIG and this boosted her platelet count to 131.      She and her  had an opportunity to ask questions and wished to proceed.      FINDINGS:  Upon entering the abdomen, there was significant intraabdominal fat.  There was sigmoid colon that was adherent to the left pelvic sidewall with adhesions anteriorly to the vaginal cuff.  No active inflammation per se.  No abscess.  We did do a colonoscopy and there were 2 small  polyps in the ascending colon that were removed with cold snare.  Within the transverse colon just proximal to the splenic flexure, there was a roughly 1.5 cm polyp that was removed with a hot snare.  There was no bleeding.  There was an adjacent second small polyp that was also removed with a hot snare.  There was significant diverticulum in the sigmoid colon as well as fewer in the descending, transverse and ascending colon.  No evidence of stenosis.      DESCRIPTION OF PROCEDURE:  After informed consent was obtained, the patient was taken to the operating room, positioned on the operating table in the supine position.  She was intubated.  A Celaya catheter was placed and she was positioned in modified lithotomy position.  A digital rectal exam was performed after a timeout and the colonoscope was advanced in through the anal opening and traversed to the cecum without difficulty.  Terminal ileum was intubated several centimeters.  Circumferential evaluation on withdrawal revealed a roughly 4 mm polyp in the ascending colon and this was removed with cold snare.  Just distal to that, there was another small polyp.  This was also removed and retrieved.  Within the transverse colon, there was a 1.5 cm semi-sessile polyp that was removed with a hot snare. There was no bleeding.  Just adjacent to this was another small polyp and this was removed with a hot snare.  The diverticulum as mentioned above were identified, but no evidence of active inflammation or stenosis.      The patient was then positioned in modified lithotomy position and due to some hemodynamic instability, an art line and a right IJ line were placed by Anesthesia.  Her right arm was then tucked and she was secured to the bed with wide tape. A tilt test was performed to confirm she was secure on the bed, which in fact she was.  Her abdomen was then prepped and draped in the usual fashion.  After appropriate timeout, we made a 1 cm incision above the  umbilicus and dissection was carried down through the fascia, and the peritoneal cavity was entered without difficulty.  An 0 Vicryl suture was placed at the level of fascia and the sump port was inserted.  Abdomen was insufflated to a pressure of 15 and inspection revealed some inflammation of the sigmoid down in the pelvis, but no other abnormality.  A TAP block was then performed injecting 7.5 mL of 0.25% Marcaine in each of the 4 quadrants in the pretransversalis plane.      We then placed two 5 mm ports in the right lateral abdomen and one in the left abdomen.  With this, we tilted the patient in Trendelenburg and we could see that there were adhesions of the sigmoid colon to the left pelvic sidewall.  Given her significant intraabdominal fat and a large amount of small bowel that was in the way, we elected to do a lateral to medial approach.  The line was incised lateral to the sigmoid colon and the mesentery of the sigmoid colon was mobilized up to and around the splenic flexure, entering into the lesser sac and taking the omentum off the lateral third of the transverse colon.  We then worked our way down towards the pelvis and found it to be densely adherent.  The peritoneum was scored and the colon was mobilized up off of the left pelvic sidewall.  This was somewhat tedious.  We then made a window in the mesentery on the right side of the rectosigmoid junction, incised the peritoneum on the left side just right by the bowel and made a window here.  We then could mobilize up the colon.  Given somewhat distorted planes in the left pelvic sidewall, we elected to stay very close to the colon.  We switched to a 5/30 scope and divided the bowel in the upper rectum.  We then divided the remaining mesentery up towards the soft compliant bowel in the descending-sigmoid junction.  We then inspected and found excellent hemostasis of our cut edge of our mesentery and opened a roughly 2 cm incision heading cephalad from  our supraumbilical incision.  A 5-9 Luari was placed and the specimen was easily brought out.  A Prashant and Kocher clamp were placed at our intended site of division after dividing the remaining mesentery and the bowel was divided.  A 2-0 Prolene pursestring was used to secure the anvil and we found that there was soft compliant bowel that allowed the 29 anvil to be secured without difficulty.  There was slight bleeding of the mesentery here, indicating good perfusion and this was easily controlled.  The anvil was then dropped back into the abdomen and the Lauri was used to reobtain a pneumoperitoneum.  We switched gloves and then could see that we had excellent reach of our descending colon to the upper rectum.  We assessed for proper orientation, which we could achieve easily.  I then went below and passed the sizer up through the anal opening up to the top of the rectum, which was about at 15 cm.  We then passed the stapling device, deploying the spike slightly anterior to the staple line and the anvil was then docked, ensuring no additional tissue was caught in that.  The anastomosis was closed and fired without difficulty.  Two intact anastomotic rings were noted.  The bowel proximal to the anastomosis was occluded and there was no air leak once the pelvis was submerged in water.  I could visualize the anastomosis which appeared widely patent, well perfused, tension-free and hemostatic at roughly 15 cm from the anal verge.  The air was desufflated and I switched to a closing tray.  The ports were removed and the fascia at the umbilical extraction incision was reapproximated with 0 looped PDS.  The 3 ports had been removed under direct vision and were nicely hemostatic.  4-0 Monocryl was used to reapproximate the three 5 mm ports as well as the extraction site.  Dermabond was placed.      The patient was taken to the recovery area, intubated in stable condition.      ESTIMATED BLOOD LOSS:  Less than 50 mL.       COMPLICATIONS:  No immediate complications.      SPECIMENS:   Two polyps in the ascending colon, one in the transverse colon and two in the descending colon, as well as the sigmoid colon which was opened on the back table and there was no evidence of neoplasia here.         DESTINY CSOTA MD             D: 10/24/2018   T: 10/25/2018   MT:       Name:     BEE HALE   MRN:      [id number]        Account:        [id number]   :      1950           Procedure Date: 10/24/2018      Document: [id number]       cc: Flavia Patel MD       [facility]        Gracie Titus DO

## 2018-10-25 NOTE — PLAN OF CARE
Problem: Patient Care Overview  Goal: Plan of Care/Patient Progress Review  Outcome: No Change  Came up from surgery this evening for a laparoscopic sigmoid colectomy.   Alert and oriented, not up to bedside yet.   VSS.  98% on RA.   LS clear, denies SOB.   BS hypoactive, no flatus, last bm yesterday.   Celaya in place, drained 150 postop.  4 lap sites closed with dermabond, CDI.    IV LR at 75 ml/hr.  IV flagyl.   PCA dilaudid and tylenol scheduled for pain, pt feels comfortable.  Will continue to monitor.

## 2018-10-25 NOTE — CONSULTS
NUTRITION EDUCATION      REASON FOR NUTRITION CONSULT:  Provider Order  -  Nutrition Education on a low residue diet (post op order set)    ASSESSMENT:  Defer complete nutrition assessment and instructions - patient still NPO    FOLLOW UP:   Will instruct patient prior to discharge as schedule allows      Megan Nelson RDN, LD, CNSC  Pager - 3rd floor/ICU: [phone]  Pager - All other floors: [phone]  Pager - Weekend/holiday: [phone]  Office: [phone]

## 2018-10-25 NOTE — PROGRESS NOTES
RT Note    Patient refused CPAP for tonight. Stated she does not wear one at home. Believes her breathing is good. Is agreeable to go on CPAP if oxygen desats or have increased WOB during the night.    Will continue to follow and monitor.        Ingrid Russo, RRT

## 2018-10-26 LAB
ERYTHROCYTE [DISTWIDTH] IN BLOOD BY AUTOMATED COUNT: 14.4 % (ref 10–15)
GLUCOSE SERPL-MCNC: 102 MG/DL (ref 70–99)
HCT VFR BLD AUTO: 36.2 % (ref 35–47)
HGB BLD-MCNC: 11.7 G/DL (ref 11.7–15.7)
HGB BLD-MCNC: 11.8 G/DL (ref 11.7–15.7)
MCH RBC QN AUTO: 29.8 PG (ref 26.5–33)
MCHC RBC AUTO-ENTMCNC: 32.3 G/DL (ref 31.5–36.5)
MCV RBC AUTO: 92 FL (ref 78–100)
PLATELET # BLD AUTO: 117 10E9/L (ref 150–450)
RBC # BLD AUTO: 3.93 10E12/L (ref 3.8–5.2)
WBC # BLD AUTO: 6.1 10E9/L (ref 4–11)

## 2018-10-26 PROCEDURE — 99232 SBSQ HOSP IP/OBS MODERATE 35: CPT | Performed by: INTERNAL MEDICINE

## 2018-10-26 PROCEDURE — 25000125 ZZHC RX 250: Performed by: COLON & RECTAL SURGERY

## 2018-10-26 PROCEDURE — 85027 COMPLETE CBC AUTOMATED: CPT | Performed by: COLON & RECTAL SURGERY

## 2018-10-26 PROCEDURE — 36416 COLLJ CAPILLARY BLOOD SPEC: CPT | Performed by: COLON & RECTAL SURGERY

## 2018-10-26 PROCEDURE — 82947 ASSAY GLUCOSE BLOOD QUANT: CPT | Performed by: COLON & RECTAL SURGERY

## 2018-10-26 PROCEDURE — 85018 HEMOGLOBIN: CPT | Performed by: COLON & RECTAL SURGERY

## 2018-10-26 PROCEDURE — 25000132 ZZH RX MED GY IP 250 OP 250 PS 637: Performed by: INTERNAL MEDICINE

## 2018-10-26 PROCEDURE — 25000128 H RX IP 250 OP 636: Performed by: COLON & RECTAL SURGERY

## 2018-10-26 PROCEDURE — 25000132 ZZH RX MED GY IP 250 OP 250 PS 637: Performed by: COLON & RECTAL SURGERY

## 2018-10-26 PROCEDURE — 36415 COLL VENOUS BLD VENIPUNCTURE: CPT | Performed by: COLON & RECTAL SURGERY

## 2018-10-26 PROCEDURE — 12000000 ZZH R&B MED SURG/OB

## 2018-10-26 RX ORDER — OXYCODONE HYDROCHLORIDE 5 MG/1
5 TABLET ORAL EVERY 4 HOURS PRN
Status: DISCONTINUED | OUTPATIENT
Start: 2018-10-26 | End: 2018-10-27 | Stop reason: HOSPADM

## 2018-10-26 RX ORDER — DILTIAZEM HYDROCHLORIDE 60 MG/1
120 CAPSULE, EXTENDED RELEASE ORAL 2 TIMES DAILY
Status: DISCONTINUED | OUTPATIENT
Start: 2018-10-26 | End: 2018-10-27 | Stop reason: HOSPADM

## 2018-10-26 RX ORDER — METOPROLOL TARTRATE 50 MG
50 TABLET ORAL 2 TIMES DAILY
Status: DISCONTINUED | OUTPATIENT
Start: 2018-10-26 | End: 2018-10-27 | Stop reason: HOSPADM

## 2018-10-26 RX ORDER — PRIMIDONE 250 MG/1
250 TABLET ORAL 2 TIMES DAILY
Status: DISCONTINUED | OUTPATIENT
Start: 2018-10-26 | End: 2018-10-27 | Stop reason: HOSPADM

## 2018-10-26 RX ORDER — HYDROMORPHONE HYDROCHLORIDE 1 MG/ML
0.2 INJECTION, SOLUTION INTRAMUSCULAR; INTRAVENOUS; SUBCUTANEOUS
Status: DISCONTINUED | OUTPATIENT
Start: 2018-10-26 | End: 2018-10-27 | Stop reason: HOSPADM

## 2018-10-26 RX ADMIN — METOPROLOL TARTRATE 50 MG: 50 TABLET, FILM COATED ORAL at 14:27

## 2018-10-26 RX ADMIN — DILTIAZEM HYDROCHLORIDE 120 MG: 60 CAPSULE, EXTENDED RELEASE ORAL at 14:26

## 2018-10-26 RX ADMIN — FAMOTIDINE 20 MG: 10 INJECTION, SOLUTION INTRAVENOUS at 09:48

## 2018-10-26 RX ADMIN — ALVIMOPAN 12 MG: 12 CAPSULE ORAL at 21:37

## 2018-10-26 RX ADMIN — ALVIMOPAN 12 MG: 12 CAPSULE ORAL at 09:48

## 2018-10-26 RX ADMIN — METOPROLOL TARTRATE 5 MG: 5 INJECTION, SOLUTION INTRAVENOUS at 02:19

## 2018-10-26 RX ADMIN — ENOXAPARIN SODIUM 40 MG: 40 INJECTION SUBCUTANEOUS at 17:17

## 2018-10-26 RX ADMIN — PRIMIDONE 250 MG: 250 TABLET ORAL at 20:03

## 2018-10-26 RX ADMIN — ACETAMINOPHEN 975 MG: 325 TABLET, FILM COATED ORAL at 14:25

## 2018-10-26 RX ADMIN — DILTIAZEM HYDROCHLORIDE 120 MG: 60 CAPSULE, EXTENDED RELEASE ORAL at 20:03

## 2018-10-26 RX ADMIN — METOPROLOL TARTRATE 50 MG: 50 TABLET, FILM COATED ORAL at 20:03

## 2018-10-26 RX ADMIN — PRIMIDONE 250 MG: 250 TABLET ORAL at 14:29

## 2018-10-26 RX ADMIN — ACETAMINOPHEN 975 MG: 325 TABLET, FILM COATED ORAL at 22:04

## 2018-10-26 RX ADMIN — ACETAMINOPHEN 975 MG: 325 TABLET, FILM COATED ORAL at 06:29

## 2018-10-26 RX ADMIN — ATORVASTATIN CALCIUM 80 MG: 40 TABLET, FILM COATED ORAL at 20:03

## 2018-10-26 RX ADMIN — METOPROLOL TARTRATE 5 MG: 5 INJECTION, SOLUTION INTRAVENOUS at 07:50

## 2018-10-26 RX ADMIN — RANITIDINE 300 MG: 150 TABLET ORAL at 14:25

## 2018-10-26 ASSESSMENT — ACTIVITIES OF DAILY LIVING (ADL)
ADLS_ACUITY_SCORE: 11

## 2018-10-26 ASSESSMENT — PAIN DESCRIPTION - DESCRIPTORS
DESCRIPTORS: PRESSURE
DESCRIPTORS: ACHING
DESCRIPTORS: ACHING

## 2018-10-26 NOTE — PROGRESS NOTES
COLON & RECTAL SURGERY  PROGRESS NOTE    October 26, 2018  Post-op Day # 2 lap sigmoid    SUBJECTIVE:  Patient doing well. Sitting in chair bedside. Has not required PCA overnight or today. Tolerating clears with no N/V. Passing gas and reports small smear of stool this AM. Did have some blood on toilet paper with wiping x1 this AM.    OBJECTIVE:  Temp:  [96.3  F (35.7  C)-97.9  F (36.6  C)] 97.9  F (36.6  C)  Heart Rate:  [69-92] 80  Resp:  [16-24] 18  BP: (132-161)/(50-87) 146/59  SpO2:  [92 %-95 %] 92 %    Intake/Output Summary (Last 24 hours) at 10/26/18 0910  Last data filed at 10/26/18 0617   Gross per 24 hour   Intake             2102 ml   Output             2125 ml   Net              -23 ml       GENERAL:  Awake, alert, no acute distress, sitting in chair bedside  HEAD: Normocephalic atraumatic  SCLERA: Anicteric  EXTREMITIES: Warm and well perfused  ABDOMEN:  Obese, soft, appropriately tender, non-distended. No guarding, rigidity, or peritoneal signs.   INCISION:  C/d/i, dermabond, some minor ecchymosis    LABS:  Lab Results   Component Value Date    WBC 6.1 10/26/2018     Lab Results   Component Value Date    HGB 11.7 10/26/2018     Lab Results   Component Value Date    HCT 36.2 10/26/2018     Lab Results   Component Value Date     10/26/2018     Last Basic Metabolic Panel:  Lab Results   Component Value Date     10/25/2018      Lab Results   Component Value Date    POTASSIUM 4.2 10/25/2018     Lab Results   Component Value Date    CHLORIDE 105 10/25/2018     Lab Results   Component Value Date    TONIA 7.9 10/25/2018     Lab Results   Component Value Date    CO2 30 10/25/2018     Lab Results   Component Value Date    BUN 8 10/25/2018     Lab Results   Component Value Date    CR 0.85 10/25/2018     Lab Results   Component Value Date     10/26/2018       ASSESSMENT/PLAN: POD#2 lap sigmoid colectomy with intra-op c-scope and polypectomy. AVSS. No N/V. Passing gas, small smear stool this AM.  Blood on toilet paper this morning, Hgb stable, heme/onc rec continuing lovenox.    1. Advance to full liquid diet  2. Discontinue PCA. Continue PO Tylenol and add PRN backup options (oxy and PRN IV dilaudid)  3. Continue IVF  4. OOB, ambulate  5. Appreciate recs from heme/onc.  Continue Lovenox for ppx.  6. Monitor for ongoing rectal bleeding      For questions/paging, please contact the CRS office at [phone].    Angela Saldana PA-C  [facility]  Phone: [phone]    Colon and Rectal Surgery Attending Note    Patient seen and examined independently.  Agree with above assessment and plan.  Doing well. No nausea, up walking. SMall blood with BM.  abd soft obese, mild bruising.  Plan as above.  plts and heg stable.  Repeat hgb later today.  If stable continue lovenox. No other anticoagulation at this time.     Nieves Cordova MD  [facility]  Office Phone # [phone]

## 2018-10-26 NOTE — PROGRESS NOTES
Patient ambulated to the bathroom. Had 250 ml urine output and some pradeep blood from rectum. Passing gas. Patient feeling dizzy and anxious. VSS. Denies nausea. Rest encouraged. Will pass on to day shift and continue to monitor

## 2018-10-26 NOTE — PLAN OF CARE
Problem: Patient Care Overview  Goal: Plan of Care/Patient Progress Review  Outcome: Improving  Alert and oriented.  SBA.   VSS.  98% on RA.   LS clear, denies SOB, productive cough, encouraging IS.   BS active x4, passing flatus, multiple small bm's.   Voiding well.   Lap sites closed w/ dermabond, CDI, open to air.   Coccyx blanchable red, barrier cream applied.   Advanced to full liquid diet, tolerating well, denies nausea.   PCA and IV fluids DC'd, PRN dilaudid added. Scheduled tylenol.  Colorectal surgery, Hem/Onc, Nutrition following.   Will continue to monitor.

## 2018-10-26 NOTE — PROGRESS NOTES
Spoke to patient and she states that things are going well now that she has been able to pass gas. She said that for awhile her stomach felt like it was going to blow up but she is feeling much better. The procedure went well and was just a laparoscopic so she was happy about that. I told her that she can always come in for an office visit if she has any questions or new concerns to discuss. She expressed understanding to this and had no further questions at this time.

## 2018-10-26 NOTE — PLAN OF CARE
Problem: Patient Care Overview  Goal: Plan of Care/Patient Progress Review  Outcome: Improving  POD#2 lap colectomy  Pain controlled, PCA Dilaudid 0mg used over night, tylenol scheduled  Tolerating clear liquid diet  +BS, denies Flatus, No BM  Plts 129, continue to monitor, PCDs and Lovenox prophylaxis  Lopressor IV for afib   TL RIJ drsg CD&I, infusing, LR 75 ml/hr - paged MD questioning blood draws from RIJ and hep locking lumens  SBA with transfers, voiding without difficulty

## 2018-10-26 NOTE — PROGRESS NOTES
[facility]  Hospitalist Progress Note    Jose Jay MD 10/26/2018  Text Page      Reason for Stay (Diagnosis): partial colectomy for diverticulitis         Assessment and Plan:      Summary of Stay: Gema Coombs is a 68 year old female with a history of RAFI, Asthma, HTN, Paroxysmal Atrial Fibrillation, Seizure Disorder, GERD, Chronic Idiopathic Thrombocytopenia, Recurrent Diverticulitis who is admitted s/p Interoperative Colonoscopy with Laparoscopic Sigmoid Colectomy for history of recurrent diverticulitis.      Recurrent Diverticulitis s/p Intraoperative colonoscopy with laparoscopic-assisted sigmoid colectomy on 10/24:  -- Doing well.     -Started on full liquid diet  - currently on Ciprofloxacin and Flagyl     Chronic ITP - followed by Hematology.  S/p IVIG and steroids last week.   --Platelets little bit low will follow        Atrial Fibrillation, HTN - previous PAF 2016 with spontaneous conversion, now persistent.  Hospitalized last week 2/2 Afib with RVR.  Discharged on Cardizem SR 120mg bid, Lopressor 50mg bid and plans to start Eliquis after her surgery.  HR overnight 60s on scheduled IV Metoprolol.  - Heme consulted for anticoagulation recs - plan to continue Lovenox for 2-3 days then switch to Eliquis  -We will convert back to oral medications today now that tolerating oral intake.     HLD - continue Atorvastatin      GERD -resume Ranitidine      Seizure Disorder -   Resume home Primidone when tolerating po well.     RAFI - does not use cpap     Asthma - no signs of acute exacerbation.  Continue home albuterol prn       DVT Prophylaxis: Pneumatic Compression Devices  Code Status: Full Code           Interval History (Subjective):      Had a couple small bowel movements.  Has virtually no pain and is feeling well.  Feeling bloated.                  Physical Exam:      Last Vital Signs:  /59 (BP Location: Left arm)  Temp 97.9  F (36.6  C) (Axillary)  Resp 18  Ht 1.626 m  "(5' 4\")  Wt 131.5 kg (290 lb)  SpO2 92%  BMI 49.78 kg/m2      Vital signs reviewed  General:  Alert, calm, NAD  CV: regular rate and rhythm, no murmurs or rubs  Lungs:  Clear to ascultation bilaterally, normal respiratory effort  Abdomen: Incisions healing well, nontender,  normal bowel sounds  Neuro: normal strength and sensation in all 4 extremities, cranial nerves grossly intact  Psychiatric:  Mood and affect within normal limits         Medications:      All current medications were reviewed with changes reflected in problem list.         Data:      All new lab and imaging data was reviewed.   Labs:  Hemoglobin 11.8  Platelets 117  "

## 2018-10-26 NOTE — CONSULTS
"NUTRITION ASSESSMENT & EDUCATION NOTE      REASON FOR ASSESSMENT  Gema Coombs is a 68 year old female seen by Registered Dietitian for Provider Order - Nutrition Education - low fiber diet.     Nutrition History  Nutrition History:    Diet history:     PTA pt reports being on low-fiber diet since June due to GI issues. Claims to have been pretty much only eating chicken and mashed potatoes.    No known food allergies.    CURRENT DIET ORDER  Diet: Full Liquid    Intake/Tolerance:  Tolerating liquids per RN notes.  Pt tolerated jello and water yesterday. Has been tolerating jello, coffee, water today     Anthropometrics  Height: 5'4\"  Weight: 290 lb (131.5 kg)  BMI Calculated:  49.78  IBW:  120 lbs  Weight Status:  Obesity Grade III BMI >40  % IBW:  242%  Weight History:   Wt Readings from Last 10 Encounters:   10/24/18 131.5 kg (290 lb)   10/15/18 133.3 kg (293 lb 14.6 oz)   10/15/18 132 kg (291 lb)   09/27/18 133.8 kg (295 lb)   09/12/18 135.2 kg (298 lb)   06/16/18 146.1 kg (322 lb 3.2 oz)   04/13/18 143.8 kg (317 lb)   11/20/17 (!) 142.6 kg (314 lb 6.4 oz)   09/25/17 (!) 141.1 kg (311 lb)   09/13/17 (!) 142.9 kg (315 lb)   Pt has lost about 25 lbs from this time last year (2017). Pt reports being on a weight loss program PTA.    LABS  Labs reviewed.    MEDICATIONS  Meds reviewed.    ASSESSED NUTRITION NEEDS PER APPROVED PRACTICE GUIDELINES:  Dosing Weight: 163 lbs (74kg) adjusted BW    Estimated Energy Needs: 1850 -  2220 kcals/day (25-30 Kcal/Kg  Justification:  maintenance      Estimated Protein Needs: 74 - 89 grams protein/day (1-1.2 g pro/Kg  Justification:  maintenance, post-op and preservation of lean body mass         Estimated Fluid Needs: 1850 - 2220  mL/day (1 mL/Kcal   Justification:  maintenance      MALNUTRITION:  Patient does not meet two of the following criteria necessary for diagnosing malnutrition: significant weight loss, reduced intake, subcutaneous fat loss, muscle loss or fluid " retention    NUTRITION DIAGNOSIS  No nutrition diagnosis at this time.    INTERVENTIONS  Nutrition Prescription:  Low-fiber diet if full liquids tolerated well     Implementation    Assessed learning needs, learning preferences, and willingness to learn.  a) Nutrition Education: pt denied education - reported already well aware of the diet from being on it before    Anticipate good compliance based on former knowledge of the diet    Goals    Patient verbalizes understanding of diet by listing low-fiber foods allowed on the diet.     All the above goals met during education session    Evaluation/Monitoring     Provided RD contact information for future questions    Recommend Out-Patient Nutrition Referral if further diet instructions are needed    Will re-evaluate in 7 days, on sooner, if nutrition status changes    Chana Mock, Dietetic Intern

## 2018-10-26 NOTE — PLAN OF CARE
Problem: Patient Care Overview  Goal: Plan of Care/Patient Progress Review  Outcome: No Change  Alert and oriented.  SBA.   VSS.  94% on RA.   LS clear, denies SOB, congested nonproductive cough.   BS active x4, no gas, no bm, abdomen nondistended, c/o gas discomfort.  Lap sites closed with dermabond, no drainage, open to air.   Celaya out on days, voiding well this evening.   Advanced to clears, only had jello, drinking water, tolerating well, denies nausea.   LR at 5 ml/hr, used only 0.2 mg PCA dilaudid, scheduled Tylenol.   Colorectal surgery following.  Will continue to monitor.

## 2018-10-26 NOTE — PROGRESS NOTES
Oncology/Hematology Chart Check:    Chart reviewed.  Patient not seen.  Plt 117 today.  On Lovenox for DVT prophylaxis.  Noted report of an episode of bloody output from rectum this morning, which could be post-surgical.    #1 Chronic ITP  #2 Atrial fibrillation  #3 Recurrent diverticulitis s/p sigmoid colectomy 10/24/2018     PLAN:  - Continue to monitor rectal bleeding.  The episode she had this morning could be post-surgical.  Hgb is stable.  Would not hold prophylactic Lovenox yet unless bleeding continues.  - If bleeding stops, reasonable to continue Lovenox daily for DVT prophylaxis for the next 1-2 days until Dr. Cordova's team feels comfortable from a bleeding standpoint to switch to full dose anticoagulation for atrial fibrillation.  - Can then discontinue Lovenox and start Eliquis at 5 mg BID (no bridging needed)  - Would continue to monitor platelet count daily or every other day while she is hospitalized  - After starting Eliquis, would check CBC monthly in the outpatient setting, so we can hold it if her platelet count goes below 50.     Daron Patel M.D.  [facility]  [phone]

## 2018-10-27 VITALS
OXYGEN SATURATION: 96 % | SYSTOLIC BLOOD PRESSURE: 125 MMHG | DIASTOLIC BLOOD PRESSURE: 54 MMHG | RESPIRATION RATE: 18 BRPM | TEMPERATURE: 98.1 F | HEIGHT: 64 IN | WEIGHT: 290 LBS | BODY MASS INDEX: 49.51 KG/M2

## 2018-10-27 LAB
ERYTHROCYTE [DISTWIDTH] IN BLOOD BY AUTOMATED COUNT: 14.3 % (ref 10–15)
HCT VFR BLD AUTO: 35 % (ref 35–47)
HGB BLD-MCNC: 11.4 G/DL (ref 11.7–15.7)
MCH RBC QN AUTO: 30.2 PG (ref 26.5–33)
MCHC RBC AUTO-ENTMCNC: 32.6 G/DL (ref 31.5–36.5)
MCV RBC AUTO: 93 FL (ref 78–100)
PLATELET # BLD AUTO: 118 10E9/L (ref 150–450)
RBC # BLD AUTO: 3.77 10E12/L (ref 3.8–5.2)
WBC # BLD AUTO: 6.1 10E9/L (ref 4–11)

## 2018-10-27 PROCEDURE — 25000132 ZZH RX MED GY IP 250 OP 250 PS 637: Performed by: INTERNAL MEDICINE

## 2018-10-27 PROCEDURE — 36415 COLL VENOUS BLD VENIPUNCTURE: CPT | Performed by: INTERNAL MEDICINE

## 2018-10-27 PROCEDURE — 25000132 ZZH RX MED GY IP 250 OP 250 PS 637: Performed by: COLON & RECTAL SURGERY

## 2018-10-27 PROCEDURE — 85027 COMPLETE CBC AUTOMATED: CPT | Performed by: INTERNAL MEDICINE

## 2018-10-27 RX ORDER — OXYCODONE HYDROCHLORIDE 5 MG/1
5 TABLET ORAL EVERY 4 HOURS PRN
Qty: 6 TABLET | Refills: 0 | Status: SHIPPED | OUTPATIENT
Start: 2018-10-27 | End: 2019-01-28

## 2018-10-27 RX ADMIN — PRIMIDONE 250 MG: 250 TABLET ORAL at 08:39

## 2018-10-27 RX ADMIN — ALBUTEROL SULFATE 1 PUFF: 90 AEROSOL, METERED RESPIRATORY (INHALATION) at 04:00

## 2018-10-27 RX ADMIN — ACETAMINOPHEN 975 MG: 325 TABLET, FILM COATED ORAL at 06:47

## 2018-10-27 RX ADMIN — RANITIDINE 300 MG: 150 TABLET ORAL at 08:39

## 2018-10-27 ASSESSMENT — ACTIVITIES OF DAILY LIVING (ADL)
ADLS_ACUITY_SCORE: 11

## 2018-10-27 NOTE — PLAN OF CARE
Problem: Patient Care Overview  Goal: Plan of Care/Patient Progress Review  Outcome: Adequate for Discharge Date Met: 10/27/18  Patient hospitalized for diverticulitis. Cleared for discharge to home today per MD. Discharge instructions, medications, and follow-ups reviewed with patient and spouse in detail. Discharge medications, including oxycodone prescription were sent with paitent. Patient and spouse verbalized understanding of discharge instructions. Belongings were returned to patient at time of discharge. Spouse providing transport home. internal jugular line removed by MD, site without redness. Updated Dr. Cordova patient having diarrhea, ok to discharge, patient to follow up Monday if diarrhea continues; notified patient.

## 2018-10-27 NOTE — PLAN OF CARE
Problem: Patient Care Overview  Goal: Plan of Care/Patient Progress Review  POD3 sigmoid colectomy   Lap sites dermabonded   Tolerating low fiber diet, denies nausea  BS active, passing gas, x1 large loose BM  Reports minimal abdominal discomfort - rating 1/10  Scheduled tylenol given, albuterol x1 PRN for wheezing   1+ edema on left hand, bruising noted  VSS - tmax 99.5F   Will continue to monitor

## 2018-10-27 NOTE — PROGRESS NOTES
Transition Communication Hand-off for Care Transitions to Next Level of Care Provider    Name: Gema Coombs  : 1950  MRN #: [id number]  Primary Care Provider: Flavia Alberts     Primary Clinic: [facility] [address]     Reason for Hospitalization:  diverticulitis  Diverticulitis of colon  Admit Date/Time: 10/24/2018 11:15 AM  Discharge Date: 10/27/18  Payor Source: Payor: PREFERREDONE / Plan: PREFERREDONE NON [facility] / Product Type: HMO /     Readmission Assessment Measure (SKYE) Risk Score/category: Elevated    Reason for Communication Hand-off Referral: Other Elevated SKYE    Discharge Plan: Home       Concern for non-adherence with plan of care:   No  Discharge Needs Assessment:      Already enrolled in Tele-monitoring program and name of program:  NA  Follow-up specialty is recommended: No    Follow-up plan:  No future appointments.    Any outstanding tests or procedures:  No    Key Recommendations:  Pt was admitted for scheduled elective Hemicolectomy d/t history of diverticulitis.  SKYE ELEVATED.  Pt was hospitalized 10/15 to 10/20 for AFIB RVR.  She did connect with Care Coordinator with  [facility] post hospitalization.  Patient was discharged on Eliquis and it is recommended to have a CBC check in month.     Savi Quan & Nieves Hernandez    AVS/Discharge Summary is the source of truth; this is a helpful guide for improved communication of patient story

## 2018-10-27 NOTE — PLAN OF CARE
Problem: Patient Care Overview  Goal: Plan of Care/Patient Progress Review  Outcome: No Change  Ambulatory Status:  Pt up SBA.  VS:  Vital signs:  Temp: 99  F (37.2  C) Temp src: Oral BP: 134/68   Heart Rate: 69 Resp: 20 SpO2: 92 % O2 Device: None (Room air)     Pain:  Denies.  Resp: LS Clear.  Productive cough.  Using IS.  GI:  Denies nausea.  No appetite and on low fiber diet.  BS active.  Passing flatus.  Last BM this shift. Multiple small loose stools this shift.  :  WDL.  Skin:  Bruises, Lap sites, scars, scabs, blanchable red bottom- applied barrier cream.  Disposition:  TBD.

## 2018-10-27 NOTE — PROGRESS NOTES
"Colon & Rectal Surgery Progress Note             Interval History:   Postop Day #: 3  Doing well, no nausea, several loose stools. Good control.  No bleeding                Medications:   I have reviewed this patient's current medications               Physical Exam:   Blood pressure 125/54, temperature 98.1  F (36.7  C), temperature source Oral, resp. rate 18, height 1.626 m (5' 4\"), weight 131.5 kg (290 lb), SpO2 96 %, currently breastfeeding.    Intake/Output Summary (Last 24 hours) at 10/27/18 1046  Last data filed at 10/27/18 0900   Gross per 24 hour   Intake             1179 ml   Output              800 ml   Net              379 ml     GEN:  alert  ABD:  Soft, obese, incisions without signs of infection         Data:        Lab Results   Component Value Date     10/25/2018    Lab Results   Component Value Date    CHLORIDE 105 10/25/2018    Lab Results   Component Value Date    BUN 8 10/25/2018      Lab Results   Component Value Date    POTASSIUM 4.2 10/25/2018    Lab Results   Component Value Date    CO2 30 10/25/2018    Lab Results   Component Value Date    CR 0.85 10/25/2018    CR 0.72 10/24/2018        Lab Results   Component Value Date    HGB 11.4 (L) 10/27/2018    HGB 11.8 10/26/2018     Lab Results   Component Value Date     (L) 10/27/2018     (L) 10/26/2018     Lab Results   Component Value Date    WBC 6.1 10/27/2018    WBC 6.1 10/26/2018            Assessment and Plan:   Discontinue home today.  Discontinue central line.  Path pending.     Nieves Cordova MD  [facility]  Office Phone # [phone]    "

## 2018-10-29 ENCOUNTER — PATIENT OUTREACH (OUTPATIENT)
Dept: CARE COORDINATION | Facility: CLINIC | Age: 68
End: 2018-10-29

## 2018-10-29 LAB — COPATH REPORT: NORMAL

## 2018-10-29 NOTE — PROGRESS NOTES
Clinic Care Coordination Contact  [facility]/Voicemail    Referral Source: IP Handoff  Transition Communication Hand-off for Care Transitions to Next Level of Care Provider     Name: Gema Coombs  : 1950  MRN #: [id number]  Primary Care Provider: Flavia Alberts      Primary Clinic: [facility] [address]      Reason for Hospitalization:  diverticulitis  Diverticulitis of colon  Admit Date/Time: 10/24/2018 11:15 AM  Discharge Date: 10/27/18  Payor Source: Payor: PREFERREDONE / Plan: PREFERREDONE NON [place] [facility] / Product Type: HMO /      Readmission Assessment Measure (SKYE) Risk Score/category: Elevated     Reason for Communication Hand-off Referral: Other Elevated SKYE     Discharge Plan: Home         Concern for non-adherence with plan of care:                           No  Discharge Needs Assessment:        Already enrolled in Tele-monitoring program and name of program:  NA  Follow-up specialty is recommended: No    Follow-up plan:  No future appointments.     Any outstanding tests or procedures:  No     Key Recommendations:  Pt was admitted for scheduled elective Hemicolectomy d/t history of diverticulitis.  SKYE ELEVATED.  Pt was hospitalized 10/15 to 10/20 for AFIB RVR.  She did connect with Care Coordinator with  [facility] post hospitalization.  Patient was discharged on Eliquis and it is recommended to have a CBC check in month.        Clinical Data: Care Coordinator Outreach  Outreach attempted x 1.  Left message on voicemail with call back information and requested return call.  Plan: Care Coordinator will try to reach patient again in 3-5 business days.  Germaine Simon,   [facility]  [email or web address]  [phone]      Clinic Care Coordination Contact  [facility]/Voicemail    Referral Source: IP Handoff  Clinical Data: Care Coordinator Outreach  Outreach attempted x 2.  Left message on voicemail with call back information and requested  return call.  Plan: Care Coordinator will do chart review in 10 days and will close if no needs.    Social Michael Martinez  [facility]  [email or web address]  [phone]    Clinic Care Coordination Contact    Situation: Patient chart reviewed by care coordinator.     Background: No call back from patient.      Assessment: Patient knows how to contact CC and is working with her health care team.  No need for outreach by CC at this time.      Plan/Recommendations: No further outreach by CC at this time.    Social Michael Martinez  [facility]  [email or web address]  [phone]

## 2018-10-29 NOTE — DISCHARGE SUMMARY
[facility] Discharge Summary      Gema Coombs MRN# [id number]   Age: 68 year old YOB: 1950     Date of Admission:  10/24/2018  Date of Discharge::  10/27/2018  1:15 PM  Admitting Physician:  Nieves Cordova MD  Discharge Physician:  Nieves Cordova MD     PCP:  Flavia Alberts    Disposition: Patient discharged from [facility] to home in stable condition.        Primary Diagnosis:   Recurrent diverticulitis            Discharge Medications:   Discharge Medication List as of 10/27/2018 11:56 AM      START taking these medications    Details   oxyCODONE IR (ROXICODONE) 5 MG tablet Take 1 tablet (5 mg) by mouth every 4 hours as needed for moderate to severe pain, Disp-6 tablet, R-0, Local Print         CONTINUE these medications which have NOT CHANGED    Details   acetaminophen (TYLENOL) 500 MG tablet Take 500-1,000 mg by mouth every 6 hours as needed for mild pain, Historical      albuterol (PROAIR HFA/PROVENTIL HFA/VENTOLIN HFA) 108 (90 Base) MCG/ACT Inhaler Inhale 1 puff into the lungs every 4 hours as needed for shortness of breath / dyspnea or wheezing, Historical      diltiazem (CARDIZEM SR) 120 MG CP12 12 hr SR capsule Take 120 mg by mouth 2 times daily, Disp-60 capsule, R-0, E-Prescribe      metoprolol tartrate (LOPRESSOR) 50 MG tablet Take 1 tablet (50 mg) by mouth 2 times daily, Disp-60 tablet, R-0, E-Prescribe      ondansetron (ZOFRAN-ODT) 4 MG ODT tab Take 4 mg by mouth every 8 hours as needed , R-0, Historical      primidone (MYSOLINE) 250 MG tablet Take 250 mg by mouth 2 times daily, Historical      ranitidine HCl 300 MG CAPS Take 300 mg by mouth daily , Historical      albuterol (2.5 MG/3ML) 0.083% neb solution Take 1 vial (2.5 mg) by nebulization every 6 hours as needed for shortness of breath / dyspnea, Disp-1 Box, R-1, E-Prescribe      apixaban ANTICOAGULANT (ELIQUIS) 5 MG tablet Take 1 tablet (5 mg) by mouth 2 times daily, Disp-60 tablet, R-0,  E-PrescribeDo not start taking until cleared to do so by surgeon after upcoming colon surgery.      atorvastatin (LIPITOR) 80 MG tablet Take 1 tablet (80 mg) by mouth every evening, Disp-30 tablet, R-0, E-Prescribe      HYDROcodone-acetaminophen (NORCO) 5-325 MG per tablet Take 1 tablet by mouth every 6 hours as needed for severe pain, Disp-10 tablet, R-0, Local Print                    Follow Up, Special Instructions:   Discharge diet: Low residue   Discharge activity: No heavy lifting, pushing, pulling for 6 week(s)   Discharge follow-up: Follow up with Dr. Cordova in 4 weeks   Wound care: Daily dressing changes  Ice to area for comfort  Keep wound clean and dry  May get incision wet in shower but do not soak or scrub              Procedures:   Procedure(s): Intra-op colonoscopy with lap sigmoid colectomy       No other procedures performed during this admission            Consultations:   Hospitalist, heme/onc, nutrition          Brief Hospital Summary:   Patient is a 68 year old woman who underwent intra-op colonoscopy and lap sigmoid colectomy on 10/24/18 by Dr. Cordova.   There were no immediate complications during this procedure.    Please refer to the full operative summary for details.  The patient's hospital course was unremarkable.  Pain was controlled on oral pain regimen.  She was tolerating a low fiber diet.  Bowel function had returned prior to discharge.  She recovered as anticipated and experienced no post-operative complications.         Attestation:  I have reviewed today's vital signs, notes, medications, labs and imaging.    Angela Saldana PA-C  [facility]          ADDENDUM:  Length of stay: 3 days  Indicate Y or N for the following:  UTI  No  C diff  No  PNA  No  SSI No  DVT No  PE  No  CVA No  MI No  Enterocutaneous fistula  No  Peripheral nerve injury  No  Abscess (not adjacent to anastomosis)  No  Leak No    Treated with:   Antibiotics N/A   Drain  N/A   Reoperation   N/A  Death within 30 days No  Reintubation  No  Reoperation  No   Procedure N/A    FOR CANCER CASES: N/A

## 2018-11-06 DIAGNOSIS — K21.9 GASTROESOPHAGEAL REFLUX DISEASE, ESOPHAGITIS PRESENCE NOT SPECIFIED: Primary | ICD-10-CM

## 2018-11-06 NOTE — TELEPHONE ENCOUNTER
Pending Prescriptions:                       Disp   Refills    ranitidine HCl 300 MG CAPS [Pharmacy Med *90 cap*             Sig: TAKE 1 CAPSULE BY MOUTH EVERY DAY    This is in pt chart has history another another provider prescribing?  Please fax deny or close encounter  Mart[place]leen  [phone] (home) NONE (work)

## 2018-11-15 ENCOUNTER — TELEPHONE (OUTPATIENT)
Dept: CARDIOLOGY | Facility: CLINIC | Age: 68
End: 2018-11-15

## 2018-11-15 DIAGNOSIS — I48.91 ATRIAL FIBRILLATION (H): Primary | ICD-10-CM

## 2018-11-15 DIAGNOSIS — I48.0 PAROXYSMAL ATRIAL FIBRILLATION (H): ICD-10-CM

## 2018-11-15 RX ORDER — METOPROLOL TARTRATE 50 MG
50 TABLET ORAL 2 TIMES DAILY
Qty: 60 TABLET | Refills: 0 | Status: SHIPPED | OUTPATIENT
Start: 2018-11-15 | End: 2018-11-16

## 2018-11-15 RX ORDER — DILTIAZEM HYDROCHLORIDE 120 MG/1
120 CAPSULE, EXTENDED RELEASE ORAL 2 TIMES DAILY
Qty: 60 CAPSULE | Refills: 0 | Status: SHIPPED | OUTPATIENT
Start: 2018-11-15 | End: 2018-11-19

## 2018-11-15 NOTE — TELEPHONE ENCOUNTER
"Pt called and states was discharged 10/20/18 from [facility] secondary to new onset A. Fib with RVR. Started on Diltiazem, Metoprolol and Eliquis (to be started after hemicolectomy per surgeon's recommendation). Readmitted on 10/24/18 for scheduled colon resection. Per Dr. Titus's discharge plan of care, \"Start anticoagulation with Eliquis 5mg BID AFTER hemicolectomy. Continue Eliquis for 4 weeks, and follow up with [facility] heart clinic in 1-2 mos for consideration of DCCV of AFib.\" Pt states she has not started Eliquis yet and can not remember if her surgeon said it was OK to start or not. States has not been back in A. Fib since converted to NSR prior to discharge. Needs refill of Metoprolol and Diltiazem-ordered. Instructed pt to schedule f/u cardiology CHAD OV to review plan of care and address Eliquis. Pt states she has hx of thrombocytopenia (ITP)-sees Hem/Onc for this condition. Has questions regarding safety of taking Eliquis in presence of ITP. Last Platelet count was 118 on 10/27/18. Call transferred to brittany. ERICA Trevino RN.  "

## 2018-11-16 DIAGNOSIS — I48.0 PAROXYSMAL ATRIAL FIBRILLATION (H): ICD-10-CM

## 2018-11-16 RX ORDER — METOPROLOL TARTRATE 50 MG
50 TABLET ORAL 2 TIMES DAILY
Qty: 60 TABLET | Refills: 0 | Status: SHIPPED | OUTPATIENT
Start: 2018-11-16 | End: 2019-01-12

## 2018-11-16 RX ORDER — ATORVASTATIN CALCIUM 80 MG/1
80 TABLET, FILM COATED ORAL EVERY EVENING
Qty: 30 TABLET | Refills: 0 | Status: SHIPPED | OUTPATIENT
Start: 2018-11-16 | End: 2018-12-16

## 2018-11-16 NOTE — TELEPHONE ENCOUNTER
Received incoming refill request from Nora for  Pending Prescriptions:                       Disp   Refills    atorvastatin (LIPITOR) 80 MG tablet       30 tab*0            Sig: Take 1 tablet (80 mg) by mouth every evening    metoprolol tartrate (LOPRESSOR) 50 MG tab*60 tab*0            Sig: Take 1 tablet (50 mg) by mouth 2 times daily    apixaban ANTICOAGULANT (ELIQUIS) 5 MG tab*60 tab*0            Sig: Take 1 tablet (5 mg) by mouth 2 times daily    Patient was getting these filled by other providers but Nora said patient is requesting the refills to be sent to us. Please fill in correct amount to dispense with appropriate refills amount. Routing to Dr. Alberts for review.

## 2018-11-19 ENCOUNTER — OFFICE VISIT (OUTPATIENT)
Dept: CARDIOLOGY | Facility: CLINIC | Age: 68
End: 2018-11-19
Attending: INTERNAL MEDICINE
Payer: COMMERCIAL

## 2018-11-19 VITALS
HEIGHT: 65 IN | HEART RATE: 60 BPM | BODY MASS INDEX: 47.65 KG/M2 | DIASTOLIC BLOOD PRESSURE: 78 MMHG | SYSTOLIC BLOOD PRESSURE: 138 MMHG | WEIGHT: 286 LBS

## 2018-11-19 DIAGNOSIS — I48.0 PAROXYSMAL ATRIAL FIBRILLATION (H): Primary | ICD-10-CM

## 2018-11-19 PROCEDURE — 99214 OFFICE O/P EST MOD 30 MIN: CPT | Performed by: PHYSICIAN ASSISTANT

## 2018-11-19 RX ORDER — DILTIAZEM HYDROCHLORIDE 180 MG/1
180 CAPSULE, COATED, EXTENDED RELEASE ORAL DAILY
Qty: 30 CAPSULE | Refills: 11 | Status: SHIPPED | OUTPATIENT
Start: 2018-11-19 | End: 2019-12-04

## 2018-11-19 NOTE — LETTER
11/19/2018    Flavia Alberts MD  [address] [facility] [address]  [place] [facility] [place] [address]    RE: Gema Coombs       Dear Colleague,    I had the pleasure of seeing eGma Coombs in the [facility].    Please see separate dictation for HPI, PHYSICAL EXAM AND IMPRESSION/PLAN.    CURRENT MEDICATIONS:  Current Outpatient Prescriptions   Medication Sig Dispense Refill     acetaminophen (TYLENOL) 500 MG tablet Take 500-1,000 mg by mouth every 6 hours as needed for mild pain       albuterol (2.5 MG/3ML) 0.083% neb solution Take 1 vial (2.5 mg) by nebulization every 6 hours as needed for shortness of breath / dyspnea 1 Box 1     albuterol (PROAIR HFA/PROVENTIL HFA/VENTOLIN HFA) 108 (90 Base) MCG/ACT Inhaler Inhale 1 puff into the lungs every 4 hours as needed for shortness of breath / dyspnea or wheezing       apixaban ANTICOAGULANT (ELIQUIS) 5 MG tablet Take 1 tablet (5 mg) by mouth 2 times daily 60 tablet 0     atorvastatin (LIPITOR) 80 MG tablet Take 1 tablet (80 mg) by mouth every evening 30 tablet 0     diltiazem (CARDIZEM SR) 120 MG CP12 12 hr SR capsule Take 120 mg by mouth 2 times daily 60 capsule 0     HYDROcodone-acetaminophen (NORCO) 5-325 MG per tablet Take 1 tablet by mouth every 6 hours as needed for severe pain 10 tablet 0     metoprolol tartrate (LOPRESSOR) 50 MG tablet Take 1 tablet (50 mg) by mouth 2 times daily 60 tablet 0     primidone (MYSOLINE) 250 MG tablet Take 250 mg by mouth 2 times daily       ranitidine HCl 300 MG CAPS Take 1 capsule by mouth At Bedtime 90 capsule 3     ondansetron (ZOFRAN-ODT) 4 MG ODT tab Take 4 mg by mouth every 8 hours as needed   0     oxyCODONE IR (ROXICODONE) 5 MG tablet Take 1 tablet (5 mg) by mouth every 4 hours as needed for moderate to severe pain (Patient not taking: Reported on 11/19/2018) 6 tablet 0       ALLERGIES:     Allergies   Allergen Reactions     No Known Drug Allergy        PAST MEDICAL HISTORY:  Past Medical History:    Diagnosis Date     Arrhythmia     a fib     Diverticulitis      Extrinsic asthma, unspecified      Fibromyalgia      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     none for 3 years but EEG still shows seizure activity     Hypertension      Paroxysmal atrial fibrillation (H) 16     Sleep apnea     Doesn't use a CPAP       PAST SURGICAL HISTORY:  Past Surgical History:   Procedure Laterality Date     BIOPSY OF BREAST, INCISIONAL      L breasts     BIOPSY OF BREAST, NEEDLE CORE  &    R breasts     C ANORECTAL MYOMECTOMY      uterine fibroids     C  DELIVERY ONLY      x 2     COLONOSCOPY N/A 10/24/2018    Procedure: COLONOSCOPY;  Surgeon: Nieves Cordova MD;  Location: [facility] OR     LAPAROSCOPIC ASSISTED SIGMOID COLECTOMY N/A 10/24/2018    Procedure: Intraoperative colonoscopy with laparoscopic-assisted sigmoid colectomy;  Surgeon: Nieves Cordova MD;  Location: [facility] OR     LAPAROSCOPY,LYSIS ADHESNS  1985    pain R side poss r/t c-sections       SOCIAL HISTORY:  Social History     Social History     Marital status:      Spouse name: Jalen     Number of children: 2     Years of education: 14     Occupational History     [employer]     Social History Main Topics     Smoking status: Never Smoker     Smokeless tobacco: Never Used     Alcohol use 0.0 oz/week     0 Standard drinks or equivalent per week      Comment: rare     Drug use: No     Sexual activity: Yes     Partners: Male      Comment: postmenopause     Other Topics Concern     Exercise Yes     Seat Belt Yes     Self-Exams Yes     Social History Narrative       FAMILY HISTORY:  Family History   Problem Relation Age of Onset     Cancer Maternal Aunt      breast-     Cancer Maternal Grandmother      breast-     HEART DISEASE Paternal Grandfather      heartattack-  at 58     HEART DISEASE Maternal Grandfather           HEART  DISEASE Paternal Uncle      bypass-alive &well     Thyroid Disease Sister      hypothyroidism-alive       Review of Systems:  Skin:  Negative for       Eyes:  Positive for glasses    ENT:  Negative for      Respiratory:  Positive for   ashma   Cardiovascular:    edema;Positive for;fatigue    Gastroenterology: Positive for heartburn;diarrhea;constipation    Genitourinary:  not assessed      Musculoskeletal:  Positive for fibromyalgia    Neurologic:  Positive for headaches;numbness or tingling of hands;numbness or tingling of feet    Psychiatric:  Positive for sleep disturbances    Heme/Lymph/Imm:  Positive for allergies    Endocrine:  Negative for         Reviewed. Remainder of the note dictated.    Nakita Arias PA-C        Service Date: 11/19/2018      HISTORY OF PRESENT ILLNESS:  Gema is a pleasant 68-year-old female who presents to the office today for followup after a recent hospitalization at [facility] for atrial fibrillation with RVR.      She does have a history of paroxysmal atrial fibrillation with only 1 prior known episode noted in 2016, chronic idiopathic thrombocytopenia, morbid obesity, obstructive sleep apnea, asthma, hypertension, hyperlipidemia, epilepsy and recurrent diverticulitis.  She was admitted to [facility] on 10/15 from her primary care provider's office due to atrial fibrillation with RVR and associated palpitations.  This was noted about 3 days after she had had an infusion reaction while receiving IVIG for low platelets/chronic ITP.  She was started on AV callie blocking agents for rate control.  She initially was not started on anticoagulation due to the chronic ITP and need for a hemicolectomy due to the recurrent diverticulitis.  During her hospitalization, she did have some chest discomfort.  She underwent a nuclear stress test that was concerning for an inferior area of ischemia.  She went on to cardiac catheterization which showed essentially normal coronary  linda  Heme/Onc was involved with her care and did give the okay to start on anticoagulation; however, ultimately it was recommended that she wait until after her hemicolectomy.  At the time of her discharge from that hospitalization, she was in rate controlled atrial fibrillation.  Later that month, she did undergo her planned surgery at which time she had a laparoscopic-assisted sigmoid colectomy.  It sounds like this went well without complications.  The patient notes that she was in atrial fibrillation until the night prior to discharge at which time she noticed that her heartbeat was not irregular any longer.  She tells me that the nursing staff confirmed that she went back into a normal rhythm (I can't find this mentioned in any notes).  She tells me that she did receive some Eliquis while she was in the hospital, but after discharge she was nervous about taking the medication due to her ITP and did not continue it.      Overall, she has been feeling well since leaving the hospital.  She believes she has been in normal rhythm, she has not noted any recurrent atrial fibrillation symptoms.  She has been checking her heart rate and rhythm with an bo on her phone.  This has shown a heart rate in the 60s, with a regular rhythm.  She does mention that she has been somewhat nervous to take the Eliquis due to her history of thrombocytopenia.  I did review with her today that Dr. Patel, her hematologist, has been involved in her care.  There was a note from him on 10/26 stating that she should take the Eliquis 5 mg twice a day and recheck a CBC in a month.  After hearing this, she was agreeable to starting the medication again.  She also mentions that she had been taking diltiazem, but took her last dose this morning.  She states that her pharmacy is unable to get the 12-hour release form.      CURRENT CARDIAC MEDICATIONS:   1.  Eliquis 5 mg b.i.d.  Again, she has not been taking this recently.   2.  Atorvastatin  80 mg daily.   3.  Diltiazem 120 mg 12-hour release b.i.d.   4.  Metoprolol tartrate 50 mg b.i.d.      The remainder of her medications, allergies and review of systems were reviewed and as are documented separately.      PHYSICAL EXAMINATION:   GENERAL:  The patient is a pleasant 68-year-old female who appears her stated age.  She is in no apparent distress.   VITAL SIGNS:  Her blood pressure is 138/78, pulse is 60, weight is 286 pounds.     PULMONARY:  Breathing is nonlabored.  Lungs are clear to auscultation.   CARDIAC:  Reveals a regular rate and rhythm.  I do not appreciate any significant murmur.   EXTREMITIES:  Lower extremities show trace edema bilaterally.   NEUROLOGIC:  Alert and oriented.      She did have an echocardiogram on 10/16.  This showed normal LV size and systolic function.  She was noted to have mild mitral regurgitation.  Again, her coronary angiogram from 10/19 showed trivial coronary artery disease, preserved LV systolic function.      ASSESSMENT AND PLAN:  Gema is a pleasant 68-year-old female with a history of paroxysmal atrial fibrillation with one prior episode with spontaneous conversion to sinus rhythm in 2016 and another in 10/2018, possibly triggered by an IVIG infusion reaction.  Again, she spontaneously converted to sinus rhythm.  We did have a discussion today about the natural progression of paroxysmal atrial fibrillation and the associated thromboembolic risk.  Her CHADS-VASc score is 3 due to hypertension, female and age.  We discussed the risks and benefits of anticoagulation again.  Again, I reviewed with her her hematologist's involvement in her care.  After this, she does prefer to continue with anticoagulation and will restart the Eliquis.  It looks like her heart rate was quite well controlled when in atrial fibrillation with diltiazem and metoprolol.  Unfortunately, she is unable to obtain the 12-hour formulation of diltiazem (it sounds like there is a pharmacy  shortage).  I am going to change her to the 24-hour release form.  I did recommend that we go with 180 mg daily and she was agreeable to this.      Her recent coronary angiogram showed very minimal coronary artery disease.  She is already working on risk factor modification.  She is on statin therapy, she will be on AC I do not feel we need to add an antiplatelet especially given her history.  Her echocardiogram does show mild mitral regurgitation.  This appeared to be unchanged from about 2 years ago.  She should have a followup echocardiogram in 2-3 years unless there are indications to repeat it prior to that.      At this point, her plan is to follow up with her primary care provider routinely.  She can follow back in the Cardiology office on an as-needed basis or an annual basis per her and her primary's preference.         NAKITA MCCOLLUM PA-C             D: 2018   T: 2018   MT: EDUARD      Name:     BEE HALE   MRN:      [id number]        Account:      [id number]   :      1950           Service Date: 2018      Document: [id number]        Thank you for allowing me to participate in the care of your patient.      Sincerely,     Nakita Mccollum PA-C     [facility]    cc:   Gracie Titus DO  [address]  [place] [id number]

## 2018-11-19 NOTE — LETTER
11/19/2018      Flavia Alberts MD  [address] [address]      RE: Gema Coombs       Dear Colleague,    I had the pleasure of seeing Gema Coombs in the [facility].    Service Date: 11/19/2018      HISTORY OF PRESENT ILLNESS:  Gema is a pleasant 68-year-old female who presents to the office today for followup after a recent hospitalization at [facility] for atrial fibrillation with RVR.      She does have a history of paroxysmal atrial fibrillation with only 1 prior known episode noted in 2016, chronic idiopathic thrombocytopenia, morbid obesity, obstructive sleep apnea, asthma, hypertension, hyperlipidemia, epilepsy and recurrent diverticulitis.  She was admitted to [facility] on 10/15 from her primary care provider's office due to atrial fibrillation with RVR and associated palpitations.  This was noted about 3 days after she had had an infusion reaction while receiving IVIG for low platelets/chronic ITP.  She was started on AV callie blocking agents for rate control.  She initially was not started on anticoagulation due to the chronic ITP and need for a hemicolectomy due to the recurrent diverticulitis.  During her hospitalization, she did have some chest discomfort.  She underwent a nuclear stress test that was concerning for an inferior area of ischemia.  She went on to cardiac catheterization which showed essentially normal coronary arteries.  Heme/Onc was involved with her care and did give the okay to start on anticoagulation; however, ultimately it was recommended that she wait until after her hemicolectomy.  At the time of her discharge from that hospitalization, she was in rate controlled atrial fibrillation.  Later that month, she did undergo her planned surgery at which time she had a laparoscopic-assisted sigmoid colectomy.  It sounds like this went well without complications.  The patient notes that she was in atrial  fibrillation until the night prior to discharge at which time she noticed that her heartbeat was not irregular any longer.  She tells me that the nursing staff confirmed that she went back into a normal rhythm (I can't find this mentioned in any notes).  She tells me that she did receive some Eliquis while she was in the hospital, but after discharge she was nervous about taking the medication due to her ITP and did not continue it.      Overall, she has been feeling well since leaving the hospital.  She believes she has been in normal rhythm, she has not noted any recurrent atrial fibrillation symptoms.  She has been checking her heart rate and rhythm with an bo on her phone.  This has shown a heart rate in the 60s, with a regular rhythm.  She does mention that she has been somewhat nervous to take the Eliquis due to her history of thrombocytopenia.  I did review with her today that Dr. Patel, her hematologist, has been involved in her care.  There was a note from him on 10/26 stating that she should take the Eliquis 5 mg twice a day and recheck a CBC in a month.  After hearing this, she was agreeable to starting the medication again.  She also mentions that she had been taking diltiazem, but took her last dose this morning.  She states that her pharmacy is unable to get the 12-hour release form.      CURRENT CARDIAC MEDICATIONS:   1.  Eliquis 5 mg b.i.d.  Again, she has not been taking this recently.   2.  Atorvastatin 80 mg daily.   3.  Diltiazem 120 mg 12-hour release b.i.d.   4.  Metoprolol tartrate 50 mg b.i.d.      The remainder of her medications, allergies and review of systems were reviewed and as are documented separately.      PHYSICAL EXAMINATION:   GENERAL:  The patient is a pleasant 68-year-old female who appears her stated age.  She is in no apparent distress.   VITAL SIGNS:  Her blood pressure is 138/78, pulse is 60, weight is 286 pounds.     PULMONARY:  Breathing is nonlabored.  Lungs are clear to  auscultation.   CARDIAC:  Reveals a regular rate and rhythm.  I do not appreciate any significant murmur.   EXTREMITIES:  Lower extremities show trace edema bilaterally.   NEUROLOGIC:  Alert and oriented.      She did have an echocardiogram on 10/16.  This showed normal LV size and systolic function.  She was noted to have mild mitral regurgitation.  Again, her coronary angiogram from 10/19 showed trivial coronary artery disease, preserved LV systolic function.      ASSESSMENT AND PLAN:  Gema is a pleasant 68-year-old female with a history of paroxysmal atrial fibrillation with one prior episode with spontaneous conversion to sinus rhythm in 2016 and another in 10/2018, possibly triggered by an IVIG infusion reaction.  Again, she spontaneously converted to sinus rhythm.  We did have a discussion today about the natural progression of paroxysmal atrial fibrillation and the associated thromboembolic risk.  Her CHADS-VASc score is 3 due to hypertension, female and age.  We discussed the risks and benefits of anticoagulation again.  Again, I reviewed with her her hematologist's involvement in her care.  After this, she does prefer to continue with anticoagulation and will restart the Eliquis.  It looks like her heart rate was quite well controlled when in atrial fibrillation with diltiazem and metoprolol.  Unfortunately, she is unable to obtain the 12-hour formulation of diltiazem (it sounds like there is a pharmacy shortage).  I am going to change her to the 24-hour release form.  I did recommend that we go with 180 mg daily and she was agreeable to this.      Her recent coronary angiogram showed very minimal coronary artery disease.  She is already working on risk factor modification.  She is on statin therapy, she will be on AC I do not feel we need to add an antiplatelet especially given her history.  Her echocardiogram does show mild mitral regurgitation.  This appeared to be unchanged from about 2 years ago.  She  should have a followup echocardiogram in 2-3 years unless there are indications to repeat it prior to that.      At this point, her plan is to follow up with her primary care provider routinely.  She can follow back in the Cardiology office on an as-needed basis or an annual basis per her and her primary's preference.         PRACHI MCCOLLUM PA-C             D: 2018   T: 2018   MT: EDUARD      Name:     BEE HALE   MRN:      [id number]        Account:      [id number]   :      1950           Service Date: 2018      Document: [id number]           Outpatient Encounter Prescriptions as of 2018   Medication Sig Dispense Refill     acetaminophen (TYLENOL) 500 MG tablet Take 500-1,000 mg by mouth every 6 hours as needed for mild pain       albuterol (2.5 MG/3ML) 0.083% neb solution Take 1 vial (2.5 mg) by nebulization every 6 hours as needed for shortness of breath / dyspnea 1 Box 1     albuterol (PROAIR HFA/PROVENTIL HFA/VENTOLIN HFA) 108 (90 Base) MCG/ACT Inhaler Inhale 1 puff into the lungs every 4 hours as needed for shortness of breath / dyspnea or wheezing       apixaban ANTICOAGULANT (ELIQUIS) 5 MG tablet Take 1 tablet (5 mg) by mouth 2 times daily 60 tablet 0     atorvastatin (LIPITOR) 80 MG tablet Take 1 tablet (80 mg) by mouth every evening 30 tablet 0     diltiazem 180 MG 24 hr capsule Take 1 capsule (180 mg) by mouth daily 30 capsule 11     HYDROcodone-acetaminophen (NORCO) 5-325 MG per tablet Take 1 tablet by mouth every 6 hours as needed for severe pain 10 tablet 0     metoprolol tartrate (LOPRESSOR) 50 MG tablet Take 1 tablet (50 mg) by mouth 2 times daily 60 tablet 0     primidone (MYSOLINE) 250 MG tablet Take 250 mg by mouth 2 times daily       ranitidine HCl 300 MG CAPS Take 1 capsule by mouth At Bedtime 90 capsule 3     ondansetron (ZOFRAN-ODT) 4 MG ODT tab Take 4 mg by mouth every 8 hours as needed   0     oxyCODONE IR (ROXICODONE) 5 MG tablet Take 1 tablet (5  mg) by mouth every 4 hours as needed for moderate to severe pain (Patient not taking: Reported on 11/19/2018) 6 tablet 0     [DISCONTINUED] diltiazem (CARDIZEM SR) 120 MG CP12 12 hr SR capsule Take 120 mg by mouth 2 times daily 60 capsule 0     No facility-administered encounter medications on file as of 11/19/2018.        Again, thank you for allowing me to participate in the care of your patient.      Sincerely,    Nakita Arias PA-C     [facility]

## 2018-11-19 NOTE — PROGRESS NOTES
Service Date: 11/19/2018      HISTORY OF PRESENT ILLNESS:  Gema is a pleasant 68-year-old female who presents to the office today for followup after a recent hospitalization at [facility] for atrial fibrillation with RVR.      She does have a history of paroxysmal atrial fibrillation with only 1 prior known episode noted in 2016, chronic idiopathic thrombocytopenia, morbid obesity, obstructive sleep apnea, asthma, hypertension, hyperlipidemia, epilepsy and recurrent diverticulitis.  She was admitted to [facility] on 10/15 from her primary care provider's office due to atrial fibrillation with RVR and associated palpitations.  This was noted about 3 days after she had had an infusion reaction while receiving IVIG for low platelets/chronic ITP.  She was started on AV callie blocking agents for rate control.  She initially was not started on anticoagulation due to the chronic ITP and need for a hemicolectomy due to the recurrent diverticulitis.  During her hospitalization, she did have some chest discomfort.  She underwent a nuclear stress test that was concerning for an inferior area of ischemia.  She went on to cardiac catheterization which showed essentially normal coronary arteries.  Heme/Onc was involved with her care and did give the okay to start on anticoagulation; however, ultimately it was recommended that she wait until after her hemicolectomy.  At the time of her discharge from that hospitalization, she was in rate controlled atrial fibrillation.  Later that month, she did undergo her planned surgery at which time she had a laparoscopic-assisted sigmoid colectomy.  It sounds like this went well without complications.  The patient notes that she was in atrial fibrillation until the night prior to discharge at which time she noticed that her heartbeat was not irregular any longer.  She tells me that the nursing staff confirmed that she went back into a normal rhythm (I can't find this mentioned in  any notes).  She tells me that she did receive some Eliquis while she was in the hospital, but after discharge she was nervous about taking the medication due to her ITP and did not continue it.      Overall, she has been feeling well since leaving the hospital.  She believes she has been in normal rhythm, she has not noted any recurrent atrial fibrillation symptoms.  She has been checking her heart rate and rhythm with an bo on her phone.  This has shown a heart rate in the 60s, with a regular rhythm.  She does mention that she has been somewhat nervous to take the Eliquis due to her history of thrombocytopenia.  I did review with her today that Dr. Patel, her hematologist, has been involved in her care.  There was a note from him on 10/26 stating that she should take the Eliquis 5 mg twice a day and recheck a CBC in a month.  After hearing this, she was agreeable to starting the medication again.  She also mentions that she had been taking diltiazem, but took her last dose this morning.  She states that her pharmacy is unable to get the 12-hour release form.      CURRENT CARDIAC MEDICATIONS:   1.  Eliquis 5 mg b.i.d.  Again, she has not been taking this recently.   2.  Atorvastatin 80 mg daily.   3.  Diltiazem 120 mg 12-hour release b.i.d.   4.  Metoprolol tartrate 50 mg b.i.d.      The remainder of her medications, allergies and review of systems were reviewed and as are documented separately.      PHYSICAL EXAMINATION:   GENERAL:  The patient is a pleasant 68-year-old female who appears her stated age.  She is in no apparent distress.   VITAL SIGNS:  Her blood pressure is 138/78, pulse is 60, weight is 286 pounds.     PULMONARY:  Breathing is nonlabored.  Lungs are clear to auscultation.   CARDIAC:  Reveals a regular rate and rhythm.  I do not appreciate any significant murmur.   EXTREMITIES:  Lower extremities show trace edema bilaterally.   NEUROLOGIC:  Alert and oriented.      She did have an echocardiogram  on 10/16.  This showed normal LV size and systolic function.  She was noted to have mild mitral regurgitation.  Again, her coronary angiogram from 10/19 showed trivial coronary artery disease, preserved LV systolic function.      ASSESSMENT AND PLAN:  Gema is a pleasant 68-year-old female with a history of paroxysmal atrial fibrillation with one prior episode with spontaneous conversion to sinus rhythm in 2016 and another in 10/2018, possibly triggered by an IVIG infusion reaction.  Again, she spontaneously converted to sinus rhythm.  We did have a discussion today about the natural progression of paroxysmal atrial fibrillation and the associated thromboembolic risk.  Her CHADS-VASc score is 3 due to hypertension, female and age.  We discussed the risks and benefits of anticoagulation again.  Again, I reviewed with her her hematologist's involvement in her care.  After this, she does prefer to continue with anticoagulation and will restart the Eliquis.  It looks like her heart rate was quite well controlled when in atrial fibrillation with diltiazem and metoprolol.  Unfortunately, she is unable to obtain the 12-hour formulation of diltiazem (it sounds like there is a pharmacy shortage).  I am going to change her to the 24-hour release form.  I did recommend that we go with 180 mg daily and she was agreeable to this.      Her recent coronary angiogram showed very minimal coronary artery disease.  She is already working on risk factor modification.  She is on statin therapy, she will be on AC I do not feel we need to add an antiplatelet especially given her history.  Her echocardiogram does show mild mitral regurgitation.  This appeared to be unchanged from about 2 years ago.  She should have a followup echocardiogram in 2-3 years unless there are indications to repeat it prior to that.      At this point, her plan is to follow up with her primary care provider routinely.  She can follow back in the Cardiology office  on an as-needed basis or an annual basis per her and her primary's preference.         PRACHI MCCOLLUM PA-C             D: 2018   T: 2018   MT: EDUARD      Name:     BEE HALE   MRN:      [id number]        Account:      [id number]   :      1950           Service Date: 2018      Document: [id number]

## 2018-11-19 NOTE — MR AVS SNAPSHOT
After Visit Summary   11/19/2018    Gema Coombs    MRN: [id number]           Patient Information     Date Of Birth          1950        Visit Information        Provider Department      11/19/2018 12:30 PM Nakita Arias PA-C [facility]        Today's Diagnoses     Paroxysmal atrial fibrillation (H)    -  1      Care Instructions    Thank you for your  Heart Care visit today. Your provider has recommended the following:  Medication Changes:  I changed the diltiazem to 24hr release 180mg a day  Recommendations:  Nothing new at this time  Follow-up:  As needed    Reminder:  Please bring in your current medication list or your medication, over the counter supplements and vitamin bottles as we will review these at each office visit.                  Follow-ups after your visit        Your next 10 appointments already scheduled     Nov 29, 2018 10:45 AM CST   Office Visit with Flavia Alberts MD   [facility] ([facility])    [address]  [address]  [facility] [address] [phone]              Who to contact     If you have questions or need follow up information about today's clinic visit or your schedule please contact [facility] directly at [phone].  Normal or non-critical lab and imaging results will be communicated to you by MyChart, letter or phone within 4 business days after the clinic has received the results. If you do not hear from us within 7 days, please contact the clinic through MyChart or phone. If you have a critical or abnormal lab result, we will notify you by phone as soon as possible.  Submit refill requests through [employer] or call your pharmacy and they will forward the refill request to us. Please allow 3 business days for your refill to be completed.          Additional Information About Your Visit        [facility]  "Information     [facility] gives you secure access to your electronic health record. If you see a primary care provider, you can also send messages to your care team and make appointments. If you have questions, please call your primary care clinic.  If you do not have a primary care provider, please call [phone] and they will assist you.        Care EveryWhere ID     This is your Care EveryWhere ID. This could be used by other organizations to access your [facility] medical records  [id number]        Your Vitals Were     Pulse Height BMI (Body Mass Index)             60 1.638 m (5' 4.5\") 48.33 kg/m2          Blood Pressure from Last 3 Encounters:   11/19/18 138/78   10/27/18 125/54   10/20/18 109/61    Weight from Last 3 Encounters:   11/19/18 129.7 kg (286 lb)   10/24/18 131.5 kg (290 lb)   10/15/18 133.3 kg (293 lb 14.6 oz)              We Performed the Following     Follow-Up with Cardiac Advanced Practice Provider          Today's Medication Changes          These changes are accurate as of 11/19/18  1:13 PM.  If you have any questions, ask your nurse or doctor.               Start taking these medicines.        Dose/Directions    diltiazem 180 MG 24 hr capsule   Commonly known as:  CARTIA XT   Used for:  Paroxysmal atrial fibrillation (H)   Started by:  Nakita Arias PA-C        Dose:  180 mg   Take 1 capsule (180 mg) by mouth daily   Quantity:  30 capsule   Refills:  11         Stop taking these medicines if you haven't already. Please contact your care team if you have questions.     diltiazem 120 MG Cp12 12 hr SR capsule   Commonly known as:  CARDIZEM SR   Stopped by:  Nakita Arias PA-C                Where to get your medicines      These medications were sent to [id number] [place] - [address]  [address], [place] [address]     Phone:  [phone]     diltiazem 180 MG 24 hr capsule                Primary Care " Provider Office Phone # Fax #    Flavia Alberts [phone] [address] [facility] [address]  [facility] [address]        Goals        General    Improve chronic symptoms (pt-stated)     Notes - Note created  9/26/2018 11:48 AM by Germaine Simon LSW    Goal Statement: I will get off antibiotics and resolve diverticulitis with surgery.  Measure of Success: I will feel well.  Supportive Steps to Achieve: I will have surgery.  Barriers: None identified.   Strengths: Wants to have surgery.  Has joined Facebook group for people with diverticulitis and has learned that many people have favorable outcomes after having surgery.   Date to Achieve By: 12-1-2018  Patient expressed understanding of goal: yes          Equal Access to Services     KYARA CHAPMAN : Ila Geiger, waaxda luqadaha, qaybta kaalmada adejonasyarita, simba hernandez. So [facility] [phone].    ATENCIÓN: Si habla español, tiene a teresa disposición servicios gratuitos de asistencia lingüística. Llame al [phone].    We comply with applicable federal civil rights laws and [place] laws. We do not discriminate on the basis of race, color, national origin, age, disability, sex, sexual orientation, or gender identity.            Thank you!     Thank you for choosing [facility]  for your care. Our goal is always to provide you with excellent care. Hearing back from our patients is one way we can continue to improve our services. Please take a few minutes to complete the written survey that you may receive in the mail after your visit with us. Thank you!             Your Updated Medication List - Protect others around you: Learn how to safely use, store and throw away your medicines at www.disposemymeds.org.          This list is accurate as of 11/19/18  1:13 PM.  Always use your most recent med list.                   Brand Name Dispense Instructions for use  Diagnosis    acetaminophen 500 MG tablet    TYLENOL     Take 500-1,000 mg by mouth every 6 hours as needed for mild pain        * albuterol 108 (90 Base) MCG/ACT inhaler    PROAIR HFA/PROVENTIL HFA/VENTOLIN HFA     Inhale 1 puff into the lungs every 4 hours as needed for shortness of breath / dyspnea or wheezing        * albuterol (2.5 MG/3ML) 0.083% neb solution     1 Box    Take 1 vial (2.5 mg) by nebulization every 6 hours as needed for shortness of breath / dyspnea    Mild persistent asthma without complication       apixaban ANTICOAGULANT 5 MG tablet    ELIQUIS    60 tablet    Take 1 tablet (5 mg) by mouth 2 times daily    Paroxysmal atrial fibrillation (H)       atorvastatin 80 MG tablet    LIPITOR    30 tablet    Take 1 tablet (80 mg) by mouth every evening    Paroxysmal atrial fibrillation (H)       diltiazem 180 MG 24 hr capsule    CARTIA XT    30 capsule    Take 1 capsule (180 mg) by mouth daily    Paroxysmal atrial fibrillation (H)       HYDROcodone-acetaminophen 5-325 MG per tablet    NORCO    10 tablet    Take 1 tablet by mouth every 6 hours as needed for severe pain        metoprolol tartrate 50 MG tablet    LOPRESSOR    60 tablet    Take 1 tablet (50 mg) by mouth 2 times daily    Paroxysmal atrial fibrillation (H)       ondansetron 4 MG ODT tab    ZOFRAN-ODT     Take 4 mg by mouth every 8 hours as needed    Diverticulitis       oxyCODONE IR 5 MG tablet    ROXICODONE    6 tablet    Take 1 tablet (5 mg) by mouth every 4 hours as needed for moderate to severe pain    Acute diverticulitis       primidone 250 MG tablet    MYSOLINE     Take 250 mg by mouth 2 times daily        ranitidine HCl 300 MG Caps     90 capsule    Take 1 capsule by mouth At Bedtime    Gastroesophageal reflux disease, esophagitis presence not specified       * Notice:  This list has 2 medication(s) that are the same as other medications prescribed for you. Read the directions carefully, and ask your doctor or other care provider to  review them with you.

## 2018-11-19 NOTE — PROGRESS NOTES
Please see separate dictation for HPI, PHYSICAL EXAM AND IMPRESSION/PLAN.    CURRENT MEDICATIONS:  Current Outpatient Prescriptions   Medication Sig Dispense Refill     acetaminophen (TYLENOL) 500 MG tablet Take 500-1,000 mg by mouth every 6 hours as needed for mild pain       albuterol (2.5 MG/3ML) 0.083% neb solution Take 1 vial (2.5 mg) by nebulization every 6 hours as needed for shortness of breath / dyspnea 1 Box 1     albuterol (PROAIR HFA/PROVENTIL HFA/VENTOLIN HFA) 108 (90 Base) MCG/ACT Inhaler Inhale 1 puff into the lungs every 4 hours as needed for shortness of breath / dyspnea or wheezing       apixaban ANTICOAGULANT (ELIQUIS) 5 MG tablet Take 1 tablet (5 mg) by mouth 2 times daily 60 tablet 0     atorvastatin (LIPITOR) 80 MG tablet Take 1 tablet (80 mg) by mouth every evening 30 tablet 0     diltiazem (CARDIZEM SR) 120 MG CP12 12 hr SR capsule Take 120 mg by mouth 2 times daily 60 capsule 0     HYDROcodone-acetaminophen (NORCO) 5-325 MG per tablet Take 1 tablet by mouth every 6 hours as needed for severe pain 10 tablet 0     metoprolol tartrate (LOPRESSOR) 50 MG tablet Take 1 tablet (50 mg) by mouth 2 times daily 60 tablet 0     primidone (MYSOLINE) 250 MG tablet Take 250 mg by mouth 2 times daily       ranitidine HCl 300 MG CAPS Take 1 capsule by mouth At Bedtime 90 capsule 3     ondansetron (ZOFRAN-ODT) 4 MG ODT tab Take 4 mg by mouth every 8 hours as needed   0     oxyCODONE IR (ROXICODONE) 5 MG tablet Take 1 tablet (5 mg) by mouth every 4 hours as needed for moderate to severe pain (Patient not taking: Reported on 11/19/2018) 6 tablet 0       ALLERGIES:     Allergies   Allergen Reactions     No Known Drug Allergy        PAST MEDICAL HISTORY:  Past Medical History:   Diagnosis Date     Arrhythmia     a fib     Diverticulitis      Extrinsic asthma, unspecified      Fibromyalgia      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     none for 3 years but EEG still shows  seizure activity     Hypertension      Paroxysmal atrial fibrillation (H) 16     Sleep apnea     Doesn't use a CPAP       PAST SURGICAL HISTORY:  Past Surgical History:   Procedure Laterality Date     BIOPSY OF BREAST, INCISIONAL      L breasts     BIOPSY OF BREAST, NEEDLE CORE  &    R breasts     C ANORECTAL MYOMECTOMY      uterine fibroids     C  DELIVERY ONLY      x 2     COLONOSCOPY N/A 10/24/2018    Procedure: COLONOSCOPY;  Surgeon: Nieves Cordova MD;  Location: [facility] OR     LAPAROSCOPIC ASSISTED SIGMOID COLECTOMY N/A 10/24/2018    Procedure: Intraoperative colonoscopy with laparoscopic-assisted sigmoid colectomy;  Surgeon: Nieves Cordova MD;  Location: [facility] OR     LAPAROSCOPY,LYSIS ADHESNS  1985    pain R side poss r/t c-sections       SOCIAL HISTORY:  Social History     Social History     Marital status:      Spouse name: Jalen     Number of children: 2     Years of education: 14     Occupational History     [employer]     Social History Main Topics     Smoking status: Never Smoker     Smokeless tobacco: Never Used     Alcohol use 0.0 oz/week     0 Standard drinks or equivalent per week      Comment: rare     Drug use: No     Sexual activity: Yes     Partners: Male      Comment: postmenopause     Other Topics Concern     Exercise Yes     Seat Belt Yes     Self-Exams Yes     Social History Narrative       FAMILY HISTORY:  Family History   Problem Relation Age of Onset     Cancer Maternal Aunt      breast-     Cancer Maternal Grandmother      breast-     HEART DISEASE Paternal Grandfather      heartattack-  at 58     HEART DISEASE Maternal Grandfather           HEART DISEASE Paternal Uncle      bypass-alive &well     Thyroid Disease Sister      hypothyroidism-alive       Review of Systems:  Skin:  Negative for       Eyes:  Positive for glasses    ENT:  Negative for      Respiratory:  Positive for    ashma   Cardiovascular:    edema;Positive for;fatigue    Gastroenterology: Positive for heartburn;diarrhea;constipation    Genitourinary:  not assessed      Musculoskeletal:  Positive for fibromyalgia    Neurologic:  Positive for headaches;numbness or tingling of hands;numbness or tingling of feet    Psychiatric:  Positive for sleep disturbances    Heme/Lymph/Imm:  Positive for allergies    Endocrine:  Negative for         Reviewed. Remainder of the note dictated.    Nakita Arias PA-C

## 2018-11-19 NOTE — PATIENT INSTRUCTIONS
Thank you for your [facility] visit today. Your provider has recommended the following:  Medication Changes:  I changed the diltiazem to 24hr release 180mg a day  Recommendations:  Nothing new at this time  Follow-up:  As needed    Reminder:  Please bring in your current medication list or your medication, over the counter supplements and vitamin bottles as we will review these at each office visit.

## 2018-11-28 ENCOUNTER — TRANSFERRED RECORDS (OUTPATIENT)
Dept: FAMILY MEDICINE | Facility: CLINIC | Age: 68
End: 2018-11-28

## 2018-12-16 DIAGNOSIS — I48.0 PAROXYSMAL ATRIAL FIBRILLATION (H): ICD-10-CM

## 2018-12-17 NOTE — TELEPHONE ENCOUNTER
Call patient to see if she can come in this week-  After scheduling appointment- does she still need a refill before her fasting lab and office visit?

## 2018-12-17 NOTE — TELEPHONE ENCOUNTER
Pt has OV on 12/3/18 and was a no show.  Please advise, no appointment scheduled.    Gema Coombs is requesting a refill of:    Pending Prescriptions:                       Disp   Refills    atorvastatin (LIPITOR) 80 MG tablet [Phar*30 tab*0            Sig: TAKE 1 TABLET(80 MG) BY MOUTH EVERY EVENING    ELIQUIS 5 MG tablet [Pharmacy Med Name: E*60 tab*0            Sig: TAKE 1 TABLET(5 MG) BY MOUTH TWICE DAILY

## 2018-12-18 ENCOUNTER — TELEPHONE (OUTPATIENT)
Dept: FAMILY MEDICINE | Facility: CLINIC | Age: 68
End: 2018-12-18

## 2018-12-18 RX ORDER — APIXABAN 5 MG/1
TABLET, FILM COATED ORAL
Qty: 60 TABLET | Refills: 0 | Status: SHIPPED | OUTPATIENT
Start: 2018-12-18 | End: 2019-01-28 | Stop reason: SINTOL

## 2018-12-18 RX ORDER — ATORVASTATIN CALCIUM 80 MG/1
TABLET, FILM COATED ORAL
Qty: 30 TABLET | Refills: 0 | Status: SHIPPED | OUTPATIENT
Start: 2018-12-18 | End: 2019-01-28

## 2018-12-18 NOTE — TELEPHONE ENCOUNTER
Drug interaction warning between primidone and Eliquis     Should she be on coumadin instead for anticoagulation??

## 2018-12-18 NOTE — TELEPHONE ENCOUNTER
Primidone is both a strong CY inducer and P-glycoprotein inducer.     Per Eliquis package insert:  7.2 Strong Dual Inducers of CY and P-gp   Avoid concomitant use of ELIQUIS with strong dual inducers of CY and P-gp (e.g., rifampin, carbamazepine, phenytoin, Ligonier s wort) because such drugs will decrease exposure to apixaban [see Clinical Pharmacology (12.3)].     Recommend against use of Eliquis for this patient due to drug interaction with primidone. Warfarin would be an acceptable alternative.    Also reviewed her other medications for drug interactions -- may want to consider an alternative to atorvastatin that is not metabolized by CY such as rosuvastatin due to interaction with primidone.    Marietta Jara, PharmD, BCACP   (covering pharmacist for Bella MartinD)

## 2018-12-21 NOTE — TELEPHONE ENCOUNTER
Please call patient make an appointment with MTM-or me  Needs to change from Eliquis to coumadin because of drug interaction with her primidone

## 2018-12-21 NOTE — TELEPHONE ENCOUNTER
Spoke to patient and she states that she does not want to take a blood thinner. She never started actually taking the Eliquis when they gave it to her in the hospital because she was told before that she should not take a blood thinner because she has a very low platelet count. Dr. Salcedo with [facility] told her that they would just put her on metoprolol and diltiazem for the A-fib. She has not taken the Eliquis since being discharged from the hospital. I told patient that it looks like she agreed to do anticoagulation when she was seen in cardiology on 11/19/18 and she states that it is because she felt like she was being pushed into it and doesn't actually want to do it. She does not understand why she would do this when she has a low platelet count and doesn't understand how that is good for her. I told her that the message was going to be sent to Dr. Alberts for review to get her input but patient was told she will need to come in for visit to further discuss most likely.

## 2019-01-12 DIAGNOSIS — I48.0 PAROXYSMAL ATRIAL FIBRILLATION (H): ICD-10-CM

## 2019-01-14 RX ORDER — METOPROLOL TARTRATE 50 MG
TABLET ORAL
Qty: 14 TABLET | Refills: 0 | Status: SHIPPED | OUTPATIENT
Start: 2019-01-14 | End: 2019-01-28

## 2019-01-14 NOTE — TELEPHONE ENCOUNTER
Pending Prescriptions:                       Disp   Refills    metoprolol tartrate (LOPRESSOR) 50 MG tab*60 tab*0            Sig: TAKE 1 TABLET BY MOUTH TWICE DAILY    Please review for BJS    Pt due for a fasting ov  Please fax and send to [facility] for a ov  Aleksandra  [phone]

## 2019-01-27 DIAGNOSIS — J45.30 MILD PERSISTENT ASTHMA WITHOUT COMPLICATION: ICD-10-CM

## 2019-01-28 ENCOUNTER — OFFICE VISIT (OUTPATIENT)
Dept: FAMILY MEDICINE | Facility: CLINIC | Age: 69
End: 2019-01-28

## 2019-01-28 VITALS
RESPIRATION RATE: 20 BRPM | HEIGHT: 64 IN | HEART RATE: 60 BPM | BODY MASS INDEX: 49.92 KG/M2 | WEIGHT: 292.4 LBS | TEMPERATURE: 98.4 F | DIASTOLIC BLOOD PRESSURE: 80 MMHG | SYSTOLIC BLOOD PRESSURE: 152 MMHG

## 2019-01-28 DIAGNOSIS — I48.0 PAROXYSMAL ATRIAL FIBRILLATION (H): ICD-10-CM

## 2019-01-28 DIAGNOSIS — G40.909 SEIZURE DISORDER (H): Primary | ICD-10-CM

## 2019-01-28 DIAGNOSIS — E78.5 HYPERLIPIDEMIA LDL GOAL <70: ICD-10-CM

## 2019-01-28 DIAGNOSIS — I10 ESSENTIAL HYPERTENSION, BENIGN: ICD-10-CM

## 2019-01-28 DIAGNOSIS — D69.3 CHRONIC IDIOPATHIC THROMBOCYTOPENIA (H): ICD-10-CM

## 2019-01-28 DIAGNOSIS — J45.30 MILD PERSISTENT ASTHMA WITHOUT COMPLICATION: ICD-10-CM

## 2019-01-28 LAB
ERYTHROCYTE [DISTWIDTH] IN BLOOD BY AUTOMATED COUNT: 13.5 %
HCT VFR BLD AUTO: 44.3 % (ref 35–47)
HEMOGLOBIN: 14.5 G/DL (ref 11.7–15.7)
MCH RBC QN AUTO: 30.5 PG (ref 26–33)
MCHC RBC AUTO-ENTMCNC: 32.7 G/DL (ref 31–36)
MCV RBC AUTO: 93 FL (ref 78–100)
PLATELET COUNT - QUEST: 107 10^9/L (ref 150–375)
RBC # BLD AUTO: 4.76 10*12/L (ref 3.8–5.2)
WBC # BLD AUTO: 4.6 10*9/L (ref 4–11)

## 2019-01-28 PROCEDURE — 99214 OFFICE O/P EST MOD 30 MIN: CPT | Performed by: FAMILY MEDICINE

## 2019-01-28 PROCEDURE — 80184 ASSAY OF PHENOBARBITAL: CPT | Mod: 90 | Performed by: FAMILY MEDICINE

## 2019-01-28 PROCEDURE — 36415 COLL VENOUS BLD VENIPUNCTURE: CPT | Performed by: FAMILY MEDICINE

## 2019-01-28 PROCEDURE — 84460 ALANINE AMINO (ALT) (SGPT): CPT | Mod: 90 | Performed by: FAMILY MEDICINE

## 2019-01-28 PROCEDURE — 85027 COMPLETE CBC AUTOMATED: CPT | Performed by: FAMILY MEDICINE

## 2019-01-28 PROCEDURE — 80061 LIPID PANEL: CPT | Mod: 90 | Performed by: FAMILY MEDICINE

## 2019-01-28 PROCEDURE — 80188 ASSAY OF PRIMIDONE: CPT | Mod: 90 | Performed by: FAMILY MEDICINE

## 2019-01-28 RX ORDER — PRIMIDONE 250 MG/1
250 TABLET ORAL 2 TIMES DAILY
Qty: 60 TABLET | Refills: 11 | Status: SHIPPED | OUTPATIENT
Start: 2019-01-28 | End: 2020-02-28

## 2019-01-28 RX ORDER — METOPROLOL TARTRATE 50 MG
50 TABLET ORAL 2 TIMES DAILY
Qty: 60 TABLET | Refills: 11 | Status: SHIPPED | OUTPATIENT
Start: 2019-01-28 | End: 2020-02-28

## 2019-01-28 RX ORDER — ALBUTEROL SULFATE 90 UG/1
AEROSOL, METERED RESPIRATORY (INHALATION)
Qty: 8.5 G | OUTPATIENT
Start: 2019-01-28

## 2019-01-28 RX ORDER — ALBUTEROL SULFATE 0.83 MG/ML
2.5 SOLUTION RESPIRATORY (INHALATION) EVERY 6 HOURS PRN
Qty: 1 BOX | Refills: 1 | Status: ON HOLD | OUTPATIENT
Start: 2019-01-28 | End: 2021-12-06

## 2019-01-28 RX ORDER — ALBUTEROL SULFATE 90 UG/1
1 AEROSOL, METERED RESPIRATORY (INHALATION) EVERY 4 HOURS PRN
Qty: 1 INHALER | Refills: 1 | Status: SHIPPED | OUTPATIENT
Start: 2019-01-28 | End: 2019-04-02

## 2019-01-28 ASSESSMENT — MIFFLIN-ST. JEOR: SCORE: 1851.32

## 2019-01-28 NOTE — TELEPHONE ENCOUNTER
Pending Prescriptions:                       Disp   Refills    PROAIR  (90 Base) MCG/ACT inhaler *8.5 g               Sig: INHALE 1 PUFFS INTO THE LUNGS EVERY 4 HOURS AS           NEEDED FOR SHORTNESS OF BREATH, DYSPNEA, OR           WHEEZING    This is in pt chart as reported   PT SEEING YOU TODAY AT 9:30  Fax or deny  Aleksandra  [phone] (home) NONE (work)

## 2019-01-28 NOTE — PROGRESS NOTES
SUBJECTIVE:  68 year old female who has a history of paroxysmal atrial fibriallation, rate controlled,   requests refill of her medications    Hospitalized 10/2018-  Cardiac cath- trivial coronary artery disease- atorvastatin 80 mg initiated   Also Eliquis, diltiazem and metoprolol initiated for treatment of atrial fibrillation  She monitors her pulse with an bo- regular pulse- (phone alert if pulse is irregular)    Cardiac consult with Dr Arias in November  Medications reviewed-    atorvastatin 80  eliquis 5 mg bid  Diltiazem  180 qday  Metoprolol 50 mg bid    I advised discontinuing Eliquis because of interactions with Primidone-   Has not taken Eiquis since mid December.  MTM consult advised.    Assessment   (G40.909) Seizure disorder (H)  (primary encounter diagnosis)  Comment:  Primidone refilled- serum level ordered  No seizure for years on her medication  No apparent side effects  Plan: primidone (MYSOLINE) 250 MG tablet,         HEMOGRAM/PLATELET ([facility]), VENOUS COLLECTION,         Primidone ([facility])            (I48.0) Paroxysmal atrial fibrillation (H)  Comment: NO change in current medications   Plan: metoprolol tartrate (LOPRESSOR) 50 MG tablet refilled needed           (J45.30) Mild persistent asthma without complication  Formerly on daily steroid inhaler- discontinued because of concerns of possible side effects  Comment:  ACT scores 20 (using her albuterol inhaler two or three times a week)  Plan: albuterol (PROAIR HFA/PROVENTIL HFA/VENTOLIN         HFA) 108 (90 Base) MCG/ACT inhaler, albuterol         (PROVENTIL) (2.5 MG/3ML) 0.083% neb solution            (D69.3) Chronic idiopathic thrombocytopenia (H)  Comment Has not been seen by hematologist since before her sigmoid colectomy  Platelet Count   Date Value Ref Range Status   01/28/2019 107 (A) 150 - 375 10^9/L Final   10/27/2018 118 (L) 150 - 450 10e9/L Final     Plan: HEMOGRAM/PLATELET ([facility]), VENOUS COLLECTION          She will schedule a follow  "up consult- for recommendations for surveillance    Hyperlipidemia:  Recheck lipid and ALT after initiating atorvastatin 80 mg   She denies myalgias       Indication for atorvastatin 80- ? Trivial arteriosclerosis?  Send message to cardiologist regarding dosing  Labs are pending.     ROS: 10 point ROS neg other than the symptoms noted above in the HPI.    OBJECTIVE:  /80 (BP Location: Left arm, Patient Position: Chair, Cuff Size: Adult Large)   Pulse 60   Temp 98.4  F (36.9  C) (Oral)   Resp 20   Ht 1.626 m (5' 4\")   Wt 132.6 kg (292 lb 6.4 oz)   Breastfeeding? No   BMI 50.19 kg/m    Weight is similar to last October-   No acute distress  Skin: no dermatitis  Heart: RRR without murmur  Chest is clear- no rales or rhonchi  No peripheral edema.    Assessment   See above.  AAP completed    Recheck:  One year   She will schedule appointment with hematology            "

## 2019-01-28 NOTE — NURSING NOTE
Gema Coombs is here for a medication check and refill.  Questioned patient about current smoking habits.  Pt. has never smoked.  PULSE regular  My Chart: active  CLASSIFICATION OF OVERWEIGHT AND OBESITY BY BMI                        Obesity Class           BMI(kg/m2)  Underweight                                    < 18.5  Normal                                         18.5-24.9  Overweight                                     25.0-29.9  OBESITY                     I                  30.0-34.9                             II                 35.0-39.9  EXTREME OBESITY             III                >40                            Patient's  BMI Body mass index is 50.19 kg/m .  http://hin.nhlbi.nih.gov/menuplanner/menu.cgi  Pre-visit planning  Immunizations - up to date  Colonoscopy - is up to date  Mammogram - is due and to be scheduled by patient for later completion  Asthma -   PHQ9 -    JASSON-7 -

## 2019-01-28 NOTE — LETTER
My Asthma Action Plan  Name: Gema Coombs   YOB: 1950  Date: 1/28/2019   My doctor: Flavia Alberts MD   My clinic: [facility]        My Control Medicine: None  My Rescue Medicine: Albuterol (Proair/Ventolin/Proventil) inhaler 2 puffs every four hours as needed for shortness of breath   My Asthma Severity: mild persistent  Avoid your asthma triggers: animal dander  animal dander            GREEN ZONE   Good Control    I feel good    No cough or wheeze    Can work, sleep and play without asthma symptoms       Take your asthma control medicine every day.     1. If exercise triggers your asthma, take your rescue medication    15 minutes before exercise or sports, and    During exercise if you have asthma symptoms  2. Spacer to use with inhaler: If you have a spacer, make sure to use it with your inhaler             YELLOW ZONE Getting Worse  I have ANY of these:    I do not feel good    Cough or wheeze    Chest feels tight    Wake up at night   1. Keep taking your Green Zone medications  2. Start taking your rescue medicine:    every 20 minutes for up to 1 hour. Then every 4 hours for 24-48 hours.  3. If you stay in the Yellow Zone for more than 12-24 hours, contact your doctor.  4. If you do not return to the Green Zone in 12-24 hours or you get worse, start taking your oral steroid medicine if prescribed by your provider.           RED ZONE Medical Alert - Get Help  I have ANY of these:    I feel awful    Medicine is not helping    Breathing getting harder    Trouble walking or talking    Nose opens wide to breathe       1. Take your rescue medicine NOW  2. If your provider has prescribed an oral steroid medicine, start taking it NOW  3. Call your doctor NOW  4. If you are still in the Red Zone after 20 minutes and you have not reached your doctor:    Take your rescue medicine again and    Call 911 or go to the emergency room right away    See your regular doctor within 2  weeks of an Emergency Room or Urgent Care visit for follow-up treatment.          Annual Reminders:  Meet with Asthma Educator,  Flu Shot in the Fall, consider Pneumonia Vaccination for patients with asthma (aged 19 and older).    Pharmacy: [id number] [place] [facility] [address] AT [facility] [address]                      Asthma Triggers  How To Control Things That Make Your Asthma Worse    Triggers are things that make your asthma worse.  Look at the list below to help you find your triggers and what you can do about them.  You can help prevent asthma flare-ups by staying away from your triggers.      Trigger                                                          What you can do   Cigarette Smoke  Tobacco smoke can make asthma worse. Do not allow smoking in your home, car or around you.  Be sure no one smokes at a child s day care or school.  If you smoke, ask your health care provider for ways to help you quit.  Ask family members to quit too.  Ask your health care provider for a referral to Quit Plan to help you quit smoking, or call 6-344-194-PLAN.     Colds, Flu, Bronchitis  These are common triggers of asthma. Wash your hands often.  Don t touch your eyes, nose or mouth.  Get a flu shot every year.     Dust Mites  These are tiny bugs that live in cloth or carpet. They are too small to see. Wash sheets and blankets in hot water every week.   Encase pillows and mattress in dust mite proof covers.  Avoid having carpet if you can. If you have carpet, vacuum weekly.   Use a dust mask and HEPA vacuum.   Pollen and Outdoor Mold  Some people are allergic to trees, grass, or weed pollen, or molds. Try to keep your windows closed.  Limit time out doors when pollen count is high.   Ask you health care provider about taking medicine during allergy season.     Animal Dander  Some people are allergic to skin flakes, urine or saliva from pets with fur or feathers. Keep pets with fur or  feathers out of your home.    If you can t keep the pet outdoors, then keep the pet out of your bedroom.  Keep the bedroom door closed.  Keep pets off cloth furniture and away from stuffed toys.     Mice, Rats, and Cockroaches  Some people are allergic to the waste from these pests.   Cover food and garbage.  Clean up spills and food crumbs.  Store grease in the refrigerator.   Keep food out of the bedroom.   Indoor Mold  This can be a trigger if your home has high moisture. Fix leaking faucets, pipes, or other sources of water.   Clean moldy surfaces.  Dehumidify basement if it is damp and smelly.   Smoke, Strong Odors, and Sprays  These can reduce air quality. Stay away from strong odors and sprays, such as perfume, powder, hair spray, paints, smoke incense, paint, cleaning products, candles and new carpet.   Exercise or Sports  Some people with asthma have this trigger. Be active!  Ask your doctor about taking medicine before sports or exercise to prevent symptoms.    Warm up for 5-10 minutes before and after sports or exercise.     Other Triggers of Asthma  Cold air:  Cover your nose and mouth with a scarf.  Sometimes laughing or crying can be a trigger.  Some medicines and food can trigger asthma.

## 2019-01-29 ASSESSMENT — ASTHMA QUESTIONNAIRES: ACT_TOTALSCORE: 20

## 2019-01-30 LAB
ALT SERPL-CCNC: 7 U/L (ref 6–29)
CHOLEST SERPL-MCNC: 238 MG/DL
CHOLEST/HDLC SERPL: 4.1 (CALC)
HDLC SERPL-MCNC: 58 MG/DL
LDLC SERPL CALC-MCNC: 159 MG/DL (CALC)
Lab: 13.6 MG/L (ref 5–12)
NONHDLC SERPL-MCNC: 180 MG/DL (CALC)
PHENOBARBITAL SERUM: 8.1 MG/L (ref 15–40)
TRIGL SERPL-MCNC: 99 MG/DL

## 2019-02-25 ENCOUNTER — APPOINTMENT (OUTPATIENT)
Dept: GENERAL RADIOLOGY | Facility: CLINIC | Age: 69
DRG: 202 | End: 2019-02-25
Attending: EMERGENCY MEDICINE
Payer: COMMERCIAL

## 2019-02-25 ENCOUNTER — HOSPITAL ENCOUNTER (INPATIENT)
Facility: CLINIC | Age: 69
LOS: 2 days | Discharge: HOME | DRG: 202 | End: 2019-02-28
Attending: EMERGENCY MEDICINE | Admitting: INTERNAL MEDICINE
Payer: COMMERCIAL

## 2019-02-25 DIAGNOSIS — R09.02 HYPOXIA: ICD-10-CM

## 2019-02-25 DIAGNOSIS — J44.1 COPD EXACERBATION (H): ICD-10-CM

## 2019-02-25 DIAGNOSIS — J45.41 MODERATE PERSISTENT ASTHMA WITH ACUTE EXACERBATION: Primary | ICD-10-CM

## 2019-02-25 PROBLEM — J45.909 ASTHMA: Status: ACTIVE | Noted: 2019-02-25

## 2019-02-25 LAB
ANION GAP SERPL CALCULATED.3IONS-SCNC: 4 MMOL/L (ref 3–14)
BASOPHILS # BLD AUTO: 0 10E9/L (ref 0–0.2)
BASOPHILS NFR BLD AUTO: 0.2 %
BUN SERPL-MCNC: 9 MG/DL (ref 7–30)
CALCIUM SERPL-MCNC: 8.8 MG/DL (ref 8.5–10.1)
CHLORIDE SERPL-SCNC: 106 MMOL/L (ref 94–109)
CO2 SERPL-SCNC: 31 MMOL/L (ref 20–32)
CREAT SERPL-MCNC: 0.74 MG/DL (ref 0.52–1.04)
DIFFERENTIAL METHOD BLD: ABNORMAL
EOSINOPHIL # BLD AUTO: 0 10E9/L (ref 0–0.7)
EOSINOPHIL NFR BLD AUTO: 0.1 %
ERYTHROCYTE [DISTWIDTH] IN BLOOD BY AUTOMATED COUNT: 13 % (ref 10–15)
FLUAV+FLUBV AG SPEC QL: NEGATIVE
FLUAV+FLUBV AG SPEC QL: NEGATIVE
GFR SERPL CREATININE-BSD FRML MDRD: 84 ML/MIN/{1.73_M2}
GLUCOSE SERPL-MCNC: 115 MG/DL (ref 70–99)
HCT VFR BLD AUTO: 44.3 % (ref 35–47)
HGB BLD-MCNC: 14.7 G/DL (ref 11.7–15.7)
IMM GRANULOCYTES # BLD: 0 10E9/L (ref 0–0.4)
IMM GRANULOCYTES NFR BLD: 0.5 %
LYMPHOCYTES # BLD AUTO: 0.7 10E9/L (ref 0.8–5.3)
LYMPHOCYTES NFR BLD AUTO: 8.1 %
MCH RBC QN AUTO: 30.7 PG (ref 26.5–33)
MCHC RBC AUTO-ENTMCNC: 33.2 G/DL (ref 31.5–36.5)
MCV RBC AUTO: 93 FL (ref 78–100)
MONOCYTES # BLD AUTO: 0.4 10E9/L (ref 0–1.3)
MONOCYTES NFR BLD AUTO: 4.6 %
NEUTROPHILS # BLD AUTO: 7.1 10E9/L (ref 1.6–8.3)
NEUTROPHILS NFR BLD AUTO: 86.5 %
NRBC # BLD AUTO: 0 10*3/UL
NRBC BLD AUTO-RTO: 0 /100
PLATELET # BLD AUTO: 89 10E9/L (ref 150–450)
POTASSIUM SERPL-SCNC: 4.2 MMOL/L (ref 3.4–5.3)
RBC # BLD AUTO: 4.79 10E12/L (ref 3.8–5.2)
SODIUM SERPL-SCNC: 141 MMOL/L (ref 133–144)
SPECIMEN SOURCE: NORMAL
TROPONIN I SERPL-MCNC: <0.015 UG/L (ref 0–0.04)
WBC # BLD AUTO: 8.3 10E9/L (ref 4–11)

## 2019-02-25 PROCEDURE — G0378 HOSPITAL OBSERVATION PER HR: HCPCS

## 2019-02-25 PROCEDURE — 71046 X-RAY EXAM CHEST 2 VIEWS: CPT

## 2019-02-25 PROCEDURE — 94640 AIRWAY INHALATION TREATMENT: CPT

## 2019-02-25 PROCEDURE — 99220 ZZC INITIAL OBSERVATION CARE,LEVL III: CPT | Performed by: INTERNAL MEDICINE

## 2019-02-25 PROCEDURE — 40000274 ZZH STATISTIC RCP CONSULT EA 30 MIN

## 2019-02-25 PROCEDURE — 85025 COMPLETE CBC W/AUTO DIFF WBC: CPT | Performed by: EMERGENCY MEDICINE

## 2019-02-25 PROCEDURE — 84484 ASSAY OF TROPONIN QUANT: CPT | Performed by: EMERGENCY MEDICINE

## 2019-02-25 PROCEDURE — 25000125 ZZHC RX 250: Performed by: INTERNAL MEDICINE

## 2019-02-25 PROCEDURE — 25000132 ZZH RX MED GY IP 250 OP 250 PS 637: Performed by: INTERNAL MEDICINE

## 2019-02-25 PROCEDURE — 25000125 ZZHC RX 250

## 2019-02-25 PROCEDURE — 87804 INFLUENZA ASSAY W/OPTIC: CPT | Performed by: EMERGENCY MEDICINE

## 2019-02-25 PROCEDURE — 80048 BASIC METABOLIC PNL TOTAL CA: CPT | Performed by: EMERGENCY MEDICINE

## 2019-02-25 PROCEDURE — 93005 ELECTROCARDIOGRAM TRACING: CPT

## 2019-02-25 PROCEDURE — 40000275 ZZH STATISTIC RCP TIME EA 10 MIN

## 2019-02-25 PROCEDURE — 99285 EMERGENCY DEPT VISIT HI MDM: CPT | Mod: 25

## 2019-02-25 RX ORDER — IPRATROPIUM BROMIDE AND ALBUTEROL SULFATE 2.5; .5 MG/3ML; MG/3ML
SOLUTION RESPIRATORY (INHALATION)
Status: COMPLETED
Start: 2019-02-25 | End: 2019-02-25

## 2019-02-25 RX ORDER — POLYETHYLENE GLYCOL 3350 17 G/17G
17 POWDER, FOR SOLUTION ORAL DAILY PRN
Status: DISCONTINUED | OUTPATIENT
Start: 2019-02-25 | End: 2019-02-28 | Stop reason: HOSPADM

## 2019-02-25 RX ORDER — IPRATROPIUM BROMIDE AND ALBUTEROL SULFATE 2.5; .5 MG/3ML; MG/3ML
3 SOLUTION RESPIRATORY (INHALATION) EVERY 4 HOURS PRN
Status: DISCONTINUED | OUTPATIENT
Start: 2019-02-25 | End: 2019-02-25

## 2019-02-25 RX ORDER — PROCHLORPERAZINE 25 MG
12.5 SUPPOSITORY, RECTAL RECTAL EVERY 12 HOURS PRN
Status: DISCONTINUED | OUTPATIENT
Start: 2019-02-25 | End: 2019-02-28 | Stop reason: HOSPADM

## 2019-02-25 RX ORDER — HYDRALAZINE HYDROCHLORIDE 20 MG/ML
10 INJECTION INTRAMUSCULAR; INTRAVENOUS EVERY 4 HOURS PRN
Status: DISCONTINUED | OUTPATIENT
Start: 2019-02-25 | End: 2019-02-28 | Stop reason: HOSPADM

## 2019-02-25 RX ORDER — AMOXICILLIN 250 MG
2 CAPSULE ORAL 2 TIMES DAILY PRN
Status: DISCONTINUED | OUTPATIENT
Start: 2019-02-25 | End: 2019-02-28 | Stop reason: HOSPADM

## 2019-02-25 RX ORDER — ACETAMINOPHEN 325 MG/1
650 TABLET ORAL EVERY 4 HOURS PRN
Status: DISCONTINUED | OUTPATIENT
Start: 2019-02-25 | End: 2019-02-28 | Stop reason: HOSPADM

## 2019-02-25 RX ORDER — ALBUTEROL SULFATE 0.83 MG/ML
2.5 SOLUTION RESPIRATORY (INHALATION) EVERY 4 HOURS PRN
Status: DISCONTINUED | OUTPATIENT
Start: 2019-02-25 | End: 2019-02-28 | Stop reason: HOSPADM

## 2019-02-25 RX ORDER — AMOXICILLIN 250 MG
1 CAPSULE ORAL 2 TIMES DAILY PRN
Status: DISCONTINUED | OUTPATIENT
Start: 2019-02-25 | End: 2019-02-28 | Stop reason: HOSPADM

## 2019-02-25 RX ORDER — PROCHLORPERAZINE MALEATE 5 MG
5 TABLET ORAL EVERY 6 HOURS PRN
Status: DISCONTINUED | OUTPATIENT
Start: 2019-02-25 | End: 2019-02-28 | Stop reason: HOSPADM

## 2019-02-25 RX ORDER — LORAZEPAM 0.5 MG/1
0.5 TABLET ORAL EVERY 4 HOURS PRN
Status: DISCONTINUED | OUTPATIENT
Start: 2019-02-25 | End: 2019-02-28 | Stop reason: HOSPADM

## 2019-02-25 RX ORDER — PRIMIDONE 250 MG/1
250 TABLET ORAL 2 TIMES DAILY
Status: DISCONTINUED | OUTPATIENT
Start: 2019-02-25 | End: 2019-02-28 | Stop reason: HOSPADM

## 2019-02-25 RX ORDER — ALBUTEROL SULFATE 0.83 MG/ML
2.5 SOLUTION RESPIRATORY (INHALATION) EVERY 6 HOURS PRN
Status: DISCONTINUED | OUTPATIENT
Start: 2019-02-25 | End: 2019-02-25

## 2019-02-25 RX ORDER — DILTIAZEM HYDROCHLORIDE 180 MG/1
180 CAPSULE, COATED, EXTENDED RELEASE ORAL DAILY
Status: DISCONTINUED | OUTPATIENT
Start: 2019-02-26 | End: 2019-02-28 | Stop reason: HOSPADM

## 2019-02-25 RX ORDER — PREDNISONE 20 MG/1
40 TABLET ORAL DAILY
Status: DISCONTINUED | OUTPATIENT
Start: 2019-02-25 | End: 2019-02-26

## 2019-02-25 RX ORDER — ONDANSETRON 2 MG/ML
4 INJECTION INTRAMUSCULAR; INTRAVENOUS EVERY 6 HOURS PRN
Status: DISCONTINUED | OUTPATIENT
Start: 2019-02-25 | End: 2019-02-28 | Stop reason: HOSPADM

## 2019-02-25 RX ORDER — NALOXONE HYDROCHLORIDE 0.4 MG/ML
.1-.4 INJECTION, SOLUTION INTRAMUSCULAR; INTRAVENOUS; SUBCUTANEOUS
Status: DISCONTINUED | OUTPATIENT
Start: 2019-02-25 | End: 2019-02-28 | Stop reason: HOSPADM

## 2019-02-25 RX ORDER — ACETAMINOPHEN 650 MG/1
650 SUPPOSITORY RECTAL EVERY 4 HOURS PRN
Status: DISCONTINUED | OUTPATIENT
Start: 2019-02-25 | End: 2019-02-28 | Stop reason: HOSPADM

## 2019-02-25 RX ORDER — METOPROLOL TARTRATE 50 MG
50 TABLET ORAL 2 TIMES DAILY
Status: DISCONTINUED | OUTPATIENT
Start: 2019-02-25 | End: 2019-02-28 | Stop reason: HOSPADM

## 2019-02-25 RX ORDER — ALBUTEROL SULFATE 90 UG/1
1 AEROSOL, METERED RESPIRATORY (INHALATION) EVERY 4 HOURS PRN
Status: DISCONTINUED | OUTPATIENT
Start: 2019-02-25 | End: 2019-02-28 | Stop reason: HOSPADM

## 2019-02-25 RX ORDER — IPRATROPIUM BROMIDE AND ALBUTEROL SULFATE 2.5; .5 MG/3ML; MG/3ML
3 SOLUTION RESPIRATORY (INHALATION)
Status: DISCONTINUED | OUTPATIENT
Start: 2019-02-26 | End: 2019-02-28 | Stop reason: HOSPADM

## 2019-02-25 RX ORDER — ONDANSETRON 4 MG/1
4 TABLET, ORALLY DISINTEGRATING ORAL EVERY 6 HOURS PRN
Status: DISCONTINUED | OUTPATIENT
Start: 2019-02-25 | End: 2019-02-28 | Stop reason: HOSPADM

## 2019-02-25 RX ADMIN — IPRATROPIUM BROMIDE AND ALBUTEROL SULFATE 3 ML: .5; 3 SOLUTION RESPIRATORY (INHALATION) at 13:40

## 2019-02-25 RX ADMIN — METOPROLOL TARTRATE 50 MG: 50 TABLET ORAL at 20:21

## 2019-02-25 RX ADMIN — LORAZEPAM 0.5 MG: 0.5 TABLET ORAL at 22:34

## 2019-02-25 RX ADMIN — ALBUTEROL SULFATE 2.5 MG: 2.5 SOLUTION RESPIRATORY (INHALATION) at 23:25

## 2019-02-25 RX ADMIN — RANITIDINE 300 MG: 150 TABLET ORAL at 21:58

## 2019-02-25 RX ADMIN — IPRATROPIUM BROMIDE AND ALBUTEROL SULFATE 3 ML: .5; 3 SOLUTION RESPIRATORY (INHALATION) at 19:45

## 2019-02-25 RX ADMIN — PREDNISONE 40 MG: 20 TABLET ORAL at 20:21

## 2019-02-25 RX ADMIN — PRIMIDONE 250 MG: 250 TABLET ORAL at 20:35

## 2019-02-25 RX ADMIN — IPRATROPIUM BROMIDE AND ALBUTEROL SULFATE 3 ML: .5; 3 SOLUTION RESPIRATORY (INHALATION) at 16:07

## 2019-02-25 ASSESSMENT — ENCOUNTER SYMPTOMS
SHORTNESS OF BREATH: 1
COUGH: 1

## 2019-02-25 NOTE — PHARMACY-ADMISSION MEDICATION HISTORY
Admission medication history interview status for this patient is complete. See [facility] admission navigator for allergy information, prior to admission medications and immunization status.     Medication history interview source(s):Patient and Family  Medication history resources (including written lists, pill bottles, clinic record):None  [facility]    Changes made to PTA medication list:  Added: -  Deleted: -  Changed: -    Actions taken by pharmacist (provider contacted, etc):None     Additional medication history information:None    Medication reconciliation/reorder completed by provider prior to medication history? No    Do you take OTC medications (eg tylenol, ibuprofen, fish oil, eye/ear drops, etc)? yes(Y/N)    For patients on insulin therapy: no (Y/N)  Lantus/levemir/NPH/Mix 70/30 dose:   (Y/N) (see Med list for doses)   Sliding scale Novolog Y/N  If Yes, do you have a baseline novolog pre-meal dose:  units with meals  Patients eat three meals a day:   Y/N    How many episodes of hypoglycemia do you have per week: _______  How many missed doses do you have per week: ______  How many times do you check your blood glucose per day: _______  Do you have a Continuous glucose monitor (CGM)   Y/N (remind pt that not approved for hospital use)   Any Barriers to therapy - Be specific :  cost of medications, comfortable with giving injections (if applicable), comfortable and confident with current diabetes regimen: Y/N ______________      Prior to Admission medications    Medication Sig Last Dose Taking? Auth Provider   acetaminophen (TYLENOL) 500 MG tablet Take 500-1,000 mg by mouth every 6 hours as needed for mild pain  Yes Unknown, Entered By History   albuterol (PROAIR HFA/PROVENTIL HFA/VENTOLIN HFA) 108 (90 Base) MCG/ACT inhaler Inhale 1 puff into the lungs every 4 hours as needed for shortness of breath / dyspnea or wheezing  Yes Flavia Alberts MD   albuterol (PROVENTIL) (2.5  MG/3ML) 0.083% neb solution Take 1 vial (2.5 mg) by nebulization every 6 hours as needed for shortness of breath / dyspnea  Yes Flavia Alberts MD   diltiazem 180 MG 24 hr capsule Take 1 capsule (180 mg) by mouth daily 2/25/2019 at Unknown time Yes Nakita Arias PA-C   metoprolol tartrate (LOPRESSOR) 50 MG tablet Take 1 tablet (50 mg) by mouth 2 times daily 2/25/2019 at x1 Yes Flavia Alberts MD   primidone (MYSOLINE) 250 MG tablet Take 1 tablet (250 mg) by mouth 2 times daily 2/25/2019 at x1 Yes Flavia Alberts MD   ranitidine HCl 300 MG CAPS Take 1 capsule by mouth At Bedtime 2/24/2019 at Unknown time Yes Flavia Alberts MD

## 2019-02-25 NOTE — ED PROVIDER NOTES
History     Chief Complaint:  Shortness of breath    HPI   Gema Coombs is a 68 year old female with a history of COPD and asthma who presents to the emergency department today for evaluation of shortness of breath. Patient states she had a sudden onset of increased shortness of breath yesterday morning. She endorses having labored breathing even at rest. Additionally, the patient reports a cough with green phlegm. She does not use oxygen at home. The patient denies close contact illnesses, however, she just recently returned from vacation from [facility] where she was on a plane with people coughing.    Allergies:  No Known Drug Allergies    Medications:    Albuterol  Diltiazem  Lopressor  Mysoline  Ranitidine    Past Medical History:    Arrhythmia  Diverticulitis  Extrinsic asthma  Fibromyalgia  Generalized nonconvulsive epilepsy  Hypertension  Paroxysmal atrial fibrillation  COPD    Past Surgical History:    Left breast biopsy  Right breast biopsy  Uterine fibroids   section x2  Laparoscopic sigmoid colectomy  Laparoscopic lysis adhesins    Family History:    Sister: thyroid disease    Social History:  The patient was accompanied to the ED by son.  Smoking Status: Never Smoker  Smokeless Tobacco: Never Used  Alcohol Use: Positive  Drug Use: Negative  Marital Status:       Review of Systems   Respiratory: Positive for cough and shortness of breath.    All other systems reviewed and are negative.    Physical Exam     Patient Vitals for the past 24 hrs:   BP Temp Temp src Pulse Heart Rate Resp SpO2 Weight   19 1945 -- -- -- -- -- -- 93 % --   19 1859 (!) 170/93 98.8  F (37.1  C) Oral 83 -- 24 92 % --   19 1844 -- -- -- -- -- -- 96 % --   19 1745 141/85 98  F (36.7  C) Oral -- -- -- 94 % --   19 1715 -- -- -- -- -- -- 91 % --   19 1645 -- -- -- -- -- -- 95 % --   19 1630 -- -- -- -- -- -- 99 % --   19 1615 -- -- -- -- -- -- 93 % --   19 1611 -- -- --  -- -- -- 93 % --   02/25/19 1606 -- -- -- -- -- -- (!) 87 % --   02/25/19 1415 -- -- -- -- -- -- 97 % --   02/25/19 1400 -- -- -- -- -- -- 98 % --   02/25/19 1345 -- -- -- -- -- -- 100 % --   02/25/19 1340 -- -- -- -- -- -- 96 % --   02/25/19 1325 (!) 175/95 98.2  F (36.8  C) Oral -- 73 28 90 % --   02/25/19 1321 -- -- -- -- -- -- -- 131.1 kg (289 lb)       Physical Exam  Constitutional: Vital signs reviewed as above.  Head: No external signs of trauma noted.  Eyes: Pupils are equal, round, and reactive to light.   Neck: No JVD noted  Cardiovascular: Normal rate, regular rhythm and normal heart sounds.  No murmur heard. Equal B/L peripheral pulses.  Pulmonary/Chest: No respiratory distress. Patient has B/L wheezes. Patient has no rales.   Gastrointestinal: Soft. There is no tenderness.   Musculoskeletal/Extremities: No edema noted. Normal tone.  Neurological: Patient is alert and oriented to person, place, and time.   Skin: Skin is warm and dry. There is no diaphoresis noted.   Psychiatric: The patient appears calm.      Emergency Department Course     ECG:  ECG taken at 1323, ECG read at 1329  Normal sinus rhythm  Nonspecific ST and T wave abnormality  Abnormal ECG  No significant change compared to EKG dated 10/15/2018, normal sinus rhythm today  Rate 72 bpm. RI interval 162 ms. QRS duration 86 ms. QT/QTc 402/440 ms. P-R-T axes 69 -16 34.    Imaging:  Radiology findings were communicated with the patient who voiced understanding of the findings.    XR Chest 2 Views  Negative.  ARA MIKE MD  Reading per radiology    Laboratory:  Laboratory findings were communicated with the patient who voiced understanding of the findings.    Influenza A/B antigen; negative A, negative B  Troponin (Collected 1409): <0.015  CBC: WBC 8.3, HGB 14.7, PLT 89(L)  BMP: glucose 115(H) o/w WNL (Creatinine 0.74)    Interventions:  1340 Duoneb 3 ml nebulization  1607 Duoneb 3 ml nebulization    Emergency Department  Course:    1330 Nursing notes and vitals reviewed.    1334 I performed an exam of the patient as documented above.     1409 IV was inserted and blood was drawn for laboratory testing, results above.    1409 A nasal sample was obtained for laboratory testing as documented above.    1424 The patient was sent for a chest XR while in the emergency department, results above.     1610 Patient rechecked and updated.     1633 I spoke with Dr. Bustamante of the hospitalist service from [facility] regarding patient's presentation, findings, and plan of care.    I personally reviewed the lab and image results with the patient and answered all related questions prior to admission.    Impression & Plan      Medical Decision Making:  Gema Coombs is a 68 year old female with a history of COPD, and asthma, who presents to the ED for evaluation of shortness of breath. Upon presentation in the ED, the patient does  appear to have significantly increased work of breathing, thus nebs were given and she was initiated on oxygen with significant improvement. She is alert, oriented, and neuro exam is non-focal. Aside from mild tachycardia and hypoxia with exertion, VS are within normal limits and stable. Cardiorespiratory exam is remarkable for diffuse inspiratory and expiratory wheezes.  Differential includes, but is not limited to, ACS, PE, pneumonia, pneumothorax, pleural effusion, pulmonary edema, COPD exacerbation. EKG was obtained and demonstrates no acute ischemic changes. There are no concerning changes. CXR was obtained and demonstrates no acute disease. There is no evidence of pneumothorax or pneumonia. Labs were obtained and are as mentioned above. Given the patient's history and presentation, I do feel that her symptoms are most consistent with a COPD exacerbation. She has no obvious infiltrate, fever, or leukocytosis, thus I feel that pneumonia is less likely. Her history and presentation are not typical of PE and are much  more consistent with COPD exacerbation, thus I feel that PE is less likely and further evaluation for this is not indicated at this time. I do feel that admission is indicated at this time. The patient is in agreement with this plan. She was discussed with the hospitalist and he agreed to accept this patient on to his service. She was stable/improved at the time of admission.     Diagnosis:    ICD-10-CM    1. COPD exacerbation (H) J44.1    2. Hypoxia R09.02      Disposition:   The patient is admitted into the care of Dr. Bustamante.    Scribe Disclosure:  I, Taylor oCwan, am serving as a scribe at 1:31 PM on 2/25/2019 to document services personally performed by Danny Song DO based on my observations and the provider's statements to me.    [facility] EMERGENCY DEPARTMENT       Danny Song DO  02/25/19 2024

## 2019-02-25 NOTE — ED NOTES
[facility]  ED Nurse Handoff Report    Gema Coombs is a 68 year old female   ED Chief complaint: Shortness of Breath  . ED Diagnosis:   Final diagnoses:   COPD exacerbation (H)   Hypoxia     Allergies:   Allergies   Allergen Reactions     No Known Drug Allergy        Code Status: Full Code  Activity level - Baseline/Home:  Independent. Activity Level - Current:   Stand with Assist. Lift room needed: No. Bariatric: No   Needed: No   Isolation: No. Infection: Not Applicable.     Vital Signs:   Vitals:    02/25/19 1325 02/25/19 1340 02/25/19 1606 02/25/19 1611   BP: (!) 175/95      Resp: 28      Temp: 98.2  F (36.8  C)      TempSrc: Oral      SpO2: 90% 96% (!) 87% 93%   Weight:           Cardiac Rhythm:  ,      Pain level: 0-10 Pain Scale: 4  Patient confused: No. Patient Falls Risk: Yes.   Elimination Status: Has voided   Patient Report - Initial Complaint: SOB. Focused Assessment: Respiratory - Rhythm/Pattern, Respiratory: breathlessness; shortness of breath Breath Sounds: All Fields Cough Frequency: frequent Cough Type: tight  Head To Toe Assessment - All Lung Fields Breath Sounds: Posterior:; wheezes, expiratory  Tests Performed: labs, imaging. Abnormal Results:   XR Chest 2 Views   Final Result   IMPRESSION: Negative.      ARA MIKE MD        Labs Ordered and Resulted from Time of ED Arrival Up to the Time of Departure from the ED   CBC WITH PLATELETS DIFFERENTIAL - Abnormal; Notable for the following components:       Result Value    Platelet Count 89 (*)     Absolute Lymphocytes 0.7 (*)     All other components within normal limits   BASIC METABOLIC PANEL - Abnormal; Notable for the following components:    Glucose 115 (*)     All other components within normal limits   TROPONIN I   PERIPHERAL IV CATHETER   INFLUENZA A/B ANTIGEN   .   Treatments provided: nebs,   Family Comments: at bedside  OBS brochure/video discussed/provided to patient:  Yes  ED Medications:   Medications    ipratropium - albuterol 0.5 mg/2.5 mg/3 mL (DUONEB) 0.5-2.5 (3) MG/3ML neb solution (3 mLs  Given 2/25/19 1340)   ipratropium - albuterol 0.5 mg/2.5 mg/3 mL (DUONEB) 0.5-2.5 (3) MG/3ML neb solution (3 mLs  Given 2/25/19 1607)     Drips infusing:  No  For the majority of the shift, the patient's behavior Green. Interventions performed were NA.     Severe Sepsis OR Septic Shock Diagnosis Present: No      ED Nurse Name/Phone Number: Leilani Cooper,   4:11 PM    RECEIVING UNIT ED HANDOFF REVIEW    Above ED Nurse Handoff Report was reviewed: Yes  Reviewed by: Nakita Velazquez on February 25, 2019 at 6:06 PM

## 2019-02-25 NOTE — LETTER
Transition Communication Hand-off for Care Transitions to Next Level of Care Provider    Name: Gema Coombs  : 1950  MRN #: [id number]  Primary Care Provider: Flavia Alberts  Primary Care MD Name: Dr. Alberts   Primary Clinic: [facility] [address] [facility] [address]  [facility] [address]  Primary Care Clinic Name: [facility]   Reason for Hospitalization:  Hypoxia [R09.02]  COPD exacerbation (H) [J44.1]  Admit Date/Time: 2019  1:17 PM  Discharge Date: 19  Payor Source: Payor: PREFERREDONE / Plan: PREF[employer]ED[employer] HMO / Product Type: PPO /     Readmission Assessment Measure (SKYE) Risk Score/category: Elevated         Reason for Communication Hand-off Referral: Other Elevated SKYE    Discharge Plan:    Concern for non-adherence with plan of care:   No  Discharge Needs Assessment:  Needs      Most Recent Value   # of Referrals Placed by CTS  Communication hand-offs to next level of Care Providers          Already enrolled in Tele-monitoring program and name of program:  NA  Follow-up specialty is recommended: No    Follow-up plan:  No future appointments.    Any outstanding tests or procedures:    Procedures     Future Labs/Procedures    Oxygen Adult     Comments:    New Home Oxygen Order 2 liter(s) by nasal cannula with activity with use of portable tank. Expected treatment length is 2 months.. Test on conserving device as applicable.    Patients who qualify for home O2 coverage under the CMS guidelines require ABG tests or O2 sat readings obtained closest to, but no earlier than 2 days prior to the discharge, as evidence of the need for home oxygen therapy. Testing must be performed while patient is in the chronic stable state. See notes for O2 sats.    I certify that this patient, Gema Coombs has been under my care and that I, or a nurse practitioner or physician's assistant working with me, had a face-to-face encounter that meets the face-to-face encounter requirements with this patient on  "2/28/2019. The patient, Gema Coombs was evaluated or treated in whole, or in part, for the following medical condition, which necessitates the use of the ordered oxygen. Treatment Diagnosis: acute asthma exacerbation, acute bronchitis    Attending Provider: Andra Kohli  Physician signature: See electronic signature associated with these discharge orders  Date of Order: February 28, 2019              Key Recommendations:  CTS identifies pt as high risk due to being admitted 4 times in the past 6 months for different diagnoses. Pt is currently admitted for asthma exacerbation. Pt recently travelled to [place] and developed asthma exacerbation/URI symptoms upon her return home, her asthma has otherwise been in good control. Met w/ pt at bedside to assist in scheduling a follow-up appointment, pt declines and prefers to schedule this herself.     Pt discharged w/ the following plan:   \"1. You will finish a prolonged Prednisone burst upon discharge.  You will take 60 mg daily for 3 days then 40 mg daily for 3 days then 20 mg daily for 3 days then 10 mg daily for 4 days.  2. You will need to use oxygen when you walk.    3. Follow up with your primary care doctor within two weeks.  At that time they can check your oxygen to see if you still need this.\"    O2 arranged through [facility]. No gaps in care identified.     Priti Alarcon RN CTS    AVS/Discharge Summary is the source of truth; this is a helpful guide for improved communication of patient story  "

## 2019-02-25 NOTE — LETTER
Key Recommendations:  CTS identifies pt as high risk due to being admitted 4 times in the past 6 months for different diagnoses. Pt is currently admitted for asthma exacerbation. Pt recently travelled to [place] and developed asthma exacerbation/URI symptoms upon her return home, her asthma has otherwise been in good control. Met w/ pt at bedside to assist in scheduling a follow-up appointment, pt declines and prefers to schedule this herself. No gaps in care identified.     Priti Alarcon    AVS/Discharge Summary is the source of truth; this is a helpful guide for improved communication of patient story

## 2019-02-25 NOTE — ED TRIAGE NOTES
Patient to ED for SOB, has asthma. Is coughing up green sputum. Sx started yesterday. No fevers. Had appt with primary but sx worsened so she came to ED

## 2019-02-26 LAB
ERYTHROCYTE [DISTWIDTH] IN BLOOD BY AUTOMATED COUNT: 13.3 % (ref 10–15)
HCT VFR BLD AUTO: 43.3 % (ref 35–47)
HGB BLD-MCNC: 13.8 G/DL (ref 11.7–15.7)
INTERPRETATION ECG - MUSE: NORMAL
MCH RBC QN AUTO: 30 PG (ref 26.5–33)
MCHC RBC AUTO-ENTMCNC: 31.9 G/DL (ref 31.5–36.5)
MCV RBC AUTO: 94 FL (ref 78–100)
PLATELET # BLD AUTO: 93 10E9/L (ref 150–450)
RBC # BLD AUTO: 4.6 10E12/L (ref 3.8–5.2)
WBC # BLD AUTO: 9.9 10E9/L (ref 4–11)

## 2019-02-26 PROCEDURE — 40000275 ZZH STATISTIC RCP TIME EA 10 MIN

## 2019-02-26 PROCEDURE — 12000011 ZZH R&B MS OVERFLOW

## 2019-02-26 PROCEDURE — 85027 COMPLETE CBC AUTOMATED: CPT | Performed by: INTERNAL MEDICINE

## 2019-02-26 PROCEDURE — 94640 AIRWAY INHALATION TREATMENT: CPT | Mod: 76

## 2019-02-26 PROCEDURE — 99233 SBSQ HOSP IP/OBS HIGH 50: CPT | Performed by: HOSPITALIST

## 2019-02-26 PROCEDURE — G0378 HOSPITAL OBSERVATION PER HR: HCPCS

## 2019-02-26 PROCEDURE — 36415 COLL VENOUS BLD VENIPUNCTURE: CPT | Performed by: INTERNAL MEDICINE

## 2019-02-26 PROCEDURE — 25000125 ZZHC RX 250: Performed by: INTERNAL MEDICINE

## 2019-02-26 PROCEDURE — 25000132 ZZH RX MED GY IP 250 OP 250 PS 637: Performed by: INTERNAL MEDICINE

## 2019-02-26 PROCEDURE — 25000128 H RX IP 250 OP 636: Performed by: HOSPITALIST

## 2019-02-26 PROCEDURE — 94640 AIRWAY INHALATION TREATMENT: CPT

## 2019-02-26 RX ORDER — METHYLPREDNISOLONE SODIUM SUCCINATE 125 MG/2ML
60 INJECTION, POWDER, LYOPHILIZED, FOR SOLUTION INTRAMUSCULAR; INTRAVENOUS EVERY 12 HOURS
Status: DISCONTINUED | OUTPATIENT
Start: 2019-02-26 | End: 2019-02-27

## 2019-02-26 RX ADMIN — IPRATROPIUM BROMIDE AND ALBUTEROL SULFATE 3 ML: .5; 3 SOLUTION RESPIRATORY (INHALATION) at 20:15

## 2019-02-26 RX ADMIN — DILTIAZEM HYDROCHLORIDE 180 MG: 180 CAPSULE, COATED, EXTENDED RELEASE ORAL at 08:38

## 2019-02-26 RX ADMIN — PRIMIDONE 250 MG: 250 TABLET ORAL at 08:38

## 2019-02-26 RX ADMIN — IPRATROPIUM BROMIDE AND ALBUTEROL SULFATE 3 ML: .5; 3 SOLUTION RESPIRATORY (INHALATION) at 08:47

## 2019-02-26 RX ADMIN — METOPROLOL TARTRATE 50 MG: 50 TABLET ORAL at 08:38

## 2019-02-26 RX ADMIN — METHYLPREDNISOLONE SODIUM SUCCINATE 62.5 MG: 125 INJECTION, POWDER, FOR SOLUTION INTRAMUSCULAR; INTRAVENOUS at 21:43

## 2019-02-26 RX ADMIN — PRIMIDONE 250 MG: 250 TABLET ORAL at 20:31

## 2019-02-26 RX ADMIN — RANITIDINE 300 MG: 150 TABLET ORAL at 21:43

## 2019-02-26 RX ADMIN — IPRATROPIUM BROMIDE AND ALBUTEROL SULFATE 3 ML: .5; 3 SOLUTION RESPIRATORY (INHALATION) at 11:32

## 2019-02-26 RX ADMIN — ACETAMINOPHEN 650 MG: 325 TABLET, FILM COATED ORAL at 18:27

## 2019-02-26 RX ADMIN — IPRATROPIUM BROMIDE AND ALBUTEROL SULFATE 3 ML: .5; 3 SOLUTION RESPIRATORY (INHALATION) at 15:14

## 2019-02-26 RX ADMIN — PREDNISONE 40 MG: 20 TABLET ORAL at 08:38

## 2019-02-26 RX ADMIN — ALBUTEROL SULFATE 2.5 MG: 2.5 SOLUTION RESPIRATORY (INHALATION) at 04:25

## 2019-02-26 RX ADMIN — METHYLPREDNISOLONE SODIUM SUCCINATE 62.5 MG: 125 INJECTION, POWDER, FOR SOLUTION INTRAMUSCULAR; INTRAVENOUS at 10:41

## 2019-02-26 RX ADMIN — METOPROLOL TARTRATE 50 MG: 50 TABLET ORAL at 20:30

## 2019-02-26 RX ADMIN — ACETAMINOPHEN 650 MG: 325 TABLET, FILM COATED ORAL at 08:40

## 2019-02-26 ASSESSMENT — ACTIVITIES OF DAILY LIVING (ADL)
TRANSFERRING: 0-->INDEPENDENT
BATHING: 0-->INDEPENDENT
COGNITION: 0 - NO COGNITION ISSUES REPORTED
FALL_HISTORY_WITHIN_LAST_SIX_MONTHS: NO
RETIRED_COMMUNICATION: 0-->UNDERSTANDS/COMMUNICATES WITHOUT DIFFICULTY
TOILETING: 0-->INDEPENDENT
DRESS: 0-->INDEPENDENT
SWALLOWING: 0-->SWALLOWS FOODS/LIQUIDS WITHOUT DIFFICULTY
RETIRED_EATING: 0-->INDEPENDENT
AMBULATION: 0-->INDEPENDENT

## 2019-02-26 NOTE — H&P
Admitted:     2019      CHIEF COMPLAINT:  Shortness of breath.      HISTORY OF PRESENT ILLNESS:  Gema Coombs is a 68-year-old female with past medical history significant for asthma, hypertension, AFib, diverticulitis, status post colectomy who presented today to the emergency room with her  for shortness of breath.  The patient was relatively at her baseline state of health until yesterday morning when she started to have cough and shortness of breath.  Associated with that, she felt wheezing, had asthma exacerbation, tried to get to see her primary care providers today, but concerning, and came to the emergency room.  The patient recently returned from [place] and they stated that there were many people coughing and sneezing in the plane and was concerned that she might have contacted some viral illness.  The patient denied any headache.  No fever or chills.  No runny nose.  No urinary habit change, no bowel habit change.   The patient history of atrial fibrillation with RVR maintained on Cardizem and metoprolol.  She also had a history of ITP in the low platelets and she was taken off her anticoagulation with Eliquis.  In the emergency room, she was evaluated.  On arrival, her oxygen saturation was 87% on room air.  The patient is being admitted under observation.  No treatment was given for this patient in the emergency room.  I discussed with Dr. Song.      PAST MEDICAL HISTORY:   1.  Paroxysmal atrial fibrillation.   2.  Chronic idiopathic thrombocytopenia with splenomegaly.   3.  Gastroesophageal reflux disease.   4.  Asthma.   5.  Incidental pulmonary nodule.   6.  Diverticulitis.      PAST SURGICAL HISTORY:  Colectomy, breast biopsy,  section.      SOCIAL HISTORY:  The patient is , lives with her .  She does not smoke.  She does not drink alcohol.  She does not use illicit drugs.      FAMILY HISTORY:  Significant for sister with hypothyroidism, breast cancer in her mother  not grandmother and coronary artery disease in father not grandfather.  No known history of her mother or father medical condition.      ALLERGIES:  NO KNOWN DRUG ALLERGY.      HOME MEDICATIONS:  Reviewed and reconciled as in electronic medical report.      PHYSICAL EXAMINATION:   GENERAL:  The patient is awake, alert, oriented, intermittently coughing, not in distress.   VITAL SIGNS:  Blood pressure 175/95, pulse of 73, temperature 98.2, oxygen saturation 93% on 2 liters.   HEENT:  Pink, anicteric.  Extraocular muscle movement intact.   NECK:  Supple, no JVD, no thyromegaly.   CHEST:  Scattered wheezing bilateral lungs.   CARDIOVASCULAR:  S1, S2 were heard.  No gallop or murmur, regular rate and rhythm.   ABDOMEN:  Morbidly obese, nontender, positive bowel sounds.  Surgical scar area clean.   EXTREMITIES:  Trace bilateral lower extremity edema.   PSYCHIATRIC:  Normal mood and affect.  Keeps eye contact, responds to question appropriately.   NEUROLOGIC:  No focal neurologic deficit.  Cranial nerves grossly intact to move all extremities.   PSYCHIATRIC:  Normal mood and affect.  Responds to question appropriately.        DIAGNOSTIC TESTING OF INTEREST:   EKG showed normal sinus rhythm at 72 beats per minute.  QTc is 440.  Chest x-ray is normal.        LABORATORY DATA:  Electrolytes normal.  Creatinine 0.7.  Troponin less than 0.015.  Calcium 8, sodium 141, potassium 4.2, glucose 115.  WBC 8.3, hemoglobin 14.7, platelets 89.  The patient had echocardiogram in 10/2018, at that time it showed an EF of 55%-60%, diastolic function was not assessed as the patient was in AFib during the examination.      ASSESSMENT AND PLAN:  Gema Coombs is a 68-year-old female who has a past medical history of asthma, obstructive sleep apnea at one point on CPAP and lost her CPAP and did not use it again, hypertension, paroxysmal atrial fibrillation, seizure disorder, gastroesophageal reflux disease, chronic idiopathic thrombocytopenia,  recurrent diverticulitis, status post elective colectomy who presented today with cough, shortness of breath and wheezing being admitted for mild asthma exacerbation with hypoxia.   1.  Acute asthma exacerbation with hypoxia.   2.  Chronic idiopathic thrombocytopenic purpura.   3.  History of seizure disorder.   4.  Obstructive sleep apnea, not on CPAP.   5.  Gastroesophageal reflux disease.      PLAN:  The patient is being admitted under observation.  Her respiratory status improved in the emergency room with just 2 liters of oxygen.  The patient was not treated with medications other than bronchodilators and seemed to have more wheezing which shows improvement in air exchange.  I expect the patient to be in the hospital greater than 2 nights, and being admitted under observation.  I will continue bronchodilators, control her blood pressure with blood pressure medications, started on prednisone.  We will get her CBC checked in the morning as patient has underlying thrombocytopenia.  History of atrial fibrillation, currently in normal sinus on calcium channel blocker and beta blocker.  At one point she was on Eliquis, which was stopped by her providers due to thrombocytopenia related to increased risk of bleeding.  Questions and concerns addressed.  Her  is at bedside.  She is full code.         ELVIS SHEARER MD             D: 2019   T: 2019   MT: EDUARD      Name:     BEE HALE   MRN:      [id number]        Account:      [id number]   :      1950        Admitted:     2019                   Document: [id number]       cc: Flavia Alberts MD

## 2019-02-26 NOTE — PROGRESS NOTES
[facility]    Medicine Progress Note - Hospitalist Service       Date of Admission:  2/25/2019  Assessment & Plan      68-year-old female with past medical history significant for asthma (well controlled, last excaerbation was >2 yrs ago), hypertension, AFib (parox, not on anticoag), chronic ITP, diverticulitis with recent colectomy who was admitted 2/25/2019 with an asthma exacerbation    Asthma exacerbation    - likely triggered but recent flight home from [place] (change in weather/uri)    - change PO prednisone to solumedrol 60 mg IV BID    - still requiring oxygen (will cont to titrate down)    - on nebs    HTN    - cont home meds    A fib    - currently sinus    - cont home meds    - not on anticoag due to thrombocytopenia    Chronic ITP    - stable    History diverticulitis with colectomy    Offered to update , patient states he will be in later    Admit to inpatient for ongoing hypoxia, need for IV steroids      Diet: Combination Diet 2 gm NA Diet    DVT Prophylaxis: Low Risk/Ambulatory with no VTE prophylaxis indicated  Celaya Catheter: not present  Code Status: Full Code      Disposition Plan   Expected discharge: Tomorrow, recommended to prior living arrangement once off oxygen.  Entered: Kaiser Paredes MD 02/26/2019, 10:09 AM       The patient's care was discussed with the Bedside Nurse and Patient.    Kaiser Paredes MD  Hospitalist Service  [facility]    ______________________________________________________________________    Interval History   Patient eating breakfast. States she feels much better, but still sob and wheezing. No chest pain, abdo pain, n/v/d    Data reviewed today: I reviewed all medications, new labs and imaging results over the last 24 hours. I personally reviewed no images or EKG's today.    Physical Exam   Vital Signs: Temp: 98.7  F (37.1  C) Temp src: Oral BP: 129/62 Pulse: 83 Heart Rate: 72 Resp: 20 SpO2: 93 % O2 Device: Nasal cannula Oxygen  Delivery: 3 LPM  Weight: 289 lbs 0 oz  Constitutional: awake, alert, cooperative, no apparent distress, and appears stated age  Respiratory: good air movement, exp wheezing bilaterally  Cardiovascular: Normal apical impulse, regular rate and rhythm, normal S1 and S2, no S3 or S4, and no murmur noted  GI: No scars, normal bowel sounds, soft, non-distended, non-tender, no masses palpated, no hepatosplenomegally    Data   Recent Labs   Lab 02/26/19  0639 02/25/19  1409   WBC 9.9 8.3   HGB 13.8 14.7   MCV 94 93   PLT 93* 89*   NA  --  141   POTASSIUM  --  4.2   CHLORIDE  --  106   CO2  --  31   BUN  --  9   CR  --  0.74   ANIONGAP  --  4   TONIA  --  8.8   GLC  --  115*   TROPI  --  <0.015     Recent Results (from the past 24 hour(s))   XR Chest 2 Views    Narrative    XR CHEST 2 VW 2/25/2019 2:42 PM    HISTORY: cough, dyspnea      Impression    IMPRESSION: Negative.    ARA MIKE MD

## 2019-02-26 NOTE — PROGRESS NOTES
ROOM # 207    Living Situation (if not independent, order SW consult): Home with family   Facility name:  : [facility]     Activity level at baseline:  Ind   Activity level on admit: Ind      Patient registered to observation; given Patient Bill of Rights; given the opportunity to ask questions about observation status and their plan of care.  Patient has been oriented to the observation room, bathroom and call light is in place.    Discussed discharge goals and expectations with patient/family.

## 2019-02-26 NOTE — PLAN OF CARE
PRIMARY DIAGNOSIS: ASTHMA ex  OUTPATIENT/OBSERVATION GOALS TO BE MET BEFORE DISCHARGE:  1. Vital signs stable: Yes- BP high PRN hydralazine if SBP >180     2. Improvement of peak flow to greater than 70% sustained off nebulizer for 4 hours: No    3. Dyspnea improved and O2 sats >88% at RA or at prior home O2 therapy level: No      SpO2: 94 %, O2 Device: Nasal cannula    4. Short term supplemental O2 needed for use with activity at home: No    5. Tolerating adequate PO diet and medications: Yes    6. Return to near baseline physical activity: Yes    Discharge Planner Nurse   Safe discharge environment identified: Yes  Barriers to discharge: Yes       Entered by: Luna Chow 02/25/2019 10:36 PM     Please review provider order for any additional goals.   Nurse to notify provider when observation goals have been met and patient is ready for discharge.    Patient is Alert and Oriented x4. Vitals are Temp: 98.8  F (37.1  C) Temp src: Oral BP: 161/69 Pulse: 83 Heart Rate: 77 Resp: (!) 32 SpO2: 94 % O2 Device: Nasal cannula Oxygen Delivery: 3 LPM Pt has expiratory wheezes and SOB at rest. When up pts O2 sats drop to high 80s. Pt becomes audibly wheezy and SOB approximately 2 hours after Neb. Ativan given to lower RR. Slow deep breaths encouraged. Productive cough.  Pt is denying pain.  They are independent with Activity.    Pt is on a Regular diet. Patient is Saline locked. Plan: PRN nebs, Prednisone. Monitor O2 sats

## 2019-02-26 NOTE — PROGRESS NOTES
PRIMARY DIAGNOSIS: ASTHMA  OUTPATIENT/OBSERVATION GOALS TO BE MET BEFORE DISCHARGE:  1. Vital signs stable: Yes     2. Improvement of peak flow to greater than 70% sustained off nebulizer for 4 hours: Patient needs oxygen.      3. Dyspnea improved and O2 sats >88% at RA or at prior home O2 therapy level: No      SpO2: 95 %, O2 Device: Nasal cannula 3 liters     4. Short term supplemental O2 needed for use with activity at home: Yes     5. Tolerating adequate PO diet and medications: Yes     6. Return to near baseline physical activity: No     Discharge Planner Nurse   Safe discharge environment identified: Yes  Barriers to discharge: Yes         Please review provider order for any additional goals.   Nurse to notify provider when observation goals have been met and patient is ready for discharge.     Patient is alert and oriented x4. VS WNL and documented on the FS. Lungs sounds present with expiratory and inspiratory wheezing. Patient states she still feels SOB at rest. Patient is on 3 Liters of O2 NC with O2 sats around 90-93%. Patient is now on IV solumedrol 60mg BID. IS being encouraged. Patient has scheduled and PRN nebs ordered. Patient denies any numbness or tingling. Active bowel sounds in all 4 quadrants with LBM yesterday. Patient denies any pain, urgency, and frequency when voiding. Patient states she has a headache and rates it 4/10 and is was given Tylenol. Patient is SL and tolerating diet. Patient independent in room and is able to make needs known by using the call light.    /61 (BP Location: Left arm)   Pulse 83   Temp 98.2  F (36.8  C) (Oral)   Resp 24   Wt 131.1 kg (289 lb)   SpO2 93%   BMI 49.61 kg/m

## 2019-02-26 NOTE — PLAN OF CARE
PRIMARY DIAGNOSIS: ASTHMA EXACERBATION   OUTPATIENT/OBSERVATION GOALS TO BE MET BEFORE DISCHARGE:  1. Vital signs stable: Yes     2. Improvement of peak flow to greater than 70% sustained off nebulizer for 4 hours: No     3. Dyspnea improved and O2 sats >88% at RA or at prior home O2 therapy level: No      SpO2: 93 %, O2 Device: Nasal cannula     4. Short term supplemental O2 needed for use with activity at home: No     5. Tolerating adequate PO diet and medications: Yes     6. Return to near baseline physical activity: Yes     Patient alert and oriented x4. Vitals are Temp: 98.8  F (37.1  C) Temp src: Oral BP: 156/69 Pulse: 83 Heart Rate: 76 Resp: 20 SpO2: 94 %. Denies pain. Reports SOB at rest and activity. Currently on 3LPM via NC- [facility] RA. Receiving scheduled and PRN nebulizers. Plan to continue to manage symptoms and wean oxygen as able.      Discharge Planner Nurse   Safe discharge environment identified: Yes  Barriers to discharge: No       Entered by: Alona Maldonado 02/26/2019     Please review provider order for any additional goals.   Nurse to notify provider when observation goals have been met and patient is ready for discharge.

## 2019-02-26 NOTE — PROGRESS NOTES
"[facility] RCAT  Date: February 25, 2019  Admission Dx: Asthma exacerbation  Pulmonary History: Asthma  Home Nebulizer/MDI Use: albutedrol MDI and neb prn  Home Oxygen: none  Acuity Level (RCAT flow sheet): Level 3    Aerosol Therapy initiated: Duoneb QID, prn    Pulmonary Hygiene initiated: Coughing and deep breathing techniques     Volume Expansion initiated: Incentive spirometer     Current Oxygen Requirements: 3L NC   Current SpO2: 93%    Re-evaluation date: 2/28/2019    Patient Education: Education was provided on RCAT process. Will continue to do education with patient.     See \"RT Assessments\" flow sheet for patient assessment scoring and Acuity Level Details.     Juvenal Daily, RT on 2/25/2019 at 8:05 PM      "

## 2019-02-26 NOTE — PLAN OF CARE
PRIMARY DIAGNOSIS: ASTHMA  OUTPATIENT/OBSERVATION GOALS TO BE MET BEFORE DISCHARGE:  1. Vital signs stable: Yes    2. Improvement of peak flow to greater than 70% sustained off nebulizer for 4 hours: Pt requiring O2.    3. Dyspnea improved and O2 sats >88% at RA or at prior home O2 therapy level: No      SpO2: 93 %, O2 Device: Nasal cannula    4. Short term supplemental O2 needed for use with activity at home: Yes    5. Tolerating adequate PO diet and medications: Yes    6. Return to near baseline physical activity: Yes    Discharge Planner Nurse   Safe discharge environment identified: Yes  Barriers to discharge: Yes       Entered by: Aysha Gamez 02/26/2019 4:05 PM     Please review provider order for any additional goals.   Nurse to notify provider when observation goals have been met and patient is ready for discharge.

## 2019-02-26 NOTE — PLAN OF CARE
PRIMARY DIAGNOSIS: ASTHMA  OUTPATIENT/OBSERVATION GOALS TO BE MET BEFORE DISCHARGE:  1. Vital signs stable: Yes    2. Improvement of peak flow to greater than 70% sustained off nebulizer for 4 hours: No    3. Dyspnea improved and O2 sats >88% at RA or at prior home O2 therapy level: No      SpO2: 95 %, O2 Device: Nasal cannula    4. Short term supplemental O2 needed for use with activity at home: Yes    5. Tolerating adequate PO diet and medications: Yes    6. Return to near baseline physical activity: No    Discharge Planner Nurse   Safe discharge environment identified: Yes  Barriers to discharge: Yes       Entered by: Ching Huerta 02/26/2019 8:47 AM     Please review provider order for any additional goals.   Nurse to notify provider when observation goals have been met and patient is ready for discharge.    Patient is alert and oriented x4. VS WNL and documented on the FS. Lungs sounds present with expiratory and inspiratory wheezing. Patient states she still feels SOB at rest. Patient ton 3 Liters of O2 NC with O2 sats around 90-93%. IS being encouraged. Patient has scheduled and PRN nebs ordered. Patient denies any numbness or tingling. Active bowel sounds in all 4 quadrants with LBM yesterday. Patient denies any pain, urgency, and frequency when voiding. Patient states she has a headache and rates it 4/10 and is was given Tylenol. Patient is SL and tolerating diet. Patient independent in room and is able to make needs known by using the call light.    /62 (BP Location: Left arm)   Pulse 83   Temp 98.7  F (37.1  C) (Oral)   Resp 20   Wt 131.1 kg (289 lb)   SpO2 93%   BMI 49.61 kg/m

## 2019-02-26 NOTE — PROGRESS NOTES
Pt ambulated in the hallway SBA with pulse ox.SpO2 dropped to 82 at room air.O2 put on on 3L/min SpO2 88%-89% with O2. Pt was wheezing and short of breath.RN notified

## 2019-02-26 NOTE — PLAN OF CARE
PRIMARY DIAGNOSIS: ASTHMA EXACERBATION   OUTPATIENT/OBSERVATION GOALS TO BE MET BEFORE DISCHARGE:  1. Vital signs stable: Yes    2. Improvement of peak flow to greater than 70% sustained off nebulizer for 4 hours: No    3. Dyspnea improved and O2 sats >88% at RA or at prior home O2 therapy level: No      SpO2: 93 %, O2 Device: Nasal cannula    4. Short term supplemental O2 needed for use with activity at home: No    5. Tolerating adequate PO diet and medications: Yes    6. Return to near baseline physical activity: Yes    Patient alert and oriented x4. Vitals are Temp: 98.8  F (37.1  C) Temp src: Oral BP: 139/53 Pulse: 83 Heart Rate: 70 Resp: 22 SpO2: 93 %. Denies pain. Reports SOB at rest and activity. Currently on 3LPM via [place]- [facility] RA. Receiving scheduled and PRN nebulizers. Plan to continue to manage symptoms and wean oxygen as able.     Discharge Planner Nurse   Safe discharge environment identified: Yes  Barriers to discharge: No       Entered by: Alona Maldonado 02/26/2019      Please review provider order for any additional goals.   Nurse to notify provider when observation goals have been met and patient is ready for discharge.

## 2019-02-27 LAB
BASOPHILS # BLD AUTO: 0 10E9/L (ref 0–0.2)
BASOPHILS NFR BLD AUTO: 0 %
DIFFERENTIAL METHOD BLD: ABNORMAL
EOSINOPHIL # BLD AUTO: 0 10E9/L (ref 0–0.7)
EOSINOPHIL NFR BLD AUTO: 0 %
ERYTHROCYTE [DISTWIDTH] IN BLOOD BY AUTOMATED COUNT: 13.3 % (ref 10–15)
HCT VFR BLD AUTO: 43.8 % (ref 35–47)
HGB BLD-MCNC: 14 G/DL (ref 11.7–15.7)
IMM GRANULOCYTES # BLD: 0 10E9/L (ref 0–0.4)
IMM GRANULOCYTES NFR BLD: 0.5 %
LYMPHOCYTES # BLD AUTO: 0.9 10E9/L (ref 0.8–5.3)
LYMPHOCYTES NFR BLD AUTO: 11.6 %
MCH RBC QN AUTO: 30.2 PG (ref 26.5–33)
MCHC RBC AUTO-ENTMCNC: 32 G/DL (ref 31.5–36.5)
MCV RBC AUTO: 94 FL (ref 78–100)
MONOCYTES # BLD AUTO: 0.6 10E9/L (ref 0–1.3)
MONOCYTES NFR BLD AUTO: 7.2 %
NEUTROPHILS # BLD AUTO: 6.2 10E9/L (ref 1.6–8.3)
NEUTROPHILS NFR BLD AUTO: 80.7 %
NRBC # BLD AUTO: 0 10*3/UL
NRBC BLD AUTO-RTO: 0 /100
PLATELET # BLD AUTO: 108 10E9/L (ref 150–450)
RBC # BLD AUTO: 4.64 10E12/L (ref 3.8–5.2)
WBC # BLD AUTO: 7.7 10E9/L (ref 4–11)

## 2019-02-27 PROCEDURE — 25000132 ZZH RX MED GY IP 250 OP 250 PS 637: Performed by: HOSPITALIST

## 2019-02-27 PROCEDURE — 94640 AIRWAY INHALATION TREATMENT: CPT | Mod: 76

## 2019-02-27 PROCEDURE — 36415 COLL VENOUS BLD VENIPUNCTURE: CPT | Performed by: HOSPITALIST

## 2019-02-27 PROCEDURE — 40000275 ZZH STATISTIC RCP TIME EA 10 MIN

## 2019-02-27 PROCEDURE — 25000132 ZZH RX MED GY IP 250 OP 250 PS 637: Performed by: INTERNAL MEDICINE

## 2019-02-27 PROCEDURE — 25000128 H RX IP 250 OP 636: Performed by: HOSPITALIST

## 2019-02-27 PROCEDURE — 85025 COMPLETE CBC W/AUTO DIFF WBC: CPT | Performed by: HOSPITALIST

## 2019-02-27 PROCEDURE — 25000125 ZZHC RX 250: Performed by: INTERNAL MEDICINE

## 2019-02-27 PROCEDURE — 99233 SBSQ HOSP IP/OBS HIGH 50: CPT | Performed by: HOSPITALIST

## 2019-02-27 PROCEDURE — 12000011 ZZH R&B MS OVERFLOW

## 2019-02-27 PROCEDURE — 94640 AIRWAY INHALATION TREATMENT: CPT

## 2019-02-27 RX ORDER — GUAIFENESIN 600 MG/1
600 TABLET, EXTENDED RELEASE ORAL 2 TIMES DAILY
Status: DISCONTINUED | OUTPATIENT
Start: 2019-02-27 | End: 2019-02-28 | Stop reason: HOSPADM

## 2019-02-27 RX ORDER — METHYLPREDNISOLONE SODIUM SUCCINATE 125 MG/2ML
60 INJECTION, POWDER, LYOPHILIZED, FOR SOLUTION INTRAMUSCULAR; INTRAVENOUS ONCE
Status: COMPLETED | OUTPATIENT
Start: 2019-02-27 | End: 2019-02-27

## 2019-02-27 RX ADMIN — DILTIAZEM HYDROCHLORIDE 180 MG: 180 CAPSULE, COATED, EXTENDED RELEASE ORAL at 08:08

## 2019-02-27 RX ADMIN — GUAIFENESIN 600 MG: 600 TABLET, EXTENDED RELEASE ORAL at 20:59

## 2019-02-27 RX ADMIN — METOPROLOL TARTRATE 50 MG: 50 TABLET ORAL at 20:59

## 2019-02-27 RX ADMIN — METOPROLOL TARTRATE 50 MG: 50 TABLET ORAL at 08:08

## 2019-02-27 RX ADMIN — METHYLPREDNISOLONE SODIUM SUCCINATE 62.5 MG: 125 INJECTION, POWDER, FOR SOLUTION INTRAMUSCULAR; INTRAVENOUS at 10:01

## 2019-02-27 RX ADMIN — IPRATROPIUM BROMIDE AND ALBUTEROL SULFATE 3 ML: .5; 3 SOLUTION RESPIRATORY (INHALATION) at 10:57

## 2019-02-27 RX ADMIN — PRIMIDONE 250 MG: 250 TABLET ORAL at 20:59

## 2019-02-27 RX ADMIN — PRIMIDONE 250 MG: 250 TABLET ORAL at 08:08

## 2019-02-27 RX ADMIN — GUAIFENESIN 600 MG: 600 TABLET, EXTENDED RELEASE ORAL at 10:55

## 2019-02-27 RX ADMIN — METHYLPREDNISOLONE SODIUM SUCCINATE 62.5 MG: 125 INJECTION, POWDER, FOR SOLUTION INTRAMUSCULAR; INTRAVENOUS at 18:28

## 2019-02-27 RX ADMIN — IPRATROPIUM BROMIDE AND ALBUTEROL SULFATE 3 ML: .5; 3 SOLUTION RESPIRATORY (INHALATION) at 15:28

## 2019-02-27 RX ADMIN — IPRATROPIUM BROMIDE AND ALBUTEROL SULFATE 3 ML: .5; 3 SOLUTION RESPIRATORY (INHALATION) at 07:21

## 2019-02-27 RX ADMIN — IPRATROPIUM BROMIDE AND ALBUTEROL SULFATE 3 ML: .5; 3 SOLUTION RESPIRATORY (INHALATION) at 19:44

## 2019-02-27 RX ADMIN — RANITIDINE 300 MG: 150 TABLET ORAL at 21:00

## 2019-02-27 NOTE — PROGRESS NOTES
[facility]    Medicine Progress Note - Hospitalist Service       Date of Admission:  2/25/2019  Assessment & Plan      68-year-old female with past medical history significant for asthma (well controlled, last excaerbation was >2 yrs ago), hypertension, AFib (parox, not on anticoag), chronic ITP, diverticulitis with recent colectomy who was admitted 2/25/2019 with an asthma exacerbation    Asthma exacerbation    - likely triggered but recent flight home from [facility] (change in weather/uri)    - changed PO prednisone to solumedrol 60 mg IV BID yesterday- cont today and I will change to PO tomorrow    - she is improved today (off oxygen at rest), but dropped to low 80s when I had her up out of bed    - I offered discharge with oxygen, but she prefers to stay to see if we can titrate her off by tomorrow    - on nebs (which she has at home as well)    - she is only on rescue meds and not maintenance meds because her asthma is very stable and she did not do well with maintenance meds    - mucinex added    - ambulate    HTN    - cont home meds    A fib    - currently sinus    - cont home meds    - not on anticoag due to thrombocytopenia    Chronic ITP    - stable    History diverticulitis with colectomy     is here in room- updated    Diet: Combination Diet 2 gm NA Diet    DVT Prophylaxis: Low Risk/Ambulatory with no VTE prophylaxis indicated  Celaya Catheter: not present  Code Status: Full Code      Disposition Plan   Expected discharge: Tomorrow, recommended to prior living arrangement once off oxygen.  Entered: Kaiser Paredes MD 02/27/2019, 11:36 AM       The patient's care was discussed with the Bedside Nurse and Patient.    Kaiser Paredes MD  Hospitalist Service  [facility]    ______________________________________________________________________    Interval History   Patient in bed. Doing well. Feels better than yesterday, but still has some wheezing, coughing up yellow sputum. No  other complaints.  in room. I ambulated with patient dropped sats to low 80's  Data reviewed today: I reviewed all medications, new labs and imaging results over the last 24 hours. I personally reviewed no images or EKG's today.    Physical Exam   Vital Signs: Temp: 97.7  F (36.5  C) Temp src: Oral BP: 138/62   Heart Rate: 57 Resp: 18 SpO2: 91 % O2 Device: None (Room air) Oxygen Delivery: 3 LPM  Weight: 289 lbs 0 oz  Constitutional: awake, alert, cooperative, no apparent distress, and appears stated age  Respiratory: good air movement, exp wheezing bilaterally  Cardiovascular: Normal apical impulse, regular rate and rhythm, normal S1 and S2, no S3 or S4, and no murmur noted  GI: No scars, normal bowel sounds, soft, non-distended, non-tender, no masses palpated, no hepatosplenomegally    Data   Recent Labs   Lab 02/27/19  0641 02/26/19  0639 02/25/19  1409   WBC 7.7 9.9 8.3   HGB 14.0 13.8 14.7   MCV 94 94 93   * 93* 89*   NA  --   --  141   POTASSIUM  --   --  4.2   CHLORIDE  --   --  106   CO2  --   --  31   BUN  --   --  9   CR  --   --  0.74   ANIONGAP  --   --  4   TONIA  --   --  8.8   GLC  --   --  115*   TROPI  --   --  <0.015     No results found for this or any previous visit (from the past 24 hour(s)).

## 2019-02-27 NOTE — PROGRESS NOTES
Care Transition Initial Assessment - RN    Met with: Patient.  DATA   Active Problems:    Asthma       Cognitive Status: awake, alert and oriented.  Primary Care Clinic Name: [facility]   Primary Care MD Name: Dr. Alberts   Contact information and PCP information verified: Yes  Lives With: spouse   Living Arrangements: house  Quality of Family Relationships: supportive, involved  Description of Support System: Supportive, Involved   Who is your support system?: , Children   Support Assessment: Adequate family and caregiver support   Insurance concerns: No Insurance issues identified  ASSESSMENT  Patient currently receives the following services:  None. Pt lives at home w/ spouse and dtr and is independent at baseline.         Identified issues/concerns regarding health management: CTS identifies pt as high risk due to being admitted 4 times in the past 6 months for different diagnoses. Pt is currently admitted for asthma exacerbation. Pt recently travelled to [place] and developed asthma exacerbation/URI symptoms upon her return home, her asthma has otherwise been in good control. Met w/ pt at bedside to assist in scheduling a follow-up appointment, pt declines and prefers to schedule this herself. No gaps in care identified, will send FYI hand off to clinic CC on discharge.     PLAN  Patient anticipates discharging to Home.    Patient anticipates needs for home equipment: No  Plan/Disposition: Home   Appointments: See above.       Care  (CTS) will continue to follow as needed.    Priti Alarcon RN BSN CTS   [phone]  Care Transitions Team  [facility]

## 2019-02-27 NOTE — PROGRESS NOTES
PRIMARY DIAGNOSIS: ASTHMA  OUTPATIENT/OBSERVATION GOALS TO BE MET BEFORE DISCHARGE:  1. Vital signs stable: Yes     2. Improvement of peak flow to greater than 70% sustained off nebulizer for 4 hours: Patient needs oxygen and still getting scheduled nebs.      3. Dyspnea improved and O2 sats >88% at RA or at prior home O2 therapy level: No      SpO2: 92 %, O2 Device: Nasal cannula 1 liters     4. Short term supplemental O2 needed for use with activity at home: Yes, patient needs Oxygen at this time.      5. Tolerating adequate PO diet and medications: Yes     6. Return to near baseline physical activity: Yes     Discharge Planner Nurse   Safe discharge environment identified: Yes  Barriers to discharge: Yes       Please review provider order for any additional goals.   Nurse to notify provider when observation goals have been met and patient is ready for discharge.     Patient is alert and oriented x4. VS WNL and documented on the FS. Lungs sounds present with expiratory and inspiratory wheezing. Patient states she still feels SOB at rest, but is slowly starting to feel better. Patient is on 1 liter of O2 NC with O2 sats around 90-93%. Patient is now on IV solumedrol 60mg BID. IS being encouraged. Patient has scheduled and PRN nebs ordered. Mucinex started today. Patient denies any numbness or tingling. Active bowel sounds in all 4 quadrants. Patient denies any pain, urgency, and frequency when voiding. Patient is SL and tolerating diet. Patient independent in room and is able to make needs known by using the call light. Plan: Hopefully discharge tomorrow.     /73 (BP Location: Right arm)   Pulse 83   Temp 98.3  F (36.8  C) (Oral)   Resp 18   Wt 131.1 kg (289 lb)   SpO2 91%   BMI 49.61 kg/m

## 2019-02-27 NOTE — PLAN OF CARE
PRIMARY DIAGNOSIS: ASTHMA  OUTPATIENT/OBSERVATION GOALS TO BE MET BEFORE DISCHARGE:  1. Vital signs stable: Yes    2. Improvement of peak flow to greater than 70% sustained off nebulizer for 4 hours: Yes    3. Dyspnea improved and O2 sats >88% at RA or at prior home O2 therapy level: No      SpO2: 95 %, O2 Device: Nasal cannula    4. Short term supplemental O2 needed for use with activity at home: No    5. Tolerating adequate PO diet and medications: Yes    6. Return to near baseline physical activity: Yes    Discharge Planner Nurse   Safe discharge environment identified: Yes  Barriers to discharge: Yes       Entered by: Jessica Gregory 02/26/2019 10:02 PM    VSS, pt is A&Ox4, up independently, denies pain, 3LNC on, feeling slightly SOB, scheduled nebs, SL, will continue to monitor     Please review provider order for any additional goals.   Nurse to notify provider when observation goals have been met and patient is ready for discharge.

## 2019-02-27 NOTE — PLAN OF CARE
Pt is AOx4, up in room independently, VSS. Lungs dim with expiratory wheezes, remains on 3L O2 for SOB, and has a productive cough. Pt is drinking adequate amounts of fluids and voiding sufficient amounts of urine. Plan for scheduled neb tx's, solu-Medrol BID. Will continue to monitor and encourage fluids.

## 2019-02-27 NOTE — PLAN OF CARE
/58 (BP Location: Left arm)   Pulse 83   Temp 98.5  F (36.9  C) (Oral)   Resp 18   Wt 131.1 kg (289 lb)   SpO2 94%   BMI 49.61 kg/m      Orientation: A&Ox4  Pain status: denies  Neuro: in tact. Denies numbness or tingling. PERRLA  Activity: up ad siddhartha  Lungs: wheezes on inspiration and expiration. Pt reports dyspnea on exertion, however stated she feels much better than when she first came into the hospital. No SOB assessed when pt sitting in bed. O2 via NC applied at 3LPM - baseline on RA, wean as needed - pt wished to wean in the morning. Pt utilizes IS frequently. She also has a productive cough with thick, green sputum.  Cardiac: hx of afib, irregular apical pulse.  : WNL  GI: WNL - bowel sounds present in all 4 Qs  Skin: in tact  IVF: PIV SL  Meds: scheduled nebs, IV solumedrol  Labs/imaging: influenza A&B negative  Diet: 2g Na  Plan: encourage IS, scheduled nebs, IV solumedrol, wean O2

## 2019-02-28 VITALS
TEMPERATURE: 98.7 F | BODY MASS INDEX: 49.61 KG/M2 | HEART RATE: 83 BPM | OXYGEN SATURATION: 90 % | SYSTOLIC BLOOD PRESSURE: 137 MMHG | RESPIRATION RATE: 18 BRPM | DIASTOLIC BLOOD PRESSURE: 67 MMHG | WEIGHT: 289 LBS

## 2019-02-28 PROCEDURE — 99239 HOSP IP/OBS DSCHRG MGMT >30: CPT | Performed by: INTERNAL MEDICINE

## 2019-02-28 PROCEDURE — 94640 AIRWAY INHALATION TREATMENT: CPT

## 2019-02-28 PROCEDURE — 25000132 ZZH RX MED GY IP 250 OP 250 PS 637: Performed by: HOSPITALIST

## 2019-02-28 PROCEDURE — 94640 AIRWAY INHALATION TREATMENT: CPT | Mod: 76

## 2019-02-28 PROCEDURE — 25000132 ZZH RX MED GY IP 250 OP 250 PS 637: Performed by: INTERNAL MEDICINE

## 2019-02-28 PROCEDURE — 25000125 ZZHC RX 250: Performed by: HOSPITALIST

## 2019-02-28 PROCEDURE — 40000275 ZZH STATISTIC RCP TIME EA 10 MIN

## 2019-02-28 PROCEDURE — 25000131 ZZH RX MED GY IP 250 OP 636 PS 637: Performed by: HOSPITALIST

## 2019-02-28 PROCEDURE — 25000125 ZZHC RX 250: Performed by: INTERNAL MEDICINE

## 2019-02-28 RX ORDER — IPRATROPIUM BROMIDE AND ALBUTEROL SULFATE 2.5; .5 MG/3ML; MG/3ML
3 SOLUTION RESPIRATORY (INHALATION) 4 TIMES DAILY
Qty: 360 ML | Refills: 0 | Status: SHIPPED | OUTPATIENT
Start: 2019-02-28 | End: 2021-11-25

## 2019-02-28 RX ORDER — PREDNISONE 20 MG/1
TABLET ORAL
Qty: 20 TABLET | Refills: 0 | Status: SHIPPED | OUTPATIENT
Start: 2019-02-28 | End: 2019-08-16

## 2019-02-28 RX ADMIN — METOPROLOL TARTRATE 50 MG: 50 TABLET ORAL at 08:18

## 2019-02-28 RX ADMIN — IPRATROPIUM BROMIDE AND ALBUTEROL SULFATE 3 ML: .5; 3 SOLUTION RESPIRATORY (INHALATION) at 11:41

## 2019-02-28 RX ADMIN — DILTIAZEM HYDROCHLORIDE 180 MG: 180 CAPSULE, COATED, EXTENDED RELEASE ORAL at 08:18

## 2019-02-28 RX ADMIN — PRIMIDONE 250 MG: 250 TABLET ORAL at 08:19

## 2019-02-28 RX ADMIN — GUAIFENESIN 600 MG: 600 TABLET, EXTENDED RELEASE ORAL at 08:19

## 2019-02-28 RX ADMIN — IPRATROPIUM BROMIDE AND ALBUTEROL SULFATE 3 ML: .5; 3 SOLUTION RESPIRATORY (INHALATION) at 07:40

## 2019-02-28 RX ADMIN — PREDNISONE 60 MG: 50 TABLET ORAL at 08:18

## 2019-02-28 NOTE — PLAN OF CARE
Vitals: VSS. Temp: 97.9  F (36.6  C) Temp src: Oral BP: 142/42   Heart Rate: 68 Resp: 24 SpO2: 95 % O2 Device: Nasal cannula Oxygen Delivery: 1 LPM  Pt still on oxygen.   Neuro: WDL, patient expressing anxiety related to sister being hospitalized at this time as well.   GI/: WDL.   Skin: WDL. Very dry skin.   Activity: Ind in room. Up with SBA for long distances in [place].   Diet: 2g Na   Pain: Denies    Plan: Solumedrol changing to PO prednisone tomorrow. Scheduled nebs and PRN nebs. Mucinex. Respiratory following. Hopeful to wean off oxygen vs home with O2.

## 2019-02-28 NOTE — PROGRESS NOTES
Pt to discharge to home via private transport with . Pt given discharge instructions and verbalized understanding. Pt given prescriptions and verbalized understanding.

## 2019-02-28 NOTE — DISCHARGE INSTRUCTIONS
1. You will finish a prolonged Prednisone burst upon discharge.  You will take 60 mg daily for 3 days then 40 mg daily for 3 days then 20 mg daily for 3 days then 10 mg daily for 4 days.  2. You will need to use oxygen when you walk.    3. Follow up with your primary care doctor within two weeks.  At that time they can check your oxygen to see if you still need this.        Oxygen Provider:  Arranged through [employer], contact number [phone].  If you have any questions or concerns please call the oxygen company directly.

## 2019-02-28 NOTE — DISCHARGE SUMMARY
[facility]  Discharge Summary  Name: Gema Coombs    MRN: [id number]  YOB: 1950    Age: 68 year old  Date of Discharge:  2/28/2019  Date of Admission: 2/25/2019  Primary Care Provider: Flavia Alberts  Discharge Physician:  Andra Kohli MD  Discharging Service:  Hospitalist      Discharge Diagnoses:  1. Acute asthma exacerbation  2.  Hypoxia due to acute asthma exacerbation  3.  Hypertension  4.  Paroxysmal atrial fibrillation  5.  Chronic ITP  6.  History of diverticulitis status post colectomy     Follow-ups Needed After Discharge   Follow-up with primary care doctor within 2 weeks, check oxygen saturation    Unresulted Labs Ordered in the Past 30 Days of this Admission     No orders found from 10/16/2018 to 12/16/2018.        Hospital Course:  Gema Coombs is a 68 year old female with past medical history of asthma  (well-controlled, last exacerbation greater than 2 years ago), hypertension, paroxysmal atrial fibrillation (not on anticoagulation), chronic ITP, diverticulitis with recent colectomy and obesity who was admitted on 2/25/2019 with acute asthma exacerbation as well  leading to hypoxia.    The patient was influenza negative.  She had no leukocytosis.  On admission she was hypoxic on room air.  She was started on nebulizer treatments as well as IV Solu-Medrol.  It was felt that her asthma exacerbation was triggered from an upper respiratory infection, viral.  She overall has been improving but continues to have hypoxia on day of discharge.  At rest she has no hypoxia but with ambulation she has significant hypoxia.  With supplemental oxygen at 2 L with ambulation she does keep her oxygen saturations above 88%.  At this time she is safe for discharge and will be on a prolonged prednisone taper.  She will continue her albuterol and she was also prescribed duo nebs to take scheduled until she is improving.  There is no indication for antibiotics.  She will follow-up with her  primary care doctor within 2 weeks to see if her hypoxia has resolved and oxygen can be discontinued.    She was continued on her antihypertensives.  She remained in sinus rhythm during her hospital stay.  She does have paroxysmal atrial fibrillation and is not on anticoagulation due to thrombocytopenia.  She has chronic ITP which remained stable throughout hospital stay.     Discharge Disposition:  Discharged to home     Allergies:  Allergies   Allergen Reactions     No Known Drug Allergy         Condition on Discharge:  Discharge condition: Stable   Discharge vitals: Blood pressure 144/79, pulse 83, temperature 98.7  F (37.1  C), temperature source Oral, resp. rate 18, weight 131.1 kg (289 lb), SpO2 94 %, not currently breastfeeding.   Code status on discharge: Full Code     History of Illness:  See detailed admission note for full details.    Physical Exam:  Blood pressure 144/79, pulse 83, temperature 98.7  F (37.1  C), temperature source Oral, resp. rate 18, weight 131.1 kg (289 lb), SpO2 94 %, not currently breastfeeding.  Wt Readings from Last 1 Encounters:   02/25/19 131.1 kg (289 lb)     General: Alert, awake, no acute distress.  HEENT: Normocephalic, atraumatic, eyes anicteric and without scleral injection, EOMI, MMM.  Cardiac: RRR, normal S1, S2.  No m/g/r. No LE edema.  Pulmonary: Normal chest rise, normal work of breathing.  End expiratory wheezing without crackles.  Speaking comfortably in full sentences.  Abdomen: soft, non-tender, non-distended.  Normoactive BS.  No guarding or rebound tenderness.  Extremities: no deformities.  Warm, well perfused.  Skin: no rashes or lesions noted.  Warm and Dry.  Neuro: No focal deficits noted.  Speech clear.  Coordination and strength grossly normal.  Psych: Appropriate affect. Alert and oriented x3    Procedures other than Imaging:  IV steroids  Phlebotomy  Supplemental Oxygen     Imaging:  Results for orders placed or performed during the hospital encounter of  02/25/19   XR Chest 2 Views    Narrative    XR CHEST 2 VW 2/25/2019 2:42 PM    HISTORY: cough, dyspnea      Impression    IMPRESSION: Negative.    ARA MIKE MD        Consultations:  Consultations This Hospital Stay   RESPIRATORY CARE IP CONSULT     Recent Lab Results:  Recent Labs   Lab 02/27/19  0641 02/26/19  0639 02/25/19  1409   WBC 7.7 9.9 8.3   HGB 14.0 13.8 14.7   HCT 43.8 43.3 44.3   MCV 94 94 93   * 93* 89*     Recent Labs   Lab 02/25/19  1409      POTASSIUM 4.2   CHLORIDE 106   CO2 31   ANIONGAP 4   *   BUN 9   CR 0.74   GFRESTIMATED 84   GFRESTBLACK >90   TONIA 8.8          Pending Results:    Unresulted Labs Ordered in the Past 30 Days of this Admission     No orders found from 12/27/2018 to 2/26/2019.           Discharge Instructions and Follow-Up:   Discharge Orders      Reason for your hospital stay    You were hospitalized for an asthma exacerbation.  You are improving but still need to have oxygen when you walk.     Follow-up and recommended labs and tests     Follow up with primary care provider, Flavia Alberts, within 14 days for hospital follow- up.  The following labs/tests are recommended: check oxygen levels.     Activity    Your activity upon discharge: activity as tolerated     Full Code     Oxygen Adult    [facility] Order 2 liter(s) by nasal cannula with activity with use of portable tank. Expected treatment length is 2 months.. Test on conserving device as applicable.    Patients who qualify for home O2 coverage under the CMS guidelines require ABG tests or O2 sat readings obtained closest to, but no earlier than 2 days prior to the discharge, as evidence of the need for home oxygen therapy. Testing must be performed while patient is in the chronic stable state. See notes for O2 sats.    I certify that this patient, Gema Coombs has been under my care and that I, or a nurse practitioner or physician's assistant working with me, had a face-to-face  encounter that meets the face-to-face encounter requirements with this patient on 2/28/2019. The patient, Gema Coombs was evaluated or treated in whole, or in part, for the following medical condition, which necessitates the use of the ordered oxygen. Treatment Diagnosis: acute asthma exacerbation, acute bronchitis    Attending Provider: Andra Kohli  Physician signature: See electronic signature associated with these discharge orders  Date of Order: February 28, 2019     Diet    Follow this diet upon discharge: Orders Placed This Encounter      Combination Diet 2 gm NA Diet     Discharge Medications   Current Discharge Medication List      START taking these medications    Details   ipratropium - albuterol 0.5 mg/2.5 mg/3 mL (DUONEB) 0.5-2.5 (3) MG/3ML neb solution Take 1 vial (3 mLs) by nebulization 4 times daily  Qty: 360 mL, Refills: 0    Associated Diagnoses: Moderate persistent asthma with acute exacerbation      predniSONE (DELTASONE) 20 MG tablet Take 60 mg daily for 3 days then 40 mg daily for 3 days then 20 mg daily for 3 days then 10 mg daily for 4 days then stop..  Qty: 20 tablet, Refills: 0    Comments: Please include the quantity of tablets and (strength) per dose in sig.  Associated Diagnoses: Moderate persistent asthma with acute exacerbation         CONTINUE these medications which have NOT CHANGED    Details   acetaminophen (TYLENOL) 500 MG tablet Take 500-1,000 mg by mouth every 6 hours as needed for mild pain      albuterol (PROAIR HFA/PROVENTIL HFA/VENTOLIN HFA) 108 (90 Base) MCG/ACT inhaler Inhale 1 puff into the lungs every 4 hours as needed for shortness of breath / dyspnea or wheezing  Qty: 1 Inhaler, Refills: 1    Associated Diagnoses: Mild persistent asthma without complication      albuterol (PROVENTIL) (2.5 MG/3ML) 0.083% neb solution Take 1 vial (2.5 mg) by nebulization every 6 hours as needed for shortness of breath / dyspnea  Qty: 1 Box, Refills: 1    Associated Diagnoses: Mild  persistent asthma without complication      diltiazem 180 MG 24 hr capsule Take 1 capsule (180 mg) by mouth daily  Qty: 30 capsule, Refills: 11    Associated Diagnoses: Paroxysmal atrial fibrillation (H)      metoprolol tartrate (LOPRESSOR) 50 MG tablet Take 1 tablet (50 mg) by mouth 2 times daily  Qty: 60 tablet, Refills: 11    Associated Diagnoses: Paroxysmal atrial fibrillation (H)      primidone (MYSOLINE) 250 MG tablet Take 1 tablet (250 mg) by mouth 2 times daily  Qty: 60 tablet, Refills: 11    Associated Diagnoses: Seizure disorder (H)      ranitidine HCl 300 MG CAPS Take 1 capsule by mouth At Bedtime  Qty: 90 capsule, Refills: 3    Associated Diagnoses: Gastroesophageal reflux disease, esophagitis presence not specified             Time Spent on this Encounter   I, Andra Kohli, personally saw the patient today and spent greater than 30 minutes discharging this patient.    Andra Kohli MD

## 2019-02-28 NOTE — PLAN OF CARE
Vitals: VSS. Temp: 98.2  F (36.8  C) Temp src: Oral BP: 158/63   Heart Rate: 60 Resp: 22 SpO2: 95 % O2 Device: Nasal cannula Oxygen Delivery: 1 LPM     Neuro: WDL  GI/: WDL.   Skin: WDL. Very dry skin.   Activity: Ind in room. Up with SBA for long distances in zuniga.   Diet: 2g Na   Pain: Denies    Plan: Solumedrol changing to PO prednisone today. Scheduled nebs and PRN nebs. Mucinex. Respiratory following. Hopeful to wean off oxygen vs home with O2.     Pt still on oxygen. Tonight weaned patient down to .5 L on O2 at 94%. Tolerating this well. Will continue to monitor and try to wean her off of oxygen.

## 2019-02-28 NOTE — PROGRESS NOTES
Patient has been assessed for Home Oxygen needs.  Oxygen readings:   *   RA - at rest  Pulse oximetry SPO2 91 %  *   RA - during activity/with exercise SPO2 85 %  *   O2 at  0 liters/minute (at rest) ...SPO2 91%  *   O2 at  2 liters/minute (during activity/with exercise) ...SPO2 90 %

## 2019-02-28 NOTE — PLAN OF CARE
VSS with exception of O2 sats with ambulating. Pt denied pain. LS with inspiratory and expiratory wheezes. Nebs given. Pt states she overall feels better and would like to discharge. Plan for pt to discharge to home with home O2 with activity and  steroids and duonebs.

## 2019-02-28 NOTE — PROGRESS NOTES
Transition Communication Hand-off for Care Transitions to Next Level of Care Provider    Name: Gema Coombs  : 1950  MRN #: [id number]  Primary Care Provider: Flavia Alberts  Primary Care MD Name: Dr. Alberts   Primary Clinic: [facility] [address] [facility] [address]  [facility] [address]  Primary Care Clinic Name: [facility]   Reason for Hospitalization:  Hypoxia [R09.02]  COPD exacerbation (H) [J44.1]  Admit Date/Time: 2019  1:17 PM  Discharge Date: 19  Payor Source: Payor: PREFERREDONE / Plan: PREF[employer] HMO / Product Type: PPO /     Readmission Assessment Measure (SKYE) Risk Score/category: Elevated         Reason for Communication Hand-off Referral: Other Elevated SKYE    Discharge Plan:    Concern for non-adherence with plan of care:   No  Discharge Needs Assessment:  Needs      Most Recent Value   # of Referrals Placed by CTS  Communication hand-offs to next level of Care Providers          Already enrolled in Tele-monitoring program and name of program:  NA  Follow-up specialty is recommended: No    Follow-up plan:  No future appointments.    Any outstanding tests or procedures:    Procedures     Future Labs/Procedures    Oxygen Adult     Comments:    [facility] Home Oxygen Order 2 liter(s) by nasal cannula with activity with use of portable tank. Expected treatment length is 2 months.. Test on conserving device as applicable.    Patients who qualify for home O2 coverage under the CMS guidelines require ABG tests or O2 sat readings obtained closest to, but no earlier than 2 days prior to the discharge, as evidence of the need for home oxygen therapy. Testing must be performed while patient is in the chronic stable state. See notes for O2 sats.    I certify that this patient, Gema Coombs has been under my care and that I, or a nurse practitioner or physician's assistant working with me, had a face-to-face encounter that meets the face-to-face encounter requirements with this patient on  "2/28/2019. The patient, Gema Coombs was evaluated or treated in whole, or in part, for the following medical condition, which necessitates the use of the ordered oxygen. Treatment Diagnosis: acute asthma exacerbation, acute bronchitis    Attending Provider: Andra Kohli  Physician signature: See electronic signature associated with these discharge orders  Date of Order: February 28, 2019              Key Recommendations:  CTS identifies pt as high risk due to being admitted 4 times in the past 6 months for different diagnoses. Pt is currently admitted for asthma exacerbation. Pt recently travelled to [place] and developed asthma exacerbation/URI symptoms upon her return home, her asthma has otherwise been in good control. Met w/ pt at bedside to assist in scheduling a follow-up appointment, pt declines and prefers to schedule this herself.     Pt discharged w/ the following plan:   \"1. You will finish a prolonged Prednisone burst upon discharge.  You will take 60 mg daily for 3 days then 40 mg daily for 3 days then 20 mg daily for 3 days then 10 mg daily for 4 days.  2. You will need to use oxygen when you walk.    3. Follow up with your primary care doctor within two weeks.  At that time they can check your oxygen to see if you still need this.\"    O2 arranged through [facility]. No gaps in care identified.     Priti Alarcon RN CTS    AVS/Discharge Summary is the source of truth; this is a helpful guide for improved communication of patient story  "

## 2019-03-01 ENCOUNTER — PATIENT OUTREACH (OUTPATIENT)
Dept: CARE COORDINATION | Facility: CLINIC | Age: 69
End: 2019-03-01

## 2019-03-01 NOTE — PROGRESS NOTES
"Clinic Care Coordination Contact  [facility]/Voicemail    Referral Source: IP Handoff  Clinical Data: Care Coordinator Outreach  Outreach attempted x 2.  Left message on voicemail with call back information and requested return call.  Plan: Care Coordinator will mail out care coordination introduction letter with care coordinator contact information and explanation of care coordination services. Care Coordinator will do no further outreaches at this time.  Germaine Simon   [facility]  [email or web address]  [phone]          Clinic Care Coordination Contact  [facility]/Voicemail    Referral Source: IP Handoff from CTS:    alice Recommendations:  CTS identifies pt as high risk due to being admitted 4 times in the past 6 months for different diagnoses. Pt is currently admitted for asthma exacerbation. Pt recently travelled to [place] and developed asthma exacerbation/URI symptoms upon her return home, her asthma has otherwise been in good control. Met w/ pt at bedside to assist in scheduling a follow-up appointment, pt declines and prefers to schedule this herself.     Pt discharged w/ the following plan:   \"1. You will finish a prolonged Prednisone burst upon discharge.  You will take 60 mg daily for 3 days then 40 mg daily for 3 days then 20 mg daily for 3 days then 10 mg daily for 4 days.  2. You will need to use oxygen when you walk.    3. Follow up with your primary care doctor within two weeks.  At that time they can check your oxygen to see if you still need this.\"    O2 arranged through [facility]. No gaps in care identified.     Clinical Data: Care Coordinator Outreach  Outreach attempted x 1.  Left message on voicemail with call back information and requested return call.  Plan: Care Coordinator will try to reach patient again in 1-2 business days.  Social Michael Martinez  [facility]  [email or web address]  [phone]        "

## 2019-03-01 NOTE — LETTER
[facility]  [address]  [place] [address]      Gema Coombs  [address]  [place] [address]      Dear Georgia,    I am the Clinic Care Coordinator that works with [facility]. I have been trying to reach you recently to introduce Clinic Care Coordination and to see if there was anything I could assist you with.  Below is a description of what Clinic Care Coordination is and how I can further assist you.     The Clinic Care Coordinator role is a  who understands the health care system. The goal of Clinic Care Coordination is to help you manage your health and improve access to the health care system in the most efficient manner. The  can assist you with financial, behavioral, psychosocial, and chemical dependency and counseling/psychiatric resources.    Please feel free to keep this letter and contact information to contact me at [phone] with any further questions or concerns that may arise. We are focused on providing you with the highest-quality healthcare experience possible and that all starts with you.       Sincerely,     MARION Martinez

## 2019-03-11 ENCOUNTER — OFFICE VISIT (OUTPATIENT)
Dept: FAMILY MEDICINE | Facility: CLINIC | Age: 69
End: 2019-03-11

## 2019-03-11 VITALS
DIASTOLIC BLOOD PRESSURE: 64 MMHG | TEMPERATURE: 98.7 F | BODY MASS INDEX: 50.29 KG/M2 | WEIGHT: 293 LBS | HEART RATE: 68 BPM | OXYGEN SATURATION: 96 % | SYSTOLIC BLOOD PRESSURE: 134 MMHG

## 2019-03-11 DIAGNOSIS — J45.21 MILD INTERMITTENT ASTHMA WITH EXACERBATION: ICD-10-CM

## 2019-03-11 DIAGNOSIS — E78.5 HYPERLIPIDEMIA LDL GOAL <100: ICD-10-CM

## 2019-03-11 DIAGNOSIS — I48.0 PAROXYSMAL ATRIAL FIBRILLATION (H): ICD-10-CM

## 2019-03-11 DIAGNOSIS — D69.3 CHRONIC IDIOPATHIC THROMBOCYTOPENIA (H): ICD-10-CM

## 2019-03-11 DIAGNOSIS — Z09 ENCOUNTER FOR EXAMINATION FOLLOWING TREATMENT AT HOSPITAL: Primary | ICD-10-CM

## 2019-03-11 PROCEDURE — 99214 OFFICE O/P EST MOD 30 MIN: CPT | Performed by: FAMILY MEDICINE

## 2019-03-11 RX ORDER — ATORVASTATIN CALCIUM 20 MG/1
20 TABLET, FILM COATED ORAL DAILY
Qty: 90 TABLET | Refills: 0 | Status: SHIPPED | OUTPATIENT
Start: 2019-03-11 | End: 2020-02-28

## 2019-03-11 NOTE — PROGRESS NOTES
SUBJECTIVE:   Gema Coombs is a 68 year old female who presents to clinic today for the following health issues:          Hospital Follow-up Visit:    Hospital/Nursing Home/IP Rehab Facility: [facility]  Date of Admission: 2/25/19  Date of Discharge: 2/18/19  Reason(s) for Admission: Asthma, high blood pressure, chronic ITP, atrial fibrillation            Problems taking medications regularly:  None       Medication changes since discharge: Patient was put on prednisone        Problems adhering to non-medication therapy:  None    Summary of hospitalization:  [facility] discharge summary reviewed  Diagnostic Tests/Treatments reviewed.  Follow up needed: ongoing surveillance of platelets- chronic ITP  Other Healthcare Providers Involved in Patient s Care:         [place] [facility]          [facility]  Update since discharge: improved. She still has 3 days or prednisone to complete    Post Discharge Medication Reconciliation: unable to reconcile discharge medications due to anticoagulation for paroxysmal atrial fibrillation initiated?.  Need to address if patient is taking Abixaban  After discussion with cardiology- atorvastatin 20 mg initiated today  Plan of care communicated with patient     Coding guidelines for this visit:  Type of Medical   Decision Making Face-to-Face Visit       within 7 Days of discharge Face-to-Face Visit        within 14 days of discharge   Moderate Complexity [id number]   High Complexity [id number]              PROBLEMS TO ADD ON...  Recent colon resection for recurrent diverticulitis- doing well  Chronic ITP- follow up with Dr Patel to be scheduled  Seizure Disorder- stable for decades on her current dose of Primidone  Atrial Fibrillation- rate controlled- Apixaban is not documented on medications  Hyperlipidemia: atorvastatin initiated today  Morbid obesity- weight unchanged from a year ago      Problem list and histories reviewed & adjusted, as  indicated.  Additional history: as documented    Patient Active Problem List   Diagnosis     Myalgia and myositis     Breast disorder     Generalized nonconvulsive epilepsy (H)     Family history of other cardiovascular diseases     Esophageal reflux     Essential hypertension, benign     Mild persistent asthma     Hyperlipidemia     Diverticulitis of colon     Sleep apnea, obstructive     Other specified cardiac dysrhythmias/[facility]     [facility] Care Home     Morbid obesity with BMI of 50.0-59.9, adult (H)     Chronic idiopathic thrombocytopenia (H)     BCC (basal cell carcinoma), face     Seizure disorder (H)     Blurry vision     Mitral valve regurgitation/ repeat echocardiogram 2017     Acute diverticulitis     Diverticulitis     Atrial fibrillation (H)     Asthma     Past Surgical History:   Procedure Laterality Date     BIOPSY OF BREAST, INCISIONAL      L breasts     BIOPSY OF BREAST, NEEDLE CORE  &    R breasts     C ANORECTAL MYOMECTOMY      uterine fibroids     C  DELIVERY ONLY      x 2     COLONOSCOPY N/A 10/24/2018    Procedure: COLONOSCOPY;  Surgeon: Nieves Cordova MD;  Location: [facility] OR     LAPAROSCOPIC ASSISTED SIGMOID COLECTOMY N/A 10/24/2018    Procedure: Intraoperative colonoscopy with laparoscopic-assisted sigmoid colectomy;  Surgeon: Nieves Cordova MD;  Location: [facility] OR     LAPAROSCOPY,LYSIS ADHESNS  1985    pain R side poss r/t c-sections       Social History     Tobacco Use     Smoking status: Never Smoker     Smokeless tobacco: Never Used   Substance Use Topics     Alcohol use: Yes     Alcohol/week: 0.0 oz     Comment: rare     Family History   Problem Relation Age of Onset     Cancer Maternal Aunt         breast-     Cancer Maternal Grandmother         breast-     Heart Disease Paternal Grandfather         heartattack-  at 58     Heart Disease Maternal Grandfather              Heart Disease Paternal Uncle         bypass-alive  &well     Thyroid Disease Sister         hypothyroidism-alive         Current Outpatient Medications   Medication Sig Dispense Refill     acetaminophen (TYLENOL) 500 MG tablet Take 500-1,000 mg by mouth every 6 hours as needed for mild pain       albuterol (PROAIR HFA/PROVENTIL HFA/VENTOLIN HFA) 108 (90 Base) MCG/ACT inhaler Inhale 1 puff into the lungs every 4 hours as needed for shortness of breath / dyspnea or wheezing 1 Inhaler 1     albuterol (PROVENTIL) (2.5 MG/3ML) 0.083% neb solution Take 1 vial (2.5 mg) by nebulization every 6 hours as needed for shortness of breath / dyspnea 1 Box 1     atorvastatin (LIPITOR) 20 MG tablet Take 1 tablet (20 mg) by mouth daily 90 tablet 0     diltiazem 180 MG 24 hr capsule Take 1 capsule (180 mg) by mouth daily 30 capsule 11     ipratropium - albuterol 0.5 mg/2.5 mg/3 mL (DUONEB) 0.5-2.5 (3) MG/3ML neb solution Take 1 vial (3 mLs) by nebulization 4 times daily 360 mL 0     metoprolol tartrate (LOPRESSOR) 50 MG tablet Take 1 tablet (50 mg) by mouth 2 times daily 60 tablet 11     predniSONE (DELTASONE) 20 MG tablet Take 60 mg daily for 3 days then 40 mg daily for 3 days then 20 mg daily for 3 days then 10 mg daily for 4 days then stop.. 20 tablet 0     primidone (MYSOLINE) 250 MG tablet Take 1 tablet (250 mg) by mouth 2 times daily 60 tablet 11     ranitidine HCl 300 MG CAPS Take 1 capsule by mouth At Bedtime 90 capsule 3       Reviewed and updated as needed this visit by clinical staff  Allergies  Meds       Reviewed and updated as needed this visit by Provider    Follow up from ER  Neb at noon today- not needing albuterol rescue during the night  Still coughing- she denies dyspnea  No antibiotics prescribed  Prednisone- 2 more days left         ROS:  Constitutional, HEENT, cardiovascular, pulmonary, GI, , musculoskeletal, neuro, skin, endocrine and psych systems are negative, except as otherwise noted.       OBJECTIVE:     /64 (BP Location: Left arm, Patient  Position: Sitting, Cuff Size: Adult Large)   Pulse 68   Temp 98.7  F (37.1  C) (Oral)   Wt 132.9 kg (293 lb)   SpO2 96%   BMI 50.29 kg/m    Body mass index is 50.29 kg/m .   GENERAL: healthy, alert and no distress  NECK: no adenopathy, no asymmetry, masses, or scars and thyroid normal to palpation  RESP: lungs clear to auscultation - no rales, rhonchi or wheezes  CV: regular rate and rhythm,  no murmur, no peripheral edema   SKIN: no suspicious lesions or rashes    Diagnostic Test Results:  none     ASSESSMENT/PLAN:     (Z09) Encounter for examination following treatment at hospital  (primary encounter diagnosis)  Comment:    Plan: symptoms improved-     (E78.5) Hyperlipidemia LDL goal <100  Comment:  Recheck fasting lipid and ALT in three months  Plan: atorvastatin (LIPITOR) 20 MG tablet        (J45.41) Moderate persistent asthma with exacerbation  Comment:  Doing better  Plan: complete prednisone- not on a daily inhaler    (D69.3) Chronic idiopathic thrombocytopenia (H)  Comment: platelets stable  Plan: follow up with hematology    (I48.0) Paroxysmal atrial fibrillation (H)  Comment: rate controlled  Plan: confirm if patient started anticoagulant      RECHECK IN 12 WEEKS    Flavia Alberts MD  [facility]

## 2019-04-02 DIAGNOSIS — J45.30 MILD PERSISTENT ASTHMA WITHOUT COMPLICATION: ICD-10-CM

## 2019-04-02 RX ORDER — ALBUTEROL SULFATE 90 UG/1
AEROSOL, METERED RESPIRATORY (INHALATION)
Qty: 18 G | Refills: 0 | Status: SHIPPED | OUTPATIENT
Start: 2019-04-02 | End: 2019-08-16

## 2019-04-02 NOTE — TELEPHONE ENCOUNTER
Pending Prescriptions:                       Disp   Refills    albuterol (PROAIR HFA/PROVENTIL HFA/INDIGO*18 g                Sig: INHALE 1 PUFF INTO THE LUNGS EVERY 4 HOURS AS           NEEDED FOR SHORTNESS OF BREATH OR DIFFICULT           BREATHING OR WHEEZING    Last refill was 1-  Last ov was 3- rtc in 12 weeks  Please fax and close encounter or send to    Aleksandra  [phone] (home) NONE (work)

## 2019-05-13 DIAGNOSIS — J45.30 MILD PERSISTENT ASTHMA WITHOUT COMPLICATION: ICD-10-CM

## 2019-05-13 RX ORDER — ALBUTEROL SULFATE 90 UG/1
AEROSOL, METERED RESPIRATORY (INHALATION)
Qty: 18 G | Refills: 0 | COMMUNITY
Start: 2019-05-13

## 2019-05-13 NOTE — TELEPHONE ENCOUNTER
Call patient  Has gone through one albuterol inhaler in six weeks-  Would like to see her in the office-  Is she completely out and in need of a refill tonight

## 2019-05-13 NOTE — TELEPHONE ENCOUNTER
Spoke to patient and she stated that she does not need a refill at this time and still has the one that she just got a little while ago. She will come in for an office visit if she feels she needs another one after finishing the one she already has.

## 2019-05-30 ENCOUNTER — TELEPHONE (OUTPATIENT)
Dept: FAMILY MEDICINE | Facility: CLINIC | Age: 69
End: 2019-05-30

## 2019-06-16 DIAGNOSIS — E78.5 HYPERLIPIDEMIA LDL GOAL <100: ICD-10-CM

## 2019-06-17 RX ORDER — ATORVASTATIN CALCIUM 20 MG/1
TABLET, FILM COATED ORAL
Qty: 30 TABLET | Refills: 0 | COMMUNITY
Start: 2019-06-17

## 2019-08-09 DIAGNOSIS — J45.30 MILD PERSISTENT ASTHMA WITHOUT COMPLICATION: ICD-10-CM

## 2019-08-09 NOTE — TELEPHONE ENCOUNTER
Received incoming refill request for   Pending Prescriptions:                       Disp   Refills    albuterol (PROAIR HFA/PROVENTIL HFA/INDIGO*18 g   0            Sig: INHALE 1 PUFF INTO THE LUNGS EVERY 4 HOURS AS           NEEDED FOR SHORTNESS OF BREATH OR DIFFICULT           BREATHING OR WHEEZING    Patient last had a refill of this medication on 4/2/19. Patient was supposed to be seen 12 weeks from 3/11/19, routing to Dr. Alberts for approval or denial of request.

## 2019-08-10 NOTE — TELEPHONE ENCOUNTER
Call Gema- can she come in this Saturday morning-  Would like to recheck lipids-  ACT score   Refill of albuterol

## 2019-08-13 ENCOUNTER — TELEPHONE (OUTPATIENT)
Dept: FAMILY MEDICINE | Facility: CLINIC | Age: 69
End: 2019-08-13

## 2019-08-13 RX ORDER — ALBUTEROL SULFATE 90 UG/1
AEROSOL, METERED RESPIRATORY (INHALATION)
Qty: 18 G | Refills: 0 | COMMUNITY
Start: 2019-08-13

## 2019-08-13 NOTE — TELEPHONE ENCOUNTER
Received incoming faxed form from [facility] that requires review and signature from Dr. Alberts. The signature is needed for the patient to discontinue her oxygen so they can pick it up. This is in your inbasket on your desk.      Form can be faxed back to [phone]

## 2019-08-14 NOTE — TELEPHONE ENCOUNTER
Called patient and she states that [facility] already came and picked up the oxygen and they do not need a discharge signature from Dr. Alberts. She did schedule an appointment to come in on Friday to do her asthma and lipid recheck.

## 2019-08-16 ENCOUNTER — OFFICE VISIT (OUTPATIENT)
Dept: FAMILY MEDICINE | Facility: CLINIC | Age: 69
End: 2019-08-16

## 2019-08-16 VITALS
HEART RATE: 60 BPM | SYSTOLIC BLOOD PRESSURE: 130 MMHG | DIASTOLIC BLOOD PRESSURE: 82 MMHG | WEIGHT: 293 LBS | OXYGEN SATURATION: 97 % | BODY MASS INDEX: 53.38 KG/M2 | TEMPERATURE: 98.4 F

## 2019-08-16 DIAGNOSIS — E66.01 MORBID OBESITY, UNSPECIFIED OBESITY TYPE (H): ICD-10-CM

## 2019-08-16 DIAGNOSIS — R26.2 DIFFICULTY WALKING: ICD-10-CM

## 2019-08-16 DIAGNOSIS — G47.33 SLEEP APNEA, OBSTRUCTIVE: ICD-10-CM

## 2019-08-16 DIAGNOSIS — J45.40 MODERATE PERSISTENT ASTHMA WITHOUT COMPLICATION: Primary | ICD-10-CM

## 2019-08-16 PROCEDURE — 99214 OFFICE O/P EST MOD 30 MIN: CPT | Performed by: FAMILY MEDICINE

## 2019-08-16 RX ORDER — ALBUTEROL SULFATE 90 UG/1
AEROSOL, METERED RESPIRATORY (INHALATION)
Qty: 18 G | Refills: 0 | Status: SHIPPED | OUTPATIENT
Start: 2019-08-16 | End: 2019-09-15

## 2019-08-16 NOTE — NURSING NOTE
Chief Complaint   Patient presents with     Asthma     recheck medication      Pre-visit Screening:  Immunizations:  up to date  Colonoscopy:  is up to date  Mammogram: is up to date  Asthma Action Test/Plan:  Done today  PHQ9:  PHQ-2 given today  GAD7:  No concerns  Questioned patient about current smoking habits Pt. has never smoked.  Ok to leave detailed message on voice mail for today's visit only yes, phone # [phone]       Registered Dietitian

## 2019-08-16 NOTE — PROGRESS NOTES
"Subjective     Gema Coombs is a 69 year old female who presents to clinic today for the following health issues:    HPI   Asthma Follow-Up    Was ACT completed today?    Yes    ACT Total Scores 8/16/2019   ACT TOTAL SCORE -   ASTHMA ER VISITS -   ASTHMA HOSPITALIZATIONS -   ACT TOTAL SCORE (Goal Greater than or Equal to 20) 16   In the past 12 months, how many times did you visit the emergency room for your asthma without being admitted to the hospital? 1   In the past 12 months, how many times were you hospitalized overnight because of your asthma? 1     Gema was hospitalized in February with asthma exacerbation- treated with prednisone and albuterol nebs or MDI    Formerly on a daily steroid inhaler- this was discontinued on the advise of her eye doctor because of her cataracts.  Asthma symptoms were controlled for a couple of years until the past six months.         Please describe any recent triggers for your asthma: smoke-      Have you had any Emergency Room Visits, Urgent Care Visits, or Hospital Admissions since your last office visit?  Yes  Number of hospitalizations for asthma:once in February    PROBLEMS TO ADD ON...  Untreated sleep apnea (it is on her problem list- although she tells me she has not been diagnosed)  High probability-   History of paroxysmal atrial fib- (not aware of AF since last October)    Discharged on home oxygen to be used at night- she had her medical supply company  the oxygen- used her \"phone ap\" to monitor and did have low oxygen values    Patient Active Problem List   Diagnosis     Myalgia and myositis     Breast disorder     Generalized nonconvulsive epilepsy (H)     Family history of other cardiovascular diseases     Esophageal reflux     Essential hypertension, benign     Mild persistent asthma     Hyperlipidemia     Diverticulitis of colon     Sleep apnea, obstructive     Other specified cardiac dysrhythmias/pac's     Health Care Home     Morbid obesity with BMI " of 50.0-59.9, adult (H)     Chronic idiopathic thrombocytopenia (H)     BCC (basal cell carcinoma), face     Seizure disorder (H)     Blurry vision     Mitral valve regurgitation/ repeat echocardiogram 2017     Acute diverticulitis     Diverticulitis     Atrial fibrillation (H)     Asthma     Past Surgical History:   Procedure Laterality Date     BIOPSY OF BREAST, INCISIONAL      L breasts     BIOPSY OF BREAST, NEEDLE CORE  &    R breasts     C ANORECTAL MYOMECTOMY      uterine fibroids     C  DELIVERY ONLY      x 2     COLONOSCOPY N/A 10/24/2018    Procedure: COLONOSCOPY;  Surgeon: Nieves Cordova MD;  Location: [facility] OR     LAPAROSCOPIC ASSISTED SIGMOID COLECTOMY N/A 10/24/2018    Procedure: Intraoperative colonoscopy with laparoscopic-assisted sigmoid colectomy;  Surgeon: Nieves Cordova MD;  Location: [facility] OR     LAPAROSCOPY,LYSIS ADHESNS  1985    pain R side poss r/t c-sections       Social History     Tobacco Use     Smoking status: Never Smoker     Smokeless tobacco: Never Used   Substance Use Topics     Alcohol use: Yes     Alcohol/week: 0.0 oz     Comment: rare     Family History   Problem Relation Age of Onset     Cancer Maternal Aunt         breast-     Cancer Maternal Grandmother         breast-     Heart Disease Paternal Grandfather         heartattack-  at 58     Heart Disease Maternal Grandfather              Heart Disease Paternal Uncle         bypass-alive &well     Thyroid Disease Sister         hypothyroidism-alive         Current Outpatient Medications   Medication Sig Dispense Refill     albuterol (PROAIR HFA/PROVENTIL HFA/VENTOLIN HFA) 108 (90 Base) MCG/ACT inhaler INHALE 1 PUFF INTO THE LUNGS EVERY 4 HOURS AS NEEDED FOR SHORTNESS OF BREATH OR DIFFICULT BREATHING OR WHEEZING 18 g 0     atorvastatin (LIPITOR) 20 MG tablet Take 1 tablet (20 mg) by mouth daily 90 tablet 0     diltiazem 180 MG 24 hr capsule Take 1 capsule (180 mg)  by mouth daily 30 capsule 11     ipratropium - albuterol 0.5 mg/2.5 mg/3 mL (DUONEB) 0.5-2.5 (3) MG/3ML neb solution Take 1 vial (3 mLs) by nebulization 4 times daily 360 mL 0     metoprolol tartrate (LOPRESSOR) 50 MG tablet Take 1 tablet (50 mg) by mouth 2 times daily 60 tablet 11     primidone (MYSOLINE) 250 MG tablet Take 1 tablet (250 mg) by mouth 2 times daily 60 tablet 11     ranitidine HCl 300 MG CAPS TK 1 C PO HS  3     acetaminophen (TYLENOL) 500 MG tablet Take 500-1,000 mg by mouth every 6 hours as needed for mild pain       albuterol (PROVENTIL) (2.5 MG/3ML) 0.083% neb solution Take 1 vial (2.5 mg) by nebulization every 6 hours as needed for shortness of breath / dyspnea (Patient not taking: Reported on 8/16/2019) 1 Box 1       PROBLEMS TO ADD ON...  Morbid obesity: weight gain to 311 pounds  Sedentary- sits most of the day- deconditioned- also dyspneic with her asthma    Nonsmoker-   Chest Ct ast October- 3 mm lung nodule  Needs to repeat in October 2019-  No findings of COPD (mother was a heavy smoker and had lung cancer and COPD)              Review of Systems   ROS COMP: Constitutional, HEENT, cardiovascular, pulmonary, GI, , musculoskeletal, neuro, skin, endocrine and psych systems are negative, except as otherwise noted.  No palpitations  No chest pain  Breathless with walking   No abdominal pain or change in bowel habits (history of diverticulitis)            Objective    /82 (BP Location: Left arm, Patient Position: Sitting, Cuff Size: Adult Large)   Pulse 60   Temp 98.4  F (36.9  C) (Oral)   Wt 141.1 kg (311 lb)   SpO2 97%   BMI 53.38 kg/m    Body mass index is 53.38 kg/m .  Physical Exam   No acute distress  Regular rate and  rhythm. S1 and S2 normal, no murmurs, clicks, gallops or rubs. No edema or JVD. Chest is clear; no wheezes or rales.      Diagnostic Test Results:  Labs reviewed in Epic  none         Assessment & Plan   (J45.40) Moderate persistent asthma without complication   (primary encounter diagnosis)  Comment: restart daily inhaler- defer to pulmonologist  Plan: PULMONARY MEDICINE REFERRAL         Refill of rescue inhaler    (G47.33) Sleep apnea, obstructive  Comment:   Plan: PULMONARY MEDICINE REFERRAL            (R26.2) Difficulty walking  Comment: deconditioned, problems with balance, poor posture, walks hunched forward  Plan: PHYSICAL THERAPY REFERRAL            (E66.01) Morbid obesity, unspecified obesity type (H)  Comment:   Plan: multiple health problems this past year- ITP, diverticulitis- has not been focussed on weight                 No follow-ups on file.    Flavia Alberts MD  [facility]

## 2019-08-17 ASSESSMENT — ASTHMA QUESTIONNAIRES: ACT_TOTALSCORE: 16

## 2019-09-04 ENCOUNTER — MYC MEDICAL ADVICE (OUTPATIENT)
Dept: FAMILY MEDICINE | Facility: CLINIC | Age: 69
End: 2019-09-04

## 2019-09-06 ENCOUNTER — TRANSFERRED RECORDS (OUTPATIENT)
Dept: FAMILY MEDICINE | Facility: CLINIC | Age: 69
End: 2019-09-06

## 2019-09-27 ENCOUNTER — HEALTH MAINTENANCE LETTER (OUTPATIENT)
Age: 69
End: 2019-09-27

## 2019-11-13 DIAGNOSIS — K21.9 GASTROESOPHAGEAL REFLUX DISEASE, ESOPHAGITIS PRESENCE NOT SPECIFIED: ICD-10-CM

## 2019-11-18 DIAGNOSIS — I48.0 PAROXYSMAL ATRIAL FIBRILLATION (H): ICD-10-CM

## 2019-11-18 RX ORDER — DILTIAZEM HYDROCHLORIDE 180 MG/1
CAPSULE, COATED, EXTENDED RELEASE ORAL
Qty: 30 CAPSULE | Refills: 0 | OUTPATIENT
Start: 2019-11-18

## 2019-11-18 NOTE — TELEPHONE ENCOUNTER
Refused Prescriptions:                       Disp   Refills    diltiazem ER COATED BEADS (CARDIZEM CD/CAR*30 cap*0        Sig: TAKE 1 CAPSULE(180 MG) BY MOUTH DAILY  Refused By: ALEKSANDRA BREWSTER  Reason for Refusal: Appt required, please call patient     per notes on RE on 9-15  Pt due for non fasting ov  No future appt  Aleksandra  [phone] (home) NONE (work)

## 2019-12-04 DIAGNOSIS — I48.0 PAROXYSMAL ATRIAL FIBRILLATION (H): ICD-10-CM

## 2019-12-04 RX ORDER — DILTIAZEM HYDROCHLORIDE 180 MG/1
180 CAPSULE, COATED, EXTENDED RELEASE ORAL DAILY
Qty: 90 CAPSULE | Refills: 1 | Status: SHIPPED | OUTPATIENT
Start: 2019-12-04 | End: 2020-02-28

## 2019-12-04 NOTE — TELEPHONE ENCOUNTER
Medication Refilled: Diltiazem  Last office visit: 11/18/18  Last Labs/EKG: NA  Next office visit: PRN - 90 days filled until pt can get in to see her PMD   Pharmacy sent to: marcella Ham RN

## 2020-01-03 DIAGNOSIS — J45.40 MODERATE PERSISTENT ASTHMA WITHOUT COMPLICATION: ICD-10-CM

## 2020-01-03 NOTE — TELEPHONE ENCOUNTER
Pending Prescriptions:                       Disp   Refills    albuterol (PROAIR HFA/PROVENTIL HFA/INDIGO*18 g                Sig: INHALE 1 PUFF INTO THE LUNGS EVERY 4 HOURS AS           NEEDED FOR SHORTNESS OF BREATH OR DIFFICULT           BREATHING OR WHEEZING    No f/u from 8-16  Due for ov?  Fax or deny  No Future Appt's for pt     Aleksandra

## 2020-01-08 ENCOUNTER — MYC MEDICAL ADVICE (OUTPATIENT)
Dept: FAMILY MEDICINE | Facility: CLINIC | Age: 70
End: 2020-01-08

## 2020-01-08 DIAGNOSIS — J45.40 MODERATE PERSISTENT ASTHMA WITHOUT COMPLICATION: ICD-10-CM

## 2020-01-08 RX ORDER — ALBUTEROL SULFATE 90 UG/1
AEROSOL, METERED RESPIRATORY (INHALATION)
Qty: 8.5 G | Refills: 3 | OUTPATIENT
Start: 2020-01-08

## 2020-01-08 RX ORDER — ALBUTEROL SULFATE 90 UG/1
AEROSOL, METERED RESPIRATORY (INHALATION)
Qty: 18 G | Refills: 0 | Status: SHIPPED | OUTPATIENT
Start: 2020-01-08 | End: 2020-02-28

## 2020-01-08 NOTE — TELEPHONE ENCOUNTER
Pt last seen 8/2019    Pending Prescriptions:                       Disp   Refills    albuterol (PROAIR HFA/PROVENTIL HFA/INDIGO*8.5 g  3            Sig: INHALE 1 PUFF INTO THE LUNGS EVERY 4 HOURS AS           NEEDED FOR SHORTNESS OF BREATH OR WHEEZING

## 2020-01-21 ENCOUNTER — HOSPITAL ENCOUNTER (OUTPATIENT)
Facility: CLINIC | Age: 70
End: 2020-01-21
Attending: COLON & RECTAL SURGERY | Admitting: COLON & RECTAL SURGERY
Payer: COMMERCIAL

## 2020-02-09 DIAGNOSIS — I48.0 PAROXYSMAL ATRIAL FIBRILLATION (H): ICD-10-CM

## 2020-02-09 DIAGNOSIS — G40.909 SEIZURE DISORDER (H): ICD-10-CM

## 2020-02-10 RX ORDER — PRIMIDONE 250 MG/1
TABLET ORAL
Qty: 60 TABLET | Refills: 11 | OUTPATIENT
Start: 2020-02-10

## 2020-02-10 RX ORDER — METOPROLOL TARTRATE 50 MG
TABLET ORAL
Qty: 60 TABLET | Refills: 11 | OUTPATIENT
Start: 2020-02-10

## 2020-02-10 NOTE — TELEPHONE ENCOUNTER
Refused Prescriptions:                       Disp   Refills    primidone (MYSOLINE) 250 MG tablet [Pharma*60 tab*11       Sig: TAKE 1 TABLET(250 MG) BY MOUTH TWICE DAILY  Refused By: ALEKSANDRA BREWSTER  Reason for Refusal: Patient needs appointment    metoprolol tartrate (LOPRESSOR) 50 MG tabl*60 tab*11       Sig: TAKE 1 TABLET(50 MG) BY MOUTH TWICE DAILY  Refused By: ALEKSANDRA BREWSTER  Reason for Refusal: Patient needs appointment    Pt is due for a yearly fasting med check/ov  Recent Labs   Lab Test 01/29/19  0746 03/24/17  0838   CHOL 238* 232*   HDL 58 51   * 155*   TRIG 99 129   CHOLHDLRATIO 4.1 4.5     No Future Appt's for pt   Aleksandra

## 2020-02-17 DIAGNOSIS — J45.40 MODERATE PERSISTENT ASTHMA WITHOUT COMPLICATION: ICD-10-CM

## 2020-02-18 RX ORDER — ALBUTEROL SULFATE 90 UG/1
AEROSOL, METERED RESPIRATORY (INHALATION)
Qty: 18 G | Refills: 0 | COMMUNITY
Start: 2020-02-18

## 2020-02-18 NOTE — TELEPHONE ENCOUNTER
Gema Coombs is requesting a refill of:    Refused Prescriptions:                       Disp   Refills    ALBUTEROL 108 (90 Base) MCG/ACT IN inhaler*18 g   0        Sig: INHALE 1 PUFF INTO THE LUNGS EVERY 4 HOURS AS NEEDED           FOR SHORTNESS OF BREATH OR DIFFICULT BREATHING OR           WHEEZING  Refused By: MAGGIE HARMAN  Reason for Refusal: Patient needs appointment    Pt last seen 08/16/19 advised to return in 6 months. Pt due for OV

## 2020-02-28 ENCOUNTER — OFFICE VISIT (OUTPATIENT)
Dept: FAMILY MEDICINE | Facility: CLINIC | Age: 70
End: 2020-02-28

## 2020-02-28 VITALS
HEART RATE: 61 BPM | BODY MASS INDEX: 57.33 KG/M2 | RESPIRATION RATE: 20 BRPM | DIASTOLIC BLOOD PRESSURE: 86 MMHG | WEIGHT: 293 LBS | OXYGEN SATURATION: 98 % | SYSTOLIC BLOOD PRESSURE: 136 MMHG

## 2020-02-28 DIAGNOSIS — J45.40 MODERATE PERSISTENT ASTHMA WITHOUT COMPLICATION: ICD-10-CM

## 2020-02-28 DIAGNOSIS — R53.81 PHYSICAL DECONDITIONING: ICD-10-CM

## 2020-02-28 DIAGNOSIS — E66.01 MORBID OBESITY (H): Primary | ICD-10-CM

## 2020-02-28 DIAGNOSIS — G40.909 SEIZURE DISORDER (H): ICD-10-CM

## 2020-02-28 DIAGNOSIS — I48.0 PAROXYSMAL ATRIAL FIBRILLATION (H): ICD-10-CM

## 2020-02-28 DIAGNOSIS — Z23 NEED FOR VACCINATION: ICD-10-CM

## 2020-02-28 DIAGNOSIS — K21.9 GASTROESOPHAGEAL REFLUX DISEASE, ESOPHAGITIS PRESENCE NOT SPECIFIED: ICD-10-CM

## 2020-02-28 LAB
% GRANULOCYTES: 71.9 %
ALBUMIN SERPL-MCNC: 4.1 G/DL (ref 3.6–5.1)
ALBUMIN/GLOB SERPL: 2 {RATIO} (ref 1–2.5)
ALP SERPL-CCNC: 95 U/L (ref 33–130)
ALT 1742-6: 0 U/L (ref 0–32)
AST 1920-8: 4 U/L (ref 0–35)
BILIRUB SERPL-MCNC: 0.7 MG/DL (ref 0.2–1.2)
BUN SERPL-MCNC: 11 MG/DL (ref 7–25)
BUN/CREATININE RATIO: 16.4 (ref 6–22)
CALCIUM SERPL-MCNC: 9.1 MG/DL (ref 8.6–10.3)
CHLORIDE SERPLBLD-SCNC: 105.4 MMOL/L (ref 98–110)
CHOLEST SERPL-MCNC: 235 MG/DL (ref 0–199)
CHOLEST/HDLC SERPL: 4 {RATIO} (ref 0–5)
CO2 SERPL-SCNC: 30.8 MMOL/L (ref 20–32)
CREAT SERPL-MCNC: 0.67 MG/DL (ref 0.7–1.18)
GLOBULIN, CALCULATED - QUEST: 2.1 (ref 1.9–3.7)
GLUCOSE SERPL-MCNC: 96 MG/DL (ref 60–99)
HBA1C MFR BLD: 4.9 % (ref 4–7)
HCT VFR BLD AUTO: 45.5 % (ref 35–47)
HDLC SERPL-MCNC: 59 MG/DL (ref 40–150)
HEMOGLOBIN: 15 G/DL (ref 11.7–15.7)
LDLC SERPL CALC-MCNC: 149 MG/DL (ref 0–130)
LYMPHOCYTES NFR BLD AUTO: 20.5 %
MCH RBC QN AUTO: 30 PG (ref 26–33)
MCHC RBC AUTO-ENTMCNC: 33 G/DL (ref 31–36)
MCV RBC AUTO: 90.9 FL (ref 78–100)
MONOCYTES NFR BLD AUTO: 7.6 %
PLATELET COUNT - QUEST: 99 10^9/L (ref 150–375)
POTASSIUM SERPL-SCNC: 4.36 MMOL/L (ref 3.5–5.3)
PROT SERPL-MCNC: 6.2 G/DL (ref 6.1–8.1)
RBC # BLD AUTO: 5 10*12/L (ref 3.8–5.2)
SODIUM SERPL-SCNC: 142.8 MMOL/L (ref 135–146)
TRIGL SERPL-MCNC: 134 MG/DL (ref 0–149)
WBC # BLD AUTO: 5.5 10*9/L (ref 4–11)

## 2020-02-28 PROCEDURE — 80053 COMPREHEN METABOLIC PANEL: CPT | Performed by: FAMILY MEDICINE

## 2020-02-28 PROCEDURE — 80061 LIPID PANEL: CPT | Performed by: FAMILY MEDICINE

## 2020-02-28 PROCEDURE — 85025 COMPLETE CBC W/AUTO DIFF WBC: CPT | Performed by: FAMILY MEDICINE

## 2020-02-28 PROCEDURE — 90686 IIV4 VACC NO PRSV 0.5 ML IM: CPT | Performed by: FAMILY MEDICINE

## 2020-02-28 PROCEDURE — 80184 ASSAY OF PHENOBARBITAL: CPT | Performed by: FAMILY MEDICINE

## 2020-02-28 PROCEDURE — 80188 ASSAY OF PRIMIDONE: CPT | Mod: 90 | Performed by: FAMILY MEDICINE

## 2020-02-28 PROCEDURE — 83036 HEMOGLOBIN GLYCOSYLATED A1C: CPT | Performed by: FAMILY MEDICINE

## 2020-02-28 PROCEDURE — 90471 IMMUNIZATION ADMIN: CPT | Performed by: FAMILY MEDICINE

## 2020-02-28 PROCEDURE — 99214 OFFICE O/P EST MOD 30 MIN: CPT | Mod: 25 | Performed by: FAMILY MEDICINE

## 2020-02-28 RX ORDER — PRIMIDONE 250 MG/1
250 TABLET ORAL 2 TIMES DAILY
Qty: 180 TABLET | Refills: 3 | Status: SHIPPED | OUTPATIENT
Start: 2020-02-28 | End: 2021-04-13

## 2020-02-28 RX ORDER — METOPROLOL TARTRATE 50 MG
50 TABLET ORAL 2 TIMES DAILY
Qty: 180 TABLET | Refills: 3 | Status: SHIPPED | OUTPATIENT
Start: 2020-02-28 | End: 2021-04-13

## 2020-02-28 RX ORDER — ALBUTEROL SULFATE 90 UG/1
AEROSOL, METERED RESPIRATORY (INHALATION)
Qty: 18 G | Refills: 0 | Status: SHIPPED | OUTPATIENT
Start: 2020-02-28 | End: 2020-04-01

## 2020-02-28 RX ORDER — DILTIAZEM HYDROCHLORIDE 180 MG/1
180 CAPSULE, COATED, EXTENDED RELEASE ORAL DAILY
Qty: 90 CAPSULE | Refills: 1 | Status: SHIPPED | OUTPATIENT
Start: 2020-02-28 | End: 2021-04-13

## 2020-02-28 NOTE — PROGRESS NOTES
"Subjective     Gema Coombs is a 69 year old female who presents to clinic today for the following health issues:    Regular pulse:       Has history of PAF  Not a candidate for anticioagulation because of low platelet count    Physicians involved  Hematology: history of low platelets, she has not recently seen  Cardiology: paroxysmal AF: no longer an active patient: she is not on anticoagulation because of low platelets  Symptomatic when goes in and out of atrial fib: currently no palpitations or sense of irregular or rapid heart rate  Dr Quezada: [place] LUNG:consult last November: breathing much improved since starting BREO- due for follow up visit  She still carries her albuterol rescue at all times- smells trigger bronchospasm  Dr Yuan, [facility]    Remote history of seizure disorder: failed taper of mysoline -15 years ago: she does not currently see neurology      HPI   Paroxysmal atrial fibrillation: symptoms controlled on her current medications: refilled at current doses    Remote history of seizure disorder- failed attempt to discontinue MYSOLINE  Tolerates well- no apparent side effects  Mysoline level once a year- labs drawn today    MORBID OBESITY  Body mass index is 57.33 kg/m .   Today, she admits that she needs help- she is no longer ambulatory- is often pushed in a wheelchair-  No longer walks to the barn to see her horses  Sits most of the day- and eats for pleasure (\"one of the only activities that I can do\")    Family history of obesity- several siblings- one whom had a poor result from bariatric surgery- [place] has been unwilling to consider to date    We talked about medical treatment:   Normal glucose    Today she wishes a trial of PT and dietician referral to see if she can make lifestyle changes      How many days per week do you miss taking your medication? 0    Although she stopped taking atorvastatin because of myalgias- she did not want a trial of another statin, lower dose martin- recheck of " lipids off of medication    Patient Active Problem List   Diagnosis     Myalgia and myositis     Breast disorder     Generalized nonconvulsive epilepsy (H)     Family history of other cardiovascular diseases     Esophageal reflux     Essential hypertension, benign     Mild persistent asthma     Hyperlipidemia     Diverticulitis of colon     Sleep apnea, obstructive     Other specified cardiac dysrhythmias/[facility]'s     Health Care Home     Morbid obesity with BMI of 50.0-59.9, adult (H)     Chronic idiopathic thrombocytopenia (H)     BCC (basal cell carcinoma), face     Seizure disorder (H)     Blurry vision     Mitral valve regurgitation/ repeat echocardiogram 2017     Acute diverticulitis     Diverticulitis     Atrial fibrillation (H)     Asthma     Past Surgical History:   Procedure Laterality Date     BIOPSY OF BREAST, INCISIONAL      L breasts     BIOPSY OF BREAST, NEEDLE CORE  &    R breasts     C ANORECTAL MYOMECTOMY      uterine fibroids     C  DELIVERY ONLY      x 2     COLONOSCOPY N/A 10/24/2018    Procedure: COLONOSCOPY;  Surgeon: Nieves Cordova MD;  Location: [facility] OR     LAPAROSCOPIC ASSISTED SIGMOID COLECTOMY N/A 10/24/2018    Procedure: Intraoperative colonoscopy with laparoscopic-assisted sigmoid colectomy;  Surgeon: Nieves Cordova MD;  Location: [facility] OR     LAPAROSCOPY,LYSIS ADHESNS  1985    pain R side poss r/t c-sections       Social History     Tobacco Use     Smoking status: Never Smoker     Smokeless tobacco: Never Used   Substance Use Topics     Alcohol use: Yes     Alcohol/week: 0.0 standard drinks     Comment: rare     Family History   Problem Relation Age of Onset     Cancer Maternal Aunt         breast-     Cancer Maternal Grandmother         breast-     Heart Disease Paternal Grandfather         heartattack-  at 58     Heart Disease Maternal Grandfather              Heart Disease Paternal Uncle         bypass-alive &well      "Thyroid Disease Sister         hypothyroidism-alive         Current Outpatient Medications   Medication Sig Dispense Refill     acetaminophen (TYLENOL) 500 MG tablet Take 500-1,000 mg by mouth every 6 hours as needed for mild pain       albuterol (PROAIR HFA/PROVENTIL HFA/VENTOLIN HFA) 108 (90 Base) MCG/ACT inhaler INHALE 1 PUFF INTO THE LUNGS EVERY 4 HOURS AS NEEDED FOR SHORTNESS OF BREATH OR DIFFICULT BREATHING OR WHEEZING 18 g 0     albuterol (PROVENTIL) (2.5 MG/3ML) 0.083% neb solution Take 1 vial (2.5 mg) by nebulization every 6 hours as needed for shortness of breath / dyspnea 1 Box 1     BREO ELLIPTA 100-25 MCG/INH inhaler        diltiazem ER COATED BEADS (CARDIZEM CD/CARTIA XT) 180 MG 24 hr capsule Take 1 capsule (180 mg) by mouth daily 90 capsule 1     ipratropium - albuterol 0.5 mg/2.5 mg/3 mL (DUONEB) 0.5-2.5 (3) MG/3ML neb solution Take 1 vial (3 mLs) by nebulization 4 times daily 360 mL 0     metoprolol tartrate (LOPRESSOR) 50 MG tablet Take 1 tablet (50 mg) by mouth 2 times daily 180 tablet 3     primidone (MYSOLINE) 250 MG tablet Take 1 tablet (250 mg) by mouth 2 times daily 180 tablet 3     ranitidine HCl 300 MG CAPS Take 1 capsule by mouth At Bedtime 90 capsule 3     ranitidine HCl 300 MG CAPS TK 1 C PO HS  3     BP Readings from Last 3 Encounters:   02/28/20 136/86   08/16/19 130/82   03/11/19 134/64    Wt Readings from Last 3 Encounters:   02/28/20 (!) 151.5 kg (334 lb)   08/16/19 141.1 kg (311 lb)   03/11/19 132.9 kg (293 lb)             Additional history:  Mother has been placed in assisted living- cognitive decline- aspiration    Reviewed and updated as needed this visit by Provider             Review of Systems   ROS COMP: Constitutional, HEENT, cardiovascular, pulmonary, GI, , musculoskeletal, neuro, skin, endocrine and psych systems are negative, except as otherwise noted.  She denies depression: has anxiety about her care as she ages- the loss of her \"good years\" after FPC    "   Objective    /86 (BP Location: Right arm, Patient Position: Sitting, Cuff Size: Adult Large)   Pulse 61   Resp 20   Wt (!) 151.5 kg (334 lb)   SpO2 98%   BMI 57.33 kg/m    Body mass index is 57.33 kg/m .  Physical Exam   GENERAL: no acute distress  NECK: no adenopathy, no asymmetry, masses, or scars and thyroid normal to palpation  RESP: lungs clear to auscultation - no rales, rhonchi or wheezes  CV: regular rate and rhythm,   no significant murmur, no pitting edema  MS: walks stooped forward: sits in chair bent forward as well    Diagnostic Test Results:  Labs reviewed in Epic  Results for orders placed or performed in visit on 02/28/20 (from the past 24 hour(s))   CL AFF HEMOGRAM/PLATE/DIFF ([facility])   Result Value Ref Range    WBC 5.5 4.0 - 11 10*9/L    RBC Count 5.00 3.8 - 5.2 10*12/L    Hemoglobin 15.0 11.7 - 15.7 g/dL    Hematocrit 45.5 35.0 - 47.0 %    MCV 90.9 78 - 100 fL    MCH 30.0 26 - 33 pg    MCHC 33.0 31 - 36 g/dL    Platelet Count 99 (A) 150 - 375 10^9/L    % Granulocytes 71.9 %    % Lymphocytes 20.5 %    % Monocytes 7.6 %   Lipid Panel (BFP)   Result Value Ref Range    Cholesterol 235 (A) 0 - 199 mg/dL    Triglycerides 134 0 - 149 mg/dL    HDL Cholesterol 59 40 - 150 mg/dL    LDL Cholesterol Direct 149 (A) 0 - 130 mg/dL    Cholesterol/HDL Ratio 4 0 - 5   Hemoglobin A1c (BFP)   Result Value Ref Range    Hemoglobin A1C 4.9 4.0 - 7.0 %   Comprehensive Metobolic Panel (BFP)   Result Value Ref Range    Carbon Dioxide 30.8 20 - 32 mmol/L    Creatinine 0.67 (A) 0.70 - 1.18 mg/dL    Glucose 96 60 - 99 mg/dL    Sodium 142.8 135 - 146 mmol/L    Potassium 4.36 3.5 - 5.3 mmol/L    Chloride 105.4 98 - 110 mmol/L    Protein Total 6.2 6.1 - 8.1 g/dL    Albumin 4.1 3.6 - 5.1 g/dL    Alkaline Phosphatase 95 33 - 130 U/L    ALT 0 0 - 32 U/L    AST 4 0 - 35 U/L    Bilirubin Total 0.7 0.2 - 1.2 mg/dL    Urea Nitrogen 11 7 - 25 mg/dL    Calcium 9.1 8.6 - 10.3 mg/dL    BUN/Creatinine Ratio 16.4 6 - 22    Globulin  Calculated 2.1 1.9 - 3.7    A/G Ratio 2.0 1 - 2.5           Assessment & Plan   Problem List Items Addressed This Visit     Asthma    Relevant Medications    BREO ELLIPTA 100-25 MCG/INH inhaler    albuterol (PROAIR HFA/PROVENTIL HFA/VENTOLIN HFA) 108 (90 Base) MCG/ACT inhaler    Atrial fibrillation (H)    Relevant Medications    diltiazem ER COATED BEADS (CARDIZEM CD/CARTIA XT) 180 MG 24 hr capsule    metoprolol tartrate (LOPRESSOR) 50 MG tablet    Esophageal reflux    Relevant Medications    ranitidine HCl 300 MG CAPS    Seizure disorder (H)    Relevant Medications    primidone (MYSOLINE) 250 MG tablet    Other Relevant Orders    Primidone ([facility])    CL AFF HEMOGRAM/PLATE/DIFF (BFP) (Completed)    Comprehensive Metobolic Panel (BFP) (Completed)      Other Visit Diagnoses     Morbid obesity (H)    -  Primary    Relevant Orders    Lipid Panel (BFP) (Completed)    Hemoglobin A1c (BFP) (Completed)    NUTRITION REFERRAL    Comprehensive Metobolic Panel (BFP) (Completed)    Physical deconditioning        Relevant Orders    PHYSICAL THERAPY REFERRAL    Need for vaccination        Relevant Orders    FLU VAC PRESRV FREE QUAD SPLIT VIR 3+YRS IM (Completed)               FUTURE APPOINTMENTS:       - Follow-up visit in 3-6 months with new primary care provider to monitor weight loss and mobility         FOLLOW up with Dr Guzman          Schedule PT and dietician consult  Consider medical and bariatric referral for weight loss             No follow-ups on file.    Flavia Alberts MD  [facility]

## 2020-02-28 NOTE — NURSING NOTE
Chief Complaint   Patient presents with     Recheck Medication     medication check and review     Pre-visit Screening:  Immunizations:  up to date  Colonoscopy:  Is due but patient declined  Mammogram: is due but patient declined   Asthma Action Test/Plan:  ACT given today   PHQ9:  PHQ-2 done today   GAD7:  NA  Questioned patient about current smoking habits Pt. has never smoked.  Ok to leave detailed message on voice mail for today's visit only Yes, phone # [phone]

## 2020-02-29 LAB
Lab: 9.6 MG/L (ref 5–12)
PHENOBARBITAL SERUM: <5 MG/L (ref 15–40)

## 2020-02-29 ASSESSMENT — ASTHMA QUESTIONNAIRES: ACT_TOTALSCORE: 19

## 2020-03-01 NOTE — PROVIDER NOTIFICATION
"Text paged MD: Pt is supposed to have angio tomorrow, Do you want pt to have aspirin tonight given pt hx? Thanks.       2nd page: \"Paged earlier with no response, Pt having angio tomorrow and per protocol supposed to have aspirin, pt has hx of thrombocytopenia, a-fib but not on caogs due to hx, low platelets, Do you want pt to have dose tonight or hold? Thanks. \"  " Simple: Patient demonstrates quick and easy understanding/Verbalized Understanding

## 2020-03-13 ENCOUNTER — TELEPHONE (OUTPATIENT)
Dept: FAMILY MEDICINE | Facility: CLINIC | Age: 70
End: 2020-03-13

## 2020-03-13 NOTE — TELEPHONE ENCOUNTER
[facility] called asking for referral & medical records for patients upcoming appointment with Dr. Nieves Cordova 3/23/2020.     I left a message @ [phone] opt 3-1 to call me back. -- What are they needing??

## 2020-03-15 ENCOUNTER — HEALTH MAINTENANCE LETTER (OUTPATIENT)
Age: 70
End: 2020-03-15

## 2020-04-01 DIAGNOSIS — J45.40 MODERATE PERSISTENT ASTHMA WITHOUT COMPLICATION: ICD-10-CM

## 2020-04-01 RX ORDER — ALBUTEROL SULFATE 90 UG/1
AEROSOL, METERED RESPIRATORY (INHALATION)
Qty: 18 G | Refills: 0 | Status: SHIPPED | OUTPATIENT
Start: 2020-04-01 | End: 2020-05-11

## 2020-05-09 DIAGNOSIS — J45.40 MODERATE PERSISTENT ASTHMA WITHOUT COMPLICATION: ICD-10-CM

## 2020-05-11 RX ORDER — ALBUTEROL SULFATE 90 UG/1
AEROSOL, METERED RESPIRATORY (INHALATION)
Qty: 8.5 G | Refills: 1 | Status: ON HOLD | OUTPATIENT
Start: 2020-05-11 | End: 2021-12-06

## 2020-05-11 NOTE — TELEPHONE ENCOUNTER
Gema Coombs is requesting a refill of:    Pending Prescriptions:                       Disp   Refills    albuterol (PROAIR HFA/PROVENTIL HFA/INDIGO*8.5 g  1            Sig: INHALE 1 PUFF INTO THE LUNGS EVERY 4 HOURS AS           NEEDED FOR SHORTNESS OF BREATH OR DIFFICULT           BREATHING OR WHEEZING    ACT Total Scores 1/28/2019 8/16/2019 2/28/2020   ACT TOTAL SCORE - - -   ASTHMA ER VISITS - - -   ASTHMA HOSPITALIZATIONS - - -   ACT TOTAL SCORE (Goal Greater than or Equal to 20) 20 16 19   In the past 12 months, how many times did you visit the emergency room for your asthma without being admitted to the hospital? 0 1 1   In the past 12 months, how many times were you hospitalized overnight because of your asthma? 0 1 1

## 2020-05-22 ENCOUNTER — TELEPHONE (OUTPATIENT)
Dept: FAMILY MEDICINE | Facility: CLINIC | Age: 70
End: 2020-05-22

## 2020-05-22 DIAGNOSIS — G40.309 GENERALIZED NONCONVULSIVE EPILEPSY (H): Primary | ICD-10-CM

## 2020-05-22 DIAGNOSIS — K21.9 GASTROESOPHAGEAL REFLUX DISEASE WITHOUT ESOPHAGITIS: ICD-10-CM

## 2020-05-22 RX ORDER — FAMOTIDINE 40 MG/1
40 TABLET, FILM COATED ORAL 2 TIMES DAILY PRN
Qty: 180 TABLET | Refills: 0 | Status: SHIPPED | OUTPATIENT
Start: 2020-05-22 | End: 2021-04-13

## 2020-05-22 NOTE — TELEPHONE ENCOUNTER
Pt was taking Ranitidine. Has been recalled and the pharmacy is looking for a replacement.    Please advise  Thanks,Lauren

## 2020-07-24 DIAGNOSIS — J45.40 MODERATE PERSISTENT ASTHMA WITHOUT COMPLICATION: ICD-10-CM

## 2020-07-24 RX ORDER — ALBUTEROL SULFATE 90 UG/1
AEROSOL, METERED RESPIRATORY (INHALATION)
Qty: 8.5 G | Refills: 1 | COMMUNITY
Start: 2020-07-24

## 2020-07-24 NOTE — TELEPHONE ENCOUNTER
Gema Coombs is requesting a refill of:    Refused Prescriptions:                       Disp   Refills    albuterol (PROAIR HFA/PROVENTIL HFA/VENTOL*8.5 g  1        Sig: INHALE 1 PUFF INTO THE LUNGS EVERY 4 HOURS AS NEEDED           FOR SHORTNESS OF BREATH OR DIFFICULT BREATHING OR           WHEEZING  Refused By: MAGGIE HARMAN  Reason for Refusal: Patient needs appointment

## 2020-08-21 NOTE — TELEPHONE ENCOUNTER
Received incoming refill request for  Pending Prescriptions:                       Disp   Refills    albuterol (PROAIR HFA/PROVENTIL HFA/INDIGO*18 g   0            Sig: INHALE 1 PUFF INTO THE LUNGS EVERY 4 HOURS AS           NEEDED FOR SHORTNESS OF BREATH OR DIFFICULT           BREATHING OR WHEEZING    Patient last had a refill of this medication on 4/2/19 for one inhaler with no refills, routing to Dr. Alberts to clarify if this was a 30 day extension or if she is able to get more refills of this without being seen.     142.24

## 2020-08-27 DIAGNOSIS — K21.9 GASTROESOPHAGEAL REFLUX DISEASE WITHOUT ESOPHAGITIS: ICD-10-CM

## 2020-08-27 RX ORDER — FAMOTIDINE 40 MG/1
TABLET, FILM COATED ORAL
Qty: 180 TABLET | Refills: 0 | COMMUNITY
Start: 2020-08-27

## 2020-08-27 NOTE — TELEPHONE ENCOUNTER
Gema Coombs is requesting a refill of:    Refused Prescriptions:                       Disp   Refills    famotidine (PEPCID) 40 MG tablet [Pharmacy*180 ta*0        Sig: TAKE 1 TABLET(40 MG) BY MOUTH TWICE DAILY AS NEEDED           FOR HEARTBURN  Refused By: MAGGIE HARMAN  Reason for Refusal: Patient needs appointment      Pt last seen 02/28/20 advised to return in 3-6 months. Due for OV to establish care with new provider.

## 2021-01-09 ENCOUNTER — HEALTH MAINTENANCE LETTER (OUTPATIENT)
Age: 71
End: 2021-01-09

## 2021-01-18 ENCOUNTER — TELEPHONE (OUTPATIENT)
Dept: FAMILY MEDICINE | Facility: CLINIC | Age: 71
End: 2021-01-18

## 2021-03-30 ENCOUNTER — TELEPHONE (OUTPATIENT)
Dept: FAMILY MEDICINE | Facility: CLINIC | Age: 71
End: 2021-03-30

## 2021-04-13 ENCOUNTER — OFFICE VISIT (OUTPATIENT)
Dept: FAMILY MEDICINE | Facility: CLINIC | Age: 71
End: 2021-04-13

## 2021-04-13 VITALS
SYSTOLIC BLOOD PRESSURE: 134 MMHG | OXYGEN SATURATION: 98 % | TEMPERATURE: 98.4 F | RESPIRATION RATE: 20 BRPM | HEIGHT: 63 IN | DIASTOLIC BLOOD PRESSURE: 86 MMHG | WEIGHT: 293 LBS | BODY MASS INDEX: 51.91 KG/M2 | HEART RATE: 66 BPM

## 2021-04-13 DIAGNOSIS — G47.33 SLEEP APNEA, OBSTRUCTIVE: ICD-10-CM

## 2021-04-13 DIAGNOSIS — D69.3 CHRONIC IDIOPATHIC THROMBOCYTOPENIA (H): ICD-10-CM

## 2021-04-13 DIAGNOSIS — I10 ESSENTIAL HYPERTENSION, BENIGN: ICD-10-CM

## 2021-04-13 DIAGNOSIS — G40.909 SEIZURE DISORDER (H): ICD-10-CM

## 2021-04-13 DIAGNOSIS — Z87.19 H/O DIVERTICULITIS OF COLON: ICD-10-CM

## 2021-04-13 DIAGNOSIS — I48.0 PAROXYSMAL ATRIAL FIBRILLATION (H): ICD-10-CM

## 2021-04-13 DIAGNOSIS — J45.40 MODERATE PERSISTENT ASTHMA WITHOUT COMPLICATION: Primary | ICD-10-CM

## 2021-04-13 DIAGNOSIS — Z12.31 ENCOUNTER FOR SCREENING MAMMOGRAM FOR BREAST CANCER: ICD-10-CM

## 2021-04-13 DIAGNOSIS — E66.01 MORBID OBESITY WITH BMI OF 50.0-59.9, ADULT (H): ICD-10-CM

## 2021-04-13 DIAGNOSIS — Z79.899 ENCOUNTER FOR LONG-TERM (CURRENT) USE OF MEDICATIONS: ICD-10-CM

## 2021-04-13 LAB
% GRANULOCYTES: 68 %
ALBUMIN SERPL-MCNC: 4 G/DL (ref 3.6–5.1)
ALBUMIN/GLOB SERPL: 1.8 {RATIO} (ref 1–2.5)
ALP SERPL-CCNC: 98 U/L (ref 33–130)
ALT 1742-6: 6 U/L (ref 0–32)
AST 1920-8: 8 U/L (ref 0–35)
BILIRUB SERPL-MCNC: 0.6 MG/DL (ref 0.2–1.2)
BUN SERPL-MCNC: 11 MG/DL (ref 7–25)
BUN/CREATININE RATIO: 14.9 (ref 6–22)
CALCIUM SERPL-MCNC: 9 MG/DL (ref 8.6–10.3)
CHLORIDE SERPLBLD-SCNC: 104.8 MMOL/L (ref 98–110)
CO2 SERPL-SCNC: 30.5 MMOL/L (ref 20–32)
CREAT SERPL-MCNC: 0.74 MG/DL (ref 0.6–1.3)
GLOBULIN, CALCULATED - QUEST: 2.2 (ref 1.9–3.7)
GLUCOSE SERPL-MCNC: 101 MG/DL (ref 60–99)
HCT VFR BLD AUTO: 45 % (ref 35–47)
HEMOGLOBIN: 14.7 G/DL (ref 11.7–15.7)
LYMPHOCYTES NFR BLD AUTO: 23.4 %
MCH RBC QN AUTO: 29.9 PG (ref 26–33)
MCHC RBC AUTO-ENTMCNC: 32.7 G/DL (ref 31–36)
MCV RBC AUTO: 91.7 FL (ref 78–100)
MONOCYTES NFR BLD AUTO: 8.6 %
PLATELET COUNT - QUEST: 106 10^9/L (ref 150–375)
POTASSIUM SERPL-SCNC: 4.51 MMOL/L (ref 3.5–5.3)
PROT SERPL-MCNC: 6.2 G/DL (ref 6.1–8.1)
RBC # BLD AUTO: 4.91 10*12/L (ref 3.8–5.2)
SODIUM SERPL-SCNC: 141.9 MMOL/L (ref 135–146)
WBC # BLD AUTO: 5.2 10*9/L (ref 4–11)

## 2021-04-13 PROCEDURE — 99215 OFFICE O/P EST HI 40 MIN: CPT | Performed by: FAMILY MEDICINE

## 2021-04-13 PROCEDURE — 80053 COMPREHEN METABOLIC PANEL: CPT | Performed by: FAMILY MEDICINE

## 2021-04-13 PROCEDURE — 85025 COMPLETE CBC W/AUTO DIFF WBC: CPT | Performed by: FAMILY MEDICINE

## 2021-04-13 PROCEDURE — 36415 COLL VENOUS BLD VENIPUNCTURE: CPT | Performed by: FAMILY MEDICINE

## 2021-04-13 RX ORDER — METOPROLOL TARTRATE 50 MG
50 TABLET ORAL 2 TIMES DAILY
Qty: 180 TABLET | Refills: 3 | Status: ON HOLD | OUTPATIENT
Start: 2021-04-13 | End: 2021-12-06

## 2021-04-13 RX ORDER — PRIMIDONE 250 MG/1
250 TABLET ORAL 2 TIMES DAILY
Qty: 180 TABLET | Refills: 0 | Status: SHIPPED | OUTPATIENT
Start: 2021-04-13 | End: 2021-04-13

## 2021-04-13 RX ORDER — PRIMIDONE 250 MG/1
250 TABLET ORAL 2 TIMES DAILY
Qty: 180 TABLET | Refills: 0 | Status: SHIPPED | OUTPATIENT
Start: 2021-04-13 | End: 2022-01-24

## 2021-04-13 RX ORDER — DILTIAZEM HYDROCHLORIDE 180 MG/1
180 CAPSULE, COATED, EXTENDED RELEASE ORAL DAILY
Qty: 90 CAPSULE | Refills: 3 | Status: ON HOLD | OUTPATIENT
Start: 2021-04-13 | End: 2021-12-06

## 2021-04-13 ASSESSMENT — MIFFLIN-ST. JEOR: SCORE: 1930.68

## 2021-04-13 NOTE — PATIENT INSTRUCTIONS
Await labs    Schedule pulmonary, neurology, colon and rectal and sleep study  Back to hematology    Refill medications with specialists  1)  Medication: continue current medication regimen unchanged for hypertension  2)  Dietary sodium restriction  3)  Regular aerobic exercise  4)  Recheck in 1 year, sooner should new symptoms or   problems arise.    Patient Education: Reviewed risks of hypertension and principles of   treatment.    Mammogram

## 2021-04-13 NOTE — PROGRESS NOTES
New patient to me/ multiple issues for medication refills    1. Persistent asthma/ needing Breo 9/2019 after diagnosis of persistent issue. Early 1990' s diagnosis with prn albuterol/ once on Advair. Has appointment July 162021 with Dr Quezada  This specialists and cardiology believe she has sleep apnea/ did not schedule sleep study with Dr Krishnamurthy. See sleep visit 2016  2. Hypertension and on set of atrial fibrillation 2018. Hospitalized for 4 days. Converted on its own and treated with beta blocker and better hypertension control.  3. Chronic ITP/ noted again before colon resection in 2018/ see Dr Patel consult  4. Chronic diverticulitis. After colon resection did not get colonoscopy/ Dr Cordova  5. GERD. Long standing but stopped PEPCID and used zantac. Then stopped that and feels no symptoms off medications  6. Seizure disorder grand mal at age 17 after a head trauma. Last neurology visit Dr Campuzano showed EEG changes consistent with seizures. Has not rechecked sine 2016      1.   Gema Coombs is a 70 year old female who presents to the clinic for recheck on reactive airways disease.    Last exacerbation was 2 weeks ago.    Overall the patient feels improved with less wheezing and less shortness of breath.    Current medications:  Current Outpatient Medications   Medication Sig Dispense Refill     albuterol (PROAIR HFA/PROVENTIL HFA/VENTOLIN HFA) 108 (90 Base) MCG/ACT inhaler INHALE 1 PUFF INTO THE LUNGS EVERY 4 HOURS AS NEEDED FOR SHORTNESS OF BREATH OR DIFFICULT BREATHING OR WHEEZING 8.5 g 1     albuterol (PROVENTIL) (2.5 MG/3ML) 0.083% neb solution Take 1 vial (2.5 mg) by nebulization every 6 hours as needed for shortness of breath / dyspnea 1 Box 1     BREO ELLIPTA 100-25 MCG/INH inhaler Inhale 1 puff into the lungs daily 60 each 0     diltiazem ER COATED BEADS (CARDIZEM CD/CARTIA XT) 180 MG 24 hr capsule Take 1 capsule (180 mg) by mouth daily 90 capsule 3     ipratropium - albuterol 0.5 mg/2.5 mg/3 mL  "(DUONEB) 0.5-2.5 (3) MG/3ML neb solution Take 1 vial (3 mLs) by nebulization 4 times daily 360 mL 0     metoprolol tartrate (LOPRESSOR) 50 MG tablet Take 1 tablet (50 mg) by mouth 2 times daily 180 tablet 3     primidone (MYSOLINE) 250 MG tablet Take 1 tablet (250 mg) by mouth 2 times daily 180 tablet 0       Past Medical History:  Past Medical History:   Diagnosis Date     Arrhythmia     a fib     Diverticulitis      Extrinsic asthma, unspecified      Fibromyalgia      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  RESP: Normal - Clear to auscultation without rales, rhonchi, or wheezing.  CARDIAC: NORMAL - regular rate and rhythm without murmur.  ABDOMEN: Abdomen soft, non-tender. BS normal. No masses, organomegaly    Assessment:  (J45.40) Moderate persistent asthma without complication  (primary encounter diagnosis)  Comment: follow AAP  Plan: HEMOGRAM PLATELET DIFF (BFP), VENOUS         COLLECTION, BREO ELLIPTA 100-25 MCG/INH         inhaler, PULMONARY MEDICINE REFERRAL        Back to Dr Quezada    (G47.33) Sleep apnea, obstructive  Comment: get back to sleep specialists  Plan: HEMOGRAM PLATELET DIFF (BFP), VENOUS         COLLECTION, Comprehensive Metobolic Panel         (BFP), PULMONARY MEDICINE REFERRAL        Dr Krishnamurthy's name given    (I48.0) Paroxysmal atrial fibrillation (H)  Comment: resolved  Plan: metoprolol tartrate (LOPRESSOR) 50 MG tablet,         diltiazem ER COATED BEADS (CARDIZEM CD/CARTIA         XT) 180 MG 24 hr capsule        Await labs.  1)  Medication: continue current medication regimen unchanged  2)  " Dietary sodium restriction  3)  Regular aerobic exercise  4)  Recheck in 1 year, sooner should new symptoms or   problems arise.    Patient Education: Reviewed risks of hypertension and principles of   treatment.        (I10) Essential hypertension, benign  Plan: VENOUS COLLECTION, Comprehensive Metobolic         Panel (BFP), metoprolol tartrate (LOPRESSOR) 50        MG tablet, diltiazem ER COATED BEADS (CARDIZEM         CD/CARTIA XT) 180 MG 24 hr capsule            (G40.909) Seizure disorder (H)  Comment: back to neurology  Plan: VENOUS COLLECTION, Comprehensive Metobolic         Panel (BFP), NEUROLOGY ADULT REFERRAL,         Primidone ([facility]), primidone (MYSOLINE) 250 MG         tablet, DISCONTINUED: primidone (MYSOLINE) 250         MG tablet, CANCELED: Primidone ([facility])        Refilled 3 month supply    (D69.3) Chronic idiopathic thrombocytopenia (H)  Comment: stable  Plan: HEMOGRAM PLATELET DIFF (BFP), VENOUS         COLLECTION, Oncology/Hematology Adult Referral        Back to specialists    (Z87.19) H/O diverticulitis of colon  Plan: GASTROENTEROLOGY ADULT REF PROCEDURE ONLY        Schedule colonoscopy    (E66.01,  Z68.43) Morbid obesity with BMI of 50.0-59.9, adult (H)  Plan: A low fat diet, regular aerobic exercise like walking 30 minutes daily and weight control is the treatment recommendations at this time      (Z12.31) Encounter for screening mammogram for breast cancer  Plan: Mammo Screening digital (bilat), RADIOLOGY         REFERRAL            (Z79.899) Encounter for long-term (current) use of medications  Plan: Primidone ([facility]), CANCELED: Primidone ([facility])                2.  SUBJECTIVE:  Gema Coombs is an 70 year old female who presents for evaluation of   hypertension. She indicates that she is feeling well and   denies any symptoms referable to her elevated blood pressure.   Specifically denies chest pain, palpitations, dyspnea, orthopnea,   PND or peripheral edema. Current medication regimen is  "as listed   below. Patient denies any side effects of medication.    Family history: positive for cardiovascular disease  Age at onset of elevated blood pressure: 50/ also was on lipid lowering medication 2018/ stopped  Cardiovascular risk factors: family history, lipids, hypertension, obesity, sedentary life style and sleep apnea  Use of agents associated with hypertension: none  History of renal disease: negative  History of flank trauma: negative    Current Outpatient Medications   Medication     albuterol (PROAIR HFA/PROVENTIL HFA/VENTOLIN HFA) 108 (90 Base) MCG/ACT inhaler     albuterol (PROVENTIL) (2.5 MG/3ML) 0.083% neb solution     BREO ELLIPTA 100-25 MCG/INH inhaler     diltiazem ER COATED BEADS (CARDIZEM CD/CARTIA XT) 180 MG 24 hr capsule     ipratropium - albuterol 0.5 mg/2.5 mg/3 mL (DUONEB) 0.5-2.5 (3) MG/3ML neb solution     metoprolol tartrate (LOPRESSOR) 50 MG tablet     primidone (MYSOLINE) 250 MG tablet     No current facility-administered medications for this visit.      Allergies   Allergen Reactions     No Known Drug Allergy        Social History     Tobacco Use     Smoking status: Never Smoker     Smokeless tobacco: Never Used   Substance Use Topics     Alcohol use: Yes     Alcohol/week: 0.0 standard drinks     Comment: rare       OBJECTIVE:  /86 (BP Location: Right arm, Patient Position: Sitting, Cuff Size: Adult Large)   Pulse 66   Temp 98.4  F (36.9  C) (Oral)   Ht 1.6 m (5' 3\")   Wt 143.2 kg (315 lb 9.6 oz)   SpO2 98%   BMI 55.91 kg/m    Repeat BP R arm seated = 134/86 with regular size cuff.  Fundi: deferred  Thyroid: normal to inspection and palpation  Lungs: negative, Percussion normal. Good diaphragmatic excursion. Lungs clear  Heart: negative, PMI normal. No lifts, heaves, or thrills. RRR. No murmurs, clicks gallops or rub  Peripheral pulses: radial=4/4, femoral=4/4, popliteal=4/4, dorsalis pedis=4/4,    Total time spent with patient today including visit and non face to " face time 40 minutes.

## 2021-04-14 LAB
Lab: 10.4 MG/L (ref 5–12)
PHENOBARBITAL SERUM: <5 MG/L (ref 15–40)

## 2021-04-14 ASSESSMENT — ASTHMA QUESTIONNAIRES: ACT_TOTALSCORE: 22

## 2021-04-26 ENCOUNTER — TRANSFERRED RECORDS (OUTPATIENT)
Dept: FAMILY MEDICINE | Facility: CLINIC | Age: 71
End: 2021-04-26

## 2021-05-08 ENCOUNTER — HEALTH MAINTENANCE LETTER (OUTPATIENT)
Age: 71
End: 2021-05-08

## 2021-10-22 DIAGNOSIS — G40.909 SEIZURE DISORDER (H): ICD-10-CM

## 2021-10-22 DIAGNOSIS — J45.40 MODERATE PERSISTENT ASTHMA WITHOUT COMPLICATION: ICD-10-CM

## 2021-10-22 NOTE — TELEPHONE ENCOUNTER
Gema Coombs is requesting a refill of:    Refused Prescriptions:                       Disp   Refills    BREO ELLIPTA 100-25 MCG/INH inhaler [Pharm*                Sig: INHALE 1 PUFF INTO THE LUNGS DAILY  Refused By: MAGGIE HARMAN  Reason for Refusal: Originating/Specialty Provider to approve  Reason for Refusal Comment: Refills need to go to  at [facility]

## 2021-10-23 ENCOUNTER — HEALTH MAINTENANCE LETTER (OUTPATIENT)
Age: 71
End: 2021-10-23

## 2021-10-25 ENCOUNTER — MYC REFILL (OUTPATIENT)
Dept: FAMILY MEDICINE | Facility: CLINIC | Age: 71
End: 2021-10-25

## 2021-10-25 DIAGNOSIS — J45.40 MODERATE PERSISTENT ASTHMA WITHOUT COMPLICATION: ICD-10-CM

## 2021-10-25 DIAGNOSIS — G40.909 SEIZURE DISORDER (H): ICD-10-CM

## 2021-10-25 NOTE — TELEPHONE ENCOUNTER
Georgia called stating that she cannot get into [facility] until January. They will not refill her Breo inhaler, she has just 10 days left. She is requesting a refill to get her through until this appointment.    Routing to Dr. Kay, thanks.      Pending Prescriptions:                       Disp   Refills    BREO ELLIPTA 100-25 MCG/INH inhaler       28 each0            Sig: Inhale 1 puff into the lungs daily

## 2021-10-31 DIAGNOSIS — G40.909 SEIZURE DISORDER (H): ICD-10-CM

## 2021-11-01 RX ORDER — PRIMIDONE 250 MG/1
TABLET ORAL
Qty: 180 TABLET | Refills: 0 | COMMUNITY
Start: 2021-11-01

## 2021-11-01 NOTE — TELEPHONE ENCOUNTER
Gema Coombs is requesting a refill of:    Refused Prescriptions:                       Disp   Refills    primidone (MYSOLINE) 250 MG tablet [Pharma*180 ta*0        Sig: TAKE 1 TABLET(250 MG) BY MOUTH TWICE DAILY  Refused By: MAGGIE HARMAN  Reason for Refusal: Originating/Specialty Provider to approve  Reason for Refusal Comment: Refills need to go to Neurology    Per LES note on 04/13/21 pt needs to go back to Neurology to recheck seizures.

## 2021-11-25 ENCOUNTER — HOSPITAL ENCOUNTER (INPATIENT)
Facility: CLINIC | Age: 71
LOS: 11 days | Discharge: HOME HEALTH | DRG: 308 | End: 2021-12-06
Attending: EMERGENCY MEDICINE | Admitting: INTERNAL MEDICINE
Payer: COMMERCIAL

## 2021-11-25 ENCOUNTER — APPOINTMENT (OUTPATIENT)
Dept: GENERAL RADIOLOGY | Facility: CLINIC | Age: 71
DRG: 308 | End: 2021-11-25
Attending: EMERGENCY MEDICINE
Payer: COMMERCIAL

## 2021-11-25 DIAGNOSIS — I50.9 ACUTE CONGESTIVE HEART FAILURE, UNSPECIFIED HEART FAILURE TYPE (H): ICD-10-CM

## 2021-11-25 DIAGNOSIS — I50.9 CONGESTIVE HEART FAILURE, UNSPECIFIED HF CHRONICITY, UNSPECIFIED HEART FAILURE TYPE (H): ICD-10-CM

## 2021-11-25 DIAGNOSIS — I48.91 ATRIAL FIBRILLATION WITH RVR (H): ICD-10-CM

## 2021-11-25 DIAGNOSIS — R09.02 HYPOXIA: ICD-10-CM

## 2021-11-25 DIAGNOSIS — I48.0 PAROXYSMAL ATRIAL FIBRILLATION (H): ICD-10-CM

## 2021-11-25 DIAGNOSIS — J45.909 MODERATE ASTHMA, UNSPECIFIED WHETHER COMPLICATED, UNSPECIFIED WHETHER PERSISTENT: Primary | ICD-10-CM

## 2021-11-25 LAB
ALBUMIN SERPL-MCNC: 3 G/DL (ref 3.4–5)
ALP SERPL-CCNC: 107 U/L (ref 40–150)
ALT SERPL W P-5'-P-CCNC: 66 U/L (ref 0–50)
ANION GAP SERPL CALCULATED.3IONS-SCNC: 4 MMOL/L (ref 3–14)
AST SERPL W P-5'-P-CCNC: 46 U/L (ref 0–45)
BASOPHILS # BLD AUTO: 0 10E3/UL (ref 0–0.2)
BASOPHILS NFR BLD AUTO: 0 %
BILIRUB SERPL-MCNC: 0.4 MG/DL (ref 0.2–1.3)
BUN SERPL-MCNC: 11 MG/DL (ref 7–30)
CALCIUM SERPL-MCNC: 8.9 MG/DL (ref 8.5–10.1)
CHLORIDE BLD-SCNC: 104 MMOL/L (ref 94–109)
CO2 SERPL-SCNC: 32 MMOL/L (ref 20–32)
CREAT SERPL-MCNC: 0.78 MG/DL (ref 0.52–1.04)
EOSINOPHIL # BLD AUTO: 0 10E3/UL (ref 0–0.7)
EOSINOPHIL NFR BLD AUTO: 0 %
ERYTHROCYTE [DISTWIDTH] IN BLOOD BY AUTOMATED COUNT: 13.5 % (ref 10–15)
FLUAV RNA SPEC QL NAA+PROBE: NEGATIVE
FLUBV RNA RESP QL NAA+PROBE: NEGATIVE
GFR SERPL CREATININE-BSD FRML MDRD: 78 ML/MIN/1.73M2
GLUCOSE BLD-MCNC: 155 MG/DL (ref 70–99)
HCT VFR BLD AUTO: 47.4 % (ref 35–47)
HGB BLD-MCNC: 14.9 G/DL (ref 11.7–15.7)
IMM GRANULOCYTES # BLD: 0.1 10E3/UL
IMM GRANULOCYTES NFR BLD: 1 %
LYMPHOCYTES # BLD AUTO: 1.1 10E3/UL (ref 0.8–5.3)
LYMPHOCYTES NFR BLD AUTO: 14 %
MCH RBC QN AUTO: 29.6 PG (ref 26.5–33)
MCHC RBC AUTO-ENTMCNC: 31.4 G/DL (ref 31.5–36.5)
MCV RBC AUTO: 94 FL (ref 78–100)
MONOCYTES # BLD AUTO: 0.4 10E3/UL (ref 0–1.3)
MONOCYTES NFR BLD AUTO: 5 %
NEUTROPHILS # BLD AUTO: 6.5 10E3/UL (ref 1.6–8.3)
NEUTROPHILS NFR BLD AUTO: 80 %
NRBC # BLD AUTO: 0 10E3/UL
NRBC BLD AUTO-RTO: 0 /100
NT-PROBNP SERPL-MCNC: 3878 PG/ML (ref 0–900)
PLATELET # BLD AUTO: 182 10E3/UL (ref 150–450)
POTASSIUM BLD-SCNC: 3.9 MMOL/L (ref 3.4–5.3)
PROT SERPL-MCNC: 7 G/DL (ref 6.8–8.8)
RBC # BLD AUTO: 5.04 10E6/UL (ref 3.8–5.2)
SARS-COV-2 RNA RESP QL NAA+PROBE: NEGATIVE
SODIUM SERPL-SCNC: 140 MMOL/L (ref 133–144)
TROPONIN I SERPL HS-MCNC: 9 NG/L
TROPONIN I SERPL-MCNC: <0.015 UG/L (ref 0–0.04)
WBC # BLD AUTO: 8.1 10E3/UL (ref 4–11)

## 2021-11-25 PROCEDURE — 83880 ASSAY OF NATRIURETIC PEPTIDE: CPT | Performed by: EMERGENCY MEDICINE

## 2021-11-25 PROCEDURE — 99285 EMERGENCY DEPT VISIT HI MDM: CPT | Mod: 25

## 2021-11-25 PROCEDURE — 120N000001 HC R&B MED SURG/OB

## 2021-11-25 PROCEDURE — 80053 COMPREHEN METABOLIC PANEL: CPT | Performed by: EMERGENCY MEDICINE

## 2021-11-25 PROCEDURE — 99223 1ST HOSP IP/OBS HIGH 75: CPT | Mod: AI | Performed by: INTERNAL MEDICINE

## 2021-11-25 PROCEDURE — 250N000011 HC RX IP 250 OP 636: Performed by: EMERGENCY MEDICINE

## 2021-11-25 PROCEDURE — 96365 THER/PROPH/DIAG IV INF INIT: CPT

## 2021-11-25 PROCEDURE — 87636 SARSCOV2 & INF A&B AMP PRB: CPT | Performed by: EMERGENCY MEDICINE

## 2021-11-25 PROCEDURE — 250N000013 HC RX MED GY IP 250 OP 250 PS 637: Performed by: EMERGENCY MEDICINE

## 2021-11-25 PROCEDURE — 36415 COLL VENOUS BLD VENIPUNCTURE: CPT | Performed by: EMERGENCY MEDICINE

## 2021-11-25 PROCEDURE — C9803 HOPD COVID-19 SPEC COLLECT: HCPCS

## 2021-11-25 PROCEDURE — 250N000009 HC RX 250: Performed by: INTERNAL MEDICINE

## 2021-11-25 PROCEDURE — 71045 X-RAY EXAM CHEST 1 VIEW: CPT

## 2021-11-25 PROCEDURE — 258N000003 HC RX IP 258 OP 636: Performed by: INTERNAL MEDICINE

## 2021-11-25 PROCEDURE — 258N000003 HC RX IP 258 OP 636: Performed by: EMERGENCY MEDICINE

## 2021-11-25 PROCEDURE — 96366 THER/PROPH/DIAG IV INF ADDON: CPT

## 2021-11-25 PROCEDURE — 96376 TX/PRO/DX INJ SAME DRUG ADON: CPT

## 2021-11-25 PROCEDURE — 96375 TX/PRO/DX INJ NEW DRUG ADDON: CPT

## 2021-11-25 PROCEDURE — 93005 ELECTROCARDIOGRAM TRACING: CPT

## 2021-11-25 PROCEDURE — 250N000009 HC RX 250: Performed by: EMERGENCY MEDICINE

## 2021-11-25 PROCEDURE — 94640 AIRWAY INHALATION TREATMENT: CPT

## 2021-11-25 PROCEDURE — 85025 COMPLETE CBC W/AUTO DIFF WBC: CPT | Performed by: EMERGENCY MEDICINE

## 2021-11-25 PROCEDURE — 84484 ASSAY OF TROPONIN QUANT: CPT | Performed by: EMERGENCY MEDICINE

## 2021-11-25 RX ORDER — AMOXICILLIN 250 MG
2 CAPSULE ORAL 2 TIMES DAILY PRN
Status: DISCONTINUED | OUTPATIENT
Start: 2021-11-25 | End: 2021-12-06 | Stop reason: HOSPADM

## 2021-11-25 RX ORDER — IPRATROPIUM BROMIDE AND ALBUTEROL SULFATE 2.5; .5 MG/3ML; MG/3ML
3 SOLUTION RESPIRATORY (INHALATION)
Status: DISPENSED | OUTPATIENT
Start: 2021-11-25 | End: 2021-11-25

## 2021-11-25 RX ORDER — DILTIAZEM HYDROCHLORIDE 30 MG/1
60 TABLET, FILM COATED ORAL ONCE
Status: COMPLETED | OUTPATIENT
Start: 2021-11-25 | End: 2021-11-25

## 2021-11-25 RX ORDER — ONDANSETRON 2 MG/ML
4 INJECTION INTRAMUSCULAR; INTRAVENOUS EVERY 6 HOURS PRN
Status: DISCONTINUED | OUTPATIENT
Start: 2021-11-25 | End: 2021-12-06 | Stop reason: HOSPADM

## 2021-11-25 RX ORDER — AMOXICILLIN 250 MG
1 CAPSULE ORAL 2 TIMES DAILY PRN
Status: DISCONTINUED | OUTPATIENT
Start: 2021-11-25 | End: 2021-12-06 | Stop reason: HOSPADM

## 2021-11-25 RX ORDER — ONDANSETRON 4 MG/1
4 TABLET, ORALLY DISINTEGRATING ORAL EVERY 6 HOURS PRN
Status: DISCONTINUED | OUTPATIENT
Start: 2021-11-25 | End: 2021-12-06 | Stop reason: HOSPADM

## 2021-11-25 RX ORDER — METOPROLOL TARTRATE 1 MG/ML
2.5 INJECTION, SOLUTION INTRAVENOUS EVERY 4 HOURS PRN
Status: DISCONTINUED | OUTPATIENT
Start: 2021-11-25 | End: 2021-12-06 | Stop reason: HOSPADM

## 2021-11-25 RX ORDER — FUROSEMIDE 10 MG/ML
40 INJECTION INTRAMUSCULAR; INTRAVENOUS ONCE
Status: COMPLETED | OUTPATIENT
Start: 2021-11-25 | End: 2021-11-25

## 2021-11-25 RX ORDER — LIDOCAINE 40 MG/G
CREAM TOPICAL
Status: DISCONTINUED | OUTPATIENT
Start: 2021-11-25 | End: 2021-12-06 | Stop reason: HOSPADM

## 2021-11-25 RX ORDER — DILTIAZEM HYDROCHLORIDE 5 MG/ML
20 INJECTION INTRAVENOUS ONCE
Status: COMPLETED | OUTPATIENT
Start: 2021-11-25 | End: 2021-11-25

## 2021-11-25 RX ADMIN — IPRATROPIUM BROMIDE AND ALBUTEROL SULFATE 3 ML: 2.5; .5 SOLUTION RESPIRATORY (INHALATION) at 05:25

## 2021-11-25 RX ADMIN — METOPROLOL TARTRATE 2.5 MG: 5 INJECTION INTRAVENOUS at 22:16

## 2021-11-25 RX ADMIN — DILTIAZEM HYDROCHLORIDE 60 MG: 30 TABLET, FILM COATED ORAL at 05:36

## 2021-11-25 RX ADMIN — DILTIAZEM HYDROCHLORIDE 20 MG: 5 INJECTION, SOLUTION INTRAVENOUS at 04:56

## 2021-11-25 RX ADMIN — IPRATROPIUM BROMIDE AND ALBUTEROL SULFATE 3 ML: 2.5; .5 SOLUTION RESPIRATORY (INHALATION) at 04:53

## 2021-11-25 RX ADMIN — FUROSEMIDE 40 MG: 10 INJECTION, SOLUTION INTRAMUSCULAR; INTRAVENOUS at 05:41

## 2021-11-25 RX ADMIN — DILTIAZEM HYDROCHLORIDE 5 MG/HR: 5 INJECTION INTRAVENOUS at 06:16

## 2021-11-25 RX ADMIN — DILTIAZEM HYDROCHLORIDE 15 MG/HR: 5 INJECTION INTRAVENOUS at 19:32

## 2021-11-25 ASSESSMENT — ACTIVITIES OF DAILY LIVING (ADL)
ADLS_ACUITY_SCORE: 8
ADLS_ACUITY_SCORE: 8
ADLS_ACUITY_SCORE: 12
ADLS_ACUITY_SCORE: 8
ADLS_ACUITY_SCORE: 8
ADLS_ACUITY_SCORE: 12
ADLS_ACUITY_SCORE: 6
ADLS_ACUITY_SCORE: 12
ADLS_ACUITY_SCORE: 8
ADLS_ACUITY_SCORE: 8
ADLS_ACUITY_SCORE: 12
ADLS_ACUITY_SCORE: 8

## 2021-11-25 ASSESSMENT — ENCOUNTER SYMPTOMS
COUGH: 0
FEVER: 0
SHORTNESS OF BREATH: 1

## 2021-11-25 NOTE — PHARMACY-ADMISSION MEDICATION HISTORY
Admission medication history interview status for this patient is complete. See [facility] admission navigator for allergy information, prior to admission medications and immunization status.     Medication history interview done, indicate source(s): Patient  Medication history resources (including written lists, pill bottles, clinic record):None  Pharmacy: [id number] - [place], [place] - [address] AT [address]    Changes made to PTA medication list:  Added: Breo inhaler  Changed: none  Reported as Not Taking: none  Removed: duoneb    Actions taken by pharmacist (provider contacted, etc):None   Additional medication history information:None  Medication reconciliation/reorder completed by provider prior to medication history? no    Prior to Admission medications    Medication Sig Last Dose Taking? Auth Provider   albuterol (PROAIR HFA/PROVENTIL HFA/VENTOLIN HFA) 108 (90 Base) MCG/ACT inhaler INHALE 1 PUFF INTO THE LUNGS EVERY 4 HOURS AS NEEDED FOR SHORTNESS OF BREATH OR DIFFICULT BREATHING OR WHEEZING Past Month at Unknown time Yes Fan Moore MD   albuterol (PROVENTIL) (2.5 MG/3ML) 0.083% neb solution Take 1 vial (2.5 mg) by nebulization every 6 hours as needed for shortness of breath / dyspnea More than a month at Unknown time Yes Flavia Alberts MD   diltiazem ER COATED BEADS (CARDIZEM CD/CARTIA XT) 180 MG 24 hr capsule Take 1 capsule (180 mg) by mouth daily 11/24/2021 at x 1 Yes Elisa Kay MD   fluticasone-vilanterol (BREO ELLIPTA) 100-25 MCG/INH inhaler Inhale 1 puff into the lungs daily 11/24/2021 at am Yes Unknown, Entered By History   metoprolol tartrate (LOPRESSOR) 50 MG tablet Take 1 tablet (50 mg) by mouth 2 times daily 11/24/2021 at x 1 Yes Elisa Kay MD   primidone (MYSOLINE) 250 MG tablet Take 1 tablet (250 mg) by mouth 2 times daily 11/24/2021 at x 1 Yes Elisa Kay MD

## 2021-11-25 NOTE — ED PROVIDER NOTES
Patient was signed out to me by Dr. Pugh pending signed out to the hospitalist team for admission to the hospital for uncontrolled A. fib with RVR and evidence of new onset CHF with pulmonary vascular congestion and shortness of breath.  Patient received diltiazem and is on diltiazem infusion as well as Lasix in the emergency department.  Patient initially was mildly hypoxic which improved after nebulizer treatments.  She does have a known history of COPD as well.    7:41 AM the case was discussed with Dr. Hollingsworth who accepts the patient for admission to cardiac telemetry for ongoing monitoring evaluation and treatment.  The patient was admitted in stable condition.    Clinical Impression:    ICD-10-CM    1. Acute congestive heart failure, unspecified heart failure type (H)  I50.9    2. Atrial fibrillation with RVR (H)  I48.91    3. Hypoxia  R09.02       Disposition:  Admitted to the hospitalist service.     Mike Kwong MD  11/25/21 0742

## 2021-11-25 NOTE — ED TRIAGE NOTES
SOB x 1 week, worse tonight than it has been.  Also endorses some midthoracic back pain.  Hx/o asthma and COPD.

## 2021-11-25 NOTE — H&P
[facility]    History and Physical  Hospitalist       Date of Admission:  11/25/2021  Date of Service (when I saw the patient): 11/25/21    Assessment & Plan   Gema Coombs is a 71 year old female patient with past medical history of paroxysmal atrial fibrillation, bronchial asthma, hypertension, obstructive sleep apnea, morbid obesity, GERD, fibromyalgia, history of generalized nonconvulsive epilepsy, prior history of diverticulitis, status post laparoscopic-assisted sigmoid colectomy, came to emergency room for evaluation for shortness of breath.  Patient stated that she has been having progressive shortness of breath over the past 1 week.  Her symptoms got worse since yesterday.  She denied associated cough or fever.  She also denies chest pain.  She has no nausea or vomiting.   In the ER, her vital signs showed temperature 97.7, pulse 154, blood pressure 142/121, oxygen saturation 92% on room air.  Laboratory work-up showed normal BMP, ALT 66, AST 46, WBC 8.1, hemoglobin 14.9.  Troponin less than 0.015.  BNP 3878.  EKG showed atrial fibrillation with rapid ventricular response.  Chest x-ray showed cardiac enlargement with pulmonary vascular congestion, interstitial opacities in both lungs suggestive of congestive heart failure.  There is also right-sided pleural fluid.  Emergency room, she was given Lasix 40 mg IV x1 dose, diltiazem 20 mg IV x1 dose, diltiazem 60 mg p.o. x1 dose.  She remained in A. fib with RVR and was started on diltiazem drip.  She was admitted to the hospital for further management.    Atrial fibrillation with rapid ventricular response  Patient does have history of paroxysmal atrial fibrillation.  She takes Cardizem  mg p.o. daily, metoprolol tartrate 50 mg p.o. twice daily at home.  Denies noncompliance with her medications.  Has been having shortness of breath for the past week.  EKG showed atrial fibrillation with rapid ventricular response at 131 bpm.   Received diltiazem 20 mg IV x1 dose, diltiazem 60 mg p.o. x1 dose and then started on diltiazem drip.    -We will continue diltiazem drip at this moment.    -Will order echocardiogram  -Patient states that she has not seen a cardiologist as outpatient.  She stated that her primary care physician manages her A. fib.  She stated that she has history of ITP and was not placed on aspirin for that reason.  -We will monitor on telemetry  -We will consult cardiology for further evaluation and recommendations    Acute congestive heart failure suspect secondary to A. fib with RVR  -Chest x-ray showed pulmonary vascular congestion.  No lower extremity edema noted.  BNP mildly elevated at 3878.    -Received Lasix 40 mg IV x1 dose in emergency room.  -Will continue Cardizem drip for A. fib with RVR.  Will order echocardiogram.    Hypertension: Blood pressure slightly on the low side at this time.  We will hold oral metoprolol and diltiazem for now.  We will continue diltiazem drip for A. fib with RVR.  Will monitor blood pressure    History of bronchial asthma:  - No evidence of asthma exacerbation at this moment.  Currently has no wheezes.  -Albuterol inhaler as needed    Morbid obesity: Complicates care    We will admit patient to telemetry floor    DVT Prophylaxis: Pneumatic Compression Devices  Code Status: Full Code    Disposition: Expected discharge in 1-2 days    Jeimy Hollingsworth MD    Primary Care Physician   Sunita Adame    Chief Complaint   Shortness of breath    History is obtained from the patient    History of Present Illness   Gema Coombs is a 71 year old female patient with past medical history of paroxysmal atrial fibrillation, bronchial asthma, hypertension, obstructive sleep apnea, morbid obesity, GERD, fibromyalgia, history of generalized nonconvulsive epilepsy, prior history of diverticulitis, status post laparoscopic-assisted sigmoid colectomy, came to emergency room for evaluation for shortness of  breath.  Patient stated that she has been having progressive shortness of breath over the past 1 week.  Her symptoms got worse since yesterday.  She denied associated cough or fever.  She also denies chest pain.  She has no nausea or vomiting.  She states that she has history of asthma but did not have significant wheezing at this time.  She has no leg swelling.  Because of worsening of her breathing, she came to emergency room for evaluation.  She states that she has history of atrial fibrillation for which she takes metoprolol and diltiazem.  She denies noncompliance with medications.  In the ER, her vital signs showed temperature 97.7, pulse 154, blood pressure 142/121, oxygen saturation 92% on room air.  Laboratory work-up showed normal BMP, ALT 66, AST 46, WBC 8.1, hemoglobin 14.9.  Troponin less than 0.015.  BNP 3878.  EKG showed atrial fibrillation with rapid ventricular response.  Chest x-ray showed cardiac enlargement with pulmonary vascular congestion, interstitial opacities in both lungs suggestive of congestive heart failure.  There is also right-sided pleural fluid.  In emergency room, she was given diltiazem 20 mg IV x1 dose, diltiazem 60 mg p.o. x1 dose, Lasix 40 mg IV.  She was also given neb treatment.  Patient remained in atrial fibrillation with rapid ventricular response and was started on diltiazem drip.  She was admitted to the hospital for further management.    Past Medical History    I have reviewed this patient's medical history and updated it with pertinent information if needed.   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  Past Surgical History:   Procedure Laterality Date     BIOPSY OF BREAST, INCISIONAL      L breasts     BIOPSY OF BREAST, NEEDLE CORE  &    R breasts     C ANORECTAL MYOMECTOMY      uterine fibroids     C  DELIVERY ONLY      x 2     COLONOSCOPY N/A 10/24/2018    Procedure: COLONOSCOPY;  Surgeon: Nieves Cordova MD;  Location: [facility] OR     LAPAROSCOPIC ASSISTED SIGMOID COLECTOMY N/A 10/24/2018    Procedure: Intraoperative colonoscopy with laparoscopic-assisted sigmoid colectomy;  Surgeon: Nieves Cordova MD;  Location: [facility] OR     LAPAROSCOPY,LYSIS ADHESNS      pain R side poss r/t c-sections       Prior to Admission Medications   Prior to Admission Medications   Prescriptions Last Dose Informant Patient Reported? Taking?   albuterol (PROAIR HFA/PROVENTIL HFA/VENTOLIN HFA) 108 (90 Base) MCG/ACT inhaler Past Month at Unknown time  No Yes   Sig: INHALE 1 PUFF INTO THE LUNGS EVERY 4 HOURS AS NEEDED FOR SHORTNESS OF BREATH OR DIFFICULT BREATHING OR WHEEZING   albuterol (PROVENTIL) (2.5 MG/3ML) 0.083% neb solution More than a month at Unknown time  No Yes   Sig: Take 1 vial (2.5 mg) by nebulization every 6 hours as needed for shortness of breath / dyspnea   diltiazem ER COATED BEADS (CARDIZEM CD/CARTIA XT) 180 MG 24 hr capsule 2021 at x 1  No Yes   Sig: Take 1 capsule (180 mg) by mouth daily   fluticasone-vilanterol (BREO ELLIPTA) 100-25 MCG/INH inhaler 2021 at am  Yes Yes   Sig: Inhale 1 puff into the lungs daily   metoprolol tartrate (LOPRESSOR) 50 MG tablet 2021 at x 1  No Yes   Sig: Take 1 tablet (50 mg) by mouth 2 times daily   primidone (MYSOLINE) 250 MG tablet 2021 at x 1  No Yes   Sig: Take 1 tablet (250 mg) by mouth 2 times daily      Facility-Administered Medications: None     Allergies   Allergies   Allergen Reactions     No Known Drug Allergy        Social  History   I have reviewed this patient's social history and updated it with pertinent information if needed. Gema Coombs  reports that she has never smoked. She has never used smokeless tobacco. She reports current alcohol use. She reports that she does not use drugs.    Family History   I have reviewed this patient's family history and updated it with pertinent information if needed.   Family History   Problem Relation Age of Onset     Cancer Maternal Aunt         breast-     Cancer Maternal Grandmother         breast-     Heart Disease Paternal Grandfather         heartattack-  at 58     Heart Disease Maternal Grandfather              Heart Disease Paternal Uncle         bypass-alive &well     Thyroid Disease Sister         hypothyroidism-alive       Review of Systems   The 10 point Review of Systems is negative other than noted in the HPI or here. Shortness of breath    Physical Exam   Temp: 97.7  F (36.5  C) Temp src: Temporal BP: 99/81 Pulse: 120   Resp: 26 SpO2: 97 % O2 Device: None (Room air) Oxygen Delivery: 2 LPM  Vital Signs with Ranges  Temp:  [97.7  F (36.5  C)] 97.7  F (36.5  C)  Pulse:  [] 120  Resp:  [26] 26  BP: ()/() 99/81  SpO2:  [92 %-100 %] 97 %  0 lbs 0 oz    GEN:  Alert, oriented x 3, appears comfortable, NAD. Morbidly obese  HEENT:  Normocephalic/atraumatic, no scleral icterus, no nasal discharge, mouth moist.  CV:  Regular rate and rhythm, no murmur or JVD.  S1 + S2 noted, no S3 or S4.  LUNGS:  Clear to auscultation bilaterally without rales/rhonchi/wheezing/retractions.  Symmetric chest rise on inhalation noted.  ABD:  Active bowel sounds, soft, non-tender/non-distended.  No rebound/guarding/rigidity.  EXT:  No edema or cyanosis.  Hands/feet warm to touch with good signs of peripheral perfusion.  No joint synovitis noted.  SKIN:  Dry to touch, no exanthems noted in the visualized areas.  NEURO:  Symmetric muscle strength, sensation to  touch grossly intact.  No new focal deficits appreciated.    Data   Data reviewed today:  I personally reviewed   Recent Labs   Lab 11/25/21  0450   WBC 8.1   HGB 14.9   MCV 94         POTASSIUM 3.9   CHLORIDE 104   CO2 32   BUN 11   CR 0.78   ANIONGAP 4   TONIA 8.9   *   ALBUMIN 3.0*   PROTTOTAL 7.0   BILITOTAL 0.4   ALKPHOS 107   ALT 66*   AST 46*   TROPONIN <0.015       Recent Results (from the past 24 hour(s))   XR Chest Port 1 View    Narrative    EXAM: XR CHEST PORT 1 VIEW  LOCATION: [facility]  DATE/TIME: 11/25/2021 4:47 AM    INDICATION: Shortness of breath  COMPARISON: 02/25/2019      Impression    IMPRESSION: Cardiac enlargement with pulmonary vascular congestion and interstitial opacities in both lungs up suggestive of early CHF/volume overload. Tortuous calcified thoracic aorta. Right-sided pleural fluid. Minimal degenerative changes in the   spine and shoulders.

## 2021-11-25 NOTE — ED NOTES
[facility]  ED Nurse Handoff Report    Gema Coombs is a 71 year old female   ED Chief complaint: Shortness of Breath  . ED Diagnosis:   Atrial fibrillation     Allergies:   Allergies   Allergen Reactions     No Known Drug Allergy        Code Status: Full Code  Activity level - Baseline/Home:  Independent. Activity Level - Current:   Stand by Assist. Lift room needed: No. Bariatric: Yes   Needed: No   Isolation: No. Infection: Not Applicable.     Vital Signs:   Vitals:    11/25/21 0510 11/25/21 0520 11/25/21 0530 11/25/21 0620   BP:   108/85 (!) 126/98   Pulse: 113 (!) 129 104 (!) 142   Resp:       Temp:       TempSrc:       SpO2: 100% 95% 99% 96%       Cardiac Rhythm:  ,      Pain level:    Patient confused: No. Patient Falls Risk: No.   Elimination Status: Has voided   Patient Report - Initial Complaint:History of atrial fibrillation presenting with shortness of breath and tachycardia Focused Assessment: Tachycardic, irregular consistent with atrial fibrillation RVR  Tests Performed: labs and imaging. Abnormal Results:   Labs Ordered and Resulted from Time of ED Arrival to Time of ED Departure   COMPREHENSIVE METABOLIC PANEL - Abnormal       Result Value    Sodium 140      Potassium 3.9      Chloride 104      Carbon Dioxide (CO2) 32      Anion Gap 4      Urea Nitrogen 11      Creatinine 0.78      Calcium 8.9      Glucose 155 (*)     Alkaline Phosphatase 107      AST 46 (*)     ALT 66 (*)     Protein Total 7.0      Albumin 3.0 (*)     Bilirubin Total 0.4      GFR Estimate 78     NT PROBNP INPATIENT - Abnormal    N terminal Pro BNP Inpatient 3,878 (*)    CBC WITH PLATELETS AND DIFFERENTIAL - Abnormal    WBC Count 8.1      RBC Count 5.04      Hemoglobin 14.9      Hematocrit 47.4 (*)     MCV 94      MCH 29.6      MCHC 31.4 (*)     RDW 13.5      Platelet Count 182      % Neutrophils 80      % Lymphocytes 14      % Monocytes 5      % Eosinophils 0      % Basophils 0      % Immature  Granulocytes 1      NRBCs per 100 WBC 0      Absolute Neutrophils 6.5      Absolute Lymphocytes 1.1      Absolute Monocytes 0.4      Absolute Eosinophils 0.0      Absolute Basophils 0.0      Absolute Immature Granulocytes 0.1 (*)     Absolute NRBCs 0.0     TROPONIN I - Normal    Troponin I High Sensitivity 9     INFLUENZA A/B & SARS-COV2 PCR MULTIPLEX - Normal    Influenza A PCR Negative      Influenza B PCR Negative      SARS CoV2 PCR Negative     TROPONIN I - Normal    Troponin I <0.015       Family Comments:  at bedside  OBS brochure/video discussed/provided to patient:  N/A  ED Medications:   Medications   ipratropium - albuterol 0.5 mg/2.5 mg/3 mL (DUONEB) neb solution 3 mL (3 mLs Nebulization Given 11/25/21 0531)   diltiazem (CARDIZEM) 125 mg in sodium chloride 0.9 % 125 mL infusion (5 mg/hr Intravenous New Bag 11/25/21 0616)   diltiazem (CARDIZEM) injection 20 mg (20 mg Intravenous Given 11/25/21 0456)   diltiazem (CARDIZEM) tablet 60 mg (60 mg Oral Given 11/25/21 0536)   furosemide (LASIX) injection 40 mg (40 mg Intravenous Given 11/25/21 0541)     Drips infusing:  Yes- diltiaem  For the majority of the shift, the patient's behavior Green.     Sepsis treatment initiated: No     Patient tested for COVID 19 prior to admission: YES    ED Nurse Name/Phone Number: Sherry Gonzalez RN,   6:27 AM  RECEIVING UNIT ED HANDOFF REVIEW    Above ED Nurse Handoff Report was reviewed: Yes  Reviewed by: Rosita Cross RN on November 25, 2021 at 2:33 PM

## 2021-11-25 NOTE — ED PROVIDER NOTES
History   Chief Complaint:  Shortness of Breath     The history is provided by the patient.      Gema Coombs is a 71 year old female with history of asthma, hypertension, and atrial fibrillation who presents with shortness of breath. Presents with troubled breathing for the past week which worsened today. She had a slight fever at the beginning of her symptoms but none now. Denies cough. Not anticoagulated. She is not Covid vaccinated.     Review of Systems   Constitutional: Negative for fever.   Respiratory: Positive for shortness of breath. Negative for cough.    All other systems reviewed and are negative.    Allergies:  The patient has no known allergies.     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   Hypertension  Paroxysmal atrial fibrillation  Sleep apnea  Asthma  Mitral valve regurgitation  Basal cell carcinoma  Morbid obesity   Esophageal reflux       Past Surgical History:    Biopsy of breast x2  Anorectal myomectomy    delivery x2  Colonoscopy   Laparoscopic assisted sigmoid colectomy   Laparoscopy, lysis adhesions      Family History:    Sister: hypothyroidism     Social History:  Presents to ED alone    Physical Exam     Patient Vitals for the past 24 hrs:   BP Temp Temp src Pulse Resp SpO2   21 0530 108/85 -- -- 104 -- 99 %   21 0520 -- -- -- (!) 129 -- 95 %   21 0510 -- -- -- 113 -- 100 %   21 0500 (!) 106/36 -- -- 92 -- 100 %   21 0450 -- -- -- (!) 135 -- 100 %   21 0440 -- -- -- (!) 154 -- 100 %   21 0430 (!) 146/111 -- -- (!) 152 -- 100 %   21 0420 (!) 150/128 -- -- (!) 160 -- 98 %   21 0344 (!) 142/121 97.7  F (36.5  C) Temporal (!) 154 26 92 %       Physical Exam  Constitutional:  Oriented to person, place, and time. Overweight. Mild  respiratory distress.   HENT:   Head:    Normocephalic.   Mouth/Throat:   Oropharynx is clear and moist.   Eyes:    EOM are normal. Pupils are equal, round, and reactive to light.   Neck:    Neck supple.   Cardiovascular:  Tachycardic rate, regular rhythm and normal heart sounds.      Exam reveals no gallop and no friction rub.       No murmur heard.  Pulmonary/Chest:  Effort normal. Decreased breath sounds bilaterally.      Mild respiratory distress. No wheezes. No rales.      No reproducible chest wall pain.  Abdominal:   Soft. No distension. No tenderness. No rebound and no guarding.   Musculoskeletal:  Normal range of motion.  1+ pitting edema with lymphedema noted. Nontender.   Neurological:   Alert and oriented to person, place, and time.           Moves all 4 extremities spontaneously    Skin:    No rash noted. No pallor.     Emergency Department Course   ECG  ECG obtained at 0430, ECG read at 0430  Atrial fibrillation with rapid ventricular response  Nonspecific ST and T wave abnormality   Abnormal ECG   Rate 131 bpm. VA interval * ms. QRS duration 80 ms. QT/QTc 276/407 ms. P-R-T axes * -6 201.     Imaging:  XR Chest Port 1 View   Final Result   IMPRESSION: Cardiac enlargement with pulmonary vascular congestion and interstitial opacities in both lungs up suggestive of early CHF/volume overload. Tortuous calcified thoracic aorta. Right-sided pleural fluid. Minimal degenerative changes in the    spine and shoulders.      Report per radiology    Laboratory:  Labs Ordered and Resulted from Time of ED Arrival to Time of ED Departure   COMPREHENSIVE METABOLIC PANEL - Abnormal       Result Value    Sodium 140      Potassium 3.9      Chloride 104      Carbon Dioxide (CO2) 32      Anion Gap 4      Urea Nitrogen 11      Creatinine 0.78      Calcium 8.9      Glucose 155 (*)     Alkaline Phosphatase 107      AST 46 (*)     ALT 66 (*)     Protein Total 7.0      Albumin 3.0 (*)     Bilirubin Total 0.4      GFR Estimate 78      NT PROBNP INPATIENT - Abnormal    N terminal Pro BNP Inpatient 3,878 (*)    CBC WITH PLATELETS AND DIFFERENTIAL - Abnormal    WBC Count 8.1      RBC Count 5.04      Hemoglobin 14.9      Hematocrit 47.4 (*)     MCV 94      MCH 29.6      MCHC 31.4 (*)     RDW 13.5      Platelet Count 182      % Neutrophils 80      % Lymphocytes 14      % Monocytes 5      % Eosinophils 0      % Basophils 0      % Immature Granulocytes 1      NRBCs per 100 WBC 0      Absolute Neutrophils 6.5      Absolute Lymphocytes 1.1      Absolute Monocytes 0.4      Absolute Eosinophils 0.0      Absolute Basophils 0.0      Absolute Immature Granulocytes 0.1 (*)     Absolute NRBCs 0.0     TROPONIN I - Normal    Troponin I High Sensitivity 9     INFLUENZA A/B & SARS-COV2 PCR MULTIPLEX - Normal    Influenza A PCR Negative      Influenza B PCR Negative      SARS CoV2 PCR Negative     TROPONIN I - Normal    Troponin I <0.015        Emergency Department Course:  Reviewed:  I reviewed nursing notes, vitals, past medical history and Care Everywhere    Assessments:  0441 I obtained history and examined the patient as noted above.   0544 I rechecked the patient and explained findings.     Interventions:  0453 Duoneb, 3 mL, Nebulization  0456 Cardizem, 20 mg, IV  0525 Duoneb, 3 mL, Nebulization  0536 Cardizem, 60 mg, PO  0541 Lasix, 40 mg, IV    Disposition:  The patient was admitted to the hospital under the care of the Hospitalist service.     Impression & Plan         Medical Decision Making:  Ms. Coombs a 71 year old female with history of atrial fibrillation and COPD who is here complaining of shortness of breath, hypoxic, and found to be in a-fib with RVR. differential includes pneumonia, COVID-19, asthma/COPD exacerbation, CHF, pneumothorax, or other causes. In regards to her atrial fibrillation she initially responded quite well to IV bolus Cardizem and followed up with oral dose unfortunately this has trended up maybe compounding with duonebs that  were given that have improved her symptoms. In regards to her hypoxia and shortness of breath initially she responded to duoneb and were able to ween off supplemental O2 she is at 98% room air but her chest x-ray does confirm pulmonary congestion with elevated BNP consistent with congestive heart failure which she does not have previous history of. Atrial fibrillation and new acute CHF Cardizem drip has been started for further rate control and she will be admitted for diuresis and echocardiogram. Hospitalist is delayed in calling back. Case is signed out to Dr. Kwong who will follow up with Hospitalist for admission.     Critical Care Time: was 35 minutes for this patient excluding procedures    Diagnosis:    ICD-10-CM    1. Acute congestive heart failure, unspecified heart failure type (H)  I50.9    2. Atrial fibrillation with RVR (H)  I48.91    3. Hypoxia  R09.02        Scribe Disclosure:  Michael FERREIRA, am serving as a scribe at 4:05 AM on 11/25/2021 to document services personally performed by Mike Mejia MD based on my observations and the provider's statements to me.            Mike Mejia MD  11/25/21 0641

## 2021-11-26 ENCOUNTER — APPOINTMENT (OUTPATIENT)
Dept: CARDIOLOGY | Facility: CLINIC | Age: 71
DRG: 308 | End: 2021-11-26
Attending: INTERNAL MEDICINE
Payer: COMMERCIAL

## 2021-11-26 LAB
ANION GAP SERPL CALCULATED.3IONS-SCNC: 2 MMOL/L (ref 3–14)
ATRIAL RATE - MUSE: 141 BPM
BASOPHILS # BLD AUTO: 0 10E3/UL (ref 0–0.2)
BASOPHILS NFR BLD AUTO: 0 %
BUN SERPL-MCNC: 11 MG/DL (ref 7–30)
CALCIUM SERPL-MCNC: 8.9 MG/DL (ref 8.5–10.1)
CHLORIDE BLD-SCNC: 103 MMOL/L (ref 94–109)
CO2 SERPL-SCNC: 35 MMOL/L (ref 20–32)
CREAT SERPL-MCNC: 0.81 MG/DL (ref 0.52–1.04)
DIASTOLIC BLOOD PRESSURE - MUSE: NORMAL MMHG
EOSINOPHIL # BLD AUTO: 0 10E3/UL (ref 0–0.7)
EOSINOPHIL NFR BLD AUTO: 0 %
ERYTHROCYTE [DISTWIDTH] IN BLOOD BY AUTOMATED COUNT: 13.5 % (ref 10–15)
ERYTHROCYTE [DISTWIDTH] IN BLOOD BY AUTOMATED COUNT: 13.6 % (ref 10–15)
GFR SERPL CREATININE-BSD FRML MDRD: 74 ML/MIN/1.73M2
GLUCOSE BLD-MCNC: 107 MG/DL (ref 70–99)
HCT VFR BLD AUTO: 44.6 % (ref 35–47)
HCT VFR BLD AUTO: 44.6 % (ref 35–47)
HGB BLD-MCNC: 13.7 G/DL (ref 11.7–15.7)
HGB BLD-MCNC: 14 G/DL (ref 11.7–15.7)
IMM GRANULOCYTES # BLD: 0 10E3/UL
IMM GRANULOCYTES NFR BLD: 0 %
INTERPRETATION ECG - MUSE: NORMAL
LVEF ECHO: NORMAL
LYMPHOCYTES # BLD AUTO: 1.2 10E3/UL (ref 0.8–5.3)
LYMPHOCYTES NFR BLD AUTO: 22 %
MAGNESIUM SERPL-MCNC: 2.1 MG/DL (ref 1.6–2.3)
MCH RBC QN AUTO: 29.1 PG (ref 26.5–33)
MCH RBC QN AUTO: 29.4 PG (ref 26.5–33)
MCHC RBC AUTO-ENTMCNC: 30.7 G/DL (ref 31.5–36.5)
MCHC RBC AUTO-ENTMCNC: 31.4 G/DL (ref 31.5–36.5)
MCV RBC AUTO: 94 FL (ref 78–100)
MCV RBC AUTO: 95 FL (ref 78–100)
MONOCYTES # BLD AUTO: 0.5 10E3/UL (ref 0–1.3)
MONOCYTES NFR BLD AUTO: 9 %
NEUTROPHILS # BLD AUTO: 4 10E3/UL (ref 1.6–8.3)
NEUTROPHILS NFR BLD AUTO: 69 %
NRBC # BLD AUTO: 0 10E3/UL
NRBC BLD AUTO-RTO: 0 /100
P AXIS - MUSE: NORMAL DEGREES
PLATELET # BLD AUTO: 141 10E3/UL (ref 150–450)
PLATELET # BLD AUTO: 143 10E3/UL (ref 150–450)
POTASSIUM BLD-SCNC: 4 MMOL/L (ref 3.4–5.3)
PR INTERVAL - MUSE: NORMAL MS
QRS DURATION - MUSE: 80 MS
QT - MUSE: 276 MS
QTC - MUSE: 407 MS
R AXIS - MUSE: -6 DEGREES
RBC # BLD AUTO: 4.7 10E6/UL (ref 3.8–5.2)
RBC # BLD AUTO: 4.77 10E6/UL (ref 3.8–5.2)
SODIUM SERPL-SCNC: 140 MMOL/L (ref 133–144)
SYSTOLIC BLOOD PRESSURE - MUSE: NORMAL MMHG
T AXIS - MUSE: 201 DEGREES
TROPONIN I SERPL HS-MCNC: 9 NG/L
TROPONIN I SERPL-MCNC: <0.015 UG/L (ref 0–0.04)
TSH SERPL DL<=0.005 MIU/L-ACNC: 1.68 MU/L (ref 0.4–4)
UFH PPP CHRO-ACNC: 1.07 IU/ML
UFH PPP CHRO-ACNC: <0.1 IU/ML
VENTRICULAR RATE- MUSE: 131 BPM
WBC # BLD AUTO: 5.7 10E3/UL (ref 4–11)
WBC # BLD AUTO: 6.8 10E3/UL (ref 4–11)

## 2021-11-26 PROCEDURE — 85520 HEPARIN ASSAY: CPT | Performed by: INTERNAL MEDICINE

## 2021-11-26 PROCEDURE — 250N000009 HC RX 250: Performed by: INTERNAL MEDICINE

## 2021-11-26 PROCEDURE — 99233 SBSQ HOSP IP/OBS HIGH 50: CPT | Performed by: INTERNAL MEDICINE

## 2021-11-26 PROCEDURE — 80048 BASIC METABOLIC PNL TOTAL CA: CPT | Performed by: INTERNAL MEDICINE

## 2021-11-26 PROCEDURE — 85027 COMPLETE CBC AUTOMATED: CPT | Performed by: INTERNAL MEDICINE

## 2021-11-26 PROCEDURE — 250N000011 HC RX IP 250 OP 636: Performed by: INTERNAL MEDICINE

## 2021-11-26 PROCEDURE — 255N000002 HC RX 255 OP 636: Performed by: INTERNAL MEDICINE

## 2021-11-26 PROCEDURE — 999N000208 ECHOCARDIOGRAM COMPLETE

## 2021-11-26 PROCEDURE — 120N000013 HC R&B IMCU

## 2021-11-26 PROCEDURE — 258N000003 HC RX IP 258 OP 636: Performed by: INTERNAL MEDICINE

## 2021-11-26 PROCEDURE — 85025 COMPLETE CBC W/AUTO DIFF WBC: CPT | Performed by: INTERNAL MEDICINE

## 2021-11-26 PROCEDURE — 36415 COLL VENOUS BLD VENIPUNCTURE: CPT | Performed by: INTERNAL MEDICINE

## 2021-11-26 PROCEDURE — 250N000013 HC RX MED GY IP 250 OP 250 PS 637: Performed by: INTERNAL MEDICINE

## 2021-11-26 PROCEDURE — 83735 ASSAY OF MAGNESIUM: CPT | Performed by: INTERNAL MEDICINE

## 2021-11-26 PROCEDURE — 84484 ASSAY OF TROPONIN QUANT: CPT | Performed by: INTERNAL MEDICINE

## 2021-11-26 PROCEDURE — 99221 1ST HOSP IP/OBS SF/LOW 40: CPT | Mod: 25 | Performed by: INTERNAL MEDICINE

## 2021-11-26 PROCEDURE — 84443 ASSAY THYROID STIM HORMONE: CPT | Performed by: INTERNAL MEDICINE

## 2021-11-26 PROCEDURE — 93306 TTE W/DOPPLER COMPLETE: CPT | Mod: 26 | Performed by: INTERNAL MEDICINE

## 2021-11-26 RX ORDER — LEVALBUTEROL TARTRATE 45 UG/1
2 AEROSOL, METERED ORAL EVERY 6 HOURS PRN
Status: DISCONTINUED | OUTPATIENT
Start: 2021-11-26 | End: 2021-12-06 | Stop reason: HOSPADM

## 2021-11-26 RX ORDER — DILTIAZEM HYDROCHLORIDE 180 MG/1
180 CAPSULE, COATED, EXTENDED RELEASE ORAL DAILY
Status: DISCONTINUED | OUTPATIENT
Start: 2021-11-26 | End: 2021-11-26

## 2021-11-26 RX ORDER — NITROGLYCERIN 0.4 MG/1
0.4 TABLET SUBLINGUAL EVERY 5 MIN PRN
Status: DISCONTINUED | OUTPATIENT
Start: 2021-11-26 | End: 2021-12-06 | Stop reason: HOSPADM

## 2021-11-26 RX ORDER — HEPARIN SODIUM 10000 [USP'U]/100ML
0-5000 INJECTION, SOLUTION INTRAVENOUS CONTINUOUS
Status: DISCONTINUED | OUTPATIENT
Start: 2021-11-26 | End: 2021-11-28

## 2021-11-26 RX ORDER — PRIMIDONE 250 MG/1
250 TABLET ORAL 2 TIMES DAILY
Status: DISCONTINUED | OUTPATIENT
Start: 2021-11-26 | End: 2021-12-06 | Stop reason: HOSPADM

## 2021-11-26 RX ORDER — METOPROLOL TARTRATE 50 MG
50 TABLET ORAL 2 TIMES DAILY
Status: DISCONTINUED | OUTPATIENT
Start: 2021-11-26 | End: 2021-11-27

## 2021-11-26 RX ADMIN — HUMAN ALBUMIN MICROSPHERES AND PERFLUTREN 3 ML: 10; .22 INJECTION, SOLUTION INTRAVENOUS at 07:33

## 2021-11-26 RX ADMIN — MICONAZOLE NITRATE: 20 POWDER TOPICAL at 08:28

## 2021-11-26 RX ADMIN — HEPARIN SODIUM 1200 UNITS/HR: 1000 INJECTION INTRAVENOUS; SUBCUTANEOUS at 11:15

## 2021-11-26 RX ADMIN — DILTIAZEM HYDROCHLORIDE 15 MG/HR: 5 INJECTION INTRAVENOUS at 03:52

## 2021-11-26 RX ADMIN — MICONAZOLE NITRATE: 20 POWDER TOPICAL at 03:37

## 2021-11-26 RX ADMIN — LEVALBUTEROL TARTRATE 2 PUFF: 45 AEROSOL, METERED ORAL at 03:35

## 2021-11-26 RX ADMIN — LEVALBUTEROL TARTRATE 2 PUFF: 45 AEROSOL, METERED ORAL at 21:25

## 2021-11-26 RX ADMIN — PRIMIDONE 250 MG: 250 TABLET ORAL at 21:26

## 2021-11-26 RX ADMIN — LEVALBUTEROL TARTRATE 2 PUFF: 45 AEROSOL, METERED ORAL at 09:44

## 2021-11-26 RX ADMIN — METOPROLOL TARTRATE 2.5 MG: 5 INJECTION INTRAVENOUS at 03:32

## 2021-11-26 RX ADMIN — AMIODARONE HYDROCHLORIDE 1 MG/MIN: 50 INJECTION, SOLUTION INTRAVENOUS at 12:22

## 2021-11-26 RX ADMIN — PRIMIDONE 250 MG: 250 TABLET ORAL at 08:28

## 2021-11-26 RX ADMIN — LEVALBUTEROL TARTRATE 2 PUFF: 45 AEROSOL, METERED ORAL at 16:31

## 2021-11-26 RX ADMIN — MICONAZOLE NITRATE: 20 POWDER TOPICAL at 21:33

## 2021-11-26 RX ADMIN — METOPROLOL TARTRATE 50 MG: 50 TABLET, FILM COATED ORAL at 08:28

## 2021-11-26 ASSESSMENT — ACTIVITIES OF DAILY LIVING (ADL)
ADLS_ACUITY_SCORE: 8

## 2021-11-26 NOTE — PROGRESS NOTES
SPIRITUAL HEALTH SERVICES Progress Note  [facility]    Spiritual Health Services consult order for assistance with healthcare directive.  Provided pt with informational resources and education related to directive.  Pt would like to review documents with healthcare agent, spouse, and will request follow up as needed.    Plan: [facility] is available for additional consultation/assistance with healthcare directive.    Mick Sanford MA  Staff   Pager: [phone]  Phone: *[phone]  Off Mondays

## 2021-11-26 NOTE — CONSULTS
Consult Date: 11/26/2021    CARDIOLOGY CONSULTATION    REFERRING PHYSICIAN:  Dr. Hollingsworth.    REASON FOR REFERRAL:  Recurrent atrial fibrillation.    HISTORY OF PRESENT ILLNESS:  I have been asked to evaluate this pleasant 71-year-old female for the above.  She has an underlying medical history of morbid obesity, hypertension, obstructive sleep apnea that is untreated, diverticulitis and paroxysmal atrial fibrillation.  She was last seen in our clinic in 2018.  She had been seen in the hospital for paroxysmal atrial fibrillation at that time and had followup in our clinic; however, she has been lost to followup since.  She does not really feel palpitations.  Her symptom with atrial fibrillation is shortness of breath.  She states she has not been feeling well over the last whole year.  She just retired.  She is very tearful about her health and her current state.  She has noted that she has had progressive shortness of breath over the last week.  She denies chest discomfort, nausea or vomiting.  She has not been compliant with CPAP for her obstructive sleep apnea.  She was diagnosed with atrial fibrillation with rapid ventricular response and decompensated congestive heart failure.  An echocardiogram was performed.  This is technically very difficult due to her body habitus, but it was felt that she had normal LV and RV function.  She was given IV Lasix and started on diltiazem drip.  She continues to have a tachyarrhythmia and occasional dip in her blood pressures.    PAST MEDICAL HISTORY:  Atrial fibrillation, diverticulitis, asthma, fibromyalgia, history of epilepsy, hypertension, paroxysmal atrial fibrillation, sleep apnea, chronic ITP.    MEDICATIONS ON ADMISSION:  Included extended-release diltiazem 180, a Breo inhaler, Lopressor 50 mg b.i.d., primidone and albuterol.    ALLERGIES:  NO KNOWN DRUG ALLERGIES.    SOCIAL HISTORY:  She just retired.  She lives alone.  She is a lifelong nonsmoker.    FAMILY HISTORY:   Negative for premature coronary disease in her immediate family.    REVIEW OF SYSTEMS:  She denies history of stroke or syncope.  She has a history of epilepsy, but not any recent seizures.  She has had progressive shortness of breath, as described above.  No chest pain symptoms.  She has stated she has lost about 40 pounds with [employer].  Again, she is very tearful about her current health state and weight.    PHYSICAL EXAMINATION:    VITAL SIGNS:  Her blood pressure ranges anywhere from 73 systolic to 150 systolic over 52-92 diastolic.  Heart rates occasionally in the 80s, but mostly in the 120s and upwards of 140s, and occasional 160s, in atrial fibrillation.  She is afebrile.  She is oxygenating at least 93% on room air.  GENERAL:  She is morbidly obese.  No apparent distress with eupneic on exam and with conversation.  HEENT:  Atraumatic and normocephalic.  Pupils are equal.  She wears eyeglasses.  NECK:  Short.  Very thick.  I cannot appreciate for jugular venous distention.  I do not hear carotid bruits.  CARDIOVASCULAR:  Heart tones are very distant, irregular and fast.  LUNGS:  Markedly diminished posteriorly.  ABDOMEN:  Soft, protuberant.  No significant peripheral edema.  NEUROLOGIC:  She is alert and oriented.  There are no gross focal neurologic abnormalities.    DIAGNOSTIC DATA:  I did review her electrocardiogram from admission, which demonstrates atrial fibrillation with a heart rate of 131 beats per minute.  There is ST segment depression in the lateral leads.  Electrolyte panel looks normal.  NT-proBNP was elevated at 3878.  Troponins are negative.  CBC looks normal and platelet count is 143 today.  SARS COVID testing and influenza are negative.  Chest x-ray, which was read by radiology as cardiac enlargement with pulmonary vascular congestion and interstitial opacities, tortuous calcified thoracic aorta.  An echocardiogram was read technically very difficult, but with normal LV and RV  function.  In review of her chart, she had a coronary angiogram in 10/2018 showing no evidence of coronary artery disease.    ASSESSMENT AND PLAN:  In summary, Ms. Coombs is a pleasant 71-year-old female, morbidly obese, hypertension, chronic ITP and recurrent atrial fibrillation with diastolic congestive heart failure.  She has had symptomatic improvement with IV Lasix; however, she continues to have an A-Fib with RVR, despite a diltiazem drip.  I am going to transition her at this time from diltiazem to amiodarone drip without bolus.  I want the diltiazem discontinued prior to starting the amiodarone.  I did talk to her about that she was a little concerned about the use of amiodarone.  She does have some medical knowledge from her previous job.  We will attempt this in the short term to try to get her atrial fibrillation under better control.  I did stress the importance of compliance with her CPAP mask.  She will need to be re-evaluated for this as she says her CPAP mask it is over 10 years old.  I would also like her to see hematology.  She tells me she was not placed on anticoagulation because of her ITP.  Her baseline platelet count on this admission was 182, but today, dipped a little over 143.  I would like their opinion about whether or not we can start anticoagulation because she has a high CHADS-VASc score, and at high risk for cerebrovascular embolic events related to atrial fibrillation.  I would like her to follow up with Electrophysiology as an outpatient also.  Please feel free to contact me with any questions you have in regards to her care.    Gracie Titus DO        D: 2021   T: 2021   MT: [id number]    Name:     BEE COOMBS  MRN:      [id number]        Account:      [id number]   :      1950           Consult Date: 2021     Document: [id number]

## 2021-11-26 NOTE — PROGRESS NOTES
[facility]    Hospitalist Progress Note      Assessment & Plan   Gema Coombs is a 71 year old female who was admitted on 11/25/2021.    Summary of Stay:   Gema Coombs is a 71 year old female patient with past medical history of paroxysmal atrial fibrillation, bronchial asthma, hypertension, obstructive sleep apnea, morbid obesity, GERD, fibromyalgia, history of generalized nonconvulsive epilepsy, prior history of diverticulitis, status post laparoscopic-assisted sigmoid colectomy, came to emergency room for evaluation for shortness of breath.  Patient stated that she has been having progressive shortness of breath over the past 1 week.     EKG showed atrial fibrillation with rapid ventricular response.  Chest x-ray showed cardiac enlargement with pulmonary vascular congestion, interstitial opacities in both lungs suggestive of congestive heart failure.  There is also right-sided pleural fluid.  Emergency room, she was given Lasix 40 mg IV x1 dose, diltiazem 20 mg IV x1 dose, diltiazem 60 mg p.o. x1 dose.  She remained in A. fib with RVR and was started on diltiazem drip.  She was admitted to the hospital for further management.    Plan:    A. fib with RVR.  -Difficulty controlling the heart rate especially due to low blood pressure.  -Seen by cardiology team: Started on amiodarone infusion.  Amiodarone rate was decreased to 0.5 mg/h due to low blood pressure.  -IV heparin infusion.  -History of ITP.  Monitor platelets closely.  -Echocardiogram shows normal ejection fraction.    Possible congestive heart failure.  -Due to A. fib with RVR.  -Pulmonary edema on chest x-ray at admission.  -Currently the blood pressure is low so diuretic not attempted.  -Furosemide as the blood pressure allows.    History of asthma  -No evidence of asthma exacerbation. Continue maintenance inhaler.    Morbid obesity  -Further complicates the care.    History of ITP  -Monitor platelet count daily.      DVT  Prophylaxis: Heparin IV   Code Status: Full Code  Expected discharge: 2-3 days    Keshawn Field MD  Text Page (7am - 6pm, M-F)    Interval History   Patient was evaluated with nursing staff. Overnight issues discussed.    Review of systems:  No nausea or vomiting.  No abdominal pain.  No diarrhea.  No chest pain/palpitations.  No headache/visual disturbance/new weakness.  Short of breath with minimal activity. Also feels tired.    -Data reviewed today: Labs and medications.    Physical Exam   Temp: 97.8  F (36.6  C) Temp src: Oral BP: (!) 127/91 Pulse: (!) 124   Resp: 18 SpO2: 97 % O2 Device: Nasal cannula Oxygen Delivery: 2 LPM  Vitals:    11/25/21 1520 11/26/21 0647   Weight: 139.4 kg (307 lb 4.8 oz) 139 kg (306 lb 6.4 oz)     Vital Signs with Ranges  Temp:  [97  F (36.1  C)-97.8  F (36.6  C)] 97.8  F (36.6  C)  Pulse:  [] 124  Resp:  [18-22] 18  BP: ()/() 127/91  SpO2:  [91 %-99 %] 97 %  No intake/output data recorded.    Constitutional: Awake, alert, cooperative, no apparent distress  HEENT: Trachea midline, sclera is clear   Respiratory: No crackles. No wheezing. Equal breath sounds bilaterally.  Cardiovascular: Irregular rate and rhythm, normal S1 and S2, and no murmur noted, tachycardic  GI: Normal bowel sounds, soft, non-distended, non-tender  Extremities: Peripheral edema    Medications     amiodarone 0.5 mg/min (11/26/21 1311)     amiodarone       heparin 1,200 Units/hr (11/26/21 1115)       metoprolol tartrate  50 mg Oral BID     miconazole   Topical BID     primidone  250 mg Oral BID     sodium chloride (PF)  3 mL Intracatheter Q8H       Data   Recent Labs   Lab 11/26/21  1026 11/26/21  0711 11/25/21  0450   WBC 6.8 5.7 8.1   HGB 14.0 13.7 14.9   MCV 94 95 94   * 143* 182   NA  --  140 140   POTASSIUM  --  4.0 3.9   CHLORIDE  --  103 104   CO2  --  35* 32   BUN  --  11 11   CR  --  0.81 0.78   ANIONGAP  --  2* 4   TONIA  --  8.9 8.9   GLC  --  107* 155*   ALBUMIN  --   --  3.0*    PROTTOTAL  --   --  7.0   BILITOTAL  --   --  0.4   ALKPHOS  --   --  107   ALT  --   --  66*   AST  --   --  46*   TROPONIN  --  <0.015 <0.015       Recent Results (from the past 24 hour(s))   Echocardiogram Complete   Result Value    LVEF  55-60%    [facility]    [id number]  [id number]  [id number]  [id number]^HUANG^MARGARITO^JENNY     [facility]  Echocardiography Laboratory  [address] [place] [address]     Name: BEE HALE  MRN: [id number]  : 1950  Study Date: 2021 07:11 AM  Age: 71 yrs  Gender: Female  Patient Location: [facility]  Reason For Study: Atrial Fibrillation, CHF  Ordering Physician: MARGARITO ENCINAS  Performed By: Kathryn Crockett     BSA: 2.3 m2  Height: 63 in  Weight: 310 lb  HR: 153  BP: 112/70 mmHg  ______________________________________________________________________________  Procedure  Complete Portable Echo Adult. Optison (NDC #3647-6050) given intravenously.  ______________________________________________________________________________  Interpretation Summary     Very limited image quality due to patient body habitus, imaged sitting up in  rapid afib.  Left ventricular systolic function is normal.  Diastolic Doppler findings (E/E' ratio and/or other parameters) suggest left  ventricular filling pressures are indeterminate.  Limited image quality but grossly normal LV wall motion  RV not well seen, grossly normal size and function.Normal TAPSE also suggests  normal function  There is mild-moderate biatrial enlargement.  Doppler findings do not suggest pulmonary hypertension.  Mild valvular aortic stenosis.  Aortic valve is never well seen but it is abnormal with calcification seen. By  doppler there is no AI and only mild AS  ______________________________________________________________________________  Left Ventricle  The left ventricle is normal in size. The left ventricle is not well  visualized. There is normal left ventricular wall thickness.  Left ventricular  systolic function is normal. The visual ejection fraction is 55-60%. Diastolic  function not assessed due to atrial fibrillation. Diastolic Doppler findings  (E/E' ratio and/or other parameters) suggest left ventricular filling  pressures are indeterminate. Regional wall motion abnormalities cannot be  excluded due to limited visualization. Limited image quality but grossly  normal LV wall motion. There is no thrombus seen in the left ventricle.     Right Ventricle  RV not well seen, grossly normal size and function.Normal TAPSE also suggests  normal function. The right ventricle is not well visualized. The right  ventricular systolic function is normal.     Atria  There is mild-moderate biatrial enlargement.     Mitral Valve  The mitral valve leaflets appear normal. There is no evidence of stenosis,  fluttering, or prolapse.     Tricuspid Valve  There is trace tricuspid regurgitation. The right ventricular systolic  pressure is approximated at 24.4 mmHg plus the right atrial pressure. IVC  diameter <2.1 cm collapsing >50% with sniff suggests a normal RA pressure of 3  mmHg. Doppler findings do not suggest pulmonary hypertension.     Aortic Valve  The aortic valve is not well visualized. No aortic regurgitation is present.  Mild valvular aortic stenosis. Aortic valve is never well seen but it is  abnormal with calcification seen. By doppler there is no AI and only mild AS.     Pulmonic Valve  The pulmonic valve is not well visualized.     Vessels  The aortic root is normal size.     Pericardium  The pericardium appears normal.     Rhythm  The rhythm was rapid atrial fibrillation.  ______________________________________________________________________________  MMode/2D Measurements & Calculations     asc Aorta Diam: 3.2 cm     Doppler Measurements & Calculations  MV E max neil: 89.0 cm/sec  MV dec time: 0.15 sec  Ao V2 max: 197.0 cm/sec  Ao max P.0 mmHg  Ao V2 mean: 143.0 cm/sec  Ao mean P.3  mmHg  Ao V2 VTI: 34.7 cm  LV V1 max P.8 mmHg  LV V1 max: 120.0 cm/sec  LV V1 VTI: 21.0 cm  TR max gerardo: 246.8 cm/sec  TR max P.4 mmHg  AV Gerardo Ratio (DI): 0.61  E/E' avg: 10.7  Lateral E/e': 8.9  Medial E/e': 12.5     ______________________________________________________________________________  Report approved by: Andre Ocampo 2021 09:08 AM

## 2021-11-26 NOTE — PLAN OF CARE
Patient arrived on unit around 1520 from ED. Vitals upon arrival to unit: BP (!) 128/99 (BP Location: Right arm)   Pulse (!) 125   Temp 97.4  F (36.3  C) (Oral)   Resp 22   Wt 139.4 kg (307 lb 4.8 oz)   SpO2 97%   BMI 54.44 kg/m  . Oxygen saturation 97% on RA. Alert and oriented X4. Oriented to room and call light. Patient verbalized understanding. Orders received. Continue to monitor and notify MD with changes and concerns. Lives at home with . Denies pain. Dilt. Gtt infusing. Heart rate 120-150, MD notified. Skin under breasts red and moist. Skin under pannus right side superficial open, Bilateral pannus red and moist.

## 2021-11-26 NOTE — PLAN OF CARE
Heart Failure Care Map  GOALS TO BE MET BEFORE DISCHARGE:    1. Decrease congestion and/or edema with diuretic therapy to achieve near optimal volume status.     Dyspnea improved: Yes, satisfactory for discharge.   Edema improved: No, further care required to meet this goal. Please explain Trace BLE edema         Net I/O and Weights since admission:   No intake/output data recorded.     Vitals:    11/25/21 1520 11/26/21 0647   Weight: 139.4 kg (307 lb 4.8 oz) 139 kg (306 lb 6.4 oz)       2.  O2 sats > 90% on room air, or at prior home O2 therapy level.      Able to wean O2 this shift to keep sats above 90%?: Yes, satisfactory for discharge.   Does patient use Home O2? No          Current oxygenation status:   SpO2: 93 %     O2 Device: None (Room air), Oxygen Delivery: 2 LPM    3.  Tolerates ambulation and mobility near baseline.     Ambulation: Yes, satisfactory for discharge.   Times patient ambulated with staff this shift: 2    Please review the Heart Failure Care Map for additional HF goal outcomes.    Laine Seaman RN  11/26/2021      Pt. A&Ox4, up with SBA, Tele: A.Fib with RVR, HR improved this morning but did have to have x2 doses of metoprolol PRN overnight. HR now in the 80's to 100's at rest. Dilt gtt continues at this time for rate control. Redness to abdominal folds, under breasts and in periarea. Barrier cream applied to buttocks, and miconazole powder applied to folds and under breasts. Pt. Denies any c/o pain. VSS. Will continue with POC.

## 2021-11-26 NOTE — PLAN OF CARE
A&O. Denies pain. Amio. Gtt infusing. Heparin gtt infusing. Next Hep. 10a due 0100. C/o SOB, PRN inhaler given. On O2 for comfort. Tele: TANJA Jennings RVR Plan: 2-3 days

## 2021-11-26 NOTE — PROGRESS NOTES
SPIRITUAL HEALTH SERVICES Progress Note  [facility]    Spiritual Health Services consult order for assistance with healthcare directive.  Pt is engaged in testing at this time. Provided informational packet and documents to unit staff who will provided pt for review.     Plan: Will follow up to address any questions or concerns as pt is available. [facility] is available for additional consultation/assistance with healthcare directive.    Mick Sanford MA  Staff   Pager: [phone]  Phone: *[phone]  Off Mondays

## 2021-11-26 NOTE — PROVIDER NOTIFICATION
HAYLEE Ram Gtt is maxed at 15 mg, Heart rate 120-150 . Do you want her to have her home metoprolol or to try something else with the gtt? Thanks

## 2021-11-27 LAB
UFH PPP CHRO-ACNC: 0.17 IU/ML
UFH PPP CHRO-ACNC: 0.27 IU/ML
UFH PPP CHRO-ACNC: 0.31 IU/ML

## 2021-11-27 PROCEDURE — 99233 SBSQ HOSP IP/OBS HIGH 50: CPT | Performed by: INTERNAL MEDICINE

## 2021-11-27 PROCEDURE — 250N000013 HC RX MED GY IP 250 OP 250 PS 637: Performed by: INTERNAL MEDICINE

## 2021-11-27 PROCEDURE — 85520 HEPARIN ASSAY: CPT | Performed by: INTERNAL MEDICINE

## 2021-11-27 PROCEDURE — 120N000013 HC R&B IMCU

## 2021-11-27 PROCEDURE — 258N000003 HC RX IP 258 OP 636: Performed by: INTERNAL MEDICINE

## 2021-11-27 PROCEDURE — 36415 COLL VENOUS BLD VENIPUNCTURE: CPT | Performed by: INTERNAL MEDICINE

## 2021-11-27 PROCEDURE — 250N000011 HC RX IP 250 OP 636: Performed by: INTERNAL MEDICINE

## 2021-11-27 RX ORDER — METOPROLOL TARTRATE 25 MG/1
25 TABLET, FILM COATED ORAL 2 TIMES DAILY
Status: DISCONTINUED | OUTPATIENT
Start: 2021-11-27 | End: 2021-11-28

## 2021-11-27 RX ORDER — FUROSEMIDE 10 MG/ML
40 INJECTION INTRAMUSCULAR; INTRAVENOUS
Status: DISCONTINUED | OUTPATIENT
Start: 2021-11-27 | End: 2021-11-28

## 2021-11-27 RX ADMIN — HEPARIN SODIUM 1650 UNITS/HR: 1000 INJECTION INTRAVENOUS; SUBCUTANEOUS at 16:21

## 2021-11-27 RX ADMIN — METOPROLOL TARTRATE 25 MG: 25 TABLET, FILM COATED ORAL at 20:49

## 2021-11-27 RX ADMIN — HEPARIN SODIUM 1650 UNITS/HR: 1000 INJECTION INTRAVENOUS; SUBCUTANEOUS at 10:40

## 2021-11-27 RX ADMIN — FUROSEMIDE 40 MG: 10 INJECTION, SOLUTION INTRAMUSCULAR; INTRAVENOUS at 20:48

## 2021-11-27 RX ADMIN — HEPARIN SODIUM 1500 UNITS/HR: 1000 INJECTION INTRAVENOUS; SUBCUTANEOUS at 01:32

## 2021-11-27 RX ADMIN — AMIODARONE HYDROCHLORIDE 0.5 MG/MIN: 50 INJECTION, SOLUTION INTRAVENOUS at 18:24

## 2021-11-27 RX ADMIN — PRIMIDONE 250 MG: 250 TABLET ORAL at 08:19

## 2021-11-27 RX ADMIN — METOPROLOL TARTRATE 25 MG: 25 TABLET, FILM COATED ORAL at 10:21

## 2021-11-27 RX ADMIN — PRIMIDONE 250 MG: 250 TABLET ORAL at 20:49

## 2021-11-27 RX ADMIN — MICONAZOLE NITRATE: 20 POWDER TOPICAL at 20:50

## 2021-11-27 RX ADMIN — LEVALBUTEROL TARTRATE 2 PUFF: 45 AEROSOL, METERED ORAL at 14:06

## 2021-11-27 RX ADMIN — FUROSEMIDE 40 MG: 10 INJECTION, SOLUTION INTRAMUSCULAR; INTRAVENOUS at 13:39

## 2021-11-27 RX ADMIN — MICONAZOLE NITRATE: 20 POWDER TOPICAL at 08:20

## 2021-11-27 RX ADMIN — LEVALBUTEROL TARTRATE 2 PUFF: 45 AEROSOL, METERED ORAL at 08:24

## 2021-11-27 RX ADMIN — LEVALBUTEROL TARTRATE 2 PUFF: 45 AEROSOL, METERED ORAL at 20:49

## 2021-11-27 ASSESSMENT — ACTIVITIES OF DAILY LIVING (ADL)
ADLS_ACUITY_SCORE: 8
ADLS_ACUITY_SCORE: 8
ADLS_ACUITY_SCORE: 12
ADLS_ACUITY_SCORE: 8
ADLS_ACUITY_SCORE: 12
ADLS_ACUITY_SCORE: 8
ADLS_ACUITY_SCORE: 8
ADLS_ACUITY_SCORE: 12
ADLS_ACUITY_SCORE: 12
ADLS_ACUITY_SCORE: 8
ADLS_ACUITY_SCORE: 8
ADLS_ACUITY_SCORE: 12
ADLS_ACUITY_SCORE: 12
ADLS_ACUITY_SCORE: 8
ADLS_ACUITY_SCORE: 8
ADLS_ACUITY_SCORE: 12
ADLS_ACUITY_SCORE: 8

## 2021-11-27 NOTE — PROGRESS NOTES
[facility]    Hospitalist Progress Note      Assessment & Plan   Gema Coombs is a 71 year old female who was admitted on 11/25/2021.    Summary of Stay:   Gmea Coombs is a 71 year old female patient with past medical history of paroxysmal atrial fibrillation, bronchial asthma, hypertension, obstructive sleep apnea, morbid obesity, GERD, fibromyalgia, history of generalized nonconvulsive epilepsy, prior history of diverticulitis, status post laparoscopic-assisted sigmoid colectomy, came to emergency room for evaluation for shortness of breath.  Patient stated that she has been having progressive shortness of breath over the past 1 week.      EKG showed atrial fibrillation with rapid ventricular response.  Chest x-ray showed cardiac enlargement with pulmonary vascular congestion, interstitial opacities in both lungs suggestive of congestive heart failure.  There is also right-sided pleural fluid.  Emergency room, she was given Lasix 40 mg IV x1 dose, diltiazem 20 mg IV x1 dose, diltiazem 60 mg p.o. x1 dose.  She remained in A. fib with RVR and was started on diltiazem drip.  She was admitted to the hospital for further management.     Plan:     A. fib with RVR.  -Difficulty controlling the heart rate especially due to low blood pressure.  -Seen by cardiology team: Started on amiodarone infusion.  Amiodarone rate was decreased to 0.5 mg/h due to low blood pressure.  -IV heparin infusion.  -History of ITP.  Monitor platelets closely.  -Echocardiogram shows normal ejection fraction.  -Add metoprolol as rate control is suboptimal.     Possible congestive heart failure.  -Due to A. fib with RVR.  -Pulmonary edema on chest x-ray at admission.  -Furosemide as the blood pressure allows.  Subjectively patient feels a little better in terms of breathing.     History of asthma  -No evidence of asthma exacerbation. Continue maintenance inhaler.     Morbid obesity  -Further complicates the  care.     History of ITP  -Monitor platelet count daily.        DVT Prophylaxis: Heparin IV              Code Status: Full Code  Expected discharge: 2-3 days    Keshawn Field MD  Text Page (7am - 6pm, M-F)    Interval History   Patient was evaluated with nursing staff. Overnight issues discussed.    Review of systems:  No nausea or vomiting.  No abdominal pain.  No diarrhea.  No chest pain/palpitations.  No new cough/shortness of breath.  No headache/visual disturbance/new weakness.    -Data reviewed today: Labs and medications.    Physical Exam   Temp: 96.8  F (36  C) Temp src: Oral BP: (!) 149/112 Pulse: (!) 122   Resp: 20 SpO2: 96 % O2 Device: Nasal cannula Oxygen Delivery: 2 LPM  Vitals:    11/25/21 1520 11/26/21 0647 11/27/21 0607   Weight: 139.4 kg (307 lb 4.8 oz) 139 kg (306 lb 6.4 oz) 140.2 kg (309 lb)     Vital Signs with Ranges  Temp:  [96.2  F (35.7  C)-98.4  F (36.9  C)] 96.8  F (36  C)  Pulse:  [] 122  Resp:  [20-22] 20  BP: (101-149)/() 149/112  SpO2:  [93 %-99 %] 96 %  I/O last 3 completed shifts:  In: 240 [P.O.:240]  Out: -     Constitutional: Awake, alert, cooperative, no apparent distress  HEENT: Trachea midline, sclera is clear   Respiratory: No crackles. No wheezing. Equal breath sounds bilaterally.  Cardiovascular: Irregular rate and rhythm, normal S1 and S2, tachycardic  GI: Normal bowel sounds, soft, non-distended, non-tender  Skin/Integumen: No rashes, no cyanosis  Psych: appropriate affect, no agitation   Extremities: Peripheral edema     Medications     amiodarone 0.5 mg/min (11/27/21 0151)     heparin 1,650 Units/hr (11/27/21 1040)       fluticasone-vilanterol  1 puff Inhalation Daily     furosemide  40 mg Intravenous BID     metoprolol tartrate  25 mg Oral BID     miconazole   Topical BID     primidone  250 mg Oral BID     sodium chloride (PF)  3 mL Intracatheter Q8H       Data   Recent Labs   Lab 11/26/21  1026 11/26/21  0711 11/25/21  0450   WBC 6.8 5.7 8.1   HGB 14.0 13.7  14.9   MCV 94 95 94   * 143* 182   NA  --  140 140   POTASSIUM  --  4.0 3.9   CHLORIDE  --  103 104   CO2  --  35* 32   BUN  --  11 11   CR  --  0.81 0.78   ANIONGAP  --  2* 4   TONIA  --  8.9 8.9   GLC  --  107* 155*   ALBUMIN  --   --  3.0*   PROTTOTAL  --   --  7.0   BILITOTAL  --   --  0.4   ALKPHOS  --   --  107   ALT  --   --  66*   AST  --   --  46*   TROPONIN  --  <0.015 <0.015       No results found for this or any previous visit (from the past 24 hour(s)).

## 2021-11-27 NOTE — PLAN OF CARE
Heart Failure Care Map  GOALS TO BE MET BEFORE DISCHARGE:    1. Decrease congestion and/or edema with diuretic therapy to achieve near optimal volume status.     Dyspnea improved: No, further care required to meet this goal. Please explain Pt. has been on and off O2 for comfort and SOB with exertion   Edema improved: Yes, satisfactory for discharge.        Net I/O and Weights since admission:   10/28 0700 - 11/27 0659  In: 240 [P.O.:240]  Out: -   Net: 240     Vitals:    11/25/21 1520 11/26/21 0647 11/27/21 0607   Weight: 139.4 kg (307 lb 4.8 oz) 139 kg (306 lb 6.4 oz) 140.2 kg (309 lb)       2.  O2 sats > 90% on room air, or at prior home O2 therapy level.      Able to wean O2 this shift to keep sats above 90%?: Yes, satisfactory for discharge.   Does patient use Home O2? No          Current oxygenation status:   SpO2: 97 %     O2 Device: Nasal cannula, Oxygen Delivery: 2 LPM    3.  Tolerates ambulation and mobility near baseline.     Ambulation: No, further care required to meet this goal. Please explain c/o SOB with activity   Times patient ambulated with staff this shift: 2    Please review the Heart Failure Care Map for additional HF goal outcomes.    Laine Seaman RN  11/27/2021     Pt. A&Ox4, up with SBA and walker, Tele: A.Fib RVR, 's to 140's overnight. Heparin gtt infusing without difficulty at 1500 units/hr, next Hep10a due this AM. Amiodarone gtt infusing at 0.5mg/min (16.67 ml/hr) O2 sat's at 97% at 2L per NC, pt. Has been using the O2 on and off during the night for comfort. Pt. Denies any needs a this time. Will continue with POC.

## 2021-11-27 NOTE — PLAN OF CARE
A&Ox4. Tele afib RVR hr 110's-140's. PO metoprolol and IV lasix 40 mg BID added. Denies pain. Up Ax1 with a walker. Nasal cannula 2 L. Possible discharge 2-3 days.

## 2021-11-27 NOTE — PROGRESS NOTES
[facility].  Inpatient Cardiology Progress Note.  Date of Service:  11/27/2021       INTERVAL HISTORY:  Patient is a 71-year-old  lady admitted with atrial fibrillation with rapid ventricular rates and acute decompensated heart failure with preserved ejection fraction.  Note, she is has never been symptomatic with palpitations or irregular heartbeat.    She is known to have paroxysmal atrial fibrillation but lost to clinic follow-up for several years, morbid obesity with BMI greater than 60 (used to be a  for [employer], retired a year ago, very sedentary lifestyle), untreated obstructive sleep apnea, hypertension, chronic idiopathic thrombocytopenia with platelets in the 140,000-180,000 range (no bleeding).    Seen by my cardiologist partner, Dr. Rosita Titus yesterday, started on IV amiodarone drip, beta-blocker held due to low blood pressure in the 100s.  Her blood pressure is improved.  Heart rates this morning were still in the 130-140s.  Therefore I started her on metoprolol tartrate 25 mg twice daily.  Heart rates have improved to the 100-110s.    DIAGNOSTIC DATA:  ECG on admission showed atrial fibrillation with rapid rates of 130-140 bpm.  On last ECG dated 2/25/2019, she was in sinus rhythm.  Labs - hemoglobin 14.0, platelets 141, potassium 4.0, creatinine 0.8, serial troponins negative, NT proBNP 3800, normal TSH.    Echocardiogram images challenging due to body habitus.  Grossly normal left ventricular systolic function, LVEF 55-60%, grossly normal right ventricular systolic function, moderate biatrial enlargement, and tricuspid valve regurgitation.    DIAGNOSES:  1.  Paroxysmal atrial fibrillation with rapid ventricular rates.  2.  Anticoagulation counseling in the context of high chads score and chronic mild idiopathic thrombocytopenia.  3.  New diagnosis of acute decompensated heart failure with preserved ejection fraction, LVEF 55-60%.  4.  Morbid obesity  with BMI greater than 60.  5.  Untreated severe obstructive sleep apnea.    ASSESSMENT/PLAN:  1.  Continue IV amiodarone drip started by Dr. Rosita Titus.  Stop after 24 hours.  2.  Crease metoprolol titrate, as tolerated by blood pressure.  Goal heart rate < 80 BPM.  3.  Given her multiple comorbidities, I would not recommend cardioversion. Additionally, she may have been in persistent atrial fibrillation for a long time as she was lost to follow-up and does not get any palpitations.  4.  Her CHADS2 score is very high for thromboembolic risk and she should be on long-term systemic anticoagulation.  Her thrombocytopenia is mild.  Please start Eliquis 5 mg 2 times daily (if okayed by her hematology team).  4.  IV furosemide 40 mg twice daily and uptitrate as needed.  As her heart failure is treated, her rate control will also improve.      Jordan Armas MD [facility]  Cardiology.        VITAL SIGNS:  Temp: 97.3  F (36.3  C) Temp src: Oral BP: 101/76 Pulse: 120   Resp: 20 SpO2: 93 % O2 Device: None (Room air) Oxygen Delivery: 2 LPM  11/22 0700 - 11/27 0659  In: 240 [P.O.:240]  Out: -   Net: 240  Vitals:    11/25/21 1520 11/26/21 0647 11/27/21 0607   Weight: 139.4 kg (307 lb 4.8 oz) 139 kg (306 lb 6.4 oz) 140.2 kg (309 lb)

## 2021-11-28 LAB
ERYTHROCYTE [DISTWIDTH] IN BLOOD BY AUTOMATED COUNT: 14.1 % (ref 10–15)
HCT VFR BLD AUTO: 42.7 % (ref 35–47)
HGB BLD-MCNC: 13.2 G/DL (ref 11.7–15.7)
HOLD SPECIMEN: NORMAL
HOLD SPECIMEN: NORMAL
MCH RBC QN AUTO: 29.4 PG (ref 26.5–33)
MCHC RBC AUTO-ENTMCNC: 30.9 G/DL (ref 31.5–36.5)
MCV RBC AUTO: 95 FL (ref 78–100)
PF4 HEPARIN CMPLX AB SER QL: NEGATIVE
PLATELET # BLD AUTO: 105 10E3/UL (ref 150–450)
RBC # BLD AUTO: 4.49 10E6/UL (ref 3.8–5.2)
UFH PPP CHRO-ACNC: 1.04 IU/ML
UFH PPP CHRO-ACNC: >1.1 IU/ML
WBC # BLD AUTO: 4.1 10E3/UL (ref 4–11)

## 2021-11-28 PROCEDURE — 94640 AIRWAY INHALATION TREATMENT: CPT

## 2021-11-28 PROCEDURE — 250N000013 HC RX MED GY IP 250 OP 250 PS 637: Performed by: INTERNAL MEDICINE

## 2021-11-28 PROCEDURE — 99233 SBSQ HOSP IP/OBS HIGH 50: CPT | Performed by: INTERNAL MEDICINE

## 2021-11-28 PROCEDURE — 36415 COLL VENOUS BLD VENIPUNCTURE: CPT | Performed by: INTERNAL MEDICINE

## 2021-11-28 PROCEDURE — 85027 COMPLETE CBC AUTOMATED: CPT | Performed by: INTERNAL MEDICINE

## 2021-11-28 PROCEDURE — 999N000157 HC STATISTIC RCP TIME EA 10 MIN

## 2021-11-28 PROCEDURE — 250N000011 HC RX IP 250 OP 636: Performed by: INTERNAL MEDICINE

## 2021-11-28 PROCEDURE — 120N000013 HC R&B IMCU

## 2021-11-28 PROCEDURE — 86022 PLATELET ANTIBODIES: CPT | Performed by: INTERNAL MEDICINE

## 2021-11-28 PROCEDURE — 85520 HEPARIN ASSAY: CPT | Performed by: INTERNAL MEDICINE

## 2021-11-28 PROCEDURE — 94640 AIRWAY INHALATION TREATMENT: CPT | Mod: 76

## 2021-11-28 RX ORDER — FUROSEMIDE 10 MG/ML
40 INJECTION INTRAMUSCULAR; INTRAVENOUS ONCE
Status: COMPLETED | OUTPATIENT
Start: 2021-11-28 | End: 2021-11-28

## 2021-11-28 RX ORDER — FUROSEMIDE 10 MG/ML
80 INJECTION INTRAMUSCULAR; INTRAVENOUS
Status: DISCONTINUED | OUTPATIENT
Start: 2021-11-28 | End: 2021-12-01

## 2021-11-28 RX ORDER — METOPROLOL TARTRATE 25 MG/1
25 TABLET, FILM COATED ORAL ONCE
Status: DISCONTINUED | OUTPATIENT
Start: 2021-11-28 | End: 2021-11-28

## 2021-11-28 RX ORDER — METOPROLOL TARTRATE 25 MG/1
25 TABLET, FILM COATED ORAL ONCE
Status: COMPLETED | OUTPATIENT
Start: 2021-11-28 | End: 2021-11-28

## 2021-11-28 RX ORDER — METOPROLOL TARTRATE 50 MG
50 TABLET ORAL 2 TIMES DAILY
Status: DISCONTINUED | OUTPATIENT
Start: 2021-11-28 | End: 2021-11-28

## 2021-11-28 RX ADMIN — MICONAZOLE NITRATE: 20 POWDER TOPICAL at 09:11

## 2021-11-28 RX ADMIN — METOPROLOL TARTRATE 25 MG: 25 TABLET, FILM COATED ORAL at 09:04

## 2021-11-28 RX ADMIN — PRIMIDONE 250 MG: 250 TABLET ORAL at 09:04

## 2021-11-28 RX ADMIN — FUROSEMIDE 80 MG: 10 INJECTION, SOLUTION INTRAMUSCULAR; INTRAVENOUS at 16:27

## 2021-11-28 RX ADMIN — FUROSEMIDE 40 MG: 10 INJECTION, SOLUTION INTRAMUSCULAR; INTRAVENOUS at 09:04

## 2021-11-28 RX ADMIN — APIXABAN 5 MG: 5 TABLET, FILM COATED ORAL at 12:52

## 2021-11-28 RX ADMIN — FLUTICASONE FUROATE AND VILANTEROL TRIFENATATE 1 PUFF: 100; 25 POWDER RESPIRATORY (INHALATION) at 09:11

## 2021-11-28 RX ADMIN — LEVALBUTEROL TARTRATE 2 PUFF: 45 AEROSOL, METERED ORAL at 08:48

## 2021-11-28 RX ADMIN — FUROSEMIDE 40 MG: 10 INJECTION, SOLUTION INTRAMUSCULAR; INTRAVENOUS at 12:52

## 2021-11-28 RX ADMIN — APIXABAN 5 MG: 5 TABLET, FILM COATED ORAL at 20:41

## 2021-11-28 RX ADMIN — PRIMIDONE 250 MG: 250 TABLET ORAL at 20:33

## 2021-11-28 RX ADMIN — MICONAZOLE NITRATE: 20 POWDER TOPICAL at 20:42

## 2021-11-28 RX ADMIN — METOPROLOL TARTRATE 75 MG: 50 TABLET, FILM COATED ORAL at 20:33

## 2021-11-28 RX ADMIN — METOPROLOL TARTRATE 25 MG: 25 TABLET, FILM COATED ORAL at 12:52

## 2021-11-28 ASSESSMENT — ACTIVITIES OF DAILY LIVING (ADL)
ADLS_ACUITY_SCORE: 12

## 2021-11-28 NOTE — PROGRESS NOTES
[facility].  Inpatient Cardiology Progress Note.  Date of Service: 11/28/2021       INTERVAL HISTORY:  Diuresing well on not furosemide 40 mg 2 times daily.  Weight is down by 4 pounds (although weights are unreliable).  Renal function stable with creatinine of 0.8 and serum potassium of 4.0.  Serial troponins remain negative.  BP is 124/68.  Remains in rapid atrial fibrillation with pulse of 110-120 bpm.    DIAGNOSES:  1.  Paroxysmal atrial fibrillation with rapid ventricular rates.  2.  Anticoagulation counseling in the context of high chads score and chronic mild idiopathic thrombocytopenia.  3.  New diagnosis of acute decompensated heart failure with preserved ejection fraction, LVEF 55-60%.  4.  Morbid obesity with BMI greater than 60.  5.  Untreated severe obstructive sleep apnea.    ASSESSMENT/PLAN:  She has received over 24 hours of IV amiodarone infusion.  Diuresing well but still has fluid overload. BP low normal.    1.  Increase furosemide from 40 mg IV twice daily to 80 mg twice daily.  2.  Increase metoprolol from 50 mg twice daily to 75 mg twice daily and uptitrate as tolerated.  3.  Stop IV amiodarone infusion.  4.  Stop IV heparin and switch to Eliquis 5 mg 2 times daily.    Jordan Armas MD [facility]  Cardiology.        VITAL SIGNS:  Temp: 97  F (36.1  C) Temp src: Oral BP: 124/68 Pulse: 120   Resp: 20 SpO2: 97 % O2 Device: Nasal cannula Oxygen Delivery: 2 LPM  11/23 0700 - 11/28 0659  In: 360 [P.O.:360]  Out: 1400 [Urine:1400]  Net: -1040  Vitals:    11/25/21 1520 11/26/21 0647 11/27/21 0607 11/28/21 0346   Weight: 139.4 kg (307 lb 4.8 oz) 139 kg (306 lb 6.4 oz) 140.2 kg (309 lb) 138.5 kg (305 lb 6.4 oz)

## 2021-11-28 NOTE — CONSULTS
Consult Date: 11/28/2021    HEMATOLOGY CONSULTATION    REASON FOR CONSULTATION:  I am asked by Dr. Hollingsworth to see Ms. Georgia Coombs for Hematology consultation regarding history of immune thrombocytopenia and now new onset atrial fibrillation requiring anticoagulation.    HISTORY OF PRESENT ILLNESS:  Ms Georgia Coombs is a 71-year-old woman who is followed in our office by my colleague, Dr. Daron Patel, for chronic immune thrombocytopenia.  This diagnosis was established in approximately 2018.  Over time, she has required treatment using intravenous gammaglobulin and dexamethasone.  With this treatment, she has had marked improvement in platelet count and reports no significant bleeding problems.  She is currently followed on an outpatient basis and has had no recurrent bleeding.  She was last seen in the office on 04/21/2021.  At that time, CBC showed a white count of 6200, hemoglobin 15.1 and platelet count 91,000 with normal white cell differential.  A CBC on 10/09/2014 showed a white count of 5200, hemoglobin 14.0 and platelet count 101,000.    She is currently hospitalized after developing progressive dyspnea.  She presented to the Emergency Department and was found to have atrial fibrillation with rapid ventricular response and associated congestive heart failure.  Since admission to the hospital, she has been treated with diuretic therapy, as well as diltiazem.  She is currently on both diltiazem and amiodarone.  She also has been started on heparin.  A CBC at the time of admission showed a platelet count of 182,000.  Since initiation of treatment, her platelet count has declined.  A platelet count on 11/26/2021 was 143,000.  Repeat value on this day was 141,000.  The platelet count earlier today was 105,000.  White blood cell count from today is 4100 with hemoglobin 13.2.  Additional laboratory studies have shown elevated N-terminal proBNP at 3878.  AST 68, ALT 46.  Creatinine 0.81.  Troponin levels undetectable and  TSH was 1.68.  In view of the declining platelet count, as well as need for anticoagulation due to recurrent atrial fibrillation, a Hematology consultation is requested.  Of note, she reports no recent problems with petechiae.  She relates no problems with nose or gum bleeding.  She does report mild increase in bruising across her forearm.    PAST MEDICAL HISTORY:  Includes:   1.  Chronic immune thrombocytopenia.  2.  Atrial fibrillation.  3.  Congestive heart failure.  4.  History of asthma.  5.  Morbid obesity.  6.  History of hypertension.  7.  Chronic seizure disorder.  8.  Diverticulitis.  9.  Fibromyalgia.  10.  Sleep apnea.    CURRENT MEDICATIONS:  Reviewed and as listed in the electronic health record.     ALLERGIES:  THERE ARE NO KNOWN MEDICATION ALLERGIES.     PAST SURGICAL HISTORY:  Includes prior breast biopsy, as well as anorectal myomectomy and  delivery.  She also has a history of colonoscopy and also laparoscopic-assisted sigmoid colectomy for diverticulitis.  In addition, she reports a distant history of laparoscopy for lysis of adhesions following  delivery.    SOCIAL HISTORY:  She is not .  She does not use tobacco products.  She reports occasional alcohol.    FAMILY HISTORY:  Reviewed in records and includes a history of thyroid disease in her sister, as well as breast cancer in her maternal aunt and maternal grandmother.  There is no history of blood disorders or malignancy otherwise.    REVIEW OF SYSTEMS:  A 14-point review of systems is negative, except as outlined above.    PHYSICAL EXAMINATION:    VITAL SIGNS:  From the time of consultation, temperature 97.0, heart rate 120, blood pressure 124/68, respiratory rate 20, oxygen saturation 97% on 2 liters supplemental oxygen by nasal cannula.  GENERAL:  A small area of ecchymosis on the right forearm.  There is no petechia or bruising, otherwise.  Scalp hair is normal.  HEENT:  Ears and nose unremarkable and oropharynx is  clear.  Thyroid gland not palpably enlarged.  CHEST:  Clear breath sounds without rales or wheezes.  CARDIOVASCULAR:  Rapid and irregular heart rate.  Radial pulses palpable bilaterally.  BREASTS:  Not examined.  ABDOMEN:  Obese, soft and nontender.  There is no obvious enlargement of the spleen or liver.  Surgical incisions are well healed.  PELVIC/RECTAL:  Exams not performed.  EXTREMITIES:  No significant upper extremity edema.  There is no significant arthritis or edema in the lower extremities.  Gait not assessed.  NEUROLOGIC:  Mood and affect, as well as a judgment and insight, normal.     Echocardiogram on 11/26/2021 showed rapid atrial fibrillation.  Left ventricle is normal in size, but not well visualized.  Ejection fraction was estimated at 55% to 60%.  Right ventricle was not well seen, but appeared grossly normal.  There is mild to moderate biatrial enlargement.    IMPRESSION:     1.  Chronic immune thrombocytopenia with management as outlined in records:  Her current platelet count appears similar to values seen previously in the outpatient setting.  Possible causes might include increased consumption in this setting versus medication effect.  Heparin-induced thrombocytopenia could be somewhat difficult to establish in this diagnosis given the chronic history of thrombocytopenia.  Nonetheless, if her platelet count declines further, assessment for this possibility would be appropriate.  Assuming that there is no additional factor affecting her current platelet count, then there is no clear basis for transfusion at this point.  She could resume treatment for immune thrombocytopenia.  At this point; however, there is no clear indication for restarting treatment such as prednisone or intravenous gammaglobulin or other measures.  2.  Atrial fibrillation, now on anticoagulation with heparin:  We discussed potential need for long-term anticoagulation.  I explained this could be better assessed following  potential recovery of sinus rhythm.  In the event that she is in chronic atrial fibrillation long term, then long-term anticoagulation would be appropriate to consider.  This would be a difficult issue to properly circumstance to correctly balance risk of bleeding versus risk of thrombosis.    RECOMMENDATION:  1.  Agree with continuation of heparin.  2.  Send laboratory studies for heparin-induced thrombocytopenia.  3.  Hold off on further treatment for immune thrombocytopenia at present.    I discussed these recommendations with her and she was in agreement.  Her primary hematologist, Dr. Patel, will see her in followup tomorrow.  I appreciate the chance to meet her and to help in her care.    Lee Strong MD        D: 2021   T: 2021   MT: OSORIO    Name:     BEE HALEOtto  MRN:      [id number]        Account:      [id number]   :      1950           Consult Date: 2021     Document: [id number]

## 2021-11-28 NOTE — CONSULTS
Patient seen and examined. Full note dictated. Ms. Coombs has a history of chronic immune thrombocytopenia and is followed in our office by Dr. Patel. Her last platelet count in our office from 4/21/2021 was 91,000 and she has been off active therapy for ITP since that time. She is currently admitted with dyspnea due to CHF related to atrial fibrillation due to RVR. After admission, her platelet count was 182,000. she was started on IV heparin in view of risk of arterial thromboemboli. Her platelet count has dropped since that time to 146,000 on 11/26 to 105,000 today. She has since been switched to apixiban. The basis for the drop in her platelet count is unclear and could represent HIT. Recommend serial monitoring of platelet count. No clear basis for restarting treatment for immune thrombocytopenia at present.. Dr. Patel will see her in follow up tomorrow.

## 2021-11-28 NOTE — PLAN OF CARE
Pt A&Ox4, BP softer  HR 90s-120 , 2L NC, on amio gtt 0.5, MD paged d/t amio gtt being on over 24 hours - no response as of yet, heparin gtt @ 1150 -Xa scheduled for 0800. Getting IV Lasix, tele Afib CVR /RVR. BR in skin folds and coccyx, weight shifting encouraged, a1 with walker. Cards following. Continue plan of care

## 2021-11-28 NOTE — PROGRESS NOTES
[facility]    Hospitalist Progress Note      Assessment & Plan   Gema Coombs is a 71 year old female who was admitted on 11/25/2021.    Summary of Stay:   Gema Coombs is a 71 year old female patient with past medical history of paroxysmal atrial fibrillation, bronchial asthma, hypertension, obstructive sleep apnea, morbid obesity, GERD, fibromyalgia, history of generalized nonconvulsive epilepsy, prior history of diverticulitis, status post laparoscopic-assisted sigmoid colectomy, came to emergency room for evaluation for shortness of breath.  Patient stated that she has been having progressive shortness of breath over the past 1 week.      EKG showed atrial fibrillation with rapid ventricular response.  Chest x-ray showed cardiac enlargement with pulmonary vascular congestion, interstitial opacities in both lungs suggestive of congestive heart failure.  There is also right-sided pleural fluid.  Emergency room, she was given Lasix 40 mg IV x1 dose, diltiazem 20 mg IV x1 dose, diltiazem 60 mg p.o. x1 dose.  She remained in A. fib with RVR and was started on diltiazem drip.  She was admitted to the hospital for further management.    Plan:    A. fib with RVR.  -Initially patient was treated with IV diltiazem infusion but had to be stopped due to low blood pressure.  Instead started on IV amiodarone.  -Metoprolol added due to inadequate heart rate control.  -Increase metoprolol to 50 mg twice daily today.  Give an extra dose now.  -On amiodarone 0.5 mg/h infusion.  -IV heparin infusion.  -History of ITP.  Monitor platelets closely.  -Echocardiogram shows normal ejection fraction.  No significant valvulopathy.     congestive heart failure.  -Due to A. fib with RVR.  -Pulmonary edema on chest x-ray at admission.  -Started on IV furosemide.  Subjectively patient feels better.     History of asthma  -Continue maintenance inhaler.     Morbid obesity  -Further complicates the care.     History of  ITP  -Monitor platelet count daily.  On IV heparin.  -Patient sees Dr. Patel from [facility].  -Heme-onc consultation for management of anticoagulation, especially transition to oral options such as a DOAC.        DVT Prophylaxis: Heparin IV              Code Status: Full Code  Expected discharge: 2-3 days    Keshawn Field MD  Text Page (7am - 6pm, M-F)    Interval History   Patient was evaluated with nursing staff. Overnight issues discussed.    Review of systems:  No nausea or vomiting.  No abdominal pain.  No diarrhea.  No chest pain/palpitations.  No new cough/shortness of breath.  No headache/visual disturbance/new weakness.    -Data reviewed today: Labs and medications.    Physical Exam   Temp: 97  F (36.1  C) Temp src: Oral BP: 124/68 Pulse: 120   Resp: 20 SpO2: 97 % O2 Device: Nasal cannula Oxygen Delivery: 2 LPM  Vitals:    11/26/21 0647 11/27/21 0607 11/28/21 0346   Weight: 139 kg (306 lb 6.4 oz) 140.2 kg (309 lb) 138.5 kg (305 lb 6.4 oz)     Vital Signs with Ranges  Temp:  [96  F (35.6  C)-97.5  F (36.4  C)] 97  F (36.1  C)  Pulse:  [] 120  Resp:  [16-22] 20  BP: (101-149)/() 124/68  SpO2:  [93 %-99 %] 97 %  I/O last 3 completed shifts:  In: 120 [P.O.:120]  Out: 1400 [Urine:1400]    Constitutional: Awake, alert, cooperative, no apparent distress  HEENT: Trachea midline, sclera is clear   Respiratory: No crackles. No wheezing. Equal breath sounds bilaterally.  Cardiovascular: Irregular rate and rhythm, normal S1 and S2, and no murmur noted, tachycardic  GI: Normal bowel sounds, soft, non-distended, non-tender  Extremities: Peripheral edema    Medications     amiodarone 0.5 mg/min (11/27/21 1824)     heparin 850 Units/hr (11/28/21 6936)       fluticasone-vilanterol  1 puff Inhalation Daily     furosemide  40 mg Intravenous BID     metoprolol tartrate  25 mg Oral Once     metoprolol tartrate  50 mg Oral BID     miconazole   Topical BID     primidone  250 mg Oral BID     sodium chloride  (PF)  3 mL Intracatheter Q8H       Data   Recent Labs   Lab 11/28/21  0603 11/26/21  1026 11/26/21  0711 11/25/21  0450   WBC 4.1 6.8 5.7 8.1   HGB 13.2 14.0 13.7 14.9   MCV 95 94 95 94   * 141* 143* 182   NA  --   --  140 140   POTASSIUM  --   --  4.0 3.9   CHLORIDE  --   --  103 104   CO2  --   --  35* 32   BUN  --   --  11 11   CR  --   --  0.81 0.78   ANIONGAP  --   --  2* 4   TONIA  --   --  8.9 8.9   GLC  --   --  107* 155*   ALBUMIN  --   --   --  3.0*   PROTTOTAL  --   --   --  7.0   BILITOTAL  --   --   --  0.4   ALKPHOS  --   --   --  107   ALT  --   --   --  66*   AST  --   --   --  46*   TROPONIN  --   --  <0.015 <0.015       No results found for this or any previous visit (from the past 24 hour(s)).

## 2021-11-28 NOTE — PLAN OF CARE
"14:35 Text page sent to MD \"Amiodarone and heparin drips are off.  Can the IMC orders be discontinued?\"    14:54 Text page sent to MD to see if \"no caffeine\" can be removed from diet order.  He said that is ok.    15:25  No c/o pain this shift.  Alert and oriented. Ambulates with assist of 1 gait belt and walker.  Lungs: CALLAWAY, dim.  97% 2L.  Tele: A-fib rvr.  Amiodarone and heparin drips discontinued this shift.  PO metoprolol and eliquis given. Low fat no gm sodium diet.  Good appetite.  Pur wick in place (for IV lasix).  Cardiology following.    "

## 2021-11-29 LAB
ANION GAP SERPL CALCULATED.3IONS-SCNC: 4 MMOL/L (ref 3–14)
BUN SERPL-MCNC: 13 MG/DL (ref 7–30)
CALCIUM SERPL-MCNC: 9.2 MG/DL (ref 8.5–10.1)
CHLORIDE BLD-SCNC: 97 MMOL/L (ref 94–109)
CO2 SERPL-SCNC: 38 MMOL/L (ref 20–32)
CREAT SERPL-MCNC: 1.03 MG/DL (ref 0.52–1.04)
ERYTHROCYTE [DISTWIDTH] IN BLOOD BY AUTOMATED COUNT: 13.9 % (ref 10–15)
GFR SERPL CREATININE-BSD FRML MDRD: 56 ML/MIN/1.73M2
GLUCOSE BLD-MCNC: 104 MG/DL (ref 70–99)
HCT VFR BLD AUTO: 47.3 % (ref 35–47)
HGB BLD-MCNC: 14.4 G/DL (ref 11.7–15.7)
MCH RBC QN AUTO: 29 PG (ref 26.5–33)
MCHC RBC AUTO-ENTMCNC: 30.4 G/DL (ref 31.5–36.5)
MCV RBC AUTO: 95 FL (ref 78–100)
PLATELET # BLD AUTO: 113 10E3/UL (ref 150–450)
POTASSIUM BLD-SCNC: 3.4 MMOL/L (ref 3.4–5.3)
RBC # BLD AUTO: 4.97 10E6/UL (ref 3.8–5.2)
SODIUM SERPL-SCNC: 139 MMOL/L (ref 133–144)
WBC # BLD AUTO: 5.5 10E3/UL (ref 4–11)

## 2021-11-29 PROCEDURE — 250N000011 HC RX IP 250 OP 636: Performed by: INTERNAL MEDICINE

## 2021-11-29 PROCEDURE — 99233 SBSQ HOSP IP/OBS HIGH 50: CPT | Performed by: INTERNAL MEDICINE

## 2021-11-29 PROCEDURE — 250N000013 HC RX MED GY IP 250 OP 250 PS 637: Performed by: INTERNAL MEDICINE

## 2021-11-29 PROCEDURE — 250N000013 HC RX MED GY IP 250 OP 250 PS 637: Performed by: NURSE PRACTITIONER

## 2021-11-29 PROCEDURE — 94640 AIRWAY INHALATION TREATMENT: CPT

## 2021-11-29 PROCEDURE — 85014 HEMATOCRIT: CPT | Performed by: INTERNAL MEDICINE

## 2021-11-29 PROCEDURE — 120N000013 HC R&B IMCU

## 2021-11-29 PROCEDURE — 80048 BASIC METABOLIC PNL TOTAL CA: CPT | Performed by: INTERNAL MEDICINE

## 2021-11-29 PROCEDURE — 36415 COLL VENOUS BLD VENIPUNCTURE: CPT | Performed by: INTERNAL MEDICINE

## 2021-11-29 RX ORDER — METOPROLOL TARTRATE 100 MG
100 TABLET ORAL 2 TIMES DAILY
Status: DISCONTINUED | OUTPATIENT
Start: 2021-11-29 | End: 2021-12-06 | Stop reason: HOSPADM

## 2021-11-29 RX ORDER — LORAZEPAM 0.5 MG/1
.25-.5 TABLET ORAL EVERY 4 HOURS PRN
Status: DISCONTINUED | OUTPATIENT
Start: 2021-11-29 | End: 2021-12-06 | Stop reason: HOSPADM

## 2021-11-29 RX ORDER — POTASSIUM CHLORIDE 1500 MG/1
40 TABLET, EXTENDED RELEASE ORAL 3 TIMES DAILY
Status: COMPLETED | OUTPATIENT
Start: 2021-11-29 | End: 2021-11-30

## 2021-11-29 RX ORDER — METOPROLOL TARTRATE 25 MG/1
25 TABLET, FILM COATED ORAL ONCE
Status: COMPLETED | OUTPATIENT
Start: 2021-11-29 | End: 2021-11-29

## 2021-11-29 RX ADMIN — FUROSEMIDE 80 MG: 10 INJECTION, SOLUTION INTRAMUSCULAR; INTRAVENOUS at 16:15

## 2021-11-29 RX ADMIN — METOPROLOL TARTRATE 100 MG: 100 TABLET, FILM COATED ORAL at 21:17

## 2021-11-29 RX ADMIN — PRIMIDONE 250 MG: 250 TABLET ORAL at 21:25

## 2021-11-29 RX ADMIN — FUROSEMIDE 80 MG: 10 INJECTION, SOLUTION INTRAMUSCULAR; INTRAVENOUS at 08:20

## 2021-11-29 RX ADMIN — MICONAZOLE NITRATE: 20 POWDER TOPICAL at 21:26

## 2021-11-29 RX ADMIN — POTASSIUM CHLORIDE 40 MEQ: 1500 TABLET, EXTENDED RELEASE ORAL at 09:38

## 2021-11-29 RX ADMIN — APIXABAN 5 MG: 5 TABLET, FILM COATED ORAL at 21:25

## 2021-11-29 RX ADMIN — PRIMIDONE 250 MG: 250 TABLET ORAL at 08:20

## 2021-11-29 RX ADMIN — FLUTICASONE FUROATE AND VILANTEROL TRIFENATATE 1 PUFF: 100; 25 POWDER RESPIRATORY (INHALATION) at 08:11

## 2021-11-29 RX ADMIN — METOPROLOL TARTRATE 25 MG: 25 TABLET, FILM COATED ORAL at 13:15

## 2021-11-29 RX ADMIN — POTASSIUM CHLORIDE 40 MEQ: 1500 TABLET, EXTENDED RELEASE ORAL at 21:17

## 2021-11-29 RX ADMIN — LEVALBUTEROL TARTRATE 2 PUFF: 45 AEROSOL, METERED ORAL at 00:12

## 2021-11-29 RX ADMIN — POTASSIUM CHLORIDE 40 MEQ: 1500 TABLET, EXTENDED RELEASE ORAL at 16:16

## 2021-11-29 RX ADMIN — APIXABAN 5 MG: 5 TABLET, FILM COATED ORAL at 08:20

## 2021-11-29 RX ADMIN — LEVALBUTEROL TARTRATE 2 PUFF: 45 AEROSOL, METERED ORAL at 21:33

## 2021-11-29 RX ADMIN — METOPROLOL TARTRATE 75 MG: 50 TABLET, FILM COATED ORAL at 08:20

## 2021-11-29 RX ADMIN — MICONAZOLE NITRATE: 20 POWDER TOPICAL at 08:21

## 2021-11-29 RX ADMIN — LEVALBUTEROL TARTRATE 2 PUFF: 45 AEROSOL, METERED ORAL at 14:58

## 2021-11-29 ASSESSMENT — ACTIVITIES OF DAILY LIVING (ADL)
ADLS_ACUITY_SCORE: 12

## 2021-11-29 NOTE — PROGRESS NOTES
[facility]  Hospitalist Progress Note  Cy Doss MD 11/29/2021    Reason for Stay/active problem list      Atrial fibrillation with rapid ventricular response    Acute congestive heart failure with preserved EF due to rapid A. fib         Assessment and Plan:        Summary of Stay: Gema Coombs is a 71 year old female patient with past medical history of paroxysmal atrial fibrillation, bronchial asthma, hypertension, obstructive sleep apnea, morbid obesity, GERD, fibromyalgia, history of generalized nonconvulsive epilepsy, prior history of diverticulitis, status post laparoscopic-assisted sigmoid colectomy, came to emergency room for evaluation for shortness of breath.  Patient stated that she has been having progressive shortness of breath over the past 1 week.      EKG showed atrial fibrillation with rapid ventricular response.  Chest x-ray showed cardiac enlargement with pulmonary vascular congestion, interstitial opacities in both lungs suggestive of congestive heart failure.  There is also right-sided pleural fluid.  Emergency room, she was given Lasix 40 mg IV x1 dose, diltiazem 20 mg IV x1 dose, diltiazem 60 mg p.o. x1 dose.  She remained in A. fib with RVR and was started on diltiazem drip.  She was admitted to the hospital for further management.    Problem List with Assessment and Plan    A. fib with RVR.  -Initially patient was treated with IV diltiazem infusion but had to be stopped due to low blood pressure.  Instead started on IV amiodarone which was also discontinued.  --Heart rate remains uncontrolled.  Cardiology following, recommendation noted to increase metoprolol to 100 mg twice a day  -Was treated with heparin which was changed to Eliquis  -History of ITP.  Monitor platelets closely.  -Echocardiogram shows normal ejection fraction.  No significant valvulopathy.     Acute ongestive heart failure with preserved EF  -Due to A. fib with RVR.  -Pulmonary edema on  chest x-ray at admission.  -Started on IV furosemide currently at 80 mg twice a day, continue diuretics, monitor intake and output and daily weight     History of asthma  -Continue maintenance inhaler.     Morbid obesity  -Further complicates the care.  --We will get PT assessment for discharge needs     History of ITP  -Monitor platelet count daily.    -Patient sees Dr. Patel from [facility].  -Heme-onc consultation obtained and recommendation noted to continue anticoagulation.  Patient has chronic ITP and no need to treat A. fib platelet is over 30,000.     Anxiety: Patient appears to be anxious today due to diagnosis of congestive heart failure  --Ativan as needed          Plan for today:    Continue diuretic therapy and beta-blockade per cardiology recommendation    PT assessment    As needed Ativan        LDA     Access : Peripheral     Celaya Catheter: Not present        FEN :    Orders Placed This Encounter      2 Gram Sodium Diet           Intake/Output Summary (Last 24 hours) at 11/29/2021 1742  Last data filed at 11/29/2021 1312  Gross per 24 hour   Intake --   Output 1900 ml   Net -1900 ml          DVT Prophylaxis:     DOAC    Code Status:      Full Code    Estimated discharge  disposition and plan:      May discharge  to home with home services versus TCU in 2 day(s) if patient remains stable and heart rate is controlled          Interval History (Subjective):        Patient is seen and examined by me today and medical record reviewed.Overnight events noted and care discussed with nursing staff.  Patient is a little anxious today given diagnosis of congestive heart failure.  I tried to reassure her that with medical management and follow-up heart failure and heart rate can be managed.  Denies any fever or chills.  No chest pain.             Physical Exam:        Last Vital Signs:  BP (!) 120/38 (BP Location: Right leg)   Pulse 87   Temp (!) 96.1  F (35.6  C) (Oral)   Resp 18   Wt 137.6 kg (303  lb 6.4 oz)   SpO2 96%   BMI 53.74 kg/m      I/O last 3 completed shifts:  In: -   Out: 1900 [Urine:1900]  Vitals:    11/25/21 1520 11/26/21 0647 11/27/21 0607 11/28/21 0346   Weight: 139.4 kg (307 lb 4.8 oz) 139 kg (306 lb 6.4 oz) 140.2 kg (309 lb) 138.5 kg (305 lb 6.4 oz)    11/29/21 0605   Weight: 137.6 kg (303 lb 6.4 oz)       Wt Readings from Last 5 Encounters:   11/29/21 137.6 kg (303 lb 6.4 oz)   04/13/21 143.2 kg (315 lb 9.6 oz)   02/28/20 (!) 151.5 kg (334 lb)   08/16/19 141.1 kg (311 lb)   03/11/19 132.9 kg (293 lb)        Constitutional: Awake, alert, cooperative, no apparent distress     Respiratory: Clear to auscultation bilaterally, no crackles or wheezing   Cardiovascular:  Irregularly irregular heart, tachycardic   Abdomen: Normal bowel sounds, soft, non-distended, non-tender   Skin: No new rashes, no cyanosis, dry to touch   Neuro: Alert with  no new focal weakness   Extremities:  Trace edema, normal range of motion   Other(s):        All other systems: Negative          Medications:        All current medications were reviewed with changes reflected in problem list.         Data:      All new lab and imaging data was reviewed.      Data reviewed today: I reviewed all new labs and imaging results over the last 24 hours. I personally reviewed no images or EKG's today      Recent Labs   Lab 11/29/21  0730 11/28/21  0603 11/26/21  1026   WBC 5.5 4.1 6.8   HGB 14.4 13.2 14.0   HCT 47.3* 42.7 44.6   MCV 95 95 94   * 105* 141*     No results for input(s): CULT in the last 168 hours.  Recent Labs   Lab 11/29/21  0730 11/26/21  0711 11/25/21  0450    140 140   POTASSIUM 3.4 4.0 3.9   CHLORIDE 97 103 104   CO2 38* 35* 32   ANIONGAP 4 2* 4   * 107* 155*   BUN 13 11 11   CR 1.03 0.81 0.78   GFRESTIMATED 56* 74 78   TONIA 9.2 8.9 8.9   MAG  --  2.1  --    PROTTOTAL  --   --  7.0   ALBUMIN  --   --  3.0*   BILITOTAL  --   --  0.4   ALKPHOS  --   --  107   AST  --   --  46*   ALT  --   --  66*        Recent Labs   Lab 11/29/21  0730 11/26/21  0711 11/25/21  0450   * 107* 155*       No results for input(s): INR in the last 168 hours.      Recent Labs   Lab 11/26/21  0711 11/25/21  0450   TROPONIN <0.015 <0.015       No results found for this or any previous visit (from the past 48 hour(s)).    COVID Status:  COVID-19 PCR Results    COVID-19 PCR Results 11/25/21   SARS CoV2 PCR Negative      Comments are available for some flowsheets but are not being displayed.         COVID-19 Antibody Results, Testing for Immunity    COVID-19 Antibody Results, Testing for Immunity   No data to display.              Disclaimer: This note consists of symbols derived from keyboarding, dictation and/or voice recognition software. As a result, there may be errors in the script that have gone undetected. Please consider this when interpreting information found in this chart.

## 2021-11-29 NOTE — PROGRESS NOTES
Cardiology Progress Note  Dee TERRAZAS, CNP          Assessment and Plan:     Admit (11/25) with 1 wk worsening shortness of breath  Evidence of rapid A-fib, CHF exacerbation    PMH:  Paroxysmal atrial fibrillation, asthma, hypertension, untreated severe sleep apnea, chronic ITP, diverticulitis, fibromyalgia, epilepsy, morbid obesity, coronary angiogram (2018) showed normal coronaries, hyperlipidemia      Acute HFpEF Exacerbation:  -BNP: 3800 / pulmonary vascular congestion  -ECHO: LVE 55-60% (poor imaging for RWMA), grossly normal RV function, RVSP 24 mmHg, no significant valvular pathology  -IV lasix increased to 80 mg BID yesterday with improved UOP  -(weight down 4 lbs) ( -3700 total OP and  -2700  overnight )  -continue current diuresis  Will add fluid restriction      Asymptomatic Paroxysmal Atrial Fibrillation with RVR  -previously diagnosed  -hypotension with IV diltiazem.  On Amiodarone briefly  -plan for rate control, duration unclear due to lack of follow up  -persistent since admit  HRs up to 140 bpm at times  Metoprolol increased to 75 mg BID  -mild/moderate biatrial enlargement  -Heparin stopped and Eliquis started (CHADS2-Vasc score: 4)  Was seen by oncology due to her ITP  Some concern for HIT due to PLT drop to 105,000 (113,000 today)  -will increase Metoprolol to 100 mg BID, may need to add back her usual oral Diltiazem    Severe Sleep Apnea  -some intolerance to her CPAP  -recommend outpatient sleep evaluation, she is open to this    Morbid Obesity    Hypertension:  -BP controlled               Interval History:     Breathing much improved, but not back to normal  Denies palpitations or chest pain                Medications:       apixaban ANTICOAGULANT  5 mg Oral BID     fluticasone-vilanterol  1 puff Inhalation Daily     furosemide  80 mg Intravenous BID     metoprolol tartrate  75 mg Oral BID     miconazole   Topical BID     potassium chloride  40 mEq Oral TID     primidone  250 mg Oral BID      sodium chloride (PF)  3 mL Intracatheter Q8H            Physical Exam:   Blood pressure 121/78, pulse 77, temperature (!) 96.6  F (35.9  C), temperature source Oral, resp. rate 20, weight 137.6 kg (303 lb 6.4 oz), SpO2 97 %, not currently breastfeeding.  Wt Readings from Last 3 Encounters:   11/29/21 137.6 kg (303 lb 6.4 oz)   04/13/21 143.2 kg (315 lb 9.6 oz)   02/28/20 (!) 151.5 kg (334 lb)     I/O last 3 completed shifts:  In: -   Out: 2725 [Urine:2725]    CONST:  Alert and oriented   LUNGS:  CTA bilaterally  CARDIO:  Irreg, Irreg  tachycardic  ABD:  obese  EXT:  1-2+ bilateral shin edema with chronic skin changes           Data:   TELE:  A-fib  HRs 100 bpm at rest, up to 139 bpm at times    CBC  Recent Labs   Lab 11/29/21  0730 11/28/21  0603   WBC 5.5 4.1   HGB 14.4 13.2   * 105*       BMP  Recent Labs   Lab 11/29/21  0730 11/26/21  0711 11/25/21  0450    140 140   POTASSIUM 3.4 4.0 3.9   CHLORIDE 97 103 104   TONIA 9.2 8.9 8.9   CO2 38* 35* 32   BUN 13 11 11   CR 1.03 0.81 0.78   * 107* 155*     Recent Labs   Lab Test 02/28/20  0000 01/29/19  0746   CHOL 235* 238*   HDL 59 58   * 159*   TRIG 134 99   CHOLHDLRATIO 4 4.1       TROP  Lab Results   Component Value Date    TROPI <0.015 02/25/2019    TROPI <0.015 10/16/2018    TROPI <0.015 10/15/2018    TROPI <0.015 10/15/2018    TROPI 0.017 02/29/2016    TROPONIN <0.015 11/26/2021    TROPONIN <0.015 11/25/2021    TROPONIN 0.00 02/29/2016       BNP  Recent Labs   Lab 11/25/21  0450   NTBNPI 3,878*

## 2021-11-29 NOTE — PLAN OF CARE
VSS, afeb., LS are dim, 96% on 2L. Pt A & O x4, denies pain. Good appetite, ate 100% of 2Gm Na diet. Started on po Eliquis and po metoprolol for HR control. Pt currently Afib  at 114. Pt receiving IV Lasix 80mg this shift, has purewick and output was 1200. Pt to expected to discharge   in 1-3 days to home. Tele is Afib.

## 2021-11-29 NOTE — PLAN OF CARE
Pt alert and oriented X 4. VSS. Assist of 1 with walker and gait belt. Denies pain. On Eliquis and PO metoprolol for HR control. Tele Afib HR 90s -100s. Tracing edema BLE. Dyspnea on exertion. Pure wick in place. Had IV Lasix 80 mg yesterday.    Expected discharge 1-3 days to home.    Blood pressure 112/69, pulse 72, temperature (!) 96.7  F (35.9  C), temperature source Oral, resp. rate 18, weight 138.5 kg (305 lb 6.4 oz), SpO2 97 %, not currently breastfeeding.

## 2021-11-29 NOTE — PROGRESS NOTES
Hematology Chart Check:    Patient with chronic ITP now with stable platelet count.    - Would be OK with anticoagulation if cardiology thinks it's beneficial  - Normally for patients with chronic ITP we don't treat it unless Plt is <30K, or if the patient has significantly bleeding, or if the patient needs to have a higher Plt for a procedure.  With patients on anticoagulation, we would consider treating ITP if Plt drops below 50K.    Daron Patel M.D.  [facility]  [phone]

## 2021-11-30 LAB
ANION GAP SERPL CALCULATED.3IONS-SCNC: 1 MMOL/L (ref 3–14)
BASOPHILS # BLD AUTO: 0 10E3/UL (ref 0–0.2)
BASOPHILS NFR BLD AUTO: 0 %
BUN SERPL-MCNC: 16 MG/DL (ref 7–30)
CALCIUM SERPL-MCNC: 9.3 MG/DL (ref 8.5–10.1)
CHLORIDE BLD-SCNC: 95 MMOL/L (ref 94–109)
CO2 SERPL-SCNC: 40 MMOL/L (ref 20–32)
CREAT SERPL-MCNC: 1 MG/DL (ref 0.52–1.04)
EOSINOPHIL # BLD AUTO: 0 10E3/UL (ref 0–0.7)
EOSINOPHIL NFR BLD AUTO: 0 %
ERYTHROCYTE [DISTWIDTH] IN BLOOD BY AUTOMATED COUNT: 14.2 % (ref 10–15)
GFR SERPL CREATININE-BSD FRML MDRD: 58 ML/MIN/1.73M2
GLUCOSE BLD-MCNC: 107 MG/DL (ref 70–99)
HCT VFR BLD AUTO: 49.1 % (ref 35–47)
HGB BLD-MCNC: 15.1 G/DL (ref 11.7–15.7)
IMM GRANULOCYTES # BLD: 0 10E3/UL
IMM GRANULOCYTES NFR BLD: 0 %
LYMPHOCYTES # BLD AUTO: 1 10E3/UL (ref 0.8–5.3)
LYMPHOCYTES NFR BLD AUTO: 18 %
MCH RBC QN AUTO: 29.2 PG (ref 26.5–33)
MCHC RBC AUTO-ENTMCNC: 30.8 G/DL (ref 31.5–36.5)
MCV RBC AUTO: 95 FL (ref 78–100)
MONOCYTES # BLD AUTO: 0.5 10E3/UL (ref 0–1.3)
MONOCYTES NFR BLD AUTO: 9 %
NEUTROPHILS # BLD AUTO: 4 10E3/UL (ref 1.6–8.3)
NEUTROPHILS NFR BLD AUTO: 73 %
NRBC # BLD AUTO: 0 10E3/UL
NRBC BLD AUTO-RTO: 0 /100
PLATELET # BLD AUTO: 117 10E3/UL (ref 150–450)
POTASSIUM BLD-SCNC: 4.5 MMOL/L (ref 3.4–5.3)
RBC # BLD AUTO: 5.18 10E6/UL (ref 3.8–5.2)
SODIUM SERPL-SCNC: 136 MMOL/L (ref 133–144)
WBC # BLD AUTO: 5.6 10E3/UL (ref 4–11)

## 2021-11-30 PROCEDURE — 99233 SBSQ HOSP IP/OBS HIGH 50: CPT | Performed by: INTERNAL MEDICINE

## 2021-11-30 PROCEDURE — 250N000013 HC RX MED GY IP 250 OP 250 PS 637: Performed by: INTERNAL MEDICINE

## 2021-11-30 PROCEDURE — 80048 BASIC METABOLIC PNL TOTAL CA: CPT | Performed by: INTERNAL MEDICINE

## 2021-11-30 PROCEDURE — 120N000001 HC R&B MED SURG/OB

## 2021-11-30 PROCEDURE — 36415 COLL VENOUS BLD VENIPUNCTURE: CPT | Performed by: INTERNAL MEDICINE

## 2021-11-30 PROCEDURE — 85025 COMPLETE CBC W/AUTO DIFF WBC: CPT | Performed by: INTERNAL MEDICINE

## 2021-11-30 PROCEDURE — 250N000013 HC RX MED GY IP 250 OP 250 PS 637: Performed by: NURSE PRACTITIONER

## 2021-11-30 PROCEDURE — 250N000011 HC RX IP 250 OP 636: Performed by: INTERNAL MEDICINE

## 2021-11-30 RX ORDER — DILTIAZEM HYDROCHLORIDE 30 MG/1
60 TABLET, FILM COATED ORAL EVERY 8 HOURS SCHEDULED
Status: DISCONTINUED | OUTPATIENT
Start: 2021-11-30 | End: 2021-12-01

## 2021-11-30 RX ORDER — DILTIAZEM HYDROCHLORIDE 30 MG/1
60 TABLET, FILM COATED ORAL ONCE
Status: COMPLETED | OUTPATIENT
Start: 2021-11-30 | End: 2021-11-30

## 2021-11-30 RX ADMIN — POTASSIUM CHLORIDE 40 MEQ: 1500 TABLET, EXTENDED RELEASE ORAL at 09:58

## 2021-11-30 RX ADMIN — DILTIAZEM HYDROCHLORIDE 60 MG: 30 TABLET, FILM COATED ORAL at 22:10

## 2021-11-30 RX ADMIN — PRIMIDONE 250 MG: 250 TABLET ORAL at 09:58

## 2021-11-30 RX ADMIN — METOPROLOL TARTRATE 100 MG: 100 TABLET, FILM COATED ORAL at 22:10

## 2021-11-30 RX ADMIN — DILTIAZEM HYDROCHLORIDE 60 MG: 30 TABLET, FILM COATED ORAL at 17:06

## 2021-11-30 RX ADMIN — FUROSEMIDE 80 MG: 10 INJECTION, SOLUTION INTRAMUSCULAR; INTRAVENOUS at 09:56

## 2021-11-30 RX ADMIN — PRIMIDONE 250 MG: 250 TABLET ORAL at 22:10

## 2021-11-30 RX ADMIN — APIXABAN 5 MG: 5 TABLET, FILM COATED ORAL at 22:10

## 2021-11-30 RX ADMIN — LEVALBUTEROL TARTRATE 2 PUFF: 45 AEROSOL, METERED ORAL at 22:11

## 2021-11-30 RX ADMIN — DILTIAZEM HYDROCHLORIDE 60 MG: 30 TABLET, FILM COATED ORAL at 10:29

## 2021-11-30 RX ADMIN — MICONAZOLE NITRATE: 20 POWDER TOPICAL at 10:21

## 2021-11-30 RX ADMIN — POTASSIUM CHLORIDE 40 MEQ: 1500 TABLET, EXTENDED RELEASE ORAL at 17:06

## 2021-11-30 RX ADMIN — FLUTICASONE FUROATE AND VILANTEROL TRIFENATATE 1 PUFF: 100; 25 POWDER RESPIRATORY (INHALATION) at 10:18

## 2021-11-30 RX ADMIN — POTASSIUM CHLORIDE 40 MEQ: 1500 TABLET, EXTENDED RELEASE ORAL at 22:10

## 2021-11-30 RX ADMIN — FUROSEMIDE 80 MG: 10 INJECTION, SOLUTION INTRAMUSCULAR; INTRAVENOUS at 17:08

## 2021-11-30 RX ADMIN — APIXABAN 5 MG: 5 TABLET, FILM COATED ORAL at 09:57

## 2021-11-30 RX ADMIN — METOPROLOL TARTRATE 100 MG: 100 TABLET, FILM COATED ORAL at 09:57

## 2021-11-30 RX ADMIN — LEVALBUTEROL TARTRATE 2 PUFF: 45 AEROSOL, METERED ORAL at 08:47

## 2021-11-30 ASSESSMENT — ACTIVITIES OF DAILY LIVING (ADL)
ADLS_ACUITY_SCORE: 12
ADLS_ACUITY_SCORE: 12
ADLS_ACUITY_SCORE: 13
ADLS_ACUITY_SCORE: 12
ADLS_ACUITY_SCORE: 13
ADLS_ACUITY_SCORE: 12
ADLS_ACUITY_SCORE: 12
ADLS_ACUITY_SCORE: 13
ADLS_ACUITY_SCORE: 12
ADLS_ACUITY_SCORE: 13
ADLS_ACUITY_SCORE: 12
ADLS_ACUITY_SCORE: 12
ADLS_ACUITY_SCORE: 13
ADLS_ACUITY_SCORE: 12
ADLS_ACUITY_SCORE: 13

## 2021-11-30 NOTE — PROGRESS NOTES
[facility]  Hospitalist Progress Note  Cy Doss MD 11/30/2021    Reason for Stay/active problem list      Atrial fibrillation with rapid ventricular response    Acute congestive heart failure with preserved EF due to rapid A. fib         Assessment and Plan:        Summary of Stay: Gema Coombs is a 71 year old female patient with past medical history of paroxysmal atrial fibrillation, bronchial asthma, hypertension, obstructive sleep apnea, morbid obesity, GERD, fibromyalgia, history of generalized nonconvulsive epilepsy, prior history of diverticulitis, status post laparoscopic-assisted sigmoid colectomy, came to emergency room for evaluation for shortness of breath.  Patient stated that she has been having progressive shortness of breath over the past 1 week.      EKG showed atrial fibrillation with rapid ventricular response.  Chest x-ray showed cardiac enlargement with pulmonary vascular congestion, interstitial opacities in both lungs suggestive of congestive heart failure.  There is also right-sided pleural fluid.  Emergency room, she was given Lasix 40 mg IV x1 dose, diltiazem 20 mg IV x1 dose, diltiazem 60 mg p.o. x1 dose.  She remained in A. fib with RVR and was started on diltiazem drip.  She was admitted to the hospital for further management.    Problem List with Assessment and Plan    A. fib with RVR.  -Initially patient was treated with IV diltiazem infusion but had to be stopped due to low blood pressure.  Instead started on IV amiodarone which was also discontinued.  --Heart rate controlled.  Cardiology following, recommendation noted to increase metoprolol to 100 mg twice a day and to add diltiazem 60 mg tid today   -Was treated with heparin which was changed to Eliquis  -History of ITP.  Monitor platelets closely.  -Echocardiogram shows normal ejection fraction.  No significant valvulopathy.     Acute ongestive heart failure with preserved EF  -Due to A. fib with  RVR.  -Pulmonary edema on chest x-ray at admission.  -Started on IV furosemide currently at 80 mg twice a day, continue diuretics, monitor intake and output and daily weight     Intake/Output Summary (Last 24 hours) at 11/30/2021 1139  Last data filed at 11/29/2021 2130  Gross per 24 hour   Intake --   Output 1450 ml   Net -1450 ml     Wt Readings from Last 2 Encounters:   11/30/21 135.9 kg (299 lb 8 oz)   04/13/21 143.2 kg (315 lb 9.6 oz)        History of asthma  -Continue maintenance inhaler.     Morbid obesity  -Further complicates the care.  --We will get PT assessment for discharge needs     History of ITP  -Monitor platelet count daily.    -Patient sees Dr. Patel from [facility].  -Heme-onc consultation obtained and recommendation noted to continue anticoagulation.  Patient has chronic ITP and no need to treat A. fib platelet is over 30,000.     Anxiety: Patient appears to be anxious today due to diagnosis of congestive heart failure  --Ativan as needed              Plan for today:    Continue diuretic therapy and beta-blockade and ccb  per cardiology recommendation    PT, OT assessment     As needed Ativan        LDA     Access : Peripheral     Celaya Catheter: Not present        FEN :    Orders Placed This Encounter      2 Gram Sodium Diet           Intake/Output Summary (Last 24 hours) at 11/29/2021 1747  Last data filed at 11/29/2021 1312  Gross per 24 hour   Intake --   Output 1900 ml   Net -1900 ml          DVT Prophylaxis:     DOAC    Code Status:      Full Code    Estimated discharge  disposition and plan:      May discharge  to home with home services versus TCU in1-  2 day(s) if patient remains stable and heart rate is controlled          Interval History (Subjective):        Patient is seen and examined by me today and medical record reviewed.Overnight events noted and care discussed with nursing staff.  Feels much better today. No sob at rest , able to move by bedside. Cardiology plan noted            Physical Exam:        Last Vital Signs:  /72 (BP Location: Left arm)   Pulse 65   Temp (!) 95.8  F (35.4  C) (Oral)   Resp 20   Wt 135.9 kg (299 lb 8 oz)   SpO2 97%   BMI 53.05 kg/m      I/O last 3 completed shifts:  In: -   Out: 1450 [Urine:1450]  Vitals:    11/26/21 0647 11/27/21 0607 11/28/21 0346 11/29/21 0605   Weight: 139 kg (306 lb 6.4 oz) 140.2 kg (309 lb) 138.5 kg (305 lb 6.4 oz) 137.6 kg (303 lb 6.4 oz)    11/30/21 1100   Weight: 135.9 kg (299 lb 8 oz)       Wt Readings from Last 5 Encounters:   11/30/21 135.9 kg (299 lb 8 oz)   04/13/21 143.2 kg (315 lb 9.6 oz)   02/28/20 (!) 151.5 kg (334 lb)   08/16/19 141.1 kg (311 lb)   03/11/19 132.9 kg (293 lb)        Constitutional: Awake, alert, cooperative, no apparent distress     Respiratory: Clear to auscultation bilaterally, no crackles or wheezing   Cardiovascular:  Irregularly irregular heart, tachycardic   Abdomen: Normal bowel sounds, soft, non-distended, non-tender   Skin: No new rashes, no cyanosis, dry to touch   Neuro: Alert with  no new focal weakness   Extremities:  Trace edema, normal range of motion   Other(s):        All other systems: Negative          Medications:        All current medications were reviewed with changes reflected in problem list.         Data:      All new lab and imaging data was reviewed.      Data reviewed today: I reviewed all new labs and imaging results over the last 24 hours. I personally reviewed no images or EKG's today      Recent Labs   Lab 11/30/21  0901 11/29/21  0730 11/28/21  0603   WBC 5.6 5.5 4.1   HGB 15.1 14.4 13.2   HCT 49.1* 47.3* 42.7   MCV 95 95 95   * 113* 105*     No results for input(s): CULT in the last 168 hours.  Recent Labs   Lab 11/30/21  0901 11/29/21  0730 11/26/21  0711 11/25/21  0450    139 140 140   POTASSIUM 4.5 3.4 4.0 3.9   CHLORIDE 95 97 103 104   CO2 40* 38* 35* 32   ANIONGAP 1* 4 2* 4   * 104* 107* 155*   BUN 16 13 11 11   CR 1.00 1.03 0.81  0.78   GFRESTIMATED 58* 56* 74 78   TONIA 9.3 9.2 8.9 8.9   MAG  --   --  2.1  --    PROTTOTAL  --   --   --  7.0   ALBUMIN  --   --   --  3.0*   BILITOTAL  --   --   --  0.4   ALKPHOS  --   --   --  107   AST  --   --   --  46*   ALT  --   --   --  66*       Recent Labs   Lab 11/30/21  0901 11/29/21  0730 11/26/21  0711 11/25/21  0450   * 104* 107* 155*       No results for input(s): INR in the last 168 hours.      Recent Labs   Lab 11/26/21  0711 11/25/21  0450   TROPONIN <0.015 <0.015       No results found for this or any previous visit (from the past 48 hour(s)).    COVID Status:  COVID-19 PCR Results    COVID-19 PCR Results 11/25/21   SARS CoV2 PCR Negative      Comments are available for some flowsheets but are not being displayed.         COVID-19 Antibody Results, Testing for Immunity    COVID-19 Antibody Results, Testing for Immunity   No data to display.              Disclaimer: This note consists of symbols derived from keyboarding, dictation and/or voice recognition software. As a result, there may be errors in the script that have gone undetected. Please consider this when interpreting information found in this chart.

## 2021-11-30 NOTE — PLAN OF CARE
VSS, afeb., LS are dim, 96% on 2L. Pt very pleasant and cooperative, denies pain. Appetite is good, tolerating Reg diet, ate 100% . Pt on Lasix IV , had 800 unit(s)/o, per smita. Pt taking K replacements 3x/day. Tele is Afib, cvr/rvr, usually 114, 115.

## 2021-11-30 NOTE — PROGRESS NOTES
Cardiology Progress Note  Dee TERRAZAS, CNP          Assessment and Plan:     Admit (11/25) with 1 wk worsening shortness of breath  Evidence of rapid A-fib, CHF exacerbation    PMH:  Paroxysmal atrial fibrillation, asthma, hypertension, untreated severe sleep apnea, chronic ITP, diverticulitis, fibromyalgia, epilepsy, morbid obesity, coronary angiogram (2018) showed normal coronaries, hyperlipidemia      Acute HFpEF Exacerbation:  -BNP: 3800 / pulmonary vascular congestion  -ECHO: LVE 55-60% (poor imaging for RWMA), grossly normal RV function, RVSP 24 mmHg, no significant valvular pathology  -IV lasix increased to 80 mg BID yesterday with improved UOP  -(weight down at least 4 lbs) ( -5200 total OP and  -1400  overnight )  -BMP pending  Consider changing to oral torsemide      Asymptomatic Paroxysmal Atrial Fibrillation with RVR (persistent here)  -previously diagnosed  -plan for rate control, duration unclear due to lack of follow up  -HRs up 100-130 bpm on metoprolol 100 mg BID  Will add Diltiazem 60 mg TID today  -Heparin stopped and Eliquis started (CHADS2-Vasc score: 4)  Was seen by oncology due to her ITP  Some concern for HIT due to PLT drop to 105,000 (113,000 today)      Severe Sleep Apnea  -some intolerance to her CPAP  -recommend outpatient sleep evaluation, she is open to this    Morbid Obesity    Hypertension:  -BP borderline               Interval History:     Breathing much improved, but not back to normal  Denies palpitations or chest pain                Medications:       apixaban ANTICOAGULANT  5 mg Oral BID     fluticasone-vilanterol  1 puff Inhalation Daily     furosemide  80 mg Intravenous BID     metoprolol tartrate  100 mg Oral BID     miconazole   Topical BID     potassium chloride  40 mEq Oral TID     primidone  250 mg Oral BID     sodium chloride (PF)  3 mL Intracatheter Q8H            Physical Exam:   Blood pressure 133/89, pulse 98, temperature (!) 96.4  F (35.8  C), temperature  source Oral, resp. rate 18, weight 137.6 kg (303 lb 6.4 oz), SpO2 96 %, not currently breastfeeding.  Wt Readings from Last 3 Encounters:   11/29/21 137.6 kg (303 lb 6.4 oz)   04/13/21 143.2 kg (315 lb 9.6 oz)   02/28/20 (!) 151.5 kg (334 lb)     I/O last 3 completed shifts:  In: -   Out: 1450 [Urine:1450]    CONST:  Alert and oriented   LUNGS:  CTA bilaterally  CARDIO:  Irreg, Irreg  tachycardic  ABD:  obese  EXT:  1-2+ bilateral shin edema with chronic skin changes           Data:   TELE:  A-fib  100-130 bpm    CBC  Recent Labs   Lab 11/29/21  0730 11/28/21  0603   WBC 5.5 4.1   HGB 14.4 13.2   * 105*       BMP  Recent Labs   Lab 11/29/21  0730 11/26/21  0711 11/25/21  0450    140 140   POTASSIUM 3.4 4.0 3.9   CHLORIDE 97 103 104   TONIA 9.2 8.9 8.9   CO2 38* 35* 32   BUN 13 11 11   CR 1.03 0.81 0.78   * 107* 155*     Recent Labs   Lab Test 02/28/20  0000 01/29/19  0746   CHOL 235* 238*   HDL 59 58   * 159*   TRIG 134 99   CHOLHDLRATIO 4 4.1       TROP  Lab Results   Component Value Date    TROPI <0.015 02/25/2019    TROPI <0.015 10/16/2018    TROPI <0.015 10/15/2018    TROPI <0.015 10/15/2018    TROPI 0.017 02/29/2016    TROPONIN <0.015 11/26/2021    TROPONIN <0.015 11/25/2021    TROPONIN 0.00 02/29/2016       BNP  Recent Labs   Lab 11/25/21  0450   NTBNPI 3,878*

## 2021-11-30 NOTE — PLAN OF CARE
/89 (BP Location: Right leg)   Pulse 98   Temp (!) 96.4  F (35.8  C) (Oral)   Resp 18   Wt 137.6 kg (303 lb 6.4 oz)   SpO2 96%   BMI 53.74 kg/m    A&Ox4. Denies pain. Tele: A-Fib RVR. Compliant with fluid restriction. Pure-wick in place. LS clear but diminished. No cough. Trace edema BLEs. BP taken in R leg right below knee. 02 @ 2LPM/NC. Plan: discharge home in 2-3 days.

## 2021-11-30 NOTE — PLAN OF CARE
Pt A/Ox4. Ax1 gaitbelt walker. Pt on 2 LPM NC. Diuresis in progress. 1.2 liters + 1 unmeasured large void in brief out this shift. External suction catheter in place. Telemetry Afib RVR. Discharge planned in 2-3 days. Weight trending downward.    Heart Failure Care Map  GOALS TO BE MET BEFORE DISCHARGE:    1. Decrease congestion and/or edema with diuretic therapy to achieve near optimal volume status.     Dyspnea improved: No, further care required to meet this goal. Please explain Need weaning from supplemental O2   Edema improved: Yes, satisfactory for discharge.        Net I/O and Weights since admission:   10/31 2300 - 11/30 2259  In: 360 [P.O.:360]  Out: 6675 [Urine:6675]  Net: -6315     Vitals:    11/25/21 1520 11/26/21 0647 11/27/21 0607 11/28/21 0346   Weight: 139.4 kg (307 lb 4.8 oz) 139 kg (306 lb 6.4 oz) 140.2 kg (309 lb) 138.5 kg (305 lb 6.4 oz)    11/29/21 0605 11/30/21 1100   Weight: 137.6 kg (303 lb 6.4 oz) 135.9 kg (299 lb 8 oz)       2.  O2 sats > 90% on room air, or at prior home O2 therapy level.      Able to wean O2 this shift to keep sats above 90%?: No, further care required to meet this goal. Please explain continue diuresis   Does patient use Home O2? No          Current oxygenation status:   SpO2: 97 %     O2 Device: Nasal cannula, Oxygen Delivery: 2 LPM    3.  Tolerates ambulation and mobility near baseline.     Ambulation: No, further care required to meet this goal. Please explain PT consulted   Times patient ambulated with staff this shift: 3    Please review the Heart Failure Care Map for additional HF goal outcomes.    Antoine Perez RN  11/30/2021

## 2021-12-01 ENCOUNTER — APPOINTMENT (OUTPATIENT)
Dept: PHYSICAL THERAPY | Facility: CLINIC | Age: 71
DRG: 308 | End: 2021-12-01
Attending: INTERNAL MEDICINE
Payer: COMMERCIAL

## 2021-12-01 ENCOUNTER — APPOINTMENT (OUTPATIENT)
Dept: OCCUPATIONAL THERAPY | Facility: CLINIC | Age: 71
DRG: 308 | End: 2021-12-01
Attending: INTERNAL MEDICINE
Payer: COMMERCIAL

## 2021-12-01 LAB
ANION GAP SERPL CALCULATED.3IONS-SCNC: 2 MMOL/L (ref 3–14)
BUN SERPL-MCNC: 21 MG/DL (ref 7–30)
CALCIUM SERPL-MCNC: 9.2 MG/DL (ref 8.5–10.1)
CHLORIDE BLD-SCNC: 96 MMOL/L (ref 94–109)
CO2 SERPL-SCNC: 38 MMOL/L (ref 20–32)
CREAT SERPL-MCNC: 1.02 MG/DL (ref 0.52–1.04)
GFR SERPL CREATININE-BSD FRML MDRD: 56 ML/MIN/1.73M2
GLUCOSE BLD-MCNC: 116 MG/DL (ref 70–99)
POTASSIUM BLD-SCNC: 5.2 MMOL/L (ref 3.4–5.3)
SODIUM SERPL-SCNC: 136 MMOL/L (ref 133–144)

## 2021-12-01 PROCEDURE — 97530 THERAPEUTIC ACTIVITIES: CPT | Mod: GP | Performed by: PHYSICAL THERAPIST

## 2021-12-01 PROCEDURE — 99233 SBSQ HOSP IP/OBS HIGH 50: CPT | Performed by: INTERNAL MEDICINE

## 2021-12-01 PROCEDURE — 250N000013 HC RX MED GY IP 250 OP 250 PS 637: Performed by: INTERNAL MEDICINE

## 2021-12-01 PROCEDURE — 97116 GAIT TRAINING THERAPY: CPT | Mod: GP | Performed by: PHYSICAL THERAPIST

## 2021-12-01 PROCEDURE — 94640 AIRWAY INHALATION TREATMENT: CPT

## 2021-12-01 PROCEDURE — 250N000013 HC RX MED GY IP 250 OP 250 PS 637: Performed by: NURSE PRACTITIONER

## 2021-12-01 PROCEDURE — 97161 PT EVAL LOW COMPLEX 20 MIN: CPT | Mod: GP | Performed by: PHYSICAL THERAPIST

## 2021-12-01 PROCEDURE — 80048 BASIC METABOLIC PNL TOTAL CA: CPT | Performed by: INTERNAL MEDICINE

## 2021-12-01 PROCEDURE — 36415 COLL VENOUS BLD VENIPUNCTURE: CPT | Performed by: INTERNAL MEDICINE

## 2021-12-01 PROCEDURE — 120N000001 HC R&B MED SURG/OB

## 2021-12-01 PROCEDURE — 250N000011 HC RX IP 250 OP 636: Performed by: INTERNAL MEDICINE

## 2021-12-01 PROCEDURE — 97535 SELF CARE MNGMENT TRAINING: CPT | Mod: GO

## 2021-12-01 PROCEDURE — 97165 OT EVAL LOW COMPLEX 30 MIN: CPT | Mod: GO

## 2021-12-01 PROCEDURE — 999N000157 HC STATISTIC RCP TIME EA 10 MIN

## 2021-12-01 RX ORDER — DILTIAZEM HYDROCHLORIDE 30 MG/1
60 TABLET, FILM COATED ORAL EVERY 6 HOURS SCHEDULED
Status: COMPLETED | OUTPATIENT
Start: 2021-12-01 | End: 2021-12-03

## 2021-12-01 RX ORDER — TORSEMIDE 20 MG/1
40 TABLET ORAL DAILY
Status: DISCONTINUED | OUTPATIENT
Start: 2021-12-01 | End: 2021-12-03

## 2021-12-01 RX ADMIN — APIXABAN 5 MG: 5 TABLET, FILM COATED ORAL at 09:20

## 2021-12-01 RX ADMIN — FLUTICASONE FUROATE AND VILANTEROL TRIFENATATE 1 PUFF: 100; 25 POWDER RESPIRATORY (INHALATION) at 08:27

## 2021-12-01 RX ADMIN — APIXABAN 5 MG: 5 TABLET, FILM COATED ORAL at 21:12

## 2021-12-01 RX ADMIN — FUROSEMIDE 80 MG: 10 INJECTION, SOLUTION INTRAMUSCULAR; INTRAVENOUS at 09:20

## 2021-12-01 RX ADMIN — DILTIAZEM HYDROCHLORIDE 60 MG: 30 TABLET, FILM COATED ORAL at 23:43

## 2021-12-01 RX ADMIN — MICONAZOLE NITRATE: 20 POWDER TOPICAL at 09:21

## 2021-12-01 RX ADMIN — LEVALBUTEROL TARTRATE 2 PUFF: 45 AEROSOL, METERED ORAL at 08:29

## 2021-12-01 RX ADMIN — PRIMIDONE 250 MG: 250 TABLET ORAL at 09:20

## 2021-12-01 RX ADMIN — DILTIAZEM HYDROCHLORIDE 60 MG: 30 TABLET, FILM COATED ORAL at 16:54

## 2021-12-01 RX ADMIN — PRIMIDONE 250 MG: 250 TABLET ORAL at 21:12

## 2021-12-01 RX ADMIN — DILTIAZEM HYDROCHLORIDE 60 MG: 30 TABLET, FILM COATED ORAL at 06:02

## 2021-12-01 RX ADMIN — METOPROLOL TARTRATE 100 MG: 100 TABLET, FILM COATED ORAL at 21:12

## 2021-12-01 RX ADMIN — METOPROLOL TARTRATE 100 MG: 100 TABLET, FILM COATED ORAL at 09:20

## 2021-12-01 RX ADMIN — DILTIAZEM HYDROCHLORIDE 60 MG: 30 TABLET, FILM COATED ORAL at 12:36

## 2021-12-01 ASSESSMENT — ACTIVITIES OF DAILY LIVING (ADL)
ADLS_ACUITY_SCORE: 13

## 2021-12-01 NOTE — PROGRESS NOTES
"SPIRITUAL HEALTH SERVICES Progress Note  [facility] Med. Surg. 3    Saw pt Gema Coombs per an admission request.  This author is familiar with pt from previous admissions.  Offered reflective conversation to facilitate the processing of thoughts and feelings.      Illness Narrative - Georgia reported that her a-fib persists and that she was recently diagnosed with stage II CHF.      Distress -   o She stated \"this has been a horrible year\" and shared that several of her loved ones  this year, including her mother (in March), her mother-in-law, and three close friends.  o Georgia acknowledged that she feels \"sad\" and reflected that her \"cindy is weak\" at this time.  Validated her feelings and normalized having periods of \"weak cindy.\"  Invited conversation about common ways people cope through dry periods of cindy.      Coping -   david Ho named her spouse, two children, and her FaceBook groups as being part of her support network.  o She welcomed prayer and engaged in a few moments of contemplative breathing before praying.    o Georgia reported that it is helpful for her to talk about her feelings and experiences.      Meaning-Making - Georgia reflected that God has called her to pray for others and shared that she continues to pray through this time of doubt.        Plan - Reviewed with pt how she can request further  support.  This author and other chaplains remain available per pt/family request.    Mathew Roberts M.Div., [facility]  Staff   Phone [phone]  "

## 2021-12-01 NOTE — PLAN OF CARE
Heart Failure Care Map  GOALS TO BE MET BEFORE DISCHARGE:    1. Decrease congestion and/or edema with diuretic therapy to achieve near optimal volume status.     Dyspnea improved: No, further care required to meet this goal. Please explain Patient has CALLAWAY   Edema improved: Edema improved.        Net I/O and Weights since admission:   10/31 2300 - 11/30 2259  In: 1860 [P.O.:1860]  Out: 7275 [Urine:7275]  Net: -5415     Vitals:    11/25/21 1520 11/26/21 0647 11/27/21 0607 11/28/21 0346   Weight: 139.4 kg (307 lb 4.8 oz) 139 kg (306 lb 6.4 oz) 140.2 kg (309 lb) 138.5 kg (305 lb 6.4 oz)    11/29/21 0605 11/30/21 1100   Weight: 137.6 kg (303 lb 6.4 oz) 135.9 kg (299 lb 8 oz)       2.  O2 sats > 90% on room air, or at prior home O2 therapy level.      Able to wean O2 this shift to keep sats above 90%?: No, patient on 2L NC.   Does patient use Home O2? no         Current oxygenation status:   SpO2: 95 %     O2 Device: Nasal cannula, Oxygen Delivery: 2 LPM    3.  Tolerates ambulation and mobility near baseline.     Ambulation: No, further care required to meet this goal. Please explain SOB with activities,   Times patient ambulated with staff this shift: 1    Please review the Heart Failure Care Map for additional HF goal outcomes.    Ramona Rios RN  11/30/2021

## 2021-12-01 NOTE — PLAN OF CARE
VSS.  AOX4.  Breathing is unlabored with mild CALLAWAY on 2LPM; removed O2 per MD, but when pt naps she drops to 86%.  2 LPM replaced at shift change as pt is napping intermittently.  Assist of 1 with walker and gait belt.  Pt denies pain.  2 gm Na diet.    IV lasix given this AM with good output; see flow sheets.    Cardiology consulted today; please see notes.    1800ml fluid restriction observed.    Pt up and ambulating x 3 this shift.    Tele: Afib RVR

## 2021-12-01 NOTE — PROGRESS NOTES
12/01/21 1100   Quick Adds   Type of Visit Initial PT Evaluation   Living Environment   People in home spouse;child(lynnette), adult   Current Living Arrangements house   Home Accessibility stairs to enter home;stairs within home   Number of Stairs, Main Entrance 7   Stair Railings, Main Entrance railing on right side (ascending)   Living Environment Comments Pt lives in house with 7 stairs to enter with railing on R side. All needs can be met on first level once in house. Pt lives with , he is able to help as needs, and adult daughter.    Self-Care   Equipment Currently Used at Home none   Activity/Exercise/Self-Care Comment Pt was inde with all ADLs prior, reports needing extra time due to shortness of breath. Pts  helps with cooking, cleaning, and running errands. No AD use prior to admission. Pt sleeps in sofa recliner    Disability/Function   Fall history within last six months no   Change in Functional Status Since Onset of Current Illness/Injury yes   General Information   Onset of Illness/Injury or Date of Surgery 11/25/21   Referring Physician Cy Doss MD   Patient/Family Therapy Goals Statement (PT) Return home   Pertinent History of Current Problem (include personal factors and/or comorbidities that impact the POC) Gema Coombs is a 71 year old female patient with past medical history of paroxysmal atrial fibrillation, bronchial asthma, hypertension, obstructive sleep apnea, morbid obesity, GERD, fibromyalgia, history of generalized nonconvulsive epilepsy, prior history of diverticulitis, status post laparoscopic-assisted sigmoid colectomy, came to emergency room for evaluation for shortness of breath.  Patient stated that she has been having progressive shortness of breath over the past 1 week.    Cognition   Orientation Status (Cognition) oriented x 4   Affect/Mental Status (Cognition) WFL   Follows Commands (Cognition) WFL   Cognitive Status Comments Pt did become emotional  about situtation during session.   Posture    Posture Forward head position;Protracted shoulders;Kyphosis   Strength   Manual Muscle Testing Quick Adds Strength WFL   Bed Mobility   Comment (Bed Mobility) Supine to sit with SBA with head of bed elevated and use of bed rails   Transfers   Transfer Safety Comments STS with SBA    Gait/Stairs (Locomotion)   Distance in Feet (Required for LE Total Joints) 10'   Comment (Gait/Stairs) Ambulates with FWW and CGA, fatiguing quickly   Balance   Balance no deficits were identified   Balance Comments Able to stand without support   Clinical Impression   Criteria for Skilled Therapeutic Intervention yes, treatment indicated   PT Diagnosis (PT) Decreased functional mobility   Influenced by the following impairments Decreased endurance, decreased strength   Functional limitations due to impairments Decreased bed mobility, ambulation tolerance, and stairs   Clinical Presentation Stable/Uncomplicated   Clinical Presentation Rationale Medically stable   Clinical Decision Making (Complexity) low complexity   Therapy Frequency (PT) Daily   Predicted Duration of Therapy Intervention (days/wks) 3 days   Planned Therapy Interventions (PT) bed mobility training;gait training;home exercise program;patient/family education;ROM (range of motion);stair training;strengthening;transfer training   Anticipated Equipment Needs at Discharge (PT) walker, standard;walker, rolling   Risk & Benefits of therapy have been explained evaluation/treatment results reviewed;care plan/treatment goals reviewed;risks/benefits reviewed;current/potential barriers reviewed;participants voiced agreement with care plan;participants included;patient   PT Discharge Planning    PT Discharge Recommendation (DC Rec) home with assist;home with home care physical therapy;Transitional Care Facility   PT Rationale for DC Rec Pt likely to meet PT goals. Pt has support of  and adult child at home if needed. Recommend  home with assistance depending on increasing ambulation endurance for functional mobility at home (up to 30') and pending on stair performance. In home PT would be beneficial for pt to improve functional mobility back to PLOF and increased safety. Would be significantly taxing for pt to leave home, as at [facility] pt's  was assisting pt in IADLs. Pending on ambulation endurance and stair performance, TCU may be better fit for pt to improve endurance and strength for functional mobility prior to returning home.   PT Brief overview of current status  Ambulates with CGA, fatiguing early with SOB   Total Evaluation Time   Total Evaluation Time (Minutes) 5   Skin WDL

## 2021-12-01 NOTE — PROGRESS NOTES
12/01/21 0717   Quick Adds   Type of Visit Initial Occupational Therapy Evaluation   Living Environment   People in home spouse;child(lynnette), adult   Current Living Arrangements house  (split level )   Home Accessibility stairs to enter home;stairs within home   Living Environment Comments Pt lives with spouse and adult daughter in split level house, stairs within, mostly stays on upper level, walk in shower, comfort height toilet.    Self-Care   Usual Activity Tolerance moderate   Current Activity Tolerance fair   Equipment Currently Used at Home none   Instrumental Activities of Daily Living (IADL)   Previous Responsibilities housekeeping;medication management;meal prep   IADL Comments Shared household tasks, however reports spouse assists with majority of IADLs (laundry, meal prep, etc).    Disability/Function   Fall history within last six months no   Change in Functional Status Since Onset of Current Illness/Injury yes   General Information   Onset of Illness/Injury or Date of Surgery 11/25/21   Referring Physician Cy Doss MD   Patient/Family Therapy Goal Statement (OT) Pt's goal is to d/c home   Additional Occupational Profile Info/Pertinent History of Current Problem Per chart: Pt is a 71 year old female admitted with SOB, found to have afib w/ RVR and acute congestive heart failure.    Performance Patterns (Routines, Roles, Habits) Pt reports indep in all ADLs, IADLs and mobility tasks with no AD at baseline. Pt/spouse are retired. Daughter works from home.    Existing Precautions/Restrictions fall;cardiac   Cognitive Status Examination   Orientation Status orientation to person, place and time   Sensory   Sensory Quick Adds No deficits were identified   Pain Assessment   Patient Currently in Pain No   Strength Comprehensive (MMT)   Comment, General Manual Muscle Testing (MMT) Assessment Generalized weakness BUEs   Bed Mobility   Bed Mobility supine-sit;sit-supine   Supine-Sit [place] (Bed  Mobility) supervision   Sit-Supine [place] (Bed Mobility) supervision   Transfers   Transfers sit-stand transfer   Sit-Stand Transfer   Sit-Stand [place] (Transfers) contact guard   Balance   Balance Comments CGA/SBA while standing, no AD utilized.    Activities of Daily Living   BADL Assessment toileting   Toileting   [place] Level (Toileting) minimum assist (75% patient effort)   Clinical Impression   Criteria for Skilled Therapeutic Interventions Met (OT) yes;meets criteria;skilled treatment is necessary   OT Diagnosis Impaired ADLs, IADLs and mobility tasks    OT Problem List-Impairments impacting ADL problems related to;activity tolerance impaired;strength   ADL comments/analysis Pt below baseline level of functioning in daily tasks   Assessment of Occupational Performance 5 or more Performance Deficits   Identified Performance Deficits Bathing, dressing, grooming, toileting, homemaking, transfers   Planned Therapy Interventions (OT) ADL retraining;IADL retraining;strengthening;transfer training   Clinical Decision Making Complexity (OT) low complexity   Therapy Frequency (OT) Daily   Predicted Duration of Therapy 5 days    Risk & Benefits of therapy have been explained evaluation/treatment results reviewed;care plan/treatment goals reviewed;risks/benefits reviewed;current/potential barriers reviewed;participants voiced agreement with care plan;participants included;patient   OT Discharge Planning    OT Discharge Recommendation (DC Rec) Home with assist;home with home care occupational therapy   OT Rationale for DC Rec Recommend ongoing skilled OT while IP and in HH setting to improve strength, functional activity tolerance, balance and safety needed for daily tasks. HH services indicated as leaving the environment would require significant effort at this time. Has good support from family.    Total Evaluation Time (Minutes)   Total Evaluation Time (Minutes) 8

## 2021-12-01 NOTE — PLAN OF CARE
VSS on 2 L O2 NC. Tele a fib CVR/RVR, -120. Pt A/O x 4, able to make needs known. Up SBA w/ GB and walker. PIVs in rt and left arms SL, flushed w/o difficulty. IV lasix. PO diltiazem. Purewick changed, good output. Plans to discharge in 1-2 days, home vs TCU. Will continue with plan of care.     Heart Failure Care Map  GOALS TO BE MET BEFORE DISCHARGE:    1. Decrease congestion and/or edema with diuretic therapy to achieve near optimal volume status.     Dyspnea improved: No, further care required to meet this goal. Please explain - dyspnea on exertion, needs to wean from O2   Edema improved: Yes, satisfactory for discharge.        Net I/O and Weights since admission:   11/01 0700 - 12/01 0659  In: 1860 [P.O.:1860]  Out: 7575 [Urine:7575]  Net: -5715     Vitals:    11/25/21 1520 11/26/21 0647 11/27/21 0607 11/28/21 0346   Weight: 139.4 kg (307 lb 4.8 oz) 139 kg (306 lb 6.4 oz) 140.2 kg (309 lb) 138.5 kg (305 lb 6.4 oz)    11/29/21 0605 11/30/21 1100 12/01/21 0549   Weight: 137.6 kg (303 lb 6.4 oz) 135.9 kg (299 lb 8 oz) 135.2 kg (298 lb)       2.  O2 sats > 90% on room air, or at prior home O2 therapy level.      Able to wean O2 this shift to keep sats above 90%?: No, further care required to meet this goal. Please explain - still on 2 L O2 NC   Does patient use Home O2? No          Current oxygenation status:   SpO2: 97 %     O2 Device: Nasal cannula, Oxygen Delivery: 2 LPM    3.  Tolerates ambulation and mobility near baseline.     Ambulation: No, further care required to meet this goal. Please explain - PT consult   Times patient ambulated with staff this shift: 1    Please review the Heart Failure Care Map for additional HF goal outcomes.    Connie Camacho RN  12/1/2021

## 2021-12-01 NOTE — PROGRESS NOTES
Order for home Cpap , pt states she has never had RAFI diagnosis, and therefore does not have Cpap machine.Pt is not interested in wearing hospital Cpap unit.

## 2021-12-01 NOTE — PROGRESS NOTES
Cardiology Progress Note  Dee TERRAZAS, CNP          Assessment and Plan:     Admit (11/25) with 1 wk worsening shortness of breath  Evidence of rapid A-fib, CHF exacerbation    PMH:  Paroxysmal atrial fibrillation, asthma, hypertension, untreated severe sleep apnea, chronic ITP, diverticulitis, fibromyalgia, epilepsy, morbid obesity, coronary angiogram (2018) showed normal coronaries, hyperlipidemia      Acute HFpEF Exacerbation:  -BNP: 3800 / pulmonary vascular congestion  -ECHO: LVE 55-60% (poor imaging for RWMA), grossly normal RV function, RVSP 24 mmHg, no significant valvular pathology  -IV lasix  80 mg BID for several days with improved SOB and leg edema. Not as much UOP overnight  -(weight down 8 lbs total) ( -5700 total OP and  -500  overnight )  -consider transitioning to oral Torsemide today      Asymptomatic Paroxysmal Atrial Fibrillation with RVR (persistent here)  -previously diagnosed  -plan for rate control, as it is likely hard to keep her in sinus due to body habitus, untreated sleep apnea, and likely prolonged duration of A-fib  -HRs improved some with addition of Diltiazem to her Metoprolol.  However, still some HRs up to 120 bpm.  Will increase Diltiazem 60 mg to QID  Consider rhythm control if unable to control heart rate  -Heparin stopped and Eliquis started (CHADS2-Vasc score: 4)  Was seen by oncology due to her ITP  Some concern for HIT due to PLT drop to 105,000 (113,000 today)      Presumed Sleep Apnea  -refused hospital CPAP   -she is open to outpt sleep evaluation    Morbid Obesity    Hypertension:  -BP borderline               Interval History:     Breathing much improved, but still requiring O2 support.  Denies palpitations or chest pain                Medications:       apixaban ANTICOAGULANT  5 mg Oral BID     diltiazem  60 mg Oral Q8H [facility]     fluticasone-vilanterol  1 puff Inhalation Daily     furosemide  80 mg Intravenous BID     metoprolol tartrate  100 mg Oral BID      miconazole   Topical BID     primidone  250 mg Oral BID     sodium chloride (PF)  3 mL Intracatheter Q8H            Physical Exam:   Blood pressure 120/73, pulse 118, temperature 96.8  F (36  C), temperature source Oral, resp. rate 20, weight 135.2 kg (298 lb), SpO2 97 %, not currently breastfeeding.  Wt Readings from Last 3 Encounters:   12/01/21 135.2 kg (298 lb)   04/13/21 143.2 kg (315 lb 9.6 oz)   02/28/20 (!) 151.5 kg (334 lb)     I/O last 3 completed shifts:  In: 1500 [P.O.:1500]  Out: 2000 [Urine:2000]    CONST:  Alert and oriented   LUNGS:  CTA bilaterally  CARDIO:  Irreg, Irreg  tachycardic  ABD:  obese  EXT:  1-2+ bilateral shin edema with chronic skin changes           Data:   TELE:  A-fib  100-110 bpm  HRs up to 120 bpm at times    CBC  Recent Labs   Lab 11/30/21  0901 11/29/21  0730   WBC 5.6 5.5   HGB 15.1 14.4   * 113*       BMP  Recent Labs   Lab 12/01/21  0603 11/30/21  0901 11/29/21  0730 11/26/21  0711    136 139 140   POTASSIUM 5.2 4.5 3.4 4.0   CHLORIDE 96 95 97 103   TONIA 9.2 9.3 9.2 8.9   CO2 38* 40* 38* 35*   BUN 21 16 13 11   CR 1.02 1.00 1.03 0.81   * 107* 104* 107*     Recent Labs   Lab Test 02/28/20  0000 01/29/19  0746   CHOL 235* 238*   HDL 59 58   * 159*   TRIG 134 99   CHOLHDLRATIO 4 4.1       TROP  Lab Results   Component Value Date    TROPI <0.015 02/25/2019    TROPI <0.015 10/16/2018    TROPI <0.015 10/15/2018    TROPI <0.015 10/15/2018    TROPI 0.017 02/29/2016    TROPONIN <0.015 11/26/2021    TROPONIN <0.015 11/25/2021    TROPONIN 0.00 02/29/2016       BNP  Recent Labs   Lab 11/25/21  0450   NTBNPI 3,878*

## 2021-12-01 NOTE — PROGRESS NOTES
[facility]  Hospitalist Progress Note  Cy Doss MD 12/01/2021    Reason for Stay/active problem list      Atrial fibrillation with rapid ventricular response    Acute congestive heart failure with preserved EF due to rapid A. fib         Assessment and Plan:        Summary of Stay: Gema Coombs is a 71 year old female patient with past medical history of paroxysmal atrial fibrillation, bronchial asthma, hypertension, obstructive sleep apnea, morbid obesity, GERD, fibromyalgia, history of generalized nonconvulsive epilepsy, prior history of diverticulitis, status post laparoscopic-assisted sigmoid colectomy, came to emergency room for evaluation for shortness of breath.  Patient stated that she has been having progressive shortness of breath over the past 1 week.      EKG showed atrial fibrillation with rapid ventricular response.  Chest x-ray showed cardiac enlargement with pulmonary vascular congestion, interstitial opacities in both lungs suggestive of congestive heart failure.  There is also right-sided pleural fluid.  Emergency room, she was given Lasix 40 mg IV x1 dose, diltiazem 20 mg IV x1 dose, diltiazem 60 mg p.o. x1 dose.  She remained in A. fib with RVR and was started on diltiazem drip.  She was admitted to the hospital for further management.    Problem List with Assessment and Plan    A. fib with RVR.  -Initially patient was treated with IV diltiazem infusion but had to be stopped due to low blood pressure.  Instead started on IV amiodarone which was also discontinued.  --Heart rate  Control variable ,   Cardiology following, recommendation noted to increase metoprolol to 100 mg twice a day and to add diltiazem 60 mg qid  today   -Was treated with heparin which was changed to Eliquis  -History of ITP.  Monitor platelets closely.  -Echocardiogram shows normal ejection fraction.  No significant valvulopathy.  -- continue rate control medications for now      Acute  ongestive heart failure with preserved EF  -Due to A. fib with RVR.  -Pulmonary edema on chest x-ray at admission.  -Started on IV furosemide currently at 80 mg twice a day,  Improving , start po torsemide in am    monitor intake and output and daily weight     Intake/Output Summary (Last 24 hours) at 11/30/2021 1139  Last data filed at 11/29/2021 2130  Gross per 24 hour   Intake --   Output 1450 ml   Net -1450 ml     Wt Readings from Last 2 Encounters:   12/01/21 135.2 kg (298 lb)   04/13/21 143.2 kg (315 lb 9.6 oz)        History of asthma  -Continue maintenance inhaler.     Morbid obesity  -Further complicates the care.  -- continue PT , OT      History of ITP  -Monitor platelet count daily.    -Patient sees Dr. Patel from [place] [facility].  -Heme-onc consultation obtained and recommendation noted to continue anticoagulation.  Patient has chronic ITP and no need to treat A. fib platelet is over 30,000.     Anxiety: Patient appears to be anxious today due to diagnosis of congestive heart failure  --Ativan as needed    Acute respiratory failure with hypoxia due to CHF   -- On nasal oxygen , improving   -- continue to wean down as able               Plan for today:    Wean off oxygen as able     Check oxygen sat with activity     PT ,OT     Medication changes as per cardiology         LDA     Access : Peripheral     Celaya Catheter: Not present        FEN :    Orders Placed This Encounter      2 Gram Sodium Diet           Intake/Output Summary (Last 24 hours) at 11/29/2021 1747  Last data filed at 11/29/2021 1312  Gross per 24 hour   Intake --   Output 1900 ml   Net -1900 ml          DVT Prophylaxis:     DOAC    Code Status:      Full Code    Estimated discharge  disposition and plan:      May discharge  to home with home services versus TCU when okay with cardiology - may be in 2 days         Interval History (Subjective):        Patient is seen and examined by me today and medical record reviewed.Overnight events  noted and care discussed with nursing staff.  Feels much better today. No sob at rest , able to move by bedside. Cardiology plan noted       Physical Exam:        Last Vital Signs:  /56 (BP Location: Right leg)   Pulse 91   Temp (!) 95.7  F (35.4  C) (Oral)   Resp 20   Wt 135.2 kg (298 lb)   SpO2 97%   BMI 52.79 kg/m      I/O last 3 completed shifts:  In: 1500 [P.O.:1500]  Out: 2000 [Urine:2000]  Vitals:    11/27/21 0607 11/28/21 0346 11/29/21 0605 11/30/21 1100   Weight: 140.2 kg (309 lb) 138.5 kg (305 lb 6.4 oz) 137.6 kg (303 lb 6.4 oz) 135.9 kg (299 lb 8 oz)    12/01/21 0549   Weight: 135.2 kg (298 lb)       Wt Readings from Last 5 Encounters:   12/01/21 135.2 kg (298 lb)   04/13/21 143.2 kg (315 lb 9.6 oz)   02/28/20 (!) 151.5 kg (334 lb)   08/16/19 141.1 kg (311 lb)   03/11/19 132.9 kg (293 lb)        Constitutional: Awake, alert, cooperative, no apparent distress     Respiratory: Clear to auscultation bilaterally, no crackles or wheezing   Cardiovascular:  Irregularly irregular heart, tachycardic   Abdomen: Normal bowel sounds, soft, non-distended, non-tender   Skin: No new rashes, no cyanosis, dry to touch   Neuro: Alert with  no new focal weakness   Extremities:  Trace edema, normal range of motion   Other(s):        All other systems: Negative          Medications:        All current medications were reviewed with changes reflected in problem list.         Data:      All new lab and imaging data was reviewed.      Data reviewed today: I reviewed all new labs and imaging results over the last 24 hours. I personally reviewed no images or EKG's today      Recent Labs   Lab 11/30/21  0901 11/29/21  0730 11/28/21  0603   WBC 5.6 5.5 4.1   HGB 15.1 14.4 13.2   HCT 49.1* 47.3* 42.7   MCV 95 95 95   * 113* 105*     No results for input(s): CULT in the last 168 hours.  Recent Labs   Lab 12/01/21  0603 11/30/21  0901 11/29/21  0730 11/26/21  0711 11/25/21  0450    136 139 140 140   POTASSIUM  5.2 4.5 3.4 4.0 3.9   CHLORIDE 96 95 97 103 104   CO2 38* 40* 38* 35* 32   ANIONGAP 2* 1* 4 2* 4   * 107* 104* 107* 155*   BUN 21 16 13 11 11   CR 1.02 1.00 1.03 0.81 0.78   GFRESTIMATED 56* 58* 56* 74 78   TONIA 9.2 9.3 9.2 8.9 8.9   MAG  --   --   --  2.1  --    PROTTOTAL  --   --   --   --  7.0   ALBUMIN  --   --   --   --  3.0*   BILITOTAL  --   --   --   --  0.4   ALKPHOS  --   --   --   --  107   AST  --   --   --   --  46*   ALT  --   --   --   --  66*       Recent Labs   Lab 12/01/21  0603 11/30/21  0901 11/29/21  0730 11/26/21  0711 11/25/21  0450   * 107* 104* 107* 155*       No results for input(s): INR in the last 168 hours.      Recent Labs   Lab 11/26/21  0711 11/25/21  0450   TROPONIN <0.015 <0.015       No results found for this or any previous visit (from the past 48 hour(s)).    COVID Status:  COVID-19 PCR Results    COVID-19 PCR Results 11/25/21   SARS CoV2 PCR Negative      Comments are available for some flowsheets but are not being displayed.         COVID-19 Antibody Results, Testing for Immunity    COVID-19 Antibody Results, Testing for Immunity   No data to display.              Disclaimer: This note consists of symbols derived from keyboarding, dictation and/or voice recognition software. As a result, there may be errors in the script that have gone undetected. Please consider this when interpreting information found in this chart.

## 2021-12-02 ENCOUNTER — APPOINTMENT (OUTPATIENT)
Dept: OCCUPATIONAL THERAPY | Facility: CLINIC | Age: 71
DRG: 308 | End: 2021-12-02
Payer: COMMERCIAL

## 2021-12-02 LAB
ANION GAP SERPL CALCULATED.3IONS-SCNC: 2 MMOL/L (ref 3–14)
BUN SERPL-MCNC: 25 MG/DL (ref 7–30)
CALCIUM SERPL-MCNC: 9.5 MG/DL (ref 8.5–10.1)
CHLORIDE BLD-SCNC: 93 MMOL/L (ref 94–109)
CO2 SERPL-SCNC: 40 MMOL/L (ref 20–32)
CREAT SERPL-MCNC: 1.14 MG/DL (ref 0.52–1.04)
GFR SERPL CREATININE-BSD FRML MDRD: 49 ML/MIN/1.73M2
GLUCOSE BLD-MCNC: 111 MG/DL (ref 70–99)
POTASSIUM BLD-SCNC: 5 MMOL/L (ref 3.4–5.3)
SODIUM SERPL-SCNC: 135 MMOL/L (ref 133–144)

## 2021-12-02 PROCEDURE — 250N000009 HC RX 250: Performed by: INTERNAL MEDICINE

## 2021-12-02 PROCEDURE — 99233 SBSQ HOSP IP/OBS HIGH 50: CPT | Performed by: INTERNAL MEDICINE

## 2021-12-02 PROCEDURE — 250N000013 HC RX MED GY IP 250 OP 250 PS 637: Performed by: INTERNAL MEDICINE

## 2021-12-02 PROCEDURE — 80048 BASIC METABOLIC PNL TOTAL CA: CPT | Performed by: NURSE PRACTITIONER

## 2021-12-02 PROCEDURE — 120N000001 HC R&B MED SURG/OB

## 2021-12-02 PROCEDURE — 36415 COLL VENOUS BLD VENIPUNCTURE: CPT | Performed by: NURSE PRACTITIONER

## 2021-12-02 PROCEDURE — 97535 SELF CARE MNGMENT TRAINING: CPT | Mod: GO

## 2021-12-02 PROCEDURE — 250N000013 HC RX MED GY IP 250 OP 250 PS 637: Performed by: NURSE PRACTITIONER

## 2021-12-02 RX ADMIN — APIXABAN 5 MG: 5 TABLET, FILM COATED ORAL at 20:53

## 2021-12-02 RX ADMIN — DILTIAZEM HYDROCHLORIDE 60 MG: 30 TABLET, FILM COATED ORAL at 12:22

## 2021-12-02 RX ADMIN — LEVALBUTEROL TARTRATE 2 PUFF: 45 AEROSOL, METERED ORAL at 20:54

## 2021-12-02 RX ADMIN — TORSEMIDE 40 MG: 20 TABLET ORAL at 08:42

## 2021-12-02 RX ADMIN — METOPROLOL TARTRATE 2.5 MG: 5 INJECTION INTRAVENOUS at 14:09

## 2021-12-02 RX ADMIN — APIXABAN 5 MG: 5 TABLET, FILM COATED ORAL at 08:42

## 2021-12-02 RX ADMIN — PRIMIDONE 250 MG: 250 TABLET ORAL at 20:53

## 2021-12-02 RX ADMIN — METOPROLOL TARTRATE 100 MG: 100 TABLET, FILM COATED ORAL at 20:53

## 2021-12-02 RX ADMIN — PRIMIDONE 250 MG: 250 TABLET ORAL at 08:41

## 2021-12-02 RX ADMIN — DILTIAZEM HYDROCHLORIDE 60 MG: 30 TABLET, FILM COATED ORAL at 18:14

## 2021-12-02 RX ADMIN — DILTIAZEM HYDROCHLORIDE 60 MG: 30 TABLET, FILM COATED ORAL at 06:01

## 2021-12-02 RX ADMIN — FLUTICASONE FUROATE AND VILANTEROL TRIFENATATE 1 PUFF: 100; 25 POWDER RESPIRATORY (INHALATION) at 08:42

## 2021-12-02 ASSESSMENT — ACTIVITIES OF DAILY LIVING (ADL)
ADLS_ACUITY_SCORE: 13

## 2021-12-02 NOTE — PLAN OF CARE
/71 (BP Location: Right arm)   Pulse 79   Temp (!) 96.4  F (35.8  C) (Axillary)   Resp 21   Wt 135.2 kg (298 lb)   SpO2 96%   BMI 52.79 kg/m       A/OX4. SBA with walker and gait belt.  2 gm Na diet, 1800 fluid restriction. CALLAWAY. RA. Denies pain. 1800ml fluid restriction observed. IV lasix. Purewick in place. Cards following.Tele: Afib RVR .

## 2021-12-02 NOTE — PROGRESS NOTES
Cardiology Progress Note  Dee TERRAZAS, CNP          Assessment and Plan:     Admit (11/25) with 1 wk worsening shortness of breath  Evidence of rapid A-fib, CHF exacerbation    PMH:  Paroxysmal atrial fibrillation, asthma, hypertension, untreated severe sleep apnea, chronic ITP, diverticulitis, fibromyalgia, epilepsy, morbid obesity, coronary angiogram (2018) showed normal coronaries, hyperlipidemia      Acute HFpEF Exacerbation:  -BNP: 3800 / pulmonary vascular congestion  -ECHO: LVE 55-60% (poor imaging for RWMA), grossly normal RV function, RVSP 24 mmHg, no significant valvular pathology  -improved symptoms with several days of IV lasix  80 mg BID   -Torsemide 40 mg started today  -(weight down 8 lbs total) ( -5900 total OP and  -260  overnight )  -low salt diet, nutritional consult to discuss today  -continue gentle diuresis      Asymptomatic Paroxysmal Atrial Fibrillation with RVR (persistent here)  -previously diagnosed  -HRs 90-120s on Metoprolol 100 mg BID and Diltiazem 90 mg QID  -Pt prefers rate control and deferred OWEN/DCCV.  Will continue with current medical regimen today and consider adding Digoxin tomorrow  -Heparin stopped and Eliquis started (CHADS2-Vasc score: 4)  Was seen by oncology due to her ITP  Some concern for HIT due to PLT drop to 105,000 (113,000 today)      Presumed Sleep Apnea  -refused hospital CPAP   -she is open to outpt sleep evaluation    Morbid Obesity    Hypertension:  -BP controlled               Interval History:     Tearful about health status.  Improved SOB, but still on O2 at 2L   Denies palpitations or chest pain                Medications:       apixaban ANTICOAGULANT  5 mg Oral BID     diltiazem  60 mg Oral Q6H [facility]     fluticasone-vilanterol  1 puff Inhalation Daily     metoprolol tartrate  100 mg Oral BID     miconazole   Topical BID     primidone  250 mg Oral BID     sodium chloride (PF)  3 mL Intracatheter Q8H     torsemide  40 mg Oral Daily            Physical  Exam:   Blood pressure 135/87, pulse 58, temperature (!) 96.3  F (35.7  C), temperature source Oral, resp. rate 20, weight 135.2 kg (298 lb), SpO2 95 %, not currently breastfeeding.  Wt Readings from Last 3 Encounters:   12/01/21 135.2 kg (298 lb)   04/13/21 143.2 kg (315 lb 9.6 oz)   02/28/20 (!) 151.5 kg (334 lb)     I/O last 3 completed shifts:  In: 540 [P.O.:540]  Out: 800 [Urine:800]    CONST:  Alert and oriented   LUNGS:  Scattered expiratory wheezes  CARDIO:  Irreg, Irreg  Tachycardic  Diminished heart tones  ABD:  obese  EXT:  1-2+ bilateral shin edema with chronic skin changes           Data:   TELE:  A-fib  bpm    CBC  Recent Labs   Lab 11/30/21  0901 11/29/21  0730   WBC 5.6 5.5   HGB 15.1 14.4   * 113*       BMP  Recent Labs   Lab 12/01/21  0603 11/30/21  0901 11/29/21  0730 11/26/21  0711    136 139 140   POTASSIUM 5.2 4.5 3.4 4.0   CHLORIDE 96 95 97 103   TONIA 9.2 9.3 9.2 8.9   CO2 38* 40* 38* 35*   BUN 21 16 13 11   CR 1.02 1.00 1.03 0.81   * 107* 104* 107*     Recent Labs   Lab Test 02/28/20  0000 01/29/19  0746   CHOL 235* 238*   HDL 59 58   * 159*   TRIG 134 99   CHOLHDLRATIO 4 4.1       TROP  Lab Results   Component Value Date    TROPI <0.015 02/25/2019    TROPI <0.015 10/16/2018    TROPI <0.015 10/15/2018    TROPI <0.015 10/15/2018    TROPI 0.017 02/29/2016    TROPONIN <0.015 11/26/2021    TROPONIN <0.015 11/25/2021    TROPONIN 0.00 02/29/2016       BNP  No results for input(s): NTBNPI, NTBNP in the last 168 hours.

## 2021-12-02 NOTE — PLAN OF CARE
Alert and oriented x 4. VSS. Denies pain. Up with SBA with walker. On 2 gm NA diet on fluid restriction 1800 mL. Pure wick in place.     /81 (BP Location: Right arm, Patient Position: Fowlers)   Pulse 72   Temp (!) 96.1  F (35.6  C) (Oral)   Resp 20   Wt 135.2 kg (298 lb)   SpO2 97%   BMI 52.79 kg/m

## 2021-12-02 NOTE — PROGRESS NOTES
[facility]  Hospitalist Progress Note  Cy Doss MD 12/02/2021    Reason for Stay/active problem list      Atrial fibrillation with rapid ventricular response    Acute congestive heart failure with preserved EF due to rapid A. fib         Assessment and Plan:        Summary of Stay: Gema Coombs is a 71 year old female patient with past medical history of paroxysmal atrial fibrillation, bronchial asthma, hypertension, obstructive sleep apnea, morbid obesity, GERD, fibromyalgia, history of generalized nonconvulsive epilepsy, prior history of diverticulitis, status post laparoscopic-assisted sigmoid colectomy, came to emergency room for evaluation for shortness of breath.  Patient stated that she has been having progressive shortness of breath over the past 1 week.      EKG showed atrial fibrillation with rapid ventricular response.  Chest x-ray showed cardiac enlargement with pulmonary vascular congestion, interstitial opacities in both lungs suggestive of congestive heart failure.  There is also right-sided pleural fluid.  Emergency room, she was given Lasix 40 mg IV x1 dose, diltiazem 20 mg IV x1 dose, diltiazem 60 mg p.o. x1 dose.  She remained in A. fib with RVR and was started on diltiazem drip.  She was admitted to the hospital for further management.    Problem List with Assessment and Plan    A. fib with RVR.  -Initially patient was treated with IV diltiazem infusion but had to be stopped due to low blood pressure.  Instead started on IV amiodarone which was also discontinued.  --Heart rate  Control variable ,     --Cardiology following, recommendation noted to titrate medications for better control of her heart rate.    --Currently on metoprolol 100 mg twice a day and diltiazem 90 mg 4 times a day.  Digoxin considered.  -Was treated with heparin which was changed to Eliquis  -History of ITP.  Monitor platelets closely.  -Echocardiogram shows normal ejection fraction.  No  significant valvulopathy.  -- continue rate control medications for now      Acute ongestive heart failure with preserved EF  -Due to A. fib with RVR.  -Pulmonary edema on chest x-ray at admission.  -Was treated with IV furosemide and she improved.  Oral torsemide started currently at 40 mg a day.  Cardiology managing medication titration   Intake/Output Summary (Last 24 hours) at 11/30/2021 1139  Last data filed at 11/29/2021 2130  Gross per 24 hour   Intake --   Output 1450 ml   Net -1450 ml     Wt Readings from Last 2 Encounters:   12/01/21 135.2 kg (298 lb)   04/13/21 143.2 kg (315 lb 9.6 oz)        History of asthma  -Continue maintenance inhaler.     Morbid obesity  -Further complicates the care.  -- continue PT , OT      History of ITP  -Monitor platelet count daily.    -Patient sees Dr. Patel from [place] [facility].  -Heme-onc consultation obtained and recommendation noted to continue anticoagulation.  Patient has chronic ITP and no need to treat A. fib platelet is over 30,000.     Anxiety: Patient appears to be anxious today due to diagnosis of congestive heart failure  --Ativan as needed    Acute respiratory failure with hypoxia due to CHF   -- On nasal oxygen , improving   -- continue to wean down as able               Plan for today:    Wean off oxygen as able     Continue PT OT and discharge planning    Continue to monitor on telemetry on current medications document heart rate control  Control    Discussed with family at bedside        LDA     Access : Peripheral     Celaya Catheter: Not present        FEN :    Orders Placed This Encounter      2 Gram Sodium Diet           Intake/Output Summary (Last 24 hours) at 11/29/2021 1747  Last data filed at 11/29/2021 1312  Gross per 24 hour   Intake --   Output 1900 ml   Net -1900 ml          DVT Prophylaxis:     DOAC    Code Status:      Full Code    Estimated discharge  disposition and plan:      May discharge  to home with home services versus TCU when okay  with cardiology - may be in 1-2 days         Interval History (Subjective):        Patient is seen and examined by me today and medical record reviewed.Overnight events noted and care discussed with nursing staff.  She is not feeling well today.  She feels weak and has some sensation in her throat.  Denies any fever.  No worsening of shortness of breath.  She was seen by cardiology team and recommendation noted for further medication titration.       Physical Exam:        Last Vital Signs:  /85   Pulse (!) 142   Temp (!) 96.3  F (35.7  C) (Oral)   Resp 20   Wt 135.2 kg (298 lb)   SpO2 95%   BMI 52.79 kg/m      I/O last 3 completed shifts:  In: 660 [P.O.:660]  Out: 1175 [Urine:1175]  Vitals:    11/27/21 0607 11/28/21 0346 11/29/21 0605 11/30/21 1100   Weight: 140.2 kg (309 lb) 138.5 kg (305 lb 6.4 oz) 137.6 kg (303 lb 6.4 oz) 135.9 kg (299 lb 8 oz)    12/01/21 0549   Weight: 135.2 kg (298 lb)       Wt Readings from Last 5 Encounters:   12/01/21 135.2 kg (298 lb)   04/13/21 143.2 kg (315 lb 9.6 oz)   02/28/20 (!) 151.5 kg (334 lb)   08/16/19 141.1 kg (311 lb)   03/11/19 132.9 kg (293 lb)        Constitutional: Awake, alert, cooperative, no apparent distress     Respiratory: Clear to auscultation bilaterally, no crackles or wheezing   Cardiovascular:  Irregularly irregular heart, tachycardic   Abdomen: Normal bowel sounds, soft, non-distended, non-tender   Skin: No new rashes, no cyanosis, dry to touch   Neuro: Alert with  no new focal weakness   Extremities:  Trace edema, normal range of motion   Other(s):        All other systems: Negative          Medications:        All current medications were reviewed with changes reflected in problem list.         Data:      All new lab and imaging data was reviewed.      Data reviewed today: I reviewed all new labs and imaging results over the last 24 hours. I personally reviewed no images or EKG's today      Recent Labs   Lab 11/30/21  0901 11/29/21  0730  11/28/21  0603   WBC 5.6 5.5 4.1   HGB 15.1 14.4 13.2   HCT 49.1* 47.3* 42.7   MCV 95 95 95   * 113* 105*     No results for input(s): CULT in the last 168 hours.  Recent Labs   Lab 12/02/21  0952 12/01/21  0603 11/30/21  0901 11/29/21  0730 11/26/21  0711    136 136   < > 140   POTASSIUM 5.0 5.2 4.5   < > 4.0   CHLORIDE 93* 96 95   < > 103   CO2 40* 38* 40*   < > 35*   ANIONGAP 2* 2* 1*   < > 2*   * 116* 107*   < > 107*   BUN 25 21 16   < > 11   CR 1.14* 1.02 1.00   < > 0.81   GFRESTIMATED 49* 56* 58*   < > 74   TONIA 9.5 9.2 9.3   < > 8.9   MAG  --   --   --   --  2.1    < > = values in this interval not displayed.       Recent Labs   Lab 12/02/21  0952 12/01/21  0603 11/30/21  0901 11/29/21  0730 11/26/21  0711   * 116* 107* 104* 107*       No results for input(s): INR in the last 168 hours.      Recent Labs   Lab 11/26/21  0711   TROPONIN <0.015       No results found for this or any previous visit (from the past 48 hour(s)).    COVID Status:  COVID-19 PCR Results    COVID-19 PCR Results 11/25/21   SARS CoV2 PCR Negative      Comments are available for some flowsheets but are not being displayed.         COVID-19 Antibody Results, Testing for Immunity    COVID-19 Antibody Results, Testing for Immunity   No data to display.              Disclaimer: This note consists of symbols derived from keyboarding, dictation and/or voice recognition software. As a result, there may be errors in the script that have gone undetected. Please consider this when interpreting information found in this chart.

## 2021-12-02 NOTE — CONSULTS
"NUTRITION ASSESSMENT    REASON FOR ASSESSMENT:  LOS   History of paroxysmal atrial fibrillation, bronchial asthma, hypertension, obstructive sleep apnea, morbid obesity, GERD, fibromyalgia, history of generalized nonconvulsive epilepsy, prior history of diverticulitis, status post laparoscopic-assisted sigmoid colectomy, came to emergency room for evaluation for shortness of breath  CURRENT DIET AND NOURISHMENT ORDER:    Information obtained from patient; limited due to providers in room    Food allergies/intolerances: NKFA     Patient is on a regular diet at home, denies previous diet restrictions    Typical food/fluid intake PTA: no recent changes    Frequent adds salt at the table in addition to salt added while cooking    Living situation: with , who does the meal preparation and grocery shopping:    Food access concerns: not addressed    Diet: 2 gram sodium   Current Intake/Tolerance: Per flow sheet review, % intake for majority of documented meals.    ANTHROPOMETRICS  Height: 5'3\"  Weight: 135 kg   Body mass index is 52.79 kg/m .  Weight Status:  Obesity Grade III BMI >40  Weight History: fluid shifts, CHF  Wt Readings from Last 10 Encounters:   12/01/21 135.2 kg (298 lb)   04/13/21 143.2 kg (315 lb 9.6 oz)   02/28/20 (!) 151.5 kg (334 lb)   08/16/19 141.1 kg (311 lb)   03/11/19 132.9 kg (293 lb)   02/25/19 131.1 kg (289 lb)   01/28/19 132.6 kg (292 lb 6.4 oz)   11/19/18 129.7 kg (286 lb)   10/24/18 131.5 kg (290 lb)   10/15/18 133.3 kg (293 lb 14.6 oz)       ASSESSED NUTRITION NEEDS (PER APPROVED PRACTICE GUIDELINES, Dosing weight: 52.3 kg IBW)  Estimated Energy Needs: 25-30+ kcal per kg  Justification: minimum maintenance while hospitalized  Estimated Protein Needs: 1-1.2+ grams per kg  Justification: preservation of lean body mass  Estimated Fluid Needs: per provider     LABS/MEDS/PHYSICAL FINDINGS:  Meds reviewed  Full nutrition focused physical exam deferred   Labs " reviewed    Malnutrition:  Patient does not meet two of the following criteria necessary for diagnosing malnutrition: significant weight loss (related to fluid shifts), reduced intake or fluid retention; nutrition focused physical exam not indicated with low risk assessment (sarcopenic obesity component)    INTERVENTION:  Nutrition Diagnosis:  Excessive sodium intake related to new sodium restriction and preference for high salt foods as evidenced by diet recall    Implementation:  Nutrition Education: will benefit prior to discharge. Provider entered - will follow up as able.  Collaboration and Referral of care: Discussed patient during interdisciplinary care rounds this morning    Follow Up/Monitoring:   Progress towards goals will be monitored and evaluated per protocol and Practice Guidelines        Megan Nelson MS, RDN-AP, LD, CNSC  Pager - 3rd floor/ICU: [phone]  Pager - All other floors: [phone]  Pager - Weekend/holiday: [phone]  Office: [phone]

## 2021-12-02 NOTE — PLAN OF CARE
/87 (BP Location: Right arm)   Pulse 58   Temp (!) 96.3  F (35.7  C) (Oral)   Resp 20   Wt 135.2 kg (298 lb)   SpO2 95%   BMI 52.79 kg/m       12:37 PM MD updated: Pt HR up into the 150's. Pt resting VSS. Given scheduled PO amio.     A/OX4. SBA with walker and gait belt.  2 gm Na diet, 1800 fluid restriction. CALLAWAY. RA. Denies pain. 1800ml fluid restriction observed. IV lasix. PRN IV metoprolol given for high HR. Purewick in place. Creat:1.14.Tele: Afib RVR . Cards following.

## 2021-12-03 ENCOUNTER — APPOINTMENT (OUTPATIENT)
Dept: PHYSICAL THERAPY | Facility: CLINIC | Age: 71
DRG: 308 | End: 2021-12-03
Payer: COMMERCIAL

## 2021-12-03 PROCEDURE — 250N000013 HC RX MED GY IP 250 OP 250 PS 637: Performed by: INTERNAL MEDICINE

## 2021-12-03 PROCEDURE — 94640 AIRWAY INHALATION TREATMENT: CPT

## 2021-12-03 PROCEDURE — 120N000001 HC R&B MED SURG/OB

## 2021-12-03 PROCEDURE — 97530 THERAPEUTIC ACTIVITIES: CPT | Mod: GP | Performed by: PHYSICAL THERAPIST

## 2021-12-03 PROCEDURE — 99233 SBSQ HOSP IP/OBS HIGH 50: CPT | Performed by: INTERNAL MEDICINE

## 2021-12-03 PROCEDURE — 97116 GAIT TRAINING THERAPY: CPT | Mod: GP | Performed by: PHYSICAL THERAPIST

## 2021-12-03 PROCEDURE — 250N000013 HC RX MED GY IP 250 OP 250 PS 637: Performed by: NURSE PRACTITIONER

## 2021-12-03 PROCEDURE — 999N000157 HC STATISTIC RCP TIME EA 10 MIN

## 2021-12-03 RX ORDER — DILTIAZEM HYDROCHLORIDE 60 MG/1
180 CAPSULE, EXTENDED RELEASE ORAL 2 TIMES DAILY
Status: DISCONTINUED | OUTPATIENT
Start: 2021-12-03 | End: 2021-12-06 | Stop reason: HOSPADM

## 2021-12-03 RX ORDER — TORSEMIDE 20 MG/1
20 TABLET ORAL DAILY
Status: DISCONTINUED | OUTPATIENT
Start: 2021-12-03 | End: 2021-12-06 | Stop reason: HOSPADM

## 2021-12-03 RX ADMIN — APIXABAN 5 MG: 5 TABLET, FILM COATED ORAL at 09:37

## 2021-12-03 RX ADMIN — DILTIAZEM HYDROCHLORIDE 180 MG: 60 CAPSULE, EXTENDED RELEASE ORAL at 21:02

## 2021-12-03 RX ADMIN — METOPROLOL TARTRATE 100 MG: 100 TABLET, FILM COATED ORAL at 09:36

## 2021-12-03 RX ADMIN — DILTIAZEM HYDROCHLORIDE 60 MG: 30 TABLET, FILM COATED ORAL at 00:15

## 2021-12-03 RX ADMIN — TORSEMIDE 20 MG: 20 TABLET ORAL at 09:37

## 2021-12-03 RX ADMIN — LEVALBUTEROL TARTRATE 2 PUFF: 45 AEROSOL, METERED ORAL at 21:04

## 2021-12-03 RX ADMIN — PRIMIDONE 250 MG: 250 TABLET ORAL at 09:37

## 2021-12-03 RX ADMIN — FLUTICASONE FUROATE AND VILANTEROL TRIFENATATE 1 PUFF: 100; 25 POWDER RESPIRATORY (INHALATION) at 08:15

## 2021-12-03 RX ADMIN — METOPROLOL TARTRATE 100 MG: 100 TABLET, FILM COATED ORAL at 21:02

## 2021-12-03 RX ADMIN — DILTIAZEM HYDROCHLORIDE 60 MG: 30 TABLET, FILM COATED ORAL at 05:52

## 2021-12-03 RX ADMIN — PRIMIDONE 250 MG: 250 TABLET ORAL at 21:03

## 2021-12-03 RX ADMIN — MICONAZOLE NITRATE: 20 POWDER TOPICAL at 09:38

## 2021-12-03 RX ADMIN — DILTIAZEM HYDROCHLORIDE 60 MG: 30 TABLET, FILM COATED ORAL at 12:37

## 2021-12-03 RX ADMIN — APIXABAN 5 MG: 5 TABLET, FILM COATED ORAL at 21:02

## 2021-12-03 ASSESSMENT — ACTIVITIES OF DAILY LIVING (ADL)
ADLS_ACUITY_SCORE: 13
DEPENDENT_IADLS:: INDEPENDENT
ADLS_ACUITY_SCORE: 13

## 2021-12-03 NOTE — PLAN OF CARE
A&Ox4. VSS ex afib RVR (hr , but mostly 100-110's). Denies pain. Purewick in place. Redness in folds, groin, and under breasts. Interdry in place and miconazole powder applied. Nasal cannula 2 L. CALLAWAY. Up SBA with a walker. Tele SR with prolonged QT. Discharge once cleared by cardiology.

## 2021-12-03 NOTE — CONSULTS
Care Management Initial Consult    General Information  Assessment completed with: Patient,    Type of CM/SW Visit: Initial Assessment    Primary Care Provider verified and updated as needed: Yes   Readmission within the last 30 days:        Reason for Consult: care coordination/care conference,discharge planning  Advance Care Planning:            Communication Assessment  Patient's communication style: spoken language (English or Bilingual)    Hearing Difficulty or Deaf: no   Wear Glasses or Blind: yes    Cognitive  Cognitive/Neuro/Behavioral: WDL                      Living Environment:   People in home: child(lynnette), adult,spouse     Current living Arrangements: house      Able to return to prior arrangements: yes       Family/Social Support:  Care provided by: self  Provides care for: no one  Marital Status:             Description of Support System: Supportive,Involved    Support Assessment: Adequate family and caregiver support    Current Resources:   Patient receiving home care services: No     Community Resources:    Equipment currently used at home: none  Supplies currently used at home:      Employment/Financial:  Employment Status: retired        Financial Concerns: No concerns identified           Lifestyle & Psychosocial Needs:  Social Determinants of Health     Tobacco Use: Low Risk      Smoking Tobacco Use: Never Smoker     Smokeless Tobacco Use: Never Used   Alcohol Use: Not on file   Financial Resource Strain: Not on file   Food Insecurity: Not on file   Transportation Needs: Not on file   Physical Activity: Not on file   Stress: Not on file   Social Connections: Not on file   Intimate Partner Violence: Not on file   Depression: Not at risk     PHQ-2 Score: 0   Housing Stability: Not on file       Functional Status:  Prior to admission patient needed assistance:   Dependent ADLs:: Independent  Dependent IADLs:: Independent       Mental Health Status:  Mental Health Status: No Current  Concerns       Chemical Dependency Status:  Chemical Dependency Status: No Current Concerns             Values/Beliefs:  Spiritual, Cultural Beliefs, Advent Practices, Values that affect care: yes               Additional Information:  CM consulted for discharge planning.  Pt admitted with A Fib with RVR.  Cardiology is following, initially treated with IV amiodarone, currently on metpprolol and diltiazem.    Met with pt at bedside to discuss discharge planning and provide CHF education.  Pt said she lives at home with her  and adult dtg.  She has 7 stairs to get to main level.  Pt worked for [employer] for 30 years, retired as a .  At baseline, pt was independent with all ADL's and IADLs, not on oxygen, used not medical equipment.    Pt said she is an asthmatic and originally thought her symptoms were related to asthma.  Just prior to admission, she was just sitting on  and could blearily make it to bathroom.   Provided and reviewed CHF action plan. Verified understanding using teach back technique. Pt admitted she loves salt and is going to have a hard time following it, but is motivated to follow recommendation.  Pt was going to weight watchers had has lost 30 pounds..  We discussed discharge plan. Pt would like to go home with [facility]. Referral sent.  We did discuss PT recommendation for TCU, but pt said she thinks she can go home. Pt does have history of ITP and her hematologist recommended she NOT get vaccinated.    CM will continue to follow for discharge planning.    Sydni Urena RN, BSN, PHN, CCM  Care Coordinator  [facility]  [phone]

## 2021-12-03 NOTE — PROGRESS NOTES
CLINICAL NUTRITION SERVICES - EDUCATION NOTE    RD recommended diet education for CHF     NUTRITION HISTORY:  Information obtained from patient and chart:    Diet:  2gm Na, 1800mL Fluid Restricition  ?  Living situation:   Lives with   ?  Grocery shopping:    ?  Meal preparation:    ?  Breakfast:  Eggs & desai or homemade Egg 'McMuffin' with [place] desai, thin sliced cheese, tomato, English muffin  ?  Lunch:  Beef stick, Mozzarella stick and maybe a corned beef sandwich with swiss cheese and pickles on pumpernickel bread  ?  Dinner:   Baked chicken, steak, pork chops, hamsteak, meatloaf w/ mashed potatoes, or spaghetti    Snacks/Supplements:  Weight Watchers snacks, banana, or other fruit    Fluids:  Decaf coffee, water  ?  Previous diet instructions:  None, was previously on Weight Watchers    ?  NUTRITION DIAGNOSIS:  Food- and nutrition-related knowledge deficit related to CHF as evidenced by following a high sodium diet      INTERVENTIONS:  Provided instruction on 2 gm Na diet    Provided  the following handouts: Heart Failure Nutrition Therapy    Goals:  Patient verbalizes understanding of diet by adhering to diet recommendations     Follow Up:  Patient to ask any further nutrition-related questions before discharge. In addition, pt may request outpatient RD appointment.     Will follow-up as needed.

## 2021-12-03 NOTE — PROGRESS NOTES
Cardiology Progress Note  Dee TERRAZAS, CNP          Assessment and Plan:     Admit (11/25) with 1 wk worsening shortness of breath  Evidence of rapid A-fib, CHF exacerbation    PMH:  Paroxysmal atrial fibrillation, asthma, hypertension, untreated severe sleep apnea, chronic ITP, diverticulitis, fibromyalgia, epilepsy, morbid obesity, coronary angiogram (2018) showed normal coronaries, hyperlipidemia      Acute HFpEF Exacerbation:  -BNP: 3800 / pulmonary vascular congestion  -ECHO: LVE 55-60% (poor imaging for RWMA), grossly normal RV function, RVSP 24 mmHg, no significant valvular pathology  -improved symptoms with several days of IV lasix, now on Torsemide 40 mg  -(weight down 11 lbs total) ( -6500 total OP and  -575  overnight )  -will reduce Torsemide to 20 mg/day due to mild RI  -low salt diet  -continue gentle diuresis      Asymptomatic Paroxysmal Atrial Fibrillation with RVR (persistent here)  -previously diagnosed  -HRs 90-120s on Metoprolol 100 mg BID and Diltiazem 90 mg QID  -Pt prefers rate control and deferred OWEN/DCCV.  Will transition Diltiazem to 180 mg BID today  -Heparin stopped and Eliquis started (CHADS2-Vasc score: 4)  Was seen by oncology due to her ITP  Some concern for HIT due to PLT drop to 105,000 (113,000 today)      Presumed Sleep Apnea  -refused hospital CPAP   -she is open to outpt sleep evaluation    Morbid Obesity    Hypertension:  -BP controlled               Interval History:     Requiring O2 support when sleeping  No CP, palpitations, or significant shortness of breath.                 Medications:       apixaban ANTICOAGULANT  5 mg Oral BID     diltiazem  60 mg Oral Q6H [facility]     fluticasone-vilanterol  1 puff Inhalation Daily     metoprolol tartrate  100 mg Oral BID     miconazole   Topical BID     primidone  250 mg Oral BID     sodium chloride (PF)  3 mL Intracatheter Q8H     torsemide  40 mg Oral Daily            Physical Exam:   Blood pressure 125/76, pulse 60, temperature  96.8  F (36  C), temperature source Oral, resp. rate 18, weight 133.9 kg (295 lb 3.2 oz), SpO2 98 %, not currently breastfeeding.  Wt Readings from Last 3 Encounters:   12/03/21 133.9 kg (295 lb 3.2 oz)   04/13/21 143.2 kg (315 lb 9.6 oz)   02/28/20 (!) 151.5 kg (334 lb)     I/O last 3 completed shifts:  In: 600 [P.O.:600]  Out: 1175 [Urine:1175]    CONST:  Alert and oriented   LUNGS:  Scattered expiratory wheezes  CARDIO:  Irreg, Irreg  Tachycardic  Diminished heart tones  ABD:  obese  EXT:  1+ bilateral shin edema with chronic skin changes           Data:   TELE:  A-fib 90-30 bpm    CBC  Recent Labs   Lab 11/30/21  0901 11/29/21  0730   WBC 5.6 5.5   HGB 15.1 14.4   * 113*       BMP  Recent Labs   Lab 12/02/21  0952 12/01/21  0603 11/30/21  0901 11/29/21  0730    136 136 139   POTASSIUM 5.0 5.2 4.5 3.4   CHLORIDE 93* 96 95 97   TONIA 9.5 9.2 9.3 9.2   CO2 40* 38* 40* 38*   BUN 25 21 16 13   CR 1.14* 1.02 1.00 1.03   * 116* 107* 104*     Recent Labs   Lab Test 02/28/20  0000 01/29/19  0746   CHOL 235* 238*   HDL 59 58   * 159*   TRIG 134 99   CHOLHDLRATIO 4 4.1       TROP  Lab Results   Component Value Date    TROPI <0.015 02/25/2019    TROPI <0.015 10/16/2018    TROPI <0.015 10/15/2018    TROPI <0.015 10/15/2018    TROPI 0.017 02/29/2016    TROPONIN <0.015 11/26/2021    TROPONIN <0.015 11/25/2021    TROPONIN 0.00 02/29/2016       BNP  No results for input(s): NTBNPI, NTBNP in the last 168 hours.

## 2021-12-03 NOTE — PROGRESS NOTES
[facility]  Hospitalist Progress Note  Cy Doss MD 12/03/2021    Reason for Stay/active problem list      Atrial fibrillation with rapid ventricular response    Acute congestive heart failure with preserved EF due to rapid A. fib         Assessment and Plan:        Summary of Stay: Gema Coombs is a 71 year old female patient with past medical history of paroxysmal atrial fibrillation, bronchial asthma, hypertension, obstructive sleep apnea, morbid obesity, GERD, fibromyalgia, history of generalized nonconvulsive epilepsy, prior history of diverticulitis, status post laparoscopic-assisted sigmoid colectomy, came to emergency room for evaluation for shortness of breath.  Patient stated that she has been having progressive shortness of breath over the past 1 week.      EKG showed atrial fibrillation with rapid ventricular response.  Chest x-ray showed cardiac enlargement with pulmonary vascular congestion, interstitial opacities in both lungs suggestive of congestive heart failure.  There is also right-sided pleural fluid.  Emergency room, she was given Lasix 40 mg IV x1 dose, diltiazem 20 mg IV x1 dose, diltiazem 60 mg p.o. x1 dose.  She remained in A. fib with RVR and was started on diltiazem drip.  She was admitted to the hospital for further management.    Problem List with Assessment and Plan    A. fib with RVR.  -Initially patient was treated with IV diltiazem infusion but had to be stopped due to low blood pressure.  Instead started on IV amiodarone which was also discontinued.  --Heart rate  Control variable ,     --Cardiology following, recommendation noted to titrate medications for better control of her heart rate.    --Currently on metoprolol 100 mg twice a day and diltiazem 180 mg bid.  Digoxin considered.  -Was treated with heparin which was changed to Eliquis  -History of ITP.  Monitor platelets closely.  -Echocardiogram shows normal ejection fraction.  No significant  valvulopathy.  -- continue rate control medications for now      Acute ongestive heart failure with preserved EF  -Due to A. fib with RVR.  -Pulmonary edema on chest x-ray at admission.  -Was treated with IV furosemide and she improved.  Oral torsemide started currently at 20 mg a day.  Cardiology managing medication titration   Intake/Output Summary (Last 24 hours) at 11/30/2021 1139  Last data filed at 11/29/2021 2130  Gross per 24 hour   Intake --   Output 1450 ml   Net -1450 ml     Wt Readings from Last 2 Encounters:   12/03/21 133.9 kg (295 lb 3.2 oz)   04/13/21 143.2 kg (315 lb 9.6 oz)        History of asthma  -Continue maintenance inhaler.     Morbid obesity  -Further complicates the care.  -- continue PT , OT      History of ITP  -Monitor platelet count daily.    -Patient sees Dr. Patel from [place] [facility].  -Heme-onc consultation obtained and recommendation noted to continue anticoagulation.  Patient has chronic ITP and no need to treat A. fib platelet is over 30,000.     Anxiety: Patient appears to be anxious today due to diagnosis of congestive heart failure  --Ativan as needed    Acute respiratory failure with hypoxia due to CHF   -- On nasal oxygen , improving   -- continue to wean down as able       Plan for today:    Wean off oxygen as able     Continue PT OT and discharge planning    Continue to monitor on telemetry on current medications document heart rate control  Control          LDA     Access : Peripheral     Celaya Catheter: Not present        FEN :    Orders Placed This Encounter      2 Gram Sodium Diet           Intake/Output Summary (Last 24 hours) at 11/29/2021 1747  Last data filed at 11/29/2021 1312  Gross per 24 hour   Intake --   Output 1900 ml   Net -1900 ml          DVT Prophylaxis:     DOAC    Code Status:      Full Code    Estimated discharge  disposition and plan:      May discharge  to home with home services versus TCU when okay with cardiology         Interval History  (Subjective):        Patient is seen and examined by me today and medical record reviewed.Overnight events noted and care discussed with nursing staff.  She is not feeling well today.  Feels better , no cp or sob. Discussed cardiology team plan        Physical Exam:        Last Vital Signs:  /76 (BP Location: Right arm)   Pulse 60   Temp 96.8  F (36  C) (Oral)   Resp 18   Wt 133.9 kg (295 lb 3.2 oz)   SpO2 98%   BMI 52.29 kg/m      I/O last 3 completed shifts:  In: 600 [P.O.:600]  Out: 1175 [Urine:1175]  Vitals:    11/28/21 0346 11/29/21 0605 11/30/21 1100 12/01/21 0549   Weight: 138.5 kg (305 lb 6.4 oz) 137.6 kg (303 lb 6.4 oz) 135.9 kg (299 lb 8 oz) 135.2 kg (298 lb)    12/03/21 0606   Weight: 133.9 kg (295 lb 3.2 oz)       Wt Readings from Last 5 Encounters:   12/03/21 133.9 kg (295 lb 3.2 oz)   04/13/21 143.2 kg (315 lb 9.6 oz)   02/28/20 (!) 151.5 kg (334 lb)   08/16/19 141.1 kg (311 lb)   03/11/19 132.9 kg (293 lb)        Constitutional: Awake, alert, cooperative, no apparent distress     Respiratory: Clear to auscultation bilaterally, no crackles or wheezing   Cardiovascular:  Irregularly irregular heart, tachycardic   Abdomen: Normal bowel sounds, soft, non-distended, non-tender   Skin: No new rashes, no cyanosis, dry to touch   Neuro: Alert with  no new focal weakness   Extremities:  Trace edema, normal range of motion   Other(s):        All other systems: Negative          Medications:        All current medications were reviewed with changes reflected in problem list.         Data:      All new lab and imaging data was reviewed.      Data reviewed today: I reviewed all new labs and imaging results over the last 24 hours. I personally reviewed no images or EKG's today      Recent Labs   Lab 11/30/21  0901 11/29/21  0730 11/28/21  0603   WBC 5.6 5.5 4.1   HGB 15.1 14.4 13.2   HCT 49.1* 47.3* 42.7   MCV 95 95 95   * 113* 105*     No results for input(s): CULT in the last 168 hours.  Recent  Labs   Lab 12/02/21  0952 12/01/21  0603 11/30/21  0901    136 136   POTASSIUM 5.0 5.2 4.5   CHLORIDE 93* 96 95   CO2 40* 38* 40*   ANIONGAP 2* 2* 1*   * 116* 107*   BUN 25 21 16   CR 1.14* 1.02 1.00   GFRESTIMATED 49* 56* 58*   TONIA 9.5 9.2 9.3       Recent Labs   Lab 12/02/21  0952 12/01/21  0603 11/30/21  0901 11/29/21  0730   * 116* 107* 104*       No results for input(s): INR in the last 168 hours.      No results for input(s): TROPONIN, TROPI, TROPR in the last 168 hours.    Invalid input(s): TROP, TROPONINIES    No results found for this or any previous visit (from the past 48 hour(s)).    COVID Status:  COVID-19 PCR Results    COVID-19 PCR Results 11/25/21   SARS CoV2 PCR Negative      Comments are available for some flowsheets but are not being displayed.         COVID-19 Antibody Results, Testing for Immunity    COVID-19 Antibody Results, Testing for Immunity   No data to display.              Disclaimer: This note consists of symbols derived from keyboarding, dictation and/or voice recognition software. As a result, there may be errors in the script that have gone undetected. Please consider this when interpreting information found in this chart.

## 2021-12-03 NOTE — PLAN OF CARE
Pt alert and oriented x 4. Up with SBA with gait belt and walker. Denies pain. On 2 gm Na diet with 1800 mL fluid restriction. Pure wick in place. On PRN metoprolol for HR > 120.  Tele Afib controlled HR 80s - 90s    /75 (BP Location: Right arm)   Pulse 72   Temp (!) 96.3  F (35.7  C) (Oral)   Resp 24   Wt 135.2 kg (298 lb)   SpO2 97%   BMI 52.79 kg/m

## 2021-12-04 ENCOUNTER — APPOINTMENT (OUTPATIENT)
Dept: PHYSICAL THERAPY | Facility: CLINIC | Age: 71
DRG: 308 | End: 2021-12-04
Payer: COMMERCIAL

## 2021-12-04 LAB
ERYTHROCYTE [DISTWIDTH] IN BLOOD BY AUTOMATED COUNT: 14.3 % (ref 10–15)
HCT VFR BLD AUTO: 48.9 % (ref 35–47)
HGB BLD-MCNC: 14.7 G/DL (ref 11.7–15.7)
HOLD SPECIMEN: NORMAL
MCH RBC QN AUTO: 28.6 PG (ref 26.5–33)
MCHC RBC AUTO-ENTMCNC: 30.1 G/DL (ref 31.5–36.5)
MCV RBC AUTO: 95 FL (ref 78–100)
PLATELET # BLD AUTO: 102 10E3/UL (ref 150–450)
RBC # BLD AUTO: 5.14 10E6/UL (ref 3.8–5.2)
WBC # BLD AUTO: 5.8 10E3/UL (ref 4–11)

## 2021-12-04 PROCEDURE — 250N000013 HC RX MED GY IP 250 OP 250 PS 637: Performed by: NURSE PRACTITIONER

## 2021-12-04 PROCEDURE — 99233 SBSQ HOSP IP/OBS HIGH 50: CPT | Performed by: INTERNAL MEDICINE

## 2021-12-04 PROCEDURE — 120N000001 HC R&B MED SURG/OB

## 2021-12-04 PROCEDURE — 250N000013 HC RX MED GY IP 250 OP 250 PS 637: Performed by: INTERNAL MEDICINE

## 2021-12-04 PROCEDURE — 36415 COLL VENOUS BLD VENIPUNCTURE: CPT | Performed by: NURSE PRACTITIONER

## 2021-12-04 PROCEDURE — 250N000011 HC RX IP 250 OP 636: Performed by: INTERNAL MEDICINE

## 2021-12-04 PROCEDURE — 97116 GAIT TRAINING THERAPY: CPT | Mod: GP

## 2021-12-04 PROCEDURE — 85014 HEMATOCRIT: CPT | Performed by: NURSE PRACTITIONER

## 2021-12-04 RX ORDER — DIGOXIN 0.25 MG/ML
500 INJECTION INTRAMUSCULAR; INTRAVENOUS EVERY 12 HOURS
Status: COMPLETED | OUTPATIENT
Start: 2021-12-04 | End: 2021-12-04

## 2021-12-04 RX ORDER — DIGOXIN 125 MCG
125 TABLET ORAL DAILY
Status: DISCONTINUED | OUTPATIENT
Start: 2021-12-05 | End: 2021-12-06 | Stop reason: HOSPADM

## 2021-12-04 RX ADMIN — APIXABAN 5 MG: 5 TABLET, FILM COATED ORAL at 10:03

## 2021-12-04 RX ADMIN — MICONAZOLE NITRATE: 20 POWDER TOPICAL at 10:04

## 2021-12-04 RX ADMIN — DIGOXIN 500 MCG: 250 INJECTION, SOLUTION INTRAMUSCULAR; INTRAVENOUS; PARENTERAL at 22:11

## 2021-12-04 RX ADMIN — DIGOXIN 500 MCG: 250 INJECTION, SOLUTION INTRAMUSCULAR; INTRAVENOUS; PARENTERAL at 11:56

## 2021-12-04 RX ADMIN — PRIMIDONE 250 MG: 250 TABLET ORAL at 22:09

## 2021-12-04 RX ADMIN — APIXABAN 5 MG: 5 TABLET, FILM COATED ORAL at 22:10

## 2021-12-04 RX ADMIN — DILTIAZEM HYDROCHLORIDE 180 MG: 60 CAPSULE, EXTENDED RELEASE ORAL at 10:03

## 2021-12-04 RX ADMIN — LEVALBUTEROL TARTRATE 2 PUFF: 45 AEROSOL, METERED ORAL at 23:09

## 2021-12-04 RX ADMIN — METOPROLOL TARTRATE 100 MG: 100 TABLET, FILM COATED ORAL at 22:09

## 2021-12-04 RX ADMIN — LEVALBUTEROL TARTRATE 2 PUFF: 45 AEROSOL, METERED ORAL at 11:58

## 2021-12-04 RX ADMIN — FLUTICASONE FUROATE AND VILANTEROL TRIFENATATE 1 PUFF: 100; 25 POWDER RESPIRATORY (INHALATION) at 10:04

## 2021-12-04 RX ADMIN — TORSEMIDE 20 MG: 20 TABLET ORAL at 10:03

## 2021-12-04 RX ADMIN — METOPROLOL TARTRATE 100 MG: 100 TABLET, FILM COATED ORAL at 10:03

## 2021-12-04 RX ADMIN — PRIMIDONE 250 MG: 250 TABLET ORAL at 10:03

## 2021-12-04 RX ADMIN — DILTIAZEM HYDROCHLORIDE 180 MG: 60 CAPSULE, EXTENDED RELEASE ORAL at 22:09

## 2021-12-04 ASSESSMENT — ACTIVITIES OF DAILY LIVING (ADL)
ADLS_ACUITY_SCORE: 13

## 2021-12-04 NOTE — PLAN OF CARE
A/O, SBA walker. Tele Afib CVR. Denies CP/SOB. 2L O2, LS diminished. Pt on PO torsemide, diltz, and metoprolol. Purewick in placed with good UOP. Interdry and powder intact. PT/OT following.  Continue POC. VS: /61 (BP Location: Right arm)   Pulse 70   Temp 97.8  F (36.6  C) (Oral)   Resp 18   Wt 133.9 kg (295 lb 3.2 oz)   SpO2 97%   BMI 52.29 kg/m

## 2021-12-04 NOTE — PROGRESS NOTES
[facility]  Hospitalist Progress Note  Cy Doss MD 12/04/2021    Reason for Stay/active problem list      Atrial fibrillation with rapid ventricular response    Acute congestive heart failure with preserved EF due to rapid A. fib         Assessment and Plan:        Summary of Stay: Gema Coombs is a 71 year old female patient with past medical history of paroxysmal atrial fibrillation, bronchial asthma, hypertension, obstructive sleep apnea, morbid obesity, GERD, fibromyalgia, history of generalized nonconvulsive epilepsy, prior history of diverticulitis, status post laparoscopic-assisted sigmoid colectomy, came to emergency room for evaluation for shortness of breath.  Patient stated that she has been having progressive shortness of breath over the past 1 week.      EKG showed atrial fibrillation with rapid ventricular response.  Chest x-ray showed cardiac enlargement with pulmonary vascular congestion, interstitial opacities in both lungs suggestive of congestive heart failure.  There is also right-sided pleural fluid.  Emergency room, she was given Lasix 40 mg IV x1 dose, diltiazem 20 mg IV x1 dose, diltiazem 60 mg p.o. x1 dose.  She remained in A. fib with RVR and was started on diltiazem drip.  She was admitted to the hospital for further management.    Problem List with Assessment and Plan    A. fib with RVR.  -Initially patient was treated with IV diltiazem infusion but had to be stopped due to low blood pressure.  Instead started on IV amiodarone which was also discontinued.  --Heart rate  Control variable ,     --Cardiology following, recommendation noted to titrate medications for better control of her heart rate.    --Currently on metoprolol 100 mg twice a day and diltiazem 180 mg bid.  Digoxin considered.  -Was treated with heparin which was changed to Eliquis  -History of ITP.  Monitor platelets closely.  -Echocardiogram shows normal ejection fraction.  No significant  valvulopathy.  -- currently on Metoprolol 100 mg bid and Cardizem 180 mg bid   HR 'S on tele ,may be faster with activity, will get her up and walk to check her oxygen sat and HR      Acute ongestive heart failure with preserved EF  -Due to A. fib with RVR.  -Pulmonary edema on chest x-ray at admission.  -Was treated with IV furosemide and she improved.  Oral torsemide started currently at 20 mg a day.  Cardiology managing medication titration   Intake/Output Summary (Last 24 hours) at 11/30/2021 1139  Last data filed at 11/29/2021 2130  Gross per 24 hour   Intake --   Output 1450 ml   Net -1450 ml     Wt Readings from Last 2 Encounters:   12/04/21 133.7 kg (294 lb 12.8 oz)   04/13/21 143.2 kg (315 lb 9.6 oz)        History of asthma  -Continue maintenance inhaler.     Morbid obesity  -Further complicates the care.  -- continue PT , OT      History of ITP  -Monitor platelet count daily.    -Patient sees Dr. Patel from [place] [facility].  -Heme-onc consultation obtained and recommendation noted to continue anticoagulation.  Patient has chronic ITP and no need to treat A. fib platelet is over 30,000.     Anxiety: Patient appears to be anxious today due to diagnosis of congestive heart failure  --Ativan as needed    Acute respiratory failure with hypoxia due to CHF   -- On nasal oxygen , improving   -- continue to wean down as able       Plan for today:    Wean off oxygen as able - May need home oxygen     Continue PT OT and discharge planning- home care     Discuss with cardiology med titration - ? dig          LDA     Access : Peripheral     Celaya Catheter: Not present        FEN :    Orders Placed This Encounter      2 Gram Sodium Diet           Intake/Output Summary (Last 24 hours) at 11/29/2021 1747  Last data filed at 11/29/2021 1312  Gross per 24 hour   Intake --   Output 1900 ml   Net -1900 ml          DVT Prophylaxis:     DOAC    Code Status:      Full Code    Estimated discharge  disposition and plan:       May need to push discharge to am due to heart rate control         Interval History (Subjective):        Patient is seen and examined by me today and medical record reviewed.Overnight events noted and care discussed with nursing staff.  She is not feeling well today.  Feels better , no cp or sob. HR reviewed on tele        Physical Exam:        Last Vital Signs:  /61 (BP Location: Right arm)   Pulse 70   Temp 97.8  F (36.6  C) (Oral)   Resp 18   Wt 133.7 kg (294 lb 12.8 oz)   SpO2 97%   BMI 52.22 kg/m      I/O last 3 completed shifts:  In: 560 [P.O.:560]  Out: 300 [Urine:300]  Vitals:    11/29/21 0605 11/30/21 1100 12/01/21 0549 12/03/21 0606   Weight: 137.6 kg (303 lb 6.4 oz) 135.9 kg (299 lb 8 oz) 135.2 kg (298 lb) 133.9 kg (295 lb 3.2 oz)    12/04/21 0549   Weight: 133.7 kg (294 lb 12.8 oz)       Wt Readings from Last 5 Encounters:   12/04/21 133.7 kg (294 lb 12.8 oz)   04/13/21 143.2 kg (315 lb 9.6 oz)   02/28/20 (!) 151.5 kg (334 lb)   08/16/19 141.1 kg (311 lb)   03/11/19 132.9 kg (293 lb)        Constitutional: Awake, alert, cooperative, no apparent distress     Respiratory: Clear to auscultation bilaterally, no crackles or wheezing   Cardiovascular:  Irregularly irregular heart, tachycardic   Abdomen: Normal bowel sounds, soft, non-distended, non-tender   Skin: No new rashes, no cyanosis, dry to touch   Neuro: Alert with  no new focal weakness   Extremities:  Trace edema, normal range of motion   Other(s):        All other systems: Negative          Medications:        All current medications were reviewed with changes reflected in problem list.         Data:      All new lab and imaging data was reviewed.      Data reviewed today: I reviewed all new labs and imaging results over the last 24 hours. I personally reviewed no images or EKG's today      Recent Labs   Lab 12/04/21  0616 11/30/21  0901 11/29/21  0730   WBC 5.8 5.6 5.5   HGB 14.7 15.1 14.4   HCT 48.9* 49.1* 47.3*   MCV 95 95 95    * 117* 113*     No results for input(s): CULT in the last 168 hours.  Recent Labs   Lab 12/02/21  0952 12/01/21  0603 11/30/21  0901    136 136   POTASSIUM 5.0 5.2 4.5   CHLORIDE 93* 96 95   CO2 40* 38* 40*   ANIONGAP 2* 2* 1*   * 116* 107*   BUN 25 21 16   CR 1.14* 1.02 1.00   GFRESTIMATED 49* 56* 58*   TONIA 9.5 9.2 9.3       Recent Labs   Lab 12/02/21  0952 12/01/21  0603 11/30/21  0901 11/29/21  0730   * 116* 107* 104*       No results for input(s): INR in the last 168 hours.      No results for input(s): TROPONIN, TROPI, TROPR in the last 168 hours.    Invalid input(s): TROP, TROPONINIES    No results found for this or any previous visit (from the past 48 hour(s)).    COVID Status:  COVID-19 PCR Results    COVID-19 PCR Results 11/25/21   SARS CoV2 PCR Negative      Comments are available for some flowsheets but are not being displayed.         COVID-19 Antibody Results, Testing for Immunity    COVID-19 Antibody Results, Testing for Immunity   No data to display.              Disclaimer: This note consists of symbols derived from keyboarding, dictation and/or voice recognition software. As a result, there may be errors in the script that have gone undetected. Please consider this when interpreting information found in this chart.

## 2021-12-04 NOTE — PROGRESS NOTES
Cardiology brief note.  Atrial fibrillation with rapid ventricular response, difficult to control rates.  Add digoxin IV load with orals tomorrow.  If still suboptimal heart rate control, would need to strongly consider OWEN and cardioversion on Monday.

## 2021-12-04 NOTE — PLAN OF CARE
/78   Pulse 74   Temp 97  F (36.1  C) (Oral)   Resp 18   Wt 133.7 kg (294 lb 12.8 oz)   SpO2 96%   BMI 52.22 kg/m         A/OX4. SBA with walker and gait belt. 2 gm Na diet,1800 fluid restriction. CALLAWAY. RA. Denies pain. 1800ml fluid restriction observed. IV lasix. IV push dig given for high HR. Purewick in place. C/O SOB PRN inhaler given which was effective. See MAR. Creat:1.14.Tele: Afib VVR HR 95. Cards following.

## 2021-12-05 ENCOUNTER — APPOINTMENT (OUTPATIENT)
Dept: OCCUPATIONAL THERAPY | Facility: CLINIC | Age: 71
DRG: 308 | End: 2021-12-05
Payer: COMMERCIAL

## 2021-12-05 PROCEDURE — 250N000013 HC RX MED GY IP 250 OP 250 PS 637: Performed by: INTERNAL MEDICINE

## 2021-12-05 PROCEDURE — 120N000001 HC R&B MED SURG/OB

## 2021-12-05 PROCEDURE — 250N000013 HC RX MED GY IP 250 OP 250 PS 637: Performed by: NURSE PRACTITIONER

## 2021-12-05 PROCEDURE — 99232 SBSQ HOSP IP/OBS MODERATE 35: CPT | Performed by: INTERNAL MEDICINE

## 2021-12-05 PROCEDURE — 99233 SBSQ HOSP IP/OBS HIGH 50: CPT | Performed by: INTERNAL MEDICINE

## 2021-12-05 PROCEDURE — 97535 SELF CARE MNGMENT TRAINING: CPT | Mod: GO

## 2021-12-05 RX ADMIN — DILTIAZEM HYDROCHLORIDE 180 MG: 60 CAPSULE, EXTENDED RELEASE ORAL at 20:32

## 2021-12-05 RX ADMIN — TORSEMIDE 20 MG: 20 TABLET ORAL at 08:29

## 2021-12-05 RX ADMIN — METOPROLOL TARTRATE 100 MG: 100 TABLET, FILM COATED ORAL at 08:29

## 2021-12-05 RX ADMIN — METOPROLOL TARTRATE 100 MG: 100 TABLET, FILM COATED ORAL at 20:32

## 2021-12-05 RX ADMIN — APIXABAN 5 MG: 5 TABLET, FILM COATED ORAL at 08:29

## 2021-12-05 RX ADMIN — FLUTICASONE FUROATE AND VILANTEROL TRIFENATATE 1 PUFF: 100; 25 POWDER RESPIRATORY (INHALATION) at 08:30

## 2021-12-05 RX ADMIN — DILTIAZEM HYDROCHLORIDE 180 MG: 60 CAPSULE, EXTENDED RELEASE ORAL at 08:28

## 2021-12-05 RX ADMIN — PRIMIDONE 250 MG: 250 TABLET ORAL at 20:33

## 2021-12-05 RX ADMIN — PRIMIDONE 250 MG: 250 TABLET ORAL at 08:29

## 2021-12-05 RX ADMIN — MICONAZOLE NITRATE: 20 POWDER TOPICAL at 08:30

## 2021-12-05 RX ADMIN — APIXABAN 5 MG: 5 TABLET, FILM COATED ORAL at 20:30

## 2021-12-05 RX ADMIN — DIGOXIN 125 MCG: 125 TABLET ORAL at 08:29

## 2021-12-05 RX ADMIN — MICONAZOLE NITRATE: 20 POWDER TOPICAL at 20:33

## 2021-12-05 ASSESSMENT — ACTIVITIES OF DAILY LIVING (ADL)
ADLS_ACUITY_SCORE: 13

## 2021-12-05 NOTE — PLAN OF CARE
A/O, SBA walker. Pt had 2x 2.4 sec pause and a 2.8 sec pause on nights per Tele report. Pt VSS. Denies CP/SOB. Remains Afib CVR on Tele. MD notified and reported episodes to day RN. Will continue to monitor. VS: /53 (BP Location: Right arm)   Pulse 68   Temp 97.1  F (36.2  C) (Oral)   Resp 18   Wt 133.7 kg (294 lb 12.8 oz)   SpO2 97%   BMI 52.22 kg/m

## 2021-12-05 NOTE — PLAN OF CARE
Continues vitals stable and afebrile.  present and supportive. Watching tv, sleeping between cares. Deies pain.

## 2021-12-05 NOTE — PROGRESS NOTES
Cardiology Progress Note   Date of service: 21       Assessment and Plan:     1. Persistent atrial fibrillation, rate controlled on triple AV blockade    Would send patient home on a 1 week monitor to watch for any additional bradycardia.    Continue anticoagulation.  Could consider outpatient cardioversion in 3+ weeks if patient not feeling well enough from an exertional standpoint.    Eventually, may need additional electrophysiology support such as AV node ablation or pacemaker depending on tachycardia versus bradycardia.    If she does get cardioversion in the future, would hold some of her AV callie blockade agents prior to the day of cardioversion to prevent sick sinus syndrome.    May follow-up in cardiology clinic 2 to 4 weeks with Dee Aldridge or Dr. Figueroa.  We will sign off, please call with questions.                   Interval History:   Sleeping comfortably, atrial fibrillation rates better controlled with the addition of digoxin.  2.8-second pause while sleeping, asymptomatic.              Review of Systems:   As per subjective, otherwise 5 systems reviewed and negative.           Physical Exam:     Vitals were reviewed  Blood pressure 119/61, pulse 77, temperature (!) 96  F (35.6  C), temperature source Oral, resp. rate 16, weight 133.4 kg (294 lb), SpO2 98 %, not currently breastfeeding.  Temperatures:  Current - Temp: (!) 96  F (35.6  C); Max - Temp  Av.6  F (35.9  C)  Min: 95.7  F (35.4  C)  Max: 97.1  F (36.2  C)  Respiration range: Resp  Av.7  Min: 16  Max: 18  Pulse range: Pulse  Av  Min: 54  Max: 131  Blood pressure range: Systolic (24hrs), Av , Min:110 , Max:123   ; Diastolic (24hrs), Av, Min:52, Max:80    Pulse oximetry range: SpO2  Av.9 %  Min: 96 %  Max: 98 %    Intake/Output Summary (Last 24 hours) at 2021 0977  Last data filed at 2021 0752  Gross per 24 hour   Intake 840 ml   Output 300 ml   Net 540 ml       Constitutional:   Sleeping comfortably,  no apparent distress, and appears stated age     Eyes:   Lids and lashes normal     Neck:   supple, symmetrical, trachea midline     Back:   symmetric     Lungs:   Symmetric     Cardiovascular:   Atrial fibrillation     Abdomen:   benign     Musculoskeletal:   motor strength is 5 out of 5 all extremities bilaterally     Neurologic:   Grossly nonfocal              Medications:     Current Facility-Administered Medications Ordered in Epic   Medication Dose Route Frequency Last Rate Last Admin     apixaban ANTICOAGULANT (ELIQUIS) tablet 5 mg  5 mg Oral BID   5 mg at 12/05/21 0829     digoxin (LANOXIN) tablet 125 mcg  125 mcg Oral Daily   125 mcg at 12/05/21 0829     diltiazem ER (CARDIZEM SR) 12 hr capsule 180 mg  180 mg Oral BID   180 mg at 12/05/21 0828     fluticasone-vilanterol (BREO ELLIPTA) 100-25 MCG/INH inhaler 1 puff  1 puff Inhalation Daily   1 puff at 12/05/21 0830     levalbuterol (XOPENEX HFA) Inhaler 2 puff  2 puff Inhalation Q6H PRN   2 puff at 12/04/21 2309     lidocaine (LMX4) cream   Topical Q1H PRN         lidocaine 1 % 0.1-1 mL  0.1-1 mL Other Q1H PRN         LORazepam (ATIVAN) half-tab 0.25-0.5 mg  0.25-0.5 mg Oral Q4H PRN         melatonin tablet 1 mg  1 mg Oral At Bedtime PRN         metoprolol (LOPRESSOR) injection 2.5 mg  2.5 mg Intravenous Q4H PRN   2.5 mg at 12/02/21 1409     metoprolol tartrate (LOPRESSOR) tablet 100 mg  100 mg Oral BID   100 mg at 12/05/21 0829     miconazole (MICATIN) 2 % powder   Topical BID   Given at 12/05/21 0830     nitroGLYcerin (NITROSTAT) sublingual tablet 0.4 mg  0.4 mg Sublingual Q5 Min PRN         ondansetron (ZOFRAN-ODT) ODT tab 4 mg  4 mg Oral Q6H PRN        Or     ondansetron (ZOFRAN) injection 4 mg  4 mg Intravenous Q6H PRN         primidone (MYSOLINE) tablet 250 mg  250 mg Oral BID   250 mg at 12/05/21 0829     senna-docusate (SENOKOT-S/PERICOLACE) 8.6-50 MG per tablet 1 tablet  1 tablet Oral BID PRN        Or     senna-docusate (SENOKOT-S/PERICOLACE)  8.6-50 MG per tablet 2 tablet  2 tablet Oral BID PRN         sodium chloride (PF) 0.9% PF flush 3 mL  3 mL Intracatheter Q8H   3 mL at 12/05/21 0830     sodium chloride (PF) 0.9% PF flush 3 mL  3 mL Intracatheter q1 min prn   3 mL at 12/04/21 1156     torsemide (DEMADEX) tablet 20 mg  20 mg Oral Daily   20 mg at 12/05/21 0829     No current [facility]-ordered outpatient medications on file.                Data:   All laboratory data reviewed  No results found for this or any previous visit (from the past 24 hour(s)).    All laboratory data reviewed  Lab Results   Component Value Date    CHOL 235 02/28/2020     Lab Results   Component Value Date    HDL 59 02/28/2020     Lab Results   Component Value Date     02/28/2020     01/29/2019     Lab Results   Component Value Date    TRIG 134 02/28/2020     Lab Results   Component Value Date    CHOLHDLRATIO 4 02/28/2020     TSH   Date Value Ref Range Status   11/26/2021 1.68 0.40 - 4.00 mU/L Final   10/15/2018 2.83 0.40 - 4.00 mU/L Final     Last Basic Metabolic Panel:  Lab Results   Component Value Date     12/02/2021    .9 04/13/2021      Lab Results   Component Value Date    POTASSIUM 5.0 12/02/2021    POTASSIUM 4.51 04/13/2021     Lab Results   Component Value Date    CHLORIDE 93 12/02/2021    CHLORIDE 104.8 04/13/2021     Lab Results   Component Value Date    TONIA 9.5 12/02/2021    TONIA 9.0 04/13/2021     Lab Results   Component Value Date    CO2 40 12/02/2021    CO2 30.5 04/13/2021     Lab Results   Component Value Date    BUN 25 12/02/2021    BUN 11 04/13/2021    BUN 14.9 04/13/2021     Lab Results   Component Value Date    CR 1.14 12/02/2021    CR 0.74 04/13/2021     Lab Results   Component Value Date     12/02/2021     04/13/2021     Lab Results   Component Value Date    WBC 5.8 12/04/2021    WBC 5.2 04/13/2021     Lab Results   Component Value Date    RBC 5.14 12/04/2021    RBC 4.91 04/13/2021     Lab Results   Component Value Date     HGB 14.7 12/04/2021    HGB 14.7 04/13/2021     Lab Results   Component Value Date    HCT 48.9 12/04/2021    HCT 45.0 04/13/2021     Lab Results   Component Value Date    MCV 95 12/04/2021    MCV 91.7 04/13/2021     Lab Results   Component Value Date    MCH 28.6 12/04/2021    MCH 29.9 04/13/2021     Lab Results   Component Value Date    MCHC 30.1 12/04/2021    MCHC 32.7 04/13/2021     Lab Results   Component Value Date    RDW 14.3 12/04/2021    RDW 13.3 02/27/2019     Lab Results   Component Value Date     12/04/2021     04/13/2021     02/27/2019

## 2021-12-05 NOTE — PLAN OF CARE
/61 (BP Location: Right arm)   Pulse 77   Temp (!) 96  F (35.6  C) (Oral)   Resp 16   Wt 133.4 kg (294 lb)   SpO2 98%   BMI 52.08 kg/m       A/OX4. SBA with walker and GB. 2 gm Na diet,1800 fluid restriction. CALLAWAY. 2L NC. Denies pain.PO torsemide lasix.PO dig. Purewick in place-good output. Creat:1.14.Tele: Afib CVR with frequent pauses HR 57. Cards following. Plan to have Zio patch placed tomorrow before discharge. Wean O2. If unable to wean need home O2 assessment prior to discharge.

## 2021-12-05 NOTE — PROGRESS NOTES
[facility]  Hospitalist Progress Note  Cy Doss MD 12/05/2021    Reason for Stay/active problem list      Atrial fibrillation with rapid ventricular response    Acute congestive heart failure with preserved EF due to rapid A. fib         Assessment and Plan:        Summary of Stay: Gema Coombs is a 71 year old female patient with past medical history of paroxysmal atrial fibrillation, bronchial asthma, hypertension, obstructive sleep apnea, morbid obesity, GERD, fibromyalgia, history of generalized nonconvulsive epilepsy, prior history of diverticulitis, status post laparoscopic-assisted sigmoid colectomy, came to emergency room for evaluation for shortness of breath.  Patient stated that she has been having progressive shortness of breath over the past 1 week.      EKG showed atrial fibrillation with rapid ventricular response.  Chest x-ray showed cardiac enlargement with pulmonary vascular congestion, interstitial opacities in both lungs suggestive of congestive heart failure.  There is also right-sided pleural fluid.  Emergency room, she was given Lasix 40 mg IV x1 dose, diltiazem 20 mg IV x1 dose, diltiazem 60 mg p.o. x1 dose.  She remained in A. fib with RVR and was started on diltiazem drip.  She was admitted to the hospital for further management.    Problem List with Assessment and Plan    A. fib with RVR.  -Initially patient was treated with IV diltiazem infusion but had to be stopped due to low blood pressure.  Instead started on IV amiodarone which was also discontinued.  --Heart rate  Control variable ,     --Cardiology following, recommendation noted to titrate medications for better control of her heart rate.    --Currently on metoprolol 100 mg twice a day and diltiazem 180 mg bid.  Digoxin considered.  -Was treated with heparin which was changed to Eliquis  -History of ITP.  Monitor platelets closely.  -Echocardiogram shows normal ejection fraction.  No significant  valvulopathy.  -- currently on Metoprolol 100 mg bid and Cardizem 180 mg bid   -- digitalized with IV dig 12/4 and started on po dig 12/5 , Heart rate controlled . Cardiology input appreciated and plan to get her zio patch on discharge        Acute ongestive heart failure with preserved EF  -Due to A. fib with RVR.  -Pulmonary edema on chest x-ray at admission.  -Was treated with IV furosemide and she improved.  Oral torsemide started currently at 20 mg a day.  Cardiology managing medication titration   Intake/Output Summary (Last 24 hours) at 11/30/2021 1139  Last data filed at 11/29/2021 2130  Gross per 24 hour   Intake --   Output 1450 ml   Net -1450 ml     Wt Readings from Last 2 Encounters:   12/05/21 133.4 kg (294 lb)   04/13/21 143.2 kg (315 lb 9.6 oz)        History of asthma  -Continue maintenance inhaler.     Morbid obesity  -Further complicates the care.  -- continue PT , OT      History of ITP  -Monitor platelet count daily.    -Patient sees Dr. Patel from [facility].  -Heme-onc consultation obtained and recommendation noted to continue anticoagulation.  Patient has chronic ITP and no need to treat A. fib platelet is over 30,000.     Anxiety: Patient appears to be anxious today due to diagnosis of congestive heart failure  --Ativan as needed    Acute respiratory failure with hypoxia due to CHF   -- On nasal oxygen , improving   -- continue to wean down as able       Plan for today:    bWean off oxygen as sushma - May need home oxygen - plan to check for oxygen need in am     monitor on tele today and get her home with monitor tomorrow         LDA     Access : Peripheral     Celaya Catheter: Not present        FEN :    Orders Placed This Encounter      2 Gram Sodium Diet           Intake/Output Summary (Last 24 hours) at 11/29/2021 1747  Last data filed at 11/29/2021 1312  Gross per 24 hour   Intake --   Output 1900 ml   Net -1900 ml          DVT Prophylaxis:     DOAC    Code Status:      Full  Code    Estimated discharge  disposition and plan:  Home tomorrow with monitor         Interval History (Subjective):        Patient is seen and examined by me today and medical record reviewed.Overnight events noted and care discussed with nursing staff.  She is doing well today , hr reviewed and has been well controlled with dig added . No chest pain or sob        Physical Exam:        Last Vital Signs:  /61 (BP Location: Right arm)   Pulse 77   Temp (!) 96  F (35.6  C) (Oral)   Resp 16   Wt 133.4 kg (294 lb)   SpO2 98%   BMI 52.08 kg/m      I/O last 3 completed shifts:  In: 720 [P.O.:720]  Out: 300 [Urine:300]  Vitals:    11/30/21 1100 12/01/21 0549 12/03/21 0606 12/04/21 0549   Weight: 135.9 kg (299 lb 8 oz) 135.2 kg (298 lb) 133.9 kg (295 lb 3.2 oz) 133.7 kg (294 lb 12.8 oz)    12/05/21 0557   Weight: 133.4 kg (294 lb)       Wt Readings from Last 5 Encounters:   12/05/21 133.4 kg (294 lb)   04/13/21 143.2 kg (315 lb 9.6 oz)   02/28/20 (!) 151.5 kg (334 lb)   08/16/19 141.1 kg (311 lb)   03/11/19 132.9 kg (293 lb)        Constitutional: Awake, alert, cooperative, no apparent distress     Respiratory: Clear to auscultation bilaterally, no crackles or wheezing   Cardiovascular:  Irregularly irregular heart, tachycardic   Abdomen: Normal bowel sounds, soft, non-distended, non-tender   Skin: No new rashes, no cyanosis, dry to touch   Neuro: Alert with  no new focal weakness   Extremities:  Trace edema, normal range of motion   Other(s):        All other systems: Negative          Medications:        All current medications were reviewed with changes reflected in problem list.         Data:      All new lab and imaging data was reviewed.      Data reviewed today: I reviewed all new labs and imaging results over the last 24 hours. I personally reviewed no images or EKG's today      Recent Labs   Lab 12/04/21  0616 11/30/21  0901 11/29/21  0730   WBC 5.8 5.6 5.5   HGB 14.7 15.1 14.4   HCT 48.9* 49.1* 47.3*    MCV 95 95 95   * 117* 113*     No results for input(s): CULT in the last 168 hours.  Recent Labs   Lab 12/02/21  0952 12/01/21  0603 11/30/21  0901    136 136   POTASSIUM 5.0 5.2 4.5   CHLORIDE 93* 96 95   CO2 40* 38* 40*   ANIONGAP 2* 2* 1*   * 116* 107*   BUN 25 21 16   CR 1.14* 1.02 1.00   GFRESTIMATED 49* 56* 58*   TONIA 9.5 9.2 9.3       Recent Labs   Lab 12/02/21  0952 12/01/21  0603 11/30/21  0901 11/29/21  0730   * 116* 107* 104*       No results for input(s): INR in the last 168 hours.      No results for input(s): TROPONIN, TROPI, TROPR in the last 168 hours.    Invalid input(s): TROP, TROPONINIES    No results found for this or any previous visit (from the past 48 hour(s)).    COVID Status:  COVID-19 PCR Results    COVID-19 PCR Results 11/25/21   SARS CoV2 PCR Negative      Comments are available for some flowsheets but are not being displayed.         COVID-19 Antibody Results, Testing for Immunity    COVID-19 Antibody Results, Testing for Immunity   No data to display.              Disclaimer: This note consists of symbols derived from keyboarding, dictation and/or voice recognition software. As a result, there may be errors in the script that have gone undetected. Please consider this when interpreting information found in this chart.

## 2021-12-06 ENCOUNTER — APPOINTMENT (OUTPATIENT)
Dept: OCCUPATIONAL THERAPY | Facility: CLINIC | Age: 71
DRG: 308 | End: 2021-12-06
Payer: COMMERCIAL

## 2021-12-06 ENCOUNTER — TELEPHONE (OUTPATIENT)
Dept: FAMILY MEDICINE | Facility: CLINIC | Age: 71
End: 2021-12-06

## 2021-12-06 ENCOUNTER — APPOINTMENT (OUTPATIENT)
Dept: PHYSICAL THERAPY | Facility: CLINIC | Age: 71
DRG: 308 | End: 2021-12-06
Payer: COMMERCIAL

## 2021-12-06 ENCOUNTER — APPOINTMENT (OUTPATIENT)
Dept: CARDIOLOGY | Facility: CLINIC | Age: 71
DRG: 308 | End: 2021-12-06
Attending: INTERNAL MEDICINE
Payer: COMMERCIAL

## 2021-12-06 VITALS
DIASTOLIC BLOOD PRESSURE: 67 MMHG | WEIGHT: 293 LBS | TEMPERATURE: 96.5 F | BODY MASS INDEX: 52.01 KG/M2 | OXYGEN SATURATION: 94 % | HEART RATE: 77 BPM | RESPIRATION RATE: 18 BRPM | SYSTOLIC BLOOD PRESSURE: 150 MMHG

## 2021-12-06 PROCEDURE — 93244 EXT ECG>48HR<7D REV&INTERPJ: CPT | Performed by: INTERNAL MEDICINE

## 2021-12-06 PROCEDURE — 97530 THERAPEUTIC ACTIVITIES: CPT | Mod: GO

## 2021-12-06 PROCEDURE — 250N000013 HC RX MED GY IP 250 OP 250 PS 637: Performed by: INTERNAL MEDICINE

## 2021-12-06 PROCEDURE — 97116 GAIT TRAINING THERAPY: CPT | Mod: GP | Performed by: PHYSICAL THERAPIST

## 2021-12-06 PROCEDURE — 250N000013 HC RX MED GY IP 250 OP 250 PS 637: Performed by: NURSE PRACTITIONER

## 2021-12-06 PROCEDURE — 99239 HOSP IP/OBS DSCHRG MGMT >30: CPT | Performed by: INTERNAL MEDICINE

## 2021-12-06 PROCEDURE — 97530 THERAPEUTIC ACTIVITIES: CPT | Mod: GP | Performed by: PHYSICAL THERAPIST

## 2021-12-06 PROCEDURE — 93242 EXT ECG>48HR<7D RECORDING: CPT

## 2021-12-06 PROCEDURE — 97535 SELF CARE MNGMENT TRAINING: CPT | Mod: GO

## 2021-12-06 RX ORDER — TORSEMIDE 20 MG/1
20 TABLET ORAL DAILY
Qty: 30 TABLET | Refills: 0 | Status: SHIPPED | OUTPATIENT
Start: 2021-12-07 | End: 2022-01-05

## 2021-12-06 RX ORDER — LEVALBUTEROL INHALATION SOLUTION 0.63 MG/3ML
1 SOLUTION RESPIRATORY (INHALATION) EVERY 4 HOURS PRN
Qty: 90 ML | Refills: 0 | Status: SHIPPED | OUTPATIENT
Start: 2021-12-06 | End: 2023-09-08

## 2021-12-06 RX ORDER — DILTIAZEM HYDROCHLORIDE 90 MG/1
180 CAPSULE, EXTENDED RELEASE ORAL 2 TIMES DAILY
Qty: 120 CAPSULE | Refills: 0 | Status: SHIPPED | OUTPATIENT
Start: 2021-12-06 | End: 2022-01-04

## 2021-12-06 RX ORDER — DIGOXIN 125 MCG
125 TABLET ORAL DAILY
Qty: 30 TABLET | Refills: 0 | Status: SHIPPED | OUTPATIENT
Start: 2021-12-07 | End: 2022-01-04

## 2021-12-06 RX ORDER — METOPROLOL TARTRATE 100 MG
100 TABLET ORAL 2 TIMES DAILY
Qty: 60 TABLET | Refills: 0 | Status: SHIPPED | OUTPATIENT
Start: 2021-12-06 | End: 2022-01-04

## 2021-12-06 RX ADMIN — METOPROLOL TARTRATE 100 MG: 100 TABLET, FILM COATED ORAL at 09:05

## 2021-12-06 RX ADMIN — APIXABAN 5 MG: 5 TABLET, FILM COATED ORAL at 09:04

## 2021-12-06 RX ADMIN — FLUTICASONE FUROATE AND VILANTEROL TRIFENATATE 1 PUFF: 100; 25 POWDER RESPIRATORY (INHALATION) at 09:06

## 2021-12-06 RX ADMIN — TORSEMIDE 20 MG: 20 TABLET ORAL at 09:05

## 2021-12-06 RX ADMIN — PRIMIDONE 250 MG: 250 TABLET ORAL at 09:05

## 2021-12-06 RX ADMIN — DILTIAZEM HYDROCHLORIDE 180 MG: 60 CAPSULE, EXTENDED RELEASE ORAL at 09:05

## 2021-12-06 RX ADMIN — LEVALBUTEROL TARTRATE 2 PUFF: 45 AEROSOL, METERED ORAL at 00:42

## 2021-12-06 RX ADMIN — MICONAZOLE NITRATE: 20 POWDER TOPICAL at 09:09

## 2021-12-06 RX ADMIN — DIGOXIN 125 MCG: 125 TABLET ORAL at 09:05

## 2021-12-06 ASSESSMENT — ACTIVITIES OF DAILY LIVING (ADL)
ADLS_ACUITY_SCORE: 13
ADLS_ACUITY_SCORE: 17
ADLS_ACUITY_SCORE: 13
ADLS_ACUITY_SCORE: 13
ADLS_ACUITY_SCORE: 17
ADLS_ACUITY_SCORE: 13
ADLS_ACUITY_SCORE: 17
ADLS_ACUITY_SCORE: 13
ADLS_ACUITY_SCORE: 13

## 2021-12-06 NOTE — TELEPHONE ENCOUNTER
Debi with [facility] called back. She stated that she has already talked with someone with [facility], everything is taken of.

## 2021-12-06 NOTE — PLAN OF CARE
Patient deied pain previous NOC with VSS.  Patient did drop 02 into mid 80's and was placed on 1LNC in which she maintained low 90's will cont to monitor.

## 2021-12-06 NOTE — TELEPHONE ENCOUNTER
Debi with [facility] left a message today regarding the Home Care Referral that was issued for patient. I left a message for Debi  [phone] to call me back.

## 2021-12-06 NOTE — PLAN OF CARE
VSS with slightly elevated BP. O2 sats above 90% on RA all am. Tolerating ambulation with PT. Denies pain. Purewick in place. Good oral intake and output. Education completed, discharge home with Joyce.    BP (!) 150/67 (BP Location: Right leg)   Pulse 77   Temp (!) 96.5  F (35.8  C) (Oral)   Resp 18   Wt 133.2 kg (293 lb 9.6 oz)   SpO2 94%   BMI 52.01 kg/m

## 2021-12-06 NOTE — PROGRESS NOTES
Care Management Discharge Note    Discharge Date: 12/06/2021       Discharge Disposition: Home Care    Discharge Services: None    Discharge DME: None    Discharge Transportation: family or friend will provide    Private pay costs discussed: Not applicable    PAS Confirmation Code:  NA  Patient/family educated on Medicare website which has current facility and service quality ratings: yes    Patient/Family in Agreement with the Plan: yes    Handoff Referral Completed: Yes    Additional Information:  CM following for plan of care and discharge needs.  Previous recommendations were for home care PT/OT. Paged provider Romario requesting home care orders if still indicated.    Update 1210: Provider entered home care orders. Updated [facility] of patient discharge today. AVS updated with home care contact information.     Update 1305: Received call from [facility]. Patient has not established with new provider at [facility]. Home Care unable to start until patient is seen and establishes with new provider at the clinic.  An appointment was scheduled and added to the AVS.  [facility]  Friday December 10th at 1130      Nieves Baker, DANE Baker RN Case Manager  Inpatient Care Coordination  [facility]   [phone]

## 2021-12-06 NOTE — PHARMACY - DISCHARGE MEDICATION RECONCILIATION AND EDUCATION
Discharge medication review for this patient is complete. Medication review is complete.  See EPIC for allergy information and immunization status.   Pharmacist assisted with medication reconciliation of discharge medications with PTA medications.    Patient was educated on the following for apixaban:   - Pathophysiology of disease requiring treatment  - Correct dose and duration of treatment  - Compliance  - The importance of follow-up monitoring  - The potential for drug and/or diet interactions  - Adverse Effects  - Storage  - Women of childbearing Age (if applicable)    Left written materials and instructions (Clinical notes from [facility]) for new medications: Yes    OUTCOMES: Patient verbalized understanding      Time spent: 10 mins    Discharge Medication List     Review of your medicines      START taking      Dose / Directions   apixaban ANTICOAGULANT 5 MG tablet  Commonly known as: ELIQUIS  Indication: Atrial Fibrillation Not Caused By A Heart Valve Problem  Used for: Paroxysmal atrial fibrillation (H)      Dose: 5 mg  Take 1 tablet (5 mg) by mouth 2 times daily  Quantity: 60 tablet  Refills: 0     digoxin 125 MCG tablet  Commonly known as: LANOXIN  Used for: Paroxysmal atrial fibrillation (H)      Dose: 125 mcg  Start taking on: December 7, 2021  Take 1 tablet (125 mcg) by mouth daily  Quantity: 30 tablet  Refills: 0     diltiazem ER 90 MG 12 hr capsule  Commonly known as: CARDIZEM SR  Used for: Paroxysmal atrial fibrillation (H)      Dose: 180 mg  Take 2 capsules (180 mg) by mouth 2 times daily  Quantity: 120 capsule  Refills: 0     levalbuterol 0.63 MG/3ML neb solution  Commonly known as: XOPENEX  Used for: Moderate asthma, unspecified whether complicated, unspecified whether persistent      Dose: 1 ampule  Take 3 mLs (0.63 mg) by nebulization every 4 hours as needed for shortness of breath / dyspnea or wheezing  Quantity: 90 mL  Refills: 0     torsemide 20 MG tablet  Commonly known as: DEMADEX  Used for:  Congestive heart failure, unspecified HF chronicity, unspecified heart failure type (H)      Dose: 20 mg  Start taking on: December 7, 2021  Take 1 tablet (20 mg) by mouth daily  Quantity: 30 tablet  Refills: 0        CONTINUE these medicines which may have CHANGED, or have new prescriptions. If we are uncertain of the size of tablets/capsules you have at home, strength may be listed as something that might have changed.      Dose / Directions   metoprolol tartrate 100 MG tablet  Commonly known as: LOPRESSOR  This may have changed:     medication strength    how much to take  Used for: Paroxysmal atrial fibrillation (H)      Dose: 100 mg  Take 1 tablet (100 mg) by mouth 2 times daily  Quantity: 60 tablet  Refills: 0        CONTINUE these medicines which have NOT CHANGED      Dose / Directions   fluticasone-vilanterol 100-25 MCG/INH inhaler  Commonly known as: BREO ELLIPTA      Dose: 1 puff  Inhale 1 puff into the lungs daily  Refills: 0     primidone 250 MG tablet  Commonly known as: MYSOLINE  Used for: Seizure disorder (H)      Dose: 250 mg  Take 1 tablet (250 mg) by mouth 2 times daily  Quantity: 180 tablet  Refills: 0        STOP taking    albuterol (2.5 MG/3ML) 0.083% neb solution  Commonly known as: PROVENTIL        albuterol 108 (90 Base) MCG/ACT inhaler  Commonly known as: PROAIR HFA/PROVENTIL HFA/VENTOLIN HFA        diltiazem ER COATED BEADS 180 MG 24 hr capsule  Commonly known as: CARDIZEM CD/CARTIA XT              Where to get your medicines      These medications were sent to [id number] - [place] - [address] AT [address]  [facility] [address], [place] [address]    Phone: [phone]     apixaban ANTICOAGULANT 5 MG tablet    digoxin 125 MCG tablet    diltiazem ER 90 MG 12 hr capsule    levalbuterol 0.63 MG/3ML neb solution    metoprolol tartrate 100 MG tablet    torsemide 20 MG tablet

## 2021-12-06 NOTE — PLAN OF CARE
VSS. SPO2 90-93 % weaned off oxygen now RA. LS: Diminished. Tele: Afib CVR w/inverted T's. Afebrile. Denies pain. A&OX4. Up A1 GB&W.  CR 1.14, platelet 102. Pt has purewick in place new one replaced good UOP. Redness to [place] area/abdominal fold and under breast cleaned soap water dried and miconazole powder applied. PIV to the left SL. Cards following, order to apply Zio patch before discharge. Continue monitoring possible discharge tomorrow if stable.

## 2021-12-06 NOTE — PLAN OF CARE
Physical Therapy Discharge Summary    Reason for therapy discharge:    Discharged to home.    Progress towards therapy goal(s). See goals on Care Plan in [facility] electronic health record for goal details.  Goals partially met.  Barriers to achieving goals:   discharge from facility.  Goals written for pt to be modified indep, pt is SBA for all mobility at time of discharge.     Therapy recommendation(s):    Continued therapy is recommended.  Rationale/Recommendations:  HHPT to maximize safety and indep in the home environment.

## 2021-12-06 NOTE — DISCHARGE INSTRUCTIONS
Your home care referral was sent to [facility]  If you haven't heard from them within the next 24-48 hours,  Please call them at [phone]    Home Care is unable to start until you are seen and establish care with a new provider at the clinic.   Your hospital follow up appointment has been scheduled for you with Dr. Adame at [facility] for Friday December 10 at 11:30 AM. Please bring your hospital discharge instructions, a photo ID, and insurance information with you to your appointment. Please call the clinic at [phone] if you need to reschedule.

## 2021-12-07 ENCOUNTER — TELEPHONE (OUTPATIENT)
Dept: CARDIOLOGY | Facility: CLINIC | Age: 71
End: 2021-12-07
Payer: COMMERCIAL

## 2021-12-07 DIAGNOSIS — I48.91 ATRIAL FIBRILLATION (H): Primary | ICD-10-CM

## 2021-12-07 NOTE — TELEPHONE ENCOUNTER
Patient was evaluated by cardiology while inpatient for SOB, A. Fib with RVR, CHF exacerbation. PMH: PAF, asthma, HTN, untreated severe RAFI, chronic ITP, diverticulitis, fibromyalgia, epilepsy, morbid obesity, coronary angiogram (2018) showed normal coronaries, HLD. ECHO: LVE 55-60% (poor imaging for RWMA), grossly normal RV function, RVSP 24 mmHg, no significant valvular pathology. IV Lasix diuresed. Persistent A. Fib with rates eventually controlled on triple AVB. Per Dr. Gomes's IP note, consider outpatient cardioversion in 3+ weeks pending pt's symptoms. Pt was discharged wearing a 7 day Zio Patch monitor to monitor HR control and for any signs of bradycardia. Pt was started on Eliquis, Digoxin, Diltiazem, Demadex and Metoprolol at time of discharge. PTA Albuterol was changed to Xopenex. Called patient to discuss any post hospital d/c questions she may have, review medication changes, and confirm f/u appts. Patient denied any questions regarding new medications or changes to PTA medications. Patient denied any SOB, chest pain, palpitations, edema or light headedness. RN confirmed with patient that she needs to be scheduled for a cardiology BO f/u bo't in 2-3 weeks per Dr. Gomes's recommendations-order placed. Scheduling phone number provided. Instructed patient to weigh self every AM, after waking and using the restroom, but before breakfast and medications. Call clinic for a weight gain of 2 lbs overnight or 5 lbs in a week. Low Na+ diet encouraged. Pt instructed to bring daily wt/BP diary and medications with to f/u OV. Patient advised to call clinic with any cardiac related questions or concerns prior to his appt. Patient verbalized understanding and agreed with plan.   ERICA Trevino RN.

## 2021-12-07 NOTE — PLAN OF CARE
"Occupational Therapy Discharge Summary    Reason for therapy discharge:    Discharged to home.    Progress towards therapy goal(s). See goals on Care Plan in [facility] electronic health record for goal details.  Goals partially met.  Barriers to achieving goals:   limited tolerance for therapy and discharge from facility.    Therapy recommendation(s):    No further therapy is recommended.Per treating OT \"recommending home with assist when medically ready. O2 sats stable and pt has assistance from  at home as needed     **This patient was not seen by writing OT, information for discharge summary taken from treating OT's notes.**    "

## 2021-12-13 NOTE — DISCHARGE SUMMARY
Physician Discharge Summary           [facility]    Name: Gema Coombs    MRN: [id number]     YOB: 1950    Age: 71 year old                                                     Primary care provider: Sunita Adame      Admit date:  2021      Discharge date and time: 2021  2:59 PM       Discharge Physician:  Cy Doss MD      Primary Discharge Diagnosis          Atrial fibrillation with rapid ventricular response  Acute congestive heart failure with preserved EF due to rapid A. 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     none for 3 years but EEG still shows seizure activity     Hypertension      Paroxysmal atrial fibrillation (H) 16     Sleep apnea     Doesn't use a CPAP         Past Surgical History:      Past Surgical History:   Procedure Laterality Date     BIOPSY OF BREAST, INCISIONAL      L breasts     BIOPSY OF BREAST, NEEDLE CORE  &    R breasts     C ANORECTAL MYOMECTOMY      uterine fibroids     C  DELIVERY ONLY      x 2     COLONOSCOPY N/A 10/24/2018    Procedure: COLONOSCOPY;  Surgeon: Nieves Cordova MD;  Location:  OR     LAPAROSCOPIC ASSISTED SIGMOID COLECTOMY N/A 10/24/2018    Procedure: Intraoperative colonoscopy with laparoscopic-assisted sigmoid colectomy;  Surgeon: Nieves Cordova MD;  Location: [facility] OR     LAPAROSCOPY,LYSIS ADHESNS  1985    pain R side poss r/t c-sections               Brief Summary of Hospital stay :       Please refer to  Admission H&P note for full details of patient presentation.    Reason for Hospitalization(C/C,HPI and brief patient summary): progressive shortness of breath over the past 1 week.        Significant findings(Primary diagnosis )Procedures and treatments provided(Hospital course ,consults, procedures):Please see below for details    Gema Coombs is a 71 year old female patient with past medical history of paroxysmal atrial fibrillation, bronchial asthma, hypertension, obstructive sleep apnea, morbid obesity, GERD, fibromyalgia, history of generalized nonconvulsive epilepsy, prior history of diverticulitis, status post laparoscopic-assisted sigmoid colectomy, came to emergency room for evaluation for shortness of breath.  Patient stated that she has been having progressive shortness of breath over the past 1 week.      EKG showed atrial fibrillation with rapid ventricular response.  Chest x-ray showed cardiac enlargement with pulmonary vascular congestion, interstitial opacities in both lungs suggestive of congestive heart failure.  There is also right-sided pleural fluid.  Emergency room, she was given Lasix 40 mg IV x1 dose, diltiazem 20 mg IV x1 dose, diltiazem 60 mg p.o. x1 dose.  She remained in A. fib with RVR and was started on diltiazem drip.  She was admitted to the hospital for further management.      Problem list :    A. fib with RVR.  -Initially patient was treated with IV diltiazem infusion but had to be stopped due to low blood pressure.  Instead started on IV amiodarone which was also discontinued.  --Heart rate  Control variable ,     --Cardiology following, recommendation noted to titrate medications for better control of her heart rate.    --Currently on metoprolol  -Echocardiogram shows normal ejection fraction.  No significant valvulopathy.  -- currently on Metoprolol 100 mg bid and Cardizem 180 mg bid   -- digitalized with IV dig 12/4 and started on po dig 12/5 , Heart rate controlled . Cardiology input appreciated and plan to get her zio patch on discharge         Acute ongestive heart failure with preserved EF  -Due to A. fib with RVR.  -Pulmonary edema on chest x-ray at  admission.  -Was treated with IV furosemide and she improved.  Oral torsemide started currently at 20 mg a day.  Cardiology managing medication titration        Intake/Output Summary (Last 24 hours) at 11/30/2021 1139  Last data filed at 11/29/2021 2130      Gross per 24 hour   Intake --   Output 1450 ml   Net -1450 ml          Wt Readings from Last 2 Encounters:   12/05/21 133.4 kg (294 lb)   04/13/21 143.2 kg (315 lb 9.6 oz)         History of asthma  -Continue maintenance inhaler.     Morbid obesity  -Further complicates the care.     History of ITP  -Monitor platelet count daily.    -Patient sees Dr. Patel from [place] [facility].  -Heme-onc consultation obtained and recommendation noted to continue anticoagulation.  Patient has chronic ITP and no need to treat A. fib platelet is over 30,000.      Anxiety: Patient appears to be anxious today due to diagnosis of congestive heart failure  --Ativan as needed     Acute respiratory failure with hypoxia due to CHF   -- On nasal oxygen  In house. Hypoxia resolved        Consultations during hospital stay:       CARDIOLOGY IP CONSULT  ADVANCE DIRECTIVE IP CONSULT  PHARMACY IP CONSULT  PHARMACY IP CONSULT  HEMATOLOGY & ONCOLOGY IP CONSULT  PHYSICAL THERAPY ADULT IP CONSULT  OCCUPATIONAL THERAPY ADULT IP CONSULT  SOCIAL WORK IP CONSULT  PHARMACY DISCHARGE EDUCATION BY PHARMACIST  CARE MANAGEMENT / SOCIAL WORK IP CONSULT      Patient discharge Condition:     stable    BP (!) 150/67 (BP Location: Right leg)   Pulse 77   Temp (!) 96.5  F (35.8  C) (Oral)   Resp 18   Wt 133.2 kg (293 lb 9.6 oz)   SpO2 94%   BMI 52.01 kg/m         Discharge Instructions:       Patient/family instructions: Written discharge instruction given to patient/family    Discharge Medications:       Review of your medicines      START taking      Dose / Directions   apixaban ANTICOAGULANT 5 MG tablet  Commonly known as: ELIQUIS  Indication: Atrial Fibrillation Not Caused By A Heart Valve  Problem  Used for: Paroxysmal atrial fibrillation (H)      Dose: 5 mg  Take 1 tablet (5 mg) by mouth 2 times daily  Quantity: 60 tablet  Refills: 0     digoxin 125 MCG tablet  Commonly known as: LANOXIN  Used for: Paroxysmal atrial fibrillation (H)      Dose: 125 mcg  Take 1 tablet (125 mcg) by mouth daily  Quantity: 30 tablet  Refills: 0     diltiazem ER 90 MG 12 hr capsule  Commonly known as: CARDIZEM SR  Used for: Paroxysmal atrial fibrillation (H)      Dose: 180 mg  Take 2 capsules (180 mg) by mouth 2 times daily  Quantity: 120 capsule  Refills: 0     levalbuterol 0.63 MG/3ML neb solution  Commonly known as: XOPENEX  Used for: Moderate asthma, unspecified whether complicated, unspecified whether persistent      Dose: 1 ampule  Take 3 mLs (0.63 mg) by nebulization every 4 hours as needed for shortness of breath / dyspnea or wheezing  Quantity: 90 mL  Refills: 0     torsemide 20 MG tablet  Commonly known as: DEMADEX  Used for: Congestive heart failure, unspecified HF chronicity, unspecified heart failure type (H)      Dose: 20 mg  Take 1 tablet (20 mg) by mouth daily  Quantity: 30 tablet  Refills: 0        CONTINUE these medicines which may have CHANGED, or have new prescriptions. If we are uncertain of the size of tablets/capsules you have at home, strength may be listed as something that might have changed.      Dose / Directions   metoprolol tartrate 100 MG tablet  Commonly known as: LOPRESSOR  This may have changed:     medication strength    how much to take  Used for: Paroxysmal atrial fibrillation (H)      Dose: 100 mg  Take 1 tablet (100 mg) by mouth 2 times daily  Quantity: 60 tablet  Refills: 0        CONTINUE these medicines which have NOT CHANGED      Dose / Directions   fluticasone-vilanterol 100-25 MCG/INH inhaler  Commonly known as: BREO ELLIPTA      Dose: 1 puff  Inhale 1 puff into the lungs daily  Refills: 0     primidone 250 MG tablet  Commonly known as: MYSOLINE  Used for: Seizure disorder (H)       Dose: 250 mg  Take 1 tablet (250 mg) by mouth 2 times daily  Quantity: 180 tablet  Refills: 0        STOP taking    albuterol (2.5 MG/3ML) 0.083% neb solution  Commonly known as: PROVENTIL        albuterol 108 (90 Base) MCG/ACT inhaler  Commonly known as: PROAIR HFA/PROVENTIL HFA/VENTOLIN HFA        diltiazem ER COATED BEADS 180 MG 24 hr capsule  Commonly known as: CARDIZEM CD/CARTIA XT              Where to get your medicines      These medications were sent to [id number] - [place], [place] - [address]  [address], [place] [address]    Phone: [phone]     apixaban ANTICOAGULANT 5 MG tablet    digoxin 125 MCG tablet    diltiazem ER 90 MG 12 hr capsule    levalbuterol 0.63 MG/3ML neb solution    metoprolol tartrate 100 MG tablet    torsemide 20 MG tablet          Discharge diet:Orders Placed This Encounter      Diet    cardiac diet      Discharge activity:Activity as tolerated      Discharge follow-up:    Follow up with primary care provider in 7 days or earlier if symptoms return or gets worse.    Follow up with consultant as instructed  with cardiology       Other instructions:    We discussed with patient/family about detail discharge instructions as well as discharge medications above including potential risks,side effects and benefits.Patient/family understood benefits and potential serious side effects of taking these medications and need to follow up with PCP if the patient develops complications.  Patient is also advised to see a doctor immediately for severe symptoms.        Major procedure performed/  Significant Diagnostic Studies:           Results for orders placed or performed during the hospital encounter of 11/25/21   XR Chest Port 1 View    Narrative    EXAM: XR CHEST PORT 1 VIEW  LOCATION: [facility]  DATE/TIME: 11/25/2021 4:47 AM    INDICATION: Shortness of breath  COMPARISON: 02/25/2019      Impression     IMPRESSION: Cardiac enlargement with pulmonary vascular congestion and interstitial opacities in both lungs up suggestive of early CHF/volume overload. Tortuous calcified thoracic aorta. Right-sided pleural fluid. Minimal degenerative changes in the   spine and shoulders.   Echocardiogram Complete     Value    LVEF  55-60%    [facility]    [id number]  [id number]  [id number]  [id number]^HUANG^MARGARITO^JENNY     [facility]  Echocardiography Laboratory  [address] [place] [address]     Name: BEE HALE  MRN: [id number]  : 1950  Study Date: 2021 07:11 AM  Age: 71 yrs  Gender: Female  Patient [facility]: [facility]  Reason For Study: Atrial Fibrillation, CHF  Ordering Physician: MARGARITO ENCINAS  Performed By: Kathryn Crockett     BSA: 2.3 m2  Height: 63 in  Weight: 310 lb  HR: 153  BP: 112/70 mmHg  ______________________________________________________________________________  Procedure  Complete Portable Echo Adult. Optison (NDC #7950-2723) given intravenously.  ______________________________________________________________________________  Interpretation Summary     Very limited image quality due to patient body habitus, imaged sitting up in  rapid afib.  Left ventricular systolic function is normal.  Diastolic Doppler findings (E/E' ratio and/or other parameters) suggest left  ventricular filling pressures are indeterminate.  Limited image quality but grossly normal LV wall motion  RV not well seen, grossly normal size and function.Normal TAPSE also suggests  normal function  There is mild-moderate biatrial enlargement.  Doppler findings do not suggest pulmonary hypertension.  Mild valvular aortic stenosis.  Aortic valve is never well seen but it is abnormal with calcification seen. By  doppler there is no AI and only mild AS  ______________________________________________________________________________  Left Ventricle  The left ventricle is normal in size. The left ventricle  is not well  visualized. There is normal left ventricular wall thickness. Left ventricular  systolic function is normal. The visual ejection fraction is 55-60%. Diastolic  function not assessed due to atrial fibrillation. Diastolic Doppler findings  (E/E' ratio and/or other parameters) suggest left ventricular filling  pressures are indeterminate. Regional wall motion abnormalities cannot be  excluded due to limited visualization. Limited image quality but grossly  normal LV wall motion. There is no thrombus seen in the left ventricle.     Right Ventricle  RV not well seen, grossly normal size and function.Normal TAPSE also suggests  normal function. The right ventricle is not well visualized. The right  ventricular systolic function is normal.     Atria  There is mild-moderate biatrial enlargement.     Mitral Valve  The mitral valve leaflets appear normal. There is no evidence of stenosis,  fluttering, or prolapse.     Tricuspid Valve  There is trace tricuspid regurgitation. The right ventricular systolic  pressure is approximated at 24.4 mmHg plus the right atrial pressure. IVC  diameter <2.1 cm collapsing >50% with sniff suggests a normal RA pressure of 3  mmHg. Doppler findings do not suggest pulmonary hypertension.     Aortic Valve  The aortic valve is not well visualized. No aortic regurgitation is present.  Mild valvular aortic stenosis. Aortic valve is never well seen but it is  abnormal with calcification seen. By doppler there is no AI and only mild AS.     Pulmonic Valve  The pulmonic valve is not well visualized.     Vessels  The aortic root is normal size.     Pericardium  The pericardium appears normal.     Rhythm  The rhythm was rapid atrial fibrillation.  ______________________________________________________________________________  MMode/2D Measurements & Calculations     asc Aorta Diam: 3.2 cm     Doppler Measurements & Calculations  MV E max neil: 89.0 cm/sec  MV dec time: 0.15 sec  Ao V2 max:  197.0 cm/sec  Ao max P.0 mmHg  Ao V2 mean: 143.0 cm/sec  Ao mean P.3 mmHg  Ao V2 VTI: 34.7 cm  LV V1 max P.8 mmHg  LV V1 max: 120.0 cm/sec  LV V1 VTI: 21.0 cm  TR max gerardo: 246.8 cm/sec  TR max P.4 mmHg  AV Gerardo Ratio (DI): 0.61  E/E' avg: 10.7  Lateral E/e': 8.9  Medial E/e': 12.5     ______________________________________________________________________________  Report approved by: Andre Ocampo 2021 09:08 AM             No results for input(s): WBC, HGB, HCT, MCV, PLT in the last 168 hours.  No results for input(s): CULT in the last 168 hours.  No results for input(s): NA, POTASSIUM, CHLORIDE, CO2, ANIONGAP, GLC, BUN, CR, GFRESTIMATED, GFRESTBLACK, TONIA, MAG, PHOS, PROTTOTAL, ALBUMIN, BILITOTAL, ALKPHOS, AST, ALT in the last 168 hours.    No results for input(s): GLC, BGM in the last 168 hours.    No results for input(s): INR in the last 168 hours.           Pending Results:       Unresulted Labs Ordered in the Past 30 Days of this Admission     No orders found from 10/26/2021 to 2021.             Patient Allergies:       Allergies   Allergen Reactions     No Known Drug Allergy          Disposition:     Disposition: home      I saw and evaluated the patient on day of discharge and  discharge instructions reviewed  and  all the patient's questions and concerns addressed. Over 30 minutes spent on discharge and coordination of discharge process for this patient.      Disclaimer: This note consists of symbols derived from keyboarding, dictation and/or voice recognition software. As a result, there may be errors in the script that have gone undetected. Please consider this when interpreting information found in this chart

## 2021-12-14 ENCOUNTER — OFFICE VISIT (OUTPATIENT)
Dept: FAMILY MEDICINE | Facility: CLINIC | Age: 71
End: 2021-12-14

## 2021-12-14 VITALS
DIASTOLIC BLOOD PRESSURE: 86 MMHG | OXYGEN SATURATION: 93 % | SYSTOLIC BLOOD PRESSURE: 128 MMHG | TEMPERATURE: 97.9 F | HEART RATE: 79 BPM

## 2021-12-14 DIAGNOSIS — F32.89 OTHER DEPRESSION: ICD-10-CM

## 2021-12-14 DIAGNOSIS — I50.9 ACUTE CONGESTIVE HEART FAILURE, UNSPECIFIED HEART FAILURE TYPE (H): Primary | ICD-10-CM

## 2021-12-14 DIAGNOSIS — D69.3 CHRONIC IDIOPATHIC THROMBOCYTOPENIA (H): ICD-10-CM

## 2021-12-14 DIAGNOSIS — J45.30 MILD PERSISTENT ASTHMA, UNSPECIFIED WHETHER COMPLICATED: ICD-10-CM

## 2021-12-14 PROBLEM — I48.91 ATRIAL FIBRILLATION (H): Status: RESOLVED | Noted: 2018-10-15 | Resolved: 2021-12-14

## 2021-12-14 NOTE — PROGRESS NOTES
"  Assessment & Plan   Problem List Items Addressed This Visit        Respiratory    Mild persistent asthma    Relevant Medications    fluticasone-vilanterol (BREO ELLIPTA) 100-25 MCG/INH inhaler       Circulatory    Acute congestive heart failure, unspecified heart failure type (H) - Primary       Immune    Chronic idiopathic thrombocytopenia (H)    Relevant Medications    fluticasone-vilanterol (BREO ELLIPTA) 100-25 MCG/INH inhaler      Other Visit Diagnoses     Other depression             1. Acute congestive heart failure, unspecified heart failure type (H)  She is feeling better. followup with cardiology and with the heart failure clinic.    2. Chronic idiopathic thrombocytopenia (H)      3. Other depression  Discussed medication, she doesn't want a medication for now. Will let me know if anything changes.    4. Mild persistent asthma, unspecified whether complicated  Refilled.   - fluticasone-vilanterol (BREO ELLIPTA) 100-25 MCG/INH inhaler; Inhale 1 puff into the lungs daily  Dispense: 60 each; Refill: 1    56}     BMI:   Estimated body mass index is 52.01 kg/m  as calculated from the following:    Height as of 4/13/21: 1.6 m (5' 3\").    Weight as of 12/6/21: 133.2 kg (293 lb 9.6 oz).   Weight management plan: Discussed healthy diet and exercise guidelines    FUTURE APPOINTMENTS:       - Follow-up visit in 3 months.    No follow-ups on file.    Sunita Adame MD  [facility]    Subjective   Georgia is a 71 year old who presents for the following health issues  accompanied by her spouse.    HPI     Here to followup on hosp visit.   Was dx with chf. She is feeling better.  Doesn't want a medication for depression right now.  Sees pulmonary but would like one refill of her inhaler. She is doing well on this.  Is crying a lot. Doesn't like the taste of things with low salt.   Was in the hosp for 14 days. Was supposed to have home care. But not sure about this.    Hospital Follow-up " Visit:    Hospital/Nursing Home/IP Rehab Facility: [facility]  Date of Admission: 11/25/21  Date of Discharge: 12/06/21  Reason(s) for Admission: A-fib, congestive heart failure      Was your hospitalization related to COVID-19? No   Problems taking medications regularly:  None  Medication changes since discharge: Updated in chart  Problems adhering to non-medication therapy:  None    Summary of hospitalization:  [facility] discharge summary reviewed  Diagnostic Tests/Treatments reviewed.  Follow up needed: cardiology, hematology  She said she doesn't have rehab yet. She also has a pulmonologist TEO.    Other Healthcare Providers Involved in Patient s Care:         Homecare  Update since discharge: improved.     doesn't want a medication for depression and anxiety.    Post Discharge Medication Reconciliation: discharge medications reconciled, continue medications without change.  Plan of care communicated with patient and family              Is afraid to exercise.     Review of Systems   Constitutional, HEENT, cardiovascular, pulmonary, gi and gu systems are negative, except as otherwise noted.      Objective    /86 (BP Location: Right arm, Patient Position: Sitting, Cuff Size: Adult Large)   Pulse 79   Temp 97.9  F (36.6  C) (Temporal)   SpO2 93%   There is no height or weight on file to calculate BMI.  Physical Exam   GENERAL: healthy, alert and no distress  RESP: lungs clear to auscultation - no rales, rhonchi or wheezes  CV: regular rate and rhythm, normal S1 S2, no S3 or S4, no murmur, click or rub, no peripheral edema and peripheral pulses strong  MS: no gross musculoskeletal defects noted, no edema  NEURO: Normal strength and tone, mentation intact and speech normal  PSYCH: mentation appears normal, affect normal/bright  Somewhat tearful at times.

## 2021-12-14 NOTE — NURSING NOTE
Chief Complaint   Patient presents with     Hospital F/U     [facility] 11/25-12/06 for CHF and Afib, was having chest tightness and SOB initially      Pre-visit Screening:  Immunizations:  up to date  Colonoscopy:  is up to date  Mammogram: is up to date  Asthma Action Test/Plan:  NA  PHQ9:  NA  GAD7:  NA  Questioned patient about current smoking habits Pt. has never smoked.  Ok to leave detailed message on voice mail for today's visit only Yes, phone # [phone]

## 2021-12-27 ENCOUNTER — MYC MEDICAL ADVICE (OUTPATIENT)
Dept: FAMILY MEDICINE | Facility: CLINIC | Age: 71
End: 2021-12-27

## 2021-12-27 DIAGNOSIS — J45.30 MILD PERSISTENT ASTHMA, UNSPECIFIED WHETHER COMPLICATED: Primary | ICD-10-CM

## 2021-12-27 NOTE — PROGRESS NOTES
HISTORY OF PRESENT ILLNESS:    This is a 71 year old female who follows with Dr. Titus at [facility]  Her past medical history includes:  Paroxysmal atrial fibrillation, hypertension, severe sleep apnea, asthma, chronic ITP, diverticulitis s/p sigmoid colectomy, morbid obesity, hyperlipidemia, epilepsy, and fibromyalgia    Ms Coombs has a history of paroxysmal atrial fibrillation noted in 2016 and 2018. Due to an abnormal stress test, she underwent coronary angiography (2018) which showed normal coronaries. Initially, anticoagulation was not started due to her ITP, but eventually she was cleared from hematology and was started on Eliquis.     She was hospitalized (11/25/21) with rapid A-fib and congestive heart failure  Her ECHO showed LVEF 55-60%, poor imaging of her RV, but grossly normal, RVSP 24 mmHg and no significant valvular pathology   Rate control was planned per her preference and her Metoprolol dose was increased and Diltiazem + digoxin were added.  She was diuresed well with IV lasix and placed on Torsemide at discharge along with a zioPatch monitor.    Our visit today is for further review    Ms Coombs comes in with a family member who assists with her history and symptoms  Overall, she feels well but she is very inactive.  She is disheartened with the diagnosis of heart failure  She has been very good at avoiding salty foods and is on weight watchers  She has continued to lose weight over the past month since she has been home  She denies any chest pain, shortness of breath, orthopnea, or peripheral edema   She states that she cannot feel her A-fib but does monitor her heart rate with an oximeter  She notes that her heart rate is mainly staying < 100 bpm. She continues to feel fatigued and states that 6 months ago her energy was better.  She has yet to call the sleep physician but is open to an evaluation.        I reviewed her 7-day ZioPatch  Monitor (12/6/21)  This showed persistent  A-fib with an average HR of 75 bpm  (ranging from  bpm)  She did have a 3-second pause during sleep      VITAL SIGNS:  BP: 127/78  Pulse: 64 and is irregular  Weight: 283 lbs  (down 60 lbs in past year)   BMI: 50      IMPRESSION AND PLAN:    Paroxysmal Atrial Fibrillation:  -recent episode in setting of CHF (11/25/21)  -HRs controlled on Metoprolol 100 mg BID, Cardizem 180 mg BID, and Digoxin 125 mcg  -ziopatch monitor showed persistent Afib averaging 75 bpm with a 3 second pause during sleep  -Plan for DCCV after 1 month AC Holding Digoxin and Diltiazem day prior to and day of procedure I discussed this procedure, risks and benefits involved but not limited to:  Dysrhythmias, superficial skin injury, stroke  Pt in agreement and willing to proceed.  -on Eliquis  (CHADS2-Vasc score: 4)  -will check CBC given her history of ITP    HFpEF  -recent exacerbation in setting of rapid A-fib  -LVE 55-60% with grossly normal RV function  -on Torsemide 20 mg/day  -no signs and symptoms of heart failure  -weight stable  -encouraged low salt diet  -will check BMP today and consider lowering Torsemide dose    Hypertension:  -on Metoprolol 100 mg BID, Diltiazem 180 mg BID  -BP controlled    Sleep Apnea  -open to outpatient sleep study    Morbid Obesity  -on weight watchers    The total time for the visit today was 30 minutes which includes patient visit, reviewing of records, discussion, and placing of orders of the outpatient coordination of cardiovascular care as described.  The level of medical decision making during this visit was of moderate complexity.  Thank you for allowing me to participate in their care.          Orders Placed This Encounter   Procedures     Basic metabolic panel     CBC with platelets     Cardioversion       Orders Placed This Encounter   Medications     apixaban ANTICOAGULANT (ELIQUIS) 5 MG tablet     Sig: Take 1 tablet (5 mg) by mouth 2 times daily     Dispense:  180 tablet     Refill:  3      digoxin (LANOXIN) 125 MCG tablet     Sig: Take 1 tablet (125 mcg) by mouth daily     Dispense:  30 tablet     Refill:  1     diltiazem ER (CARDIZEM SR) 90 MG 12 hr capsule     Sig: Take 2 capsules (180 mg) by mouth 2 times daily     Dispense:  120 capsule     Refill:  0     metoprolol tartrate (LOPRESSOR) 100 MG tablet     Sig: Take 1 tablet (100 mg) by mouth 2 times daily     Dispense:  180 tablet     Refill:  3       Medications Discontinued During This Encounter   Medication Reason     apixaban ANTICOAGULANT (ELIQUIS) 5 MG tablet      digoxin (LANOXIN) 125 MCG tablet      diltiazem ER (CARDIZEM SR) 90 MG 12 hr capsule Reorder     metoprolol tartrate (LOPRESSOR) 100 MG tablet          Encounter Diagnoses   Name Primary?     Persistent atrial fibrillation (H)      Chronic heart failure with preserved ejection fraction (H) Yes     Paroxysmal atrial fibrillation (H)        CURRENT MEDICATIONS:  Current Outpatient Medications   Medication Sig Dispense Refill     apixaban ANTICOAGULANT (ELIQUIS) 5 MG tablet Take 1 tablet (5 mg) by mouth 2 times daily 180 tablet 3     digoxin (LANOXIN) 125 MCG tablet Take 1 tablet (125 mcg) by mouth daily 30 tablet 1     diltiazem ER (CARDIZEM SR) 90 MG 12 hr capsule Take 2 capsules (180 mg) by mouth 2 times daily 120 capsule 0     fluticasone-vilanterol (BREO ELLIPTA) 100-25 MCG/INH inhaler Inhale 1 puff into the lungs daily 60 each 1     levalbuterol (XOPENEX HFA) 45 MCG/ACT inhaler Inhale 2 puffs into the lungs every 4 hours as needed for shortness of breath / dyspnea or wheezing 15 g 0     levalbuterol (XOPENEX) 0.63 MG/3ML neb solution Take 3 mLs (0.63 mg) by nebulization every 4 hours as needed for shortness of breath / dyspnea or wheezing 90 mL 0     metoprolol tartrate (LOPRESSOR) 100 MG tablet Take 1 tablet (100 mg) by mouth 2 times daily 180 tablet 3     primidone (MYSOLINE) 250 MG tablet Take 1 tablet (250 mg) by mouth 2 times daily 180 tablet 0     torsemide (DEMADEX) 20 MG  tablet Take 1 tablet (20 mg) by mouth daily 30 tablet 0       ALLERGIES     Allergies   Allergen Reactions     No Known Drug Allergy        PAST MEDICAL HISTORY:  Past Medical History:   Diagnosis Date     Arrhythmia     a fib     Diverticulitis      Extrinsic asthma, unspecified      Fibromyalgia      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     none for 3 years but EEG still shows seizure activity     Hypertension      Paroxysmal atrial fibrillation (H) 18     Sleep apnea     Doesn't use a CPAP       PAST SURGICAL HISTORY:  Past Surgical History:   Procedure Laterality Date     BIOPSY OF BREAST, INCISIONAL      L breasts     BIOPSY OF BREAST, NEEDLE CORE  &    R breasts     COLONOSCOPY N/A 10/24/2018    Procedure: COLONOSCOPY;  Surgeon: Nieves Cordova MD;  Location: [facility] OR     LAPAROSCOPIC ASSISTED SIGMOID COLECTOMY N/A 10/24/2018    Procedure: Intraoperative colonoscopy with laparoscopic-assisted sigmoid colectomy;  Surgeon: Nieves Cordova MD;  Location: [facility] OR     LAPAROSCOPY,LYSIS ADHESNS      pain R side poss r/t c-sections     Z ANORECTAL MYOMECTOMY      uterine fibroids     Z  DELIVERY ONLY      x 2       FAMILY HISTORY:  Family History   Problem Relation Age of Onset     Cancer Maternal Aunt         breast-     Cancer Maternal Grandmother         breast-     Heart Disease Paternal Grandfather         heartattack-  at 58     Heart Disease Maternal Grandfather              Heart Disease Paternal Uncle         bypass-alive &well     Thyroid Disease Sister         hypothyroidism-alive       SOCIAL HISTORY:  Social History     Socioeconomic History     Marital status:      Spouse name: Jalen     Number of children: 2     Years of education: 14     Highest education level: None   Occupational History     Occupation: [employer]     Employer: [employer]   Tobacco Use      "Smoking status: Never Smoker     Smokeless tobacco: Never Used   Substance and Sexual Activity     Alcohol use: Yes     Alcohol/week: 0.0 standard drinks     Comment: rare     Drug use: No     Sexual activity: Yes     Partners: Male     Comment: postmenopause   Other Topics Concern      Service Not Asked     Blood Transfusions Not Asked     Caffeine Concern Not Asked     Occupational Exposure Not Asked     Hobby Hazards Not Asked     Sleep Concern Not Asked     Stress Concern Not Asked     Weight Concern Not Asked     Special Diet Not Asked     Back Care Not Asked     Exercise Yes     Bike Helmet Not Asked     Seat Belt Yes     Self-Exams Yes     Parent/sibling w/ CABG, MI or angioplasty before 65F 55M? Not Asked   Social History Narrative     None     Social Determinants of Health     Financial Resource Strain: Not on file   Food Insecurity: Not on file   Transportation Needs: Not on file   Physical Activity: Not on file   Stress: Not on file   Social Connections: Not on file   Intimate Partner Violence: Not on file   Housing Stability: Not on file       Review of Systems:  Skin:  Positive for rash     Eyes:  Negative      ENT:  Negative      Respiratory:    dyspnea on exertion     Cardiovascular:    fatigue;Positive for    Gastroenterology: Negative      Genitourinary:  not assessed      Musculoskeletal:  Positive for back pain;neck pain;joint pain    Neurologic:  Negative      Psychiatric:  Negative      Heme/Lymph/Imm:  Positive for allergies    Endocrine:  Negative        Physical Exam:  Vitals: /78   Pulse 64   Ht 1.6 m (5' 3\")   Wt 128.4 kg (283 lb)   BMI 50.13 kg/m      Constitutional:  cooperative obese      Skin:  warm and dry to the touch          Head:  normocephalic        Eyes:  pupils equal and round        Lymph:      ENT:  no pallor or cyanosis        Neck:  JVP normal        Respiratory:  clear to auscultation;normal respiratory excursion         Cardiac:   irregular rhythm   no " presence of murmur          pulses full and equal                                        GI:  abdomen soft obese      Extremities and Muscular Skeletal:  no edema              Neurological:  affect appropriate        Psych:  Alert and Oriented x 3          CC  Seth Gomes MD  [id number]  [place] [id number]

## 2021-12-30 RX ORDER — LEVALBUTEROL TARTRATE 45 UG/1
2 AEROSOL, METERED ORAL EVERY 4 HOURS PRN
Qty: 15 G | Refills: 0 | Status: SHIPPED | OUTPATIENT
Start: 2021-12-30 | End: 2022-05-17

## 2022-01-04 ENCOUNTER — LAB (OUTPATIENT)
Dept: LAB | Facility: CLINIC | Age: 72
End: 2022-01-04
Payer: COMMERCIAL

## 2022-01-04 ENCOUNTER — OFFICE VISIT (OUTPATIENT)
Dept: CARDIOLOGY | Facility: CLINIC | Age: 72
End: 2022-01-04
Attending: INTERNAL MEDICINE
Payer: COMMERCIAL

## 2022-01-04 VITALS
DIASTOLIC BLOOD PRESSURE: 78 MMHG | HEART RATE: 64 BPM | HEIGHT: 63 IN | SYSTOLIC BLOOD PRESSURE: 127 MMHG | WEIGHT: 283 LBS | BODY MASS INDEX: 50.14 KG/M2

## 2022-01-04 DIAGNOSIS — I50.32 CHRONIC HEART FAILURE WITH PRESERVED EJECTION FRACTION (H): ICD-10-CM

## 2022-01-04 DIAGNOSIS — I50.32 CHRONIC HEART FAILURE WITH PRESERVED EJECTION FRACTION (H): Primary | ICD-10-CM

## 2022-01-04 DIAGNOSIS — I48.19 PERSISTENT ATRIAL FIBRILLATION (H): ICD-10-CM

## 2022-01-04 DIAGNOSIS — I48.0 PAROXYSMAL ATRIAL FIBRILLATION (H): ICD-10-CM

## 2022-01-04 LAB
ANION GAP SERPL CALCULATED.3IONS-SCNC: 4 MMOL/L (ref 3–14)
BUN SERPL-MCNC: 21 MG/DL (ref 7–30)
CALCIUM SERPL-MCNC: 9 MG/DL (ref 8.5–10.1)
CHLORIDE BLD-SCNC: 99 MMOL/L (ref 94–109)
CO2 SERPL-SCNC: 34 MMOL/L (ref 20–32)
CREAT SERPL-MCNC: 1.18 MG/DL (ref 0.52–1.04)
ERYTHROCYTE [DISTWIDTH] IN BLOOD BY AUTOMATED COUNT: 16.4 % (ref 10–15)
GFR SERPL CREATININE-BSD FRML MDRD: 50 ML/MIN/1.73M2
GLUCOSE BLD-MCNC: 122 MG/DL (ref 70–99)
HCT VFR BLD AUTO: 46.7 % (ref 35–47)
HGB BLD-MCNC: 14.8 G/DL (ref 11.7–15.7)
MCH RBC QN AUTO: 30.3 PG (ref 26.5–33)
MCHC RBC AUTO-ENTMCNC: 31.7 G/DL (ref 31.5–36.5)
MCV RBC AUTO: 96 FL (ref 78–100)
PLATELET # BLD AUTO: 86 10E3/UL (ref 150–450)
POTASSIUM BLD-SCNC: 3.7 MMOL/L (ref 3.4–5.3)
RBC # BLD AUTO: 4.88 10E6/UL (ref 3.8–5.2)
SODIUM SERPL-SCNC: 137 MMOL/L (ref 133–144)
WBC # BLD AUTO: 5 10E3/UL (ref 4–11)

## 2022-01-04 PROCEDURE — 80048 BASIC METABOLIC PNL TOTAL CA: CPT | Performed by: NURSE PRACTITIONER

## 2022-01-04 PROCEDURE — 36415 COLL VENOUS BLD VENIPUNCTURE: CPT | Performed by: NURSE PRACTITIONER

## 2022-01-04 PROCEDURE — 99214 OFFICE O/P EST MOD 30 MIN: CPT | Performed by: NURSE PRACTITIONER

## 2022-01-04 PROCEDURE — 85027 COMPLETE CBC AUTOMATED: CPT | Performed by: NURSE PRACTITIONER

## 2022-01-04 RX ORDER — DIGOXIN 125 MCG
125 TABLET ORAL DAILY
Qty: 30 TABLET | Refills: 1 | Status: SHIPPED | OUTPATIENT
Start: 2022-01-04 | End: 2022-01-13

## 2022-01-04 RX ORDER — METOPROLOL TARTRATE 100 MG
100 TABLET ORAL 2 TIMES DAILY
Qty: 180 TABLET | Refills: 3 | Status: SHIPPED | OUTPATIENT
Start: 2022-01-04 | End: 2023-02-03

## 2022-01-04 RX ORDER — DILTIAZEM HYDROCHLORIDE 90 MG/1
180 CAPSULE, EXTENDED RELEASE ORAL 2 TIMES DAILY
Qty: 120 CAPSULE | Refills: 0 | Status: SHIPPED | OUTPATIENT
Start: 2022-01-04 | End: 2022-01-13

## 2022-01-04 ASSESSMENT — MIFFLIN-ST. JEOR: SCORE: 1777.81

## 2022-01-04 NOTE — PATIENT INSTRUCTIONS
Schedule your cardioversion  Do NOT take Diltiazem and Digoxin the day prior to and the day of the cardioversion    Get labs today and we will call you with the results and send prescription for Torsemide

## 2022-01-04 NOTE — LETTER
1/4/2022    Sunita Adame MD  [address] [facility] [address]    RE: Gema Westrichi       Dear Colleague,     I had the pleasure of seeing Gmea Coombs in the [facility].  HISTORY OF PRESENT ILLNESS:    This is a 71 year old female who follows with Dr. Titus at [facility]  Her past medical history includes:  Paroxysmal atrial fibrillation, hypertension, severe sleep apnea, asthma, chronic ITP, diverticulitis s/p sigmoid colectomy, morbid obesity, hyperlipidemia, epilepsy, and fibromyalgia    Ms Coombs has a history of paroxysmal atrial fibrillation noted in 2016 and 2018. Due to an abnormal stress test, she underwent coronary angiography (2018) which showed normal coronaries. Initially, anticoagulation was not started due to her ITP, but eventually she was cleared from hematology and was started on Eliquis.     She was hospitalized (11/25/21) with rapid A-fib and congestive heart failure  Her ECHO showed LVEF 55-60%, poor imaging of her RV, but grossly normal, RVSP 24 mmHg and no significant valvular pathology   Rate control was planned per her preference and her Metoprolol dose was increased and Diltiazem + digoxin were added.  She was diuresed well with IV lasix and placed on Torsemide at discharge along with a zioPatch monitor.    Our visit today is for further review    Ms Coombs comes in with a family member who assists with her history and symptoms  Overall, she feels well but she is very inactive.  She is disheartened with the diagnosis of heart failure  She has been very good at avoiding salty foods and is on weight watchers  She has continued to lose weight over the past month since she has been home  She denies any chest pain, shortness of breath, orthopnea, or peripheral edema   She states that she cannot feel her A-fib but does monitor her heart rate with an oximeter  She notes that her heart rate is mainly staying < 100 bpm. She continues to feel fatigued  and states that 6 months ago her energy was better.  She has yet to call the sleep physician but is open to an evaluation.        I reviewed her 7-day ZioPatch  Monitor (12/6/21)  This showed persistent A-fib with an average HR of 75 bpm  (ranging from  bpm)  She did have a 3-second pause during sleep      VITAL SIGNS:  BP: 127/78  Pulse: 64 and is irregular  Weight: 283 lbs  (down 60 lbs in past year)   BMI: 50      IMPRESSION AND PLAN:    Paroxysmal Atrial Fibrillation:  -recent episode in setting of CHF (11/25/21)  -HRs controlled on Metoprolol 100 mg BID, Cardizem 180 mg BID, and Digoxin 125 mcg  -ziopatch monitor showed persistent Afib averaging 75 bpm with a 3 second pause during sleep  -Plan for DCCV after 1 month AC Holding Digoxin and Diltiazem day prior to and day of procedure I discussed this procedure, risks and benefits involved but not limited to:  Dysrhythmias, superficial skin injury, stroke  Pt in agreement and willing to proceed.  -on Eliquis  (CHADS2-Vasc score: 4)  -will check CBC given her history of ITP    HFpEF  -recent exacerbation in setting of rapid A-fib  -LVE 55-60% with grossly normal RV function  -on Torsemide 20 mg/day  -no signs and symptoms of heart failure  -weight stable  -encouraged low salt diet  -will check BMP today and consider lowering Torsemide dose    Hypertension:  -on Metoprolol 100 mg BID, Diltiazem 180 mg BID  -BP controlled    Sleep Apnea  -open to outpatient sleep study    Morbid Obesity  -on weight watchers    The total time for the visit today was 30 minutes which includes patient visit, reviewing of records, discussion, and placing of orders of the outpatient coordination of cardiovascular care as described.  The level of medical decision making during this visit was of moderate complexity.  Thank you for allowing me to participate in their care.          Orders Placed This Encounter   Procedures     Basic metabolic panel     CBC with platelets      Cardioversion       Orders Placed This Encounter   Medications     apixaban ANTICOAGULANT (ELIQUIS) 5 MG tablet     Sig: Take 1 tablet (5 mg) by mouth 2 times daily     Dispense:  180 tablet     Refill:  3     digoxin (LANOXIN) 125 MCG tablet     Sig: Take 1 tablet (125 mcg) by mouth daily     Dispense:  30 tablet     Refill:  1     diltiazem ER (CARDIZEM SR) 90 MG 12 hr capsule     Sig: Take 2 capsules (180 mg) by mouth 2 times daily     Dispense:  120 capsule     Refill:  0     metoprolol tartrate (LOPRESSOR) 100 MG tablet     Sig: Take 1 tablet (100 mg) by mouth 2 times daily     Dispense:  180 tablet     Refill:  3       Medications Discontinued During This Encounter   Medication Reason     apixaban ANTICOAGULANT (ELIQUIS) 5 MG tablet      digoxin (LANOXIN) 125 MCG tablet      diltiazem ER (CARDIZEM SR) 90 MG 12 hr capsule Reorder     metoprolol tartrate (LOPRESSOR) 100 MG tablet          Encounter Diagnoses   Name Primary?     Persistent atrial fibrillation (H)      Chronic heart failure with preserved ejection fraction (H) Yes     Paroxysmal atrial fibrillation (H)        CURRENT MEDICATIONS:  Current Outpatient Medications   Medication Sig Dispense Refill     apixaban ANTICOAGULANT (ELIQUIS) 5 MG tablet Take 1 tablet (5 mg) by mouth 2 times daily 180 tablet 3     digoxin (LANOXIN) 125 MCG tablet Take 1 tablet (125 mcg) by mouth daily 30 tablet 1     diltiazem ER (CARDIZEM SR) 90 MG 12 hr capsule Take 2 capsules (180 mg) by mouth 2 times daily 120 capsule 0     fluticasone-vilanterol (BREO ELLIPTA) 100-25 MCG/INH inhaler Inhale 1 puff into the lungs daily 60 each 1     levalbuterol (XOPENEX HFA) 45 MCG/ACT inhaler Inhale 2 puffs into the lungs every 4 hours as needed for shortness of breath / dyspnea or wheezing 15 g 0     levalbuterol (XOPENEX) 0.63 MG/3ML neb solution Take 3 mLs (0.63 mg) by nebulization every 4 hours as needed for shortness of breath / dyspnea or wheezing 90 mL 0     metoprolol tartrate  (LOPRESSOR) 100 MG tablet Take 1 tablet (100 mg) by mouth 2 times daily 180 tablet 3     primidone (MYSOLINE) 250 MG tablet Take 1 tablet (250 mg) by mouth 2 times daily 180 tablet 0     torsemide (DEMADEX) 20 MG tablet Take 1 tablet (20 mg) by mouth daily 30 tablet 0       ALLERGIES     Allergies   Allergen Reactions     No Known Drug Allergy        PAST MEDICAL HISTORY:  Past Medical History:   Diagnosis Date     Arrhythmia     a fib     Diverticulitis      Extrinsic asthma, unspecified      Fibromyalgia      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     none for 3 years but EEG still shows seizure activity     Hypertension      Paroxysmal atrial fibrillation (H) 18     Sleep apnea     Doesn't use a CPAP       PAST SURGICAL HISTORY:  Past Surgical History:   Procedure Laterality Date     BIOPSY OF BREAST, INCISIONAL      L breasts     BIOPSY OF BREAST, NEEDLE CORE  &    R breasts     COLONOSCOPY N/A 10/24/2018    Procedure: COLONOSCOPY;  Surgeon: Nieves Cordova MD;  Location: [facility] OR     LAPAROSCOPIC ASSISTED SIGMOID COLECTOMY N/A 10/24/2018    Procedure: Intraoperative colonoscopy with laparoscopic-assisted sigmoid colectomy;  Surgeon: Nieves Cordova MD;  Location: [facility] OR     LAPAROSCOPY,LYSIS ADHESNS  1985    pain R side poss r/t c-sections     Z ANORECTAL MYOMECTOMY      uterine fibroids     Z  DELIVERY ONLY      x 2       FAMILY HISTORY:  Family History   Problem Relation Age of Onset     Cancer Maternal Aunt         breast-     Cancer Maternal Grandmother         breast-     Heart Disease Paternal Grandfather         heartattack-  at 58     Heart Disease Maternal Grandfather              Heart Disease Paternal Uncle         bypass-alive &well     Thyroid Disease Sister         hypothyroidism-alive       SOCIAL HISTORY:  Social History     Socioeconomic History     Marital status:      Spouse  "name: Jalen     Number of children: 2     Years of education: 14     Highest education level: None   Occupational History     Occupation: [employer]     Employer: [employer]   Tobacco Use     Smoking status: Never Smoker     Smokeless tobacco: Never Used   Substance and Sexual Activity     Alcohol use: Yes     Alcohol/week: 0.0 standard drinks     Comment: rare     Drug use: No     Sexual activity: Yes     Partners: Male     Comment: postmenopause   Other Topics Concern      Service Not Asked     Blood Transfusions Not Asked     Caffeine Concern Not Asked     Occupational Exposure Not Asked     Hobby Hazards Not Asked     Sleep Concern Not Asked     Stress Concern Not Asked     Weight Concern Not Asked     Special Diet Not Asked     Back Care Not Asked     Exercise Yes     Bike Helmet Not Asked     Seat Belt Yes     Self-Exams Yes     Parent/sibling w/ CABG, MI or angioplasty before 65F 55M? Not Asked   Social History Narrative     None     Social Determinants of Health     Financial Resource Strain: Not on file   Food Insecurity: Not on file   Transportation Needs: Not on file   Physical Activity: Not on file   Stress: Not on file   Social Connections: Not on file   Intimate Partner Violence: Not on file   Housing Stability: Not on file       Review of Systems:  Skin:  Positive for rash     Eyes:  Negative      ENT:  Negative      Respiratory:    dyspnea on exertion     Cardiovascular:    fatigue;Positive for    Gastroenterology: Negative      Genitourinary:  not assessed      Musculoskeletal:  Positive for back pain;neck pain;joint pain    Neurologic:  Negative      Psychiatric:  Negative      Heme/Lymph/Imm:  Positive for allergies    Endocrine:  Negative        Physical Exam:  Vitals: /78   Pulse 64   Ht 1.6 m (5' 3\")   Wt 128.4 kg (283 lb)   BMI 50.13 kg/m      Constitutional:  cooperative obese      Skin:  warm and dry to the touch          Head:  normocephalic    "     Eyes:  pupils equal and round        Lymph:      ENT:  no pallor or cyanosis        Neck:  JVP normal        Respiratory:  clear to auscultation;normal respiratory excursion         Cardiac:   irregular rhythm   no presence of murmur          pulses full and equal                                        GI:  abdomen soft obese      Extremities and Muscular Skeletal:  no edema              Neurological:  affect appropriate        Psych:  Alert and Oriented x 3          CC  Seth Gomes MD  [address]  [place] [address]    Thank you for allowing me to participate in the care of your patient.      Sincerely,     NINI Cohen [facility]  cc:   Seth Gomes MD  [address]  [place] [address]

## 2022-01-05 ENCOUNTER — TELEPHONE (OUTPATIENT)
Dept: CARDIOLOGY | Facility: CLINIC | Age: 72
End: 2022-01-05
Payer: COMMERCIAL

## 2022-01-05 DIAGNOSIS — I48.19 PERSISTENT ATRIAL FIBRILLATION (H): Primary | ICD-10-CM

## 2022-01-05 DIAGNOSIS — I50.9 CONGESTIVE HEART FAILURE, UNSPECIFIED HF CHRONICITY, UNSPECIFIED HEART FAILURE TYPE (H): ICD-10-CM

## 2022-01-05 RX ORDER — TORSEMIDE 10 MG/1
10 TABLET ORAL DAILY
Qty: 30 TABLET | Refills: 11 | Status: SHIPPED | OUTPATIENT
Start: 2022-01-05 | End: 2023-01-25

## 2022-01-05 NOTE — TELEPHONE ENCOUNTER
"Labs reviewed PLT count down to 86 Pt has a history of ITP seen by oncology during hospitalization last month.   Per Dr Patel note:    \" Normally for patients with chronic ITP we don't treat it unless Plt is <30K, or if the patient has significantly bleeding, or if the patient needs to have a higher Plt for a procedure.  With patients on anticoagulation, we would consider treating ITP if Plt drops below 50K.\"    Stable mild renal insufficiency  Have her reduce her Torsemide to 10 mg/day and to continue to watch her weight and salt intake  Will repeat labs when she returns following her cardioversion.    Pls make sure that she has a follow up appointment with Dr Patel    "

## 2022-01-05 NOTE — TELEPHONE ENCOUNTER
Patient notified of her lab results and will decrease her Torsemide to 10mg/daily she will contact Dr. Patel's office and make an appt. Valencia Zarco RN on 1/5/2022 at 11:24 AM

## 2022-01-10 ENCOUNTER — LAB (OUTPATIENT)
Dept: LAB | Facility: CLINIC | Age: 72
End: 2022-01-10
Attending: NURSE PRACTITIONER
Payer: COMMERCIAL

## 2022-01-10 DIAGNOSIS — I48.19 PERSISTENT ATRIAL FIBRILLATION (H): ICD-10-CM

## 2022-01-10 PROCEDURE — U0003 INFECTIOUS AGENT DETECTION BY NUCLEIC ACID (DNA OR RNA); SEVERE ACUTE RESPIRATORY SYNDROME CORONAVIRUS 2 (SARS-COV-2) (CORONAVIRUS DISEASE [COVID-19]), AMPLIFIED PROBE TECHNIQUE, MAKING USE OF HIGH THROUGHPUT TECHNOLOGIES AS DESCRIBED BY CMS-2020-01-R: HCPCS

## 2022-01-10 PROCEDURE — U0005 INFEC AGEN DETEC AMPLI PROBE: HCPCS

## 2022-01-11 LAB — SARS-COV-2 RNA RESP QL NAA+PROBE: NEGATIVE

## 2022-01-12 ENCOUNTER — TELEPHONE (OUTPATIENT)
Dept: CARDIOLOGY | Facility: CLINIC | Age: 72
End: 2022-01-12
Payer: COMMERCIAL

## 2022-01-12 NOTE — TELEPHONE ENCOUNTER
Called pt to review instructions for cardioversion on 1/13/22, no answer. Left VM requesting call back to Team 1.

## 2022-01-13 ENCOUNTER — ANESTHESIA EVENT (OUTPATIENT)
Dept: CARDIOLOGY | Facility: CLINIC | Age: 72
End: 2022-01-13
Payer: COMMERCIAL

## 2022-01-13 ENCOUNTER — HOSPITAL ENCOUNTER (OUTPATIENT)
Facility: CLINIC | Age: 72
Discharge: HOME | End: 2022-01-13
Attending: INTERNAL MEDICINE | Admitting: INTERNAL MEDICINE
Payer: COMMERCIAL

## 2022-01-13 ENCOUNTER — ANESTHESIA (OUTPATIENT)
Dept: CARDIOLOGY | Facility: CLINIC | Age: 72
End: 2022-01-13
Payer: COMMERCIAL

## 2022-01-13 ENCOUNTER — HOSPITAL ENCOUNTER (OUTPATIENT)
Dept: CARDIOLOGY | Facility: CLINIC | Age: 72
End: 2022-01-13
Attending: NURSE PRACTITIONER
Payer: COMMERCIAL

## 2022-01-13 ENCOUNTER — OFFICE VISIT (OUTPATIENT)
Dept: FAMILY MEDICINE | Facility: CLINIC | Age: 72
End: 2022-01-13

## 2022-01-13 VITALS
HEART RATE: 61 BPM | RESPIRATION RATE: 20 BRPM | OXYGEN SATURATION: 92 % | DIASTOLIC BLOOD PRESSURE: 77 MMHG | TEMPERATURE: 99 F | SYSTOLIC BLOOD PRESSURE: 119 MMHG

## 2022-01-13 DIAGNOSIS — M10.071 ACUTE IDIOPATHIC GOUT INVOLVING TOE OF RIGHT FOOT: Primary | ICD-10-CM

## 2022-01-13 DIAGNOSIS — I48.19 PERSISTENT ATRIAL FIBRILLATION (H): ICD-10-CM

## 2022-01-13 DIAGNOSIS — D69.3 CHRONIC IDIOPATHIC THROMBOCYTOPENIA (H): ICD-10-CM

## 2022-01-13 LAB
ANION GAP SERPL CALCULATED.3IONS-SCNC: 4 MMOL/L (ref 3–14)
ATRIAL RATE - MUSE: 61 BPM
BUN SERPL-MCNC: 19 MG/DL (ref 7–30)
CALCIUM SERPL-MCNC: 9.3 MG/DL (ref 8.5–10.1)
CHLORIDE BLD-SCNC: 100 MMOL/L (ref 94–109)
CO2 SERPL-SCNC: 36 MMOL/L (ref 20–32)
CREAT SERPL-MCNC: 1.2 MG/DL (ref 0.52–1.04)
DIASTOLIC BLOOD PRESSURE - MUSE: NORMAL MMHG
DIGOXIN SERPL-MCNC: 1 UG/L
GFR SERPL CREATININE-BSD FRML MDRD: 49 ML/MIN/1.73M2
GLUCOSE BLD-MCNC: 127 MG/DL (ref 70–99)
INTERPRETATION ECG - MUSE: NORMAL
MAGNESIUM SERPL-MCNC: 2.4 MG/DL (ref 1.6–2.3)
P AXIS - MUSE: -16 DEGREES
POTASSIUM BLD-SCNC: 3.5 MMOL/L (ref 3.4–5.3)
PR INTERVAL - MUSE: 180 MS
QRS DURATION - MUSE: 86 MS
QT - MUSE: 378 MS
QTC - MUSE: 380 MS
R AXIS - MUSE: -10 DEGREES
SODIUM SERPL-SCNC: 140 MMOL/L (ref 133–144)
SYSTOLIC BLOOD PRESSURE - MUSE: NORMAL MMHG
T AXIS - MUSE: 203 DEGREES
URATE SERPL-MCNC: 15.6 MG/DL (ref 2.6–6)
VENTRICULAR RATE- MUSE: 61 BPM

## 2022-01-13 PROCEDURE — 99213 OFFICE O/P EST LOW 20 MIN: CPT | Mod: GT | Performed by: PHYSICIAN ASSISTANT

## 2022-01-13 PROCEDURE — 92960 CARDIOVERSION ELECTRIC EXT: CPT | Performed by: INTERNAL MEDICINE

## 2022-01-13 PROCEDURE — 80162 ASSAY OF DIGOXIN TOTAL: CPT | Performed by: NURSE PRACTITIONER

## 2022-01-13 PROCEDURE — 92960 CARDIOVERSION ELECTRIC EXT: CPT

## 2022-01-13 PROCEDURE — 250N000011 HC RX IP 250 OP 636: Performed by: NURSE ANESTHETIST, CERTIFIED REGISTERED

## 2022-01-13 PROCEDURE — 36415 COLL VENOUS BLD VENIPUNCTURE: CPT | Performed by: NURSE PRACTITIONER

## 2022-01-13 PROCEDURE — 999N000011 HC STATISTIC ANESTHESIA CASE

## 2022-01-13 PROCEDURE — 250N000013 HC RX MED GY IP 250 OP 250 PS 637: Performed by: NURSE PRACTITIONER

## 2022-01-13 PROCEDURE — 82310 ASSAY OF CALCIUM: CPT | Performed by: NURSE PRACTITIONER

## 2022-01-13 PROCEDURE — 83735 ASSAY OF MAGNESIUM: CPT | Performed by: NURSE PRACTITIONER

## 2022-01-13 PROCEDURE — 250N000009 HC RX 250: Performed by: NURSE ANESTHETIST, CERTIFIED REGISTERED

## 2022-01-13 PROCEDURE — 84550 ASSAY OF BLOOD/URIC ACID: CPT | Performed by: INTERNAL MEDICINE

## 2022-01-13 RX ORDER — COLCHICINE 0.6 MG/1
TABLET ORAL
Qty: 16 TABLET | Refills: 0 | Status: SHIPPED | OUTPATIENT
Start: 2022-01-13 | End: 2022-02-01

## 2022-01-13 RX ORDER — POTASSIUM CHLORIDE 1500 MG/1
20 TABLET, EXTENDED RELEASE ORAL
Status: COMPLETED | OUTPATIENT
Start: 2022-01-13 | End: 2022-01-13

## 2022-01-13 RX ORDER — MAGNESIUM SULFATE HEPTAHYDRATE 40 MG/ML
2 INJECTION, SOLUTION INTRAVENOUS
Status: DISCONTINUED | OUTPATIENT
Start: 2022-01-13 | End: 2022-01-14 | Stop reason: HOSPADM

## 2022-01-13 RX ORDER — ALLOPURINOL 100 MG/1
100 TABLET ORAL DAILY
Qty: 30 TABLET | Refills: 1 | Status: SHIPPED | OUTPATIENT
Start: 2022-01-13 | End: 2023-07-10

## 2022-01-13 RX ORDER — POTASSIUM CHLORIDE 1500 MG/1
TABLET, EXTENDED RELEASE ORAL
Status: DISPENSED
Start: 2022-01-13 | End: 2022-01-13

## 2022-01-13 RX ORDER — LIDOCAINE HYDROCHLORIDE 5 MG/ML
INJECTION, SOLUTION INFILTRATION; PERINEURAL PRN
Status: DISCONTINUED | OUTPATIENT
Start: 2022-01-13 | End: 2022-01-13

## 2022-01-13 RX ORDER — PROPOFOL 10 MG/ML
INJECTION, EMULSION INTRAVENOUS PRN
Status: DISCONTINUED | OUTPATIENT
Start: 2022-01-13 | End: 2022-01-13

## 2022-01-13 RX ORDER — ATROPINE SULFATE 0.1 MG/ML
INJECTION INTRAVENOUS
Status: DISPENSED
Start: 2022-01-13 | End: 2022-01-13

## 2022-01-13 RX ADMIN — LIDOCAINE HYDROCHLORIDE 2 ML: 5 INJECTION, SOLUTION INFILTRATION; PERINEURAL at 11:45

## 2022-01-13 RX ADMIN — POTASSIUM CHLORIDE 20 MEQ: 1500 TABLET, EXTENDED RELEASE ORAL at 11:06

## 2022-01-13 RX ADMIN — PROPOFOL 50 MG: 10 INJECTION, EMULSION INTRAVENOUS at 11:45

## 2022-01-13 ASSESSMENT — ENCOUNTER SYMPTOMS: DYSRHYTHMIAS: 1

## 2022-01-13 NOTE — PROGRESS NOTES
This visit was completed via telemedicine using [email or web address] including audio and video. Patient consents to being seen virtually and understands this will be billed as an office visit.     Provider physical location: [facility]  Patient physical location: Home    Patient and I completed history, ROS, and HPI via virtual encounter with video/sound.       There was no physical examination done due to telemedicine appointment.     CC: Discuss Gout, uric acid    History:  2 days ago, right foot started hurting, similar to when she had previous gout around 7 years ago in 2014. She was treated at that time with indomethacin which worked well. Uric acid level was 8.0. Did not pursue allopurinol therapy. Current flare seems to affect all her toes, where they are somewhat red and swollen and goes onto top of foot and medial aspect of arch as well. Pain is making it difficult to talk. Cannot take NSAIDs as she is on Eliquis.     [place] has been working with cardiology recently for persistent atrial fibrillation, and heart failure. Had cardioversion today that was successful. She had also mentioned to them that she was having these symptoms, and they said they can check uric acid level, but she would need to follow-up with PCP. Uric acid level was 15.6. Pt is on torsemide diuretic for her heart failure. She has been having more red meat in diet since having to limit salt intake. Not drinking  Alcohol.     Of note is that patient also has idiopathic thrombocytopenia, managed by hematology. Additional reason why she is to avoid NSAIDs.     PMH, MEDICATIONS, ALLERGIES, SOCIAL AND FAMILY HISTORY in [facility] and reviewed by me personally.    ROS negative other than the symptoms noted above in the HPI.      Examination   There were no vitals taken for this visit.     Constitutional: Sitting comfortably, in no acute distress. Vital signs noted  M/S: Limited exam with virtual visit, but 1-3 digits right foot with  moderate edema, mild erythema, on medial dorsum of foot.   Psychiatric: mentation appears normal and affect normal/bright      A/P    ICD-10-CM    1. Acute idiopathic gout involving toe of right foot  M10.071 colchicine (COLCYRS) 0.6 MG tablet     allopurinol (ZYLOPRIM) 100 MG tablet   2. Chronic idiopathic thrombocytopenia (H)  D69.3        DISCUSSION:  Symptoms and limited exam consistent with gout flare, especially with very elevated uric acid level. May be worsening with torsemide, which is a difficult balance to strike as she also has heart failure, and benefits from this diuretic. Unable to do naproxen, indomethacin, so recommended trial of colchicine 0.6 mg started with 1 tablet 3 times daily (no loading dose with pt age) for 1 day, then decrease to 1 tablet twice daily. Take with food, warned of side effects. Asked her to contact me with update Mon/Tues next week or sooner with concerns. Can use Tylenol as well to help with pain. As symptoms improve, will also have her start on suppression dose of allopurinol. Will need her to return in 1-2 months to recheck CMP (last checked 4/2021) and uric acid.     follow up visit: 1-2 months OV, update next week    Time of visit: 25 minutes    Iveth Nicole PA-C  [facility]

## 2022-01-13 NOTE — NURSING NOTE
Chief Complaint   Patient presents with     Foot Problems     virtual visit, possible gout, pain is on toes, arch and top of foot, had cardioversion today, uric acid level was drawn which was elevated      Pre-visit Screening:  Immunizations:  up to date  Colonoscopy:  is up to date  Mammogram: is up to date  Asthma Action Test/Plan:  NA  PHQ9:  NA  GAD7:  NA  Questioned patient about current smoking habits Pt. has never smoked.  Ok to leave detailed message on voice mail for today's visit only Yes, phone # [phone]

## 2022-01-13 NOTE — ANESTHESIA POSTPROCEDURE EVALUATION
Patient: Gema Coombs    Procedure: * No procedures listed *  Cardioversion External    Diagnosis:* No pre-op diagnosis entered *  Diagnosis Additional Information: No value filed.    Anesthesia Type:  No value filed.    Note:  Disposition: Outpatient   Postop Pain Control: Uneventful            Sign Out: Well controlled pain   PONV: No   Neuro/Psych: Uneventful            Sign Out: Acceptable/Baseline neuro status   Airway/Respiratory: Uneventful            Sign Out: Acceptable/Baseline resp. status   CV/Hemodynamics: Uneventful            Sign Out: Acceptable CV status; No obvious hypovolemia; No obvious fluid overload   Other NRE: NONE   DID A NON-ROUTINE EVENT OCCUR? No           Last vitals:  Vitals Value Taken Time   /77 01/13/22 1203   Temp     Pulse 61 01/13/22 1203   Resp 20 01/13/22 1203   SpO2 92 % 01/13/22 1203       Electronically Signed By: NINI Kilpatrick CRNA  January 13, 2022  12:04 PM

## 2022-01-13 NOTE — ANESTHESIA CARE TRANSFER NOTE
Patient: Gema Coombs    Procedure: * No procedures listed *  Cardioversion External    Diagnosis: * No pre-op diagnosis entered *  Diagnosis Additional Information: No value filed.    Anesthesia Type:   No value filed.     Note:    Oropharynx: spontaneously breathing  Level of Consciousness: drowsy  Oxygen Supplementation: nasal cannula  Level of Supplemental Oxygen (L/min / FiO2): 4  Independent Airway: airway patency satisfactory and stable  Dentition: dentition unchanged  Vital Signs Stable: post-procedure vital signs reviewed and stable  Report to RN Given: handoff report given  Patient transferred to: Cardiac Special Care          Vitals:  Vitals Value Taken Time   /73 01/13/22 1150   Temp     Pulse 61 01/13/22 1150   Resp 20 01/13/22 1150   SpO2 99 % 01/13/22 1150       Electronically Signed By: NINI Kilpatrick CRNA  January 13, 2022  12:04 PM

## 2022-01-13 NOTE — PRE-PROCEDURE
GENERAL PRE-PROCEDURE:     Risks and benefits: Risks, benefits and alternatives were discussed    Consent given by:  Patient  Patient states understanding of procedure being performed: Yes    Patient's understanding of procedure matches consent: Yes    Procedure consent matches procedure scheduled: Yes    Expected level of sedation:  Deep  Appropriately NPO:  Yes  Mallampati  :  Grade 3- soft palate visible, posterior pharyngeal wall not visible  Lungs:  Lungs clear with good breath sounds bilaterally  Heart:  Normal heart sounds and rate and a-fib  History & Physical reviewed:  History and physical reviewed and no updates needed  Statement of review:  I have reviewed the lab findings, diagnostic data, medications, and the plan for sedation      Procedure DC cardioversion  Indication symptomatic atrial fibrillation    Risk-benefit of cardioversion with risk including but not limited to risk of pericardial versus stroke, post cardioversion arrhythmia including bradycardia arrhythmia that may require emergent pacemaker implantation, risk of skin burns and risk of general anesthesia were discussed in detail with patient and her .  Patient has been compliant with apixaban uninterrupted for more than a month including taking 2 days morning dose.  EKG confirms she is in atrial fibrillation with ventricular rate in 80s to 90s.  She has been appropriately off diltiazem and digoxin yesterday and this morning.  Patient understands the rational of cardioversion, the risk involved and the alternative of only continuing rate control strategy and wishes to proceed with DC cardioversion.  Potassium 3.5 and she has been replaced with potassium.  Magnesium 2.4.  Inform consent obtained.  Also discussed in detail with patient  about the procedure and the potential risk/benefit.

## 2022-01-13 NOTE — ANESTHESIA PREPROCEDURE EVALUATION
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     none for 3 years but EEG still shows seizure activity     Hypertension      Paroxysmal atrial fibrillation (H) 16     Sleep apnea     Doesn't use a CPAP      Past Surgical History:   Procedure Laterality Date     BIOPSY OF BREAST, INCISIONAL      L breasts     BIOPSY OF BREAST, NEEDLE CORE  &    R breasts     COLONOSCOPY N/A 10/24/2018    Procedure: COLONOSCOPY;  Surgeon: Nieves Cordova MD;  Location: [facility] OR     LAPAROSCOPIC ASSISTED SIGMOID COLECTOMY N/A 10/24/2018    Procedure: Intraoperative colonoscopy with laparoscopic-assisted sigmoid colectomy;  Surgeon: Nieves Cordova MD;  Location: [facility] OR     LAPAROSCOPY,LYSIS ADHESNS      pain R side poss r/t c-sections     [facility] ANORECTAL MYOMECTOMY      uterine fibroids     [facility]  DELIVERY ONLY      x 2      Allergies   Allergen Reactions     No Known Drug Allergy       Social History     Tobacco Use     Smoking status: Never Smoker     Smokeless tobacco: Never Used   Substance Use Topics     Alcohol use: Yes     Alcohol/week: 0.0 standard drinks     Comment: rare      Wt Readings from Last 1 Encounters:   22 128.4 kg (283 lb)        Anesthesia Evaluation            ROS/MED HX  ENT/Pulmonary:     (+) sleep apnea, RAFI risk factors, hypertension, obese, Intermittent, asthma     Neurologic:  - neg neurologic ROS     Cardiovascular:     (+) hypertension-----CHF dysrhythmias, a-fib,     METS/Exercise Tolerance:     Hematologic:  - neg hematologic  ROS     Musculoskeletal:  - neg  musculoskeletal ROS     GI/Hepatic:     (+) GERD,     Renal/Genitourinary:  - neg Renal ROS     Endo:     (+) Obesity,     Psychiatric/Substance Use:       Infectious Disease:  - neg infectious disease ROS     Malignancy:  - neg malignancy ROS     Other:            Physical Exam    Airway        Mallampati: II   TM distance: < 3 FB   Neck ROM: limited   Mouth opening: > 3 cm    Respiratory Devices and Support         Dental  no notable dental history         Cardiovascular          Rhythm and rate: irregular and normal     Pulmonary           breath sounds clear to auscultation           OUTSIDE LABS:  CBC:   Lab Results   Component Value Date    WBC 5.0 01/04/2022    WBC 5.8 12/04/2021    HGB 14.8 01/04/2022    HGB 14.7 12/04/2021    HCT 46.7 01/04/2022    HCT 48.9 (H) 12/04/2021    PLT 86 (L) 01/04/2022     (L) 12/04/2021     BMP:   Lab Results   Component Value Date     01/13/2022     01/04/2022    POTASSIUM 3.5 01/13/2022    POTASSIUM 3.7 01/04/2022    CHLORIDE 100 01/13/2022    CHLORIDE 99 01/04/2022    CO2 36 (H) 01/13/2022    CO2 34 (H) 01/04/2022    BUN 19 01/13/2022    BUN 21 01/04/2022    CR 1.20 (H) 01/13/2022    CR 1.18 (H) 01/04/2022     (H) 01/13/2022     (H) 01/04/2022     COAGS:   Lab Results   Component Value Date    PTT 30 02/29/2016    INR 0.97 02/29/2016     POC:   Lab Results   Component Value Date     (H) 02/29/2016    HCG Negative 02/14/2012     HEPATIC:   Lab Results   Component Value Date    ALBUMIN 3.0 (L) 11/25/2021    PROTTOTAL 7.0 11/25/2021    ALT 66 (H) 11/25/2021    AST 46 (H) 11/25/2021    ALKPHOS 107 11/25/2021    BILITOTAL 0.4 11/25/2021    BILIDIRECT 0.1 03/04/2016     OTHER:   Lab Results   Component Value Date    PH 5.5 06/09/2012    LACT 1.0 08/01/2018    A1C 4.9 02/28/2020    TONIA 9.3 01/13/2022    MAG 2.4 (H) 01/13/2022    LIPASE 116 09/12/2018    TSH 1.68 11/26/2021    T4 7.7 02/26/2004    CRP 69.9 (H) 06/18/2018    SED 9 06/16/2018        Anesthesia Plan    ASA Status:  3      Anesthesia Type: MAC.     - Reason for MAC: straight local not clinically adequate   Induction: Intravenous.           Consents    Anesthesia Plan(s) and associated risks, benefits, and realistic alternatives discussed. Questions answered and patient/representative(s) expressed understanding.    - Discussed:     - Discussed with:  Patient      - Extended Intubation/Ventilatory Support Discussed: No.      - Patient is DNR/DNI Status: No    Use of blood products discussed: No .     Postoperative Care    Pain management: Oral pain medications, IV analgesics.   PONV prophylaxis: Ondansetron (or other 5HT-3)     Comments:                NINI Kilpatrick CRNA

## 2022-01-13 NOTE — PROCEDURES
[facility]    Procedure: Cardioversion    Date/Time: 1/13/2022 12:08 PM  Performed by: Nnamdi Figueroa MD  Authorized by: Dee Aldridge APRN CNP       UNIVERSAL PROTOCOL   Site Marked: NA  Prior Images Obtained and Reviewed:  Yes  Required items: Required blood products, implants, devices and special equipment available    Patient identity confirmed:  Verbally with patient and arm band  Patient was reevaluated immediately before administering moderate or deep sedation or anesthesia  Confirmation Checklist:  Patient's identity using two indicators, relevant allergies, procedure was appropriate and matched the consent or emergent situation and correct equipment/implants were available  Time out: Immediately prior to the procedure a time out was called    Universal Protocol: the Joint Commission Universal Protocol was followed    Preparation: Patient was prepped and draped in usual sterile fashion       ANESTHESIA  Anesthesia was administered and monitored by anesthesiology.  See anesthesia documentation for details.    SEDATION  Patient Sedated: Yes    Sedation Type:  Deep  Vital signs: Vital signs monitored during sedation      PROCEDURE DETAILS  Cardioversion basis: elective  Pre-procedure rhythm: atrial fibrillation  Patient position: patient was placed in a supine position  Chest area: chest area exposed  Electrodes: pads  Electrodes placed: anterior-posterior  Number of attempts: 1  Post-procedure rhythm: normal sinus rhythm      PROCEDURE  Describe Procedure: After informed consent was obtained, under MAC by anesthesia one 200 J synchronized biphasic direct current shock successfully cardioverted the patient to sinus rhythm. No complication.    Recommendations  - continue apixaban, metoprolol  - stop diltiazem and digoxin  - no driving or operate heavy machinery for atleast 24 hrs, longer if feeling drowsy.  - Follow up as planned next week,    Discussed in detail with patient's  .  Patient Tolerance:  Patient tolerated the procedure well with no immediate complications

## 2022-01-13 NOTE — DISCHARGE INSTRUCTIONS
Discharge Instructions for Cardioversion  Your healthcare provider did a procedure called cardioversion. He or she used a controlled electric shock or a medicine to briefly stop all electrical activity in your heart. This helped restore your heart s normal rhythm. Here are some instructions to follow while you recover.  Home care    Before cardioversion, you will typically be given sedation. So, you won't be able to drive home. You will need a ride. Wait at least 24 hours before driving a car or operating heavy machinery after getting sedating medicines.    The skin on your chest may be irritated or feel like it's sunburned. Your healthcare provider may prescribe a soothing lotion to ease this discomfort. These minor symptoms will go away in a few days.    Ask your healthcare provider about medicines to keep your heart rhythm steady.    If you were prescribed medicine, take it as instructed by your healthcare provider. Don t skip doses or take double doses. Cardioversion requires blood thinners for at least 4 weeks after the procedure to prevent a delayed risk of stroke when treating atrial fibrillation or atrial flutter. Be sure you discuss which medicine you are taking to prevent stroke. Ask when you need to have your medicine levels checked. Also ask whether you may be able to stop taking it in the future or whether you need to take it for life. Some of these blood-thinning medicines such as warfarin will have the dose adjusted, and interact with other medicines or foods. Your healthcare team will give you full instructions on what to watch out for. Report bleeding or symptoms of stroke immediately to your healthcare team and seek emergency medical attention.    Learn to take your own pulse. Keep a record of your results. Ask your healthcare provider when you should seek emergency medical attention. He or she will tell you which pulse rate reading is dangerous.     Cardioversion is usually short term  (temporary). You may need it repeated if the abnormal heart rhythm returns. After the procedure, your healthcare provider will tell you if the treatment worked or if you will need further treatments or medicine.    Follow-up care  Make a follow-up appointment, or as advised.  When to call your healthcare provider  Call 911 right away if you have:    Chest pain    Shortness of breath    Loss of vision, speech, or strength or coordination in any body part  Call your healthcare provider right away if you:    Feel faint, dizzy, or lightheaded    Have chest pain with increased activity    Have irregular heartbeat or fast pulse    Have bleeding issues from blood-thinning medicines  [employer] last reviewed this educational content on 5/1/2019 2000-2021 [employer]. All rights reserved. This information is not intended as a substitute for professional medical care. Always follow your healthcare professional's instructions.

## 2022-01-13 NOTE — SEDATION DOCUMENTATION
Pt admitted for DCCV- successfully completed with 1 200j shock per Dr Figueroa-sedated with Anesthesia-CRNA-  Pt now awake-VSS-coherent and taking PO- Post procedure EKG done and teaching done.  here and Pt discharged to home.

## 2022-01-17 ENCOUNTER — MYC MEDICAL ADVICE (OUTPATIENT)
Dept: FAMILY MEDICINE | Facility: CLINIC | Age: 72
End: 2022-01-17

## 2022-01-17 DIAGNOSIS — G40.909 SEIZURE DISORDER (H): ICD-10-CM

## 2022-01-17 LAB
ATRIAL RATE - MUSE: 94 BPM
DIASTOLIC BLOOD PRESSURE - MUSE: NORMAL MMHG
INTERPRETATION ECG - MUSE: NORMAL
P AXIS - MUSE: NORMAL DEGREES
PR INTERVAL - MUSE: NORMAL MS
QRS DURATION - MUSE: 86 MS
QT - MUSE: 344 MS
QTC - MUSE: 381 MS
R AXIS - MUSE: -21 DEGREES
SYSTOLIC BLOOD PRESSURE - MUSE: NORMAL MMHG
T AXIS - MUSE: 204 DEGREES
VENTRICULAR RATE- MUSE: 74 BPM

## 2022-01-17 RX ORDER — PRIMIDONE 250 MG/1
250 TABLET ORAL 2 TIMES DAILY
Qty: 180 TABLET | Refills: 0 | Status: CANCELLED | OUTPATIENT
Start: 2022-01-17

## 2022-01-17 RX ORDER — PRIMIDONE 250 MG/1
TABLET ORAL
Qty: 180 TABLET | Refills: 0 | COMMUNITY
Start: 2022-01-17

## 2022-01-17 NOTE — TELEPHONE ENCOUNTER
Please see pt's MyChart message and advise.    Gema Coombs is requesting a refill of:    Pending Prescriptions:                       Disp   Refills    primidone (MYSOLINE) 250 MG tablet        180 ta*0            Sig: Take 1 tablet (250 mg) by mouth 2 times daily

## 2022-01-17 NOTE — TELEPHONE ENCOUNTER
Gema Coombs is requesting a refill of:    Refused Prescriptions:                       Disp   Refills    primidone (MYSOLINE) 250 MG tablet [Pharma*180 ta*0        Sig: TAKE 1 TABLET(250 MG) BY MOUTH TWICE DAILY  Refused By: LADONNA TOWNSEND  Reason for Refusal: Originating/Specialty Provider to approve    Per last note of Dr Kay on 4/13/21, should be seeing Neurology

## 2022-01-19 NOTE — TELEPHONE ENCOUNTER
Call her:    She was referred to see neurology in April, we dont' manage seizure disorder, she needs to call now to schedule this apt. I believe her referral should still be good. I can fill for one month, we cannot prescribe ongoing medication for this. I can prescribe additional month if she runs out before her apt. But she will need to get in with them asap.  Also I have never prescribed this medication before for her and have not had discussions with her about why or how she takes this medication.    As for her inhalers, she was told by  on at least one time, to see pulmonary. This is the specialist who should be managing her asthma, especially as it was severe enough to land her in the hospital. I see that she continues to call us to request an inhaler. She needs to be managed by pulmonary.    I know that she said she doesn't want to go to a lot of medical appointments, but these are important medical conditions that should be managed by specialists

## 2022-01-19 NOTE — TELEPHONE ENCOUNTER
Left detailed message with pt stating she needs to see pulmonology and neurology this is not longer an option. Advised her we can help her get scheduled and can send in 1 more short supply. We will not continue managing these medications.

## 2022-01-19 NOTE — TELEPHONE ENCOUNTER
She was referred to neurology in 04.2021 by Dr. Kay, who only filled her meds at that visit for 6 months. Did she see specialist?  She requested a refill in 10.2021, this was refused by Dr Kay who said she needed to get these from neurology. Did she get them somehow? This is now 3  Months later.    Also regarding  Her inhalers--she was told to see pulmonary also by Dr. Kay. She asked for another refill, then another refill. She told us she had an apt with pulmonary in January 10. Did she go to this apt?  She had a hospital admission related to her asthma 11.2021.

## 2022-01-19 NOTE — TELEPHONE ENCOUNTER
Pt's last office visit was with Dr. Campuzano that we have documented was 04/12/16 for facial numbness.  has been filling her Primidone, last refilled by . There is nothing documented in her chart anywhere regarding her seizures/ convulsions. There are no Neurology notes regarding seizures or her taking Primidone.    In regards to her inhalers, pt has been referred to [facility] 4x and we have no documentation that she has ever gone.     Please advise, pt should be following up with these specialists.    Thanks, Elyse

## 2022-01-24 RX ORDER — PRIMIDONE 250 MG/1
250 TABLET ORAL 2 TIMES DAILY
Qty: 60 TABLET | Refills: 0 | Status: SHIPPED | OUTPATIENT
Start: 2022-01-24 | End: 2022-02-23

## 2022-01-26 ENCOUNTER — OFFICE VISIT (OUTPATIENT)
Dept: CARDIOLOGY | Facility: CLINIC | Age: 72
End: 2022-01-26
Payer: COMMERCIAL

## 2022-01-26 VITALS
BODY MASS INDEX: 49.33 KG/M2 | DIASTOLIC BLOOD PRESSURE: 80 MMHG | SYSTOLIC BLOOD PRESSURE: 134 MMHG | HEIGHT: 63 IN | WEIGHT: 278.4 LBS | OXYGEN SATURATION: 95 % | HEART RATE: 64 BPM

## 2022-01-26 DIAGNOSIS — I50.32 CHRONIC HEART FAILURE WITH PRESERVED EJECTION FRACTION (H): ICD-10-CM

## 2022-01-26 DIAGNOSIS — I48.0 PAROXYSMAL ATRIAL FIBRILLATION (H): Primary | ICD-10-CM

## 2022-01-26 PROCEDURE — 99214 OFFICE O/P EST MOD 30 MIN: CPT | Performed by: PHYSICIAN ASSISTANT

## 2022-01-26 PROCEDURE — 93000 ELECTROCARDIOGRAM COMPLETE: CPT | Performed by: PHYSICIAN ASSISTANT

## 2022-01-26 ASSESSMENT — MIFFLIN-ST. JEOR: SCORE: 1756.94

## 2022-01-26 NOTE — PATIENT INSTRUCTIONS
Due to the gout, trial a decrease in the torsemide to 10 mg every other day.   If you have more shortness of breath, weight is going back, or more swelling, go back to 10 mg daily.     See Dee Aldridge NP back in 2 months with an EKG and labs.     Call us if you have any concerns, notice irregular rhythm.

## 2022-01-26 NOTE — LETTER
2022    Sunita Adame MD  [address]  [facility] [address]    RE: Gema Coombs       Dear Colleague,     I had the pleasure of seeing Gema Coombs in the [facility].      CARDIOLOGY CLINIC PROGRESS NOTE    DOS: 2022      Gema Coombs  : 1950, 71 year old  MRN: [id number]      History:  71 year old female who follows with Dr. Titus at [facility].     Her past medical history includes:  Paroxysmal atrial fibrillation, hypertension, severe sleep apnea, asthma, chronic ITP, diverticulitis s/p sigmoid colectomy, morbid obesity, hyperlipidemia, epilepsy, and fibromyalgia     Ms Coombs has a history of paroxysmal atrial fibrillation noted in  and . Due to an abnormal stress test, she underwent coronary angiography () which showed normal coronaries. Initially, anticoagulation was not started due to her ITP, but eventually she was cleared from hematology and was started on Eliquis.      She was hospitalized 21 with rapid afib and congestive heart failure. Echo showed LVEF 55-60%, poor imaging of her RV, but grossly normal, RVSP 24 mmHg and no significant valvular pathology. Rate control was planned per her preference and her metoprolol dose was increased and diltiazem + digoxin were added. She was diuresed with IV Lasix and placed on Torsemide at discharge along with a zioPatch monitor.     7-day ZioPatch Monitor 21 showed persistent afib with an average HR of 75 bpm (ranging from  bpm).  She did have a 3-second pause during sleep.      22 s/p successful DCCV.   Dr. Figueroa stopped dig and dilt.     She had pain in her feet, so uric acid was checked and elevated.  She was started on colchicine and allopurinol. sxs resolved.   But the last few days, she started to have more right foot pain.     EKG 2022: SR, VR 63.   Feeling overall pretty good.     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     none for 3 years but EEG still shows seizure activity     Hypertension      Paroxysmal atrial fibrillation (H) 16     Sleep apnea     Doesn't use a CPAP       PAST SURGICAL HISTORY:  Past Surgical History:   Procedure Laterality Date     BIOPSY OF BREAST, INCISIONAL      L breasts     BIOPSY OF BREAST, NEEDLE CORE  &    R breasts     COLONOSCOPY N/A 10/24/2018    Procedure: COLONOSCOPY;  Surgeon: Nieves Cordova MD;  Location: [facility] OR     LAPAROSCOPIC ASSISTED SIGMOID COLECTOMY N/A 10/24/2018    Procedure: Intraoperative colonoscopy with laparoscopic-assisted sigmoid colectomy;  Surgeon: Neives Cordova MD;  Location: [facility] OR     LAPAROSCOPY,LYSIS ADHESNS  1985    pain R side poss r/t c-sections     [facility] ANORECTAL MYOMECTOMY  1980    uterine fibroids     [facility]  DELIVERY ONLY      x 2       SOCIAL HISTORY:  Social History     Socioeconomic History     Marital status:      Spouse name: Jalen     Number of children: 2     Years of education: 14     Highest education level: Not on file   Occupational History     Occupation: [employer]     Employer: [employer]   Tobacco Use     Smoking status: Never Smoker     Smokeless tobacco: Never Used   Substance and Sexual Activity     Alcohol use: Yes     Alcohol/week: 0.0 standard drinks     Comment: rare     Drug use: No     Sexual activity: Yes      Partners: Male     Comment: postmenopause   Other Topics Concern      Service Not Asked     Blood Transfusions Not Asked     Caffeine Concern Not Asked     Occupational Exposure Not Asked     Hobby Hazards Not Asked     Sleep Concern Not Asked     Stress Concern Not Asked     Weight Concern Not Asked     Special Diet Not Asked     Back Care Not Asked     Exercise Yes     Bike Helmet Not Asked     Seat Belt Yes     Self-Exams Yes     Parent/sibling w/ CABG, MI or angioplasty before 65F 55M? Not Asked   Social History Narrative     Not on file     Social Determinants of Health     Financial Resource Strain: Not on file   Food Insecurity: Not on file   Transportation Needs: Not on file   Physical Activity: Not on file   Stress: Not on file   Social Connections: Not on file   Intimate Partner Violence: Not on file   Housing Stability: Not on file       FAMILY HISTORY:  Family History   Problem Relation Age of Onset     Cancer Maternal Aunt         breast-     Cancer Maternal Grandmother         breast-     Heart Disease Paternal Grandfather         heartattack-  at 58     Heart Disease Maternal Grandfather              Heart Disease Paternal Uncle         bypass-alive &well     Thyroid Disease Sister         hypothyroidism-alive       MEDS: allopurinol (ZYLOPRIM) 100 MG tablet, Take 1 tablet (100 mg) by mouth daily  apixaban ANTICOAGULANT (ELIQUIS) 5 MG tablet, Take 1 tablet (5 mg) by mouth 2 times daily  fluticasone-vilanterol (BREO ELLIPTA) 100-25 MCG/INH inhaler, Inhale 1 puff into the lungs daily  levalbuterol (XOPENEX HFA) 45 MCG/ACT inhaler, Inhale 2 puffs into the lungs every 4 hours as needed for shortness of breath / dyspnea or wheezing  levalbuterol (XOPENEX) 0.63 MG/3ML neb solution, Take 3 mLs (0.63 mg) by nebulization every 4 hours as needed for shortness of breath / dyspnea or wheezing  metoprolol tartrate (LOPRESSOR) 100 MG tablet, Take 1 tablet (100 mg) by  "mouth 2 times daily  primidone (MYSOLINE) 250 MG tablet, Take 1 tablet (250 mg) by mouth 2 times daily  torsemide (DEMADEX) 10 MG tablet, Take 1 tablet (10 mg) by mouth daily  colchicine (COLCYRS) 0.6 MG tablet, Take 1 tablet (0.6 mg) by mouth 3 times daily for 1 day, then decrease down to 1 tablet twice daily until resolved. (Patient not taking: Reported on 1/26/2022)    No current facility-administered medications on file prior to visit.      ALLERGIES:   Allergies   Allergen Reactions     No Known Drug Allergy        PHYSICAL EXAM:  Vitals: /80 (BP Location: Right arm, Patient Position: Sitting, Cuff Size: Adult Large)   Pulse 64   Ht 1.6 m (5' 3\")   Wt 126.3 kg (278 lb 6.4 oz)   SpO2 95%   BMI 49.32 kg/m    Constitutional:  cooperative;in no acute distress morbidly obese      Skin:  warm and dry to the touch        Head:  normocephalic;no masses or lesions        Eyes:  pupils equal and round;conjunctivae and lids unremarkable;sclera white        ENT:  no pallor or cyanosis        Neck:  JVP normal        Respiratory:  clear to auscultation;normal respiratory excursion        Cardiac: regular rhythm   distant heart sounds no presence of murmur            GI:  abdomen soft;BS normoactive obese      Vascular: not assessed this visit                                      Extremities and Musculoskeletal:           Neurological:  affect appropriate            LABS/DATA:  I reviewed the following:  EKG 1/26/2022:  SR, VR 63      ASSESSMENT/PLAN:  Paroxysmal Atrial Fibrillation  -recent episode 11/25/21  -HRs controlled on metoprolol 100 mg BID, diltiazem 180 mg BID, and digoxin 125 mcg  -ziopatch monitor showed persistent Afib averaging 75 bpm with a 3 second pause during sleep  -1/13/22 s/p DCCV. dilt and dig stopped  -EKG 1/26/2022 shows SR with VR 63 on metoprolol 100 mg BID  -on Eliquis (CHADS2-Vasc score: 4)       HFpEF  -recent exacerbation in setting of rapid Afib  -LVEF 55-60% with grossly normal RV " function  -on Torsemide 10 mg/day  -no signs and symptoms of heart failure  -weight stable  -encouraged low salt diet  -Due to gout, will trial 10 mg every other day.  If she has any SOB, edema, weight gain, she will go back to 10 mg daily     Hypertension  -on Metoprolol 100 mg BID, torsemide and BP controlled     Sleep Apnea  -open to outpatient sleep study, referral placed     Morbid Obesity  -on weight watchers    Follow up:  Dee Aldridge NP in 2 months with EKG and BMP (sooner if any CHF sxs, or recurrence of irregular rhythm)    Jeff Ray PA-C    Thank you for allowing me to participate in the care of your patient.    Sincerely,     Jeff Ray PA-C     [facility]  cc:   No referring provider defined for this encounter.

## 2022-01-26 NOTE — PROGRESS NOTES
CARDIOLOGY CLINIC PROGRESS NOTE    DOS: 2022      Gema Coombs  : 1950, 71 year old  MRN: [id number]      History:  71 year old female who follows with Dr. Titus at [facility].     Her past medical history includes:  Paroxysmal atrial fibrillation, hypertension, severe sleep apnea, asthma, chronic ITP, diverticulitis s/p sigmoid colectomy, morbid obesity, hyperlipidemia, epilepsy, and fibromyalgia     Ms Coombs has a history of paroxysmal atrial fibrillation noted in 2016 and 2018. Due to an abnormal stress test, she underwent coronary angiography (2018) which showed normal coronaries. Initially, anticoagulation was not started due to her ITP, but eventually she was cleared from hematology and was started on Eliquis.      She was hospitalized 21 with rapid afib and congestive heart failure. Echo showed LVEF 55-60%, poor imaging of her RV, but grossly normal, RVSP 24 mmHg and no significant valvular pathology. Rate control was planned per her preference and her metoprolol dose was increased and diltiazem + digoxin were added. She was diuresed with IV Lasix and placed on Torsemide at discharge along with a zioPatch monitor.     7-day ZioPatch Monitor 21 showed persistent afib with an average HR of 75 bpm (ranging from  bpm).  She did have a 3-second pause during sleep.      22 s/p successful DCCV.   Dr. Figueroa stopped dig and dilt.     She had pain in her feet, so uric acid was checked and elevated.  She was started on colchicine and allopurinol. sxs resolved.   But the last few days, she started to have more right foot pain.     EKG 2022: SR, VR 63.   Feeling overall pretty good.     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     none for 3 years but EEG still shows seizure activity     Hypertension      Paroxysmal atrial fibrillation (H) 16     Sleep apnea     Doesn't use a CPAP       PAST SURGICAL HISTORY:  Past Surgical History:   Procedure Laterality Date     BIOPSY OF BREAST, INCISIONAL      L breasts     BIOPSY OF BREAST, NEEDLE CORE  &    R breasts     COLONOSCOPY N/A 10/24/2018    Procedure: COLONOSCOPY;  Surgeon: Nieves Cordova MD;  Location: [facility] OR     LAPAROSCOPIC ASSISTED SIGMOID COLECTOMY N/A 10/24/2018    Procedure: Intraoperative colonoscopy with laparoscopic-assisted sigmoid colectomy;  Surgeon: Nieves Cordova MD;  Location: [facility] OR     LAPAROSCOPY,LYSIS ADHESNS  1985    pain R side poss r/t c-sections     [facility] ANORECTAL MYOMECTOMY      uterine fibroids     [facility]  DELIVERY ONLY      x 2       SOCIAL HISTORY:  Social History     Socioeconomic History     Marital status:      Spouse name: Jalen     Number of children: 2     Years of education: 14     Highest education level: Not on file   Occupational History     Occupation: [employer]     Employer: [employer]   Tobacco Use     Smoking status: Never Smoker     Smokeless tobacco: Never Used   Substance and Sexual Activity     Alcohol use: Yes     Alcohol/week: 0.0 standard drinks     Comment: rare     Drug use: No     Sexual activity: Yes     Partners: Male     Comment: postmenopause   Other Topics Concern      Service Not Asked     Blood Transfusions Not Asked     Caffeine Concern Not Asked     Occupational Exposure Not Asked     Hobby  Hazards Not Asked     Sleep Concern Not Asked     Stress Concern Not Asked     Weight Concern Not Asked     Special Diet Not Asked     Back Care Not Asked     Exercise Yes     Bike Helmet Not Asked     Seat Belt Yes     Self-Exams Yes     Parent/sibling w/ CABG, MI or angioplasty before 65F 55M? Not Asked   Social History Narrative     Not on file     Social Determinants of Health     Financial Resource Strain: Not on file   Food Insecurity: Not on file   Transportation Needs: Not on file   Physical Activity: Not on file   Stress: Not on file   Social Connections: Not on file   Intimate Partner Violence: Not on file   Housing Stability: Not on file       FAMILY HISTORY:  Family History   Problem Relation Age of Onset     Cancer Maternal Aunt         breast-     Cancer Maternal Grandmother         breast-     Heart Disease Paternal Grandfather         heartattack-  at 58     Heart Disease Maternal Grandfather              Heart Disease Paternal Uncle         bypass-alive &well     Thyroid Disease Sister         hypothyroidism-alive       MEDS: allopurinol (ZYLOPRIM) 100 MG tablet, Take 1 tablet (100 mg) by mouth daily  apixaban ANTICOAGULANT (ELIQUIS) 5 MG tablet, Take 1 tablet (5 mg) by mouth 2 times daily  fluticasone-vilanterol (BREO ELLIPTA) 100-25 MCG/INH inhaler, Inhale 1 puff into the lungs daily  levalbuterol (XOPENEX HFA) 45 MCG/ACT inhaler, Inhale 2 puffs into the lungs every 4 hours as needed for shortness of breath / dyspnea or wheezing  levalbuterol (XOPENEX) 0.63 MG/3ML neb solution, Take 3 mLs (0.63 mg) by nebulization every 4 hours as needed for shortness of breath / dyspnea or wheezing  metoprolol tartrate (LOPRESSOR) 100 MG tablet, Take 1 tablet (100 mg) by mouth 2 times daily  primidone (MYSOLINE) 250 MG tablet, Take 1 tablet (250 mg) by mouth 2 times daily  torsemide (DEMADEX) 10 MG tablet, Take 1 tablet (10 mg) by mouth daily  colchicine (COLCYRS) 0.6 MG  "tablet, Take 1 tablet (0.6 mg) by mouth 3 times daily for 1 day, then decrease down to 1 tablet twice daily until resolved. (Patient not taking: Reported on 1/26/2022)    No current facility-administered medications on file prior to visit.      ALLERGIES:   Allergies   Allergen Reactions     No Known Drug Allergy        PHYSICAL EXAM:  Vitals: /80 (BP Location: Right arm, Patient Position: Sitting, Cuff Size: Adult Large)   Pulse 64   Ht 1.6 m (5' 3\")   Wt 126.3 kg (278 lb 6.4 oz)   SpO2 95%   BMI 49.32 kg/m    Constitutional:  cooperative;in no acute distress morbidly obese      Skin:  warm and dry to the touch        Head:  normocephalic;no masses or lesions        Eyes:  pupils equal and round;conjunctivae and lids unremarkable;sclera white        ENT:  no pallor or cyanosis        Neck:  JVP normal        Respiratory:  clear to auscultation;normal respiratory excursion        Cardiac: regular rhythm   distant heart sounds no presence of murmur            GI:  abdomen soft;BS normoactive obese      Vascular: not assessed this visit                                      Extremities and Musculoskeletal:           Neurological:  affect appropriate            LABS/DATA:  I reviewed the following:  EKG 1/26/2022:  SR, VR 63      ASSESSMENT/PLAN:  Paroxysmal Atrial Fibrillation  -recent episode 11/25/21  -HRs controlled on metoprolol 100 mg BID, diltiazem 180 mg BID, and digoxin 125 mcg  -ziopatch monitor showed persistent Afib averaging 75 bpm with a 3 second pause during sleep  -1/13/22 s/p DCCV. dilt and dig stopped  -EKG 1/26/2022 shows SR with VR 63 on metoprolol 100 mg BID  -on Eliquis (CHADS2-Vasc score: 4)       HFpEF  -recent exacerbation in setting of rapid Afib  -LVEF 55-60% with grossly normal RV function  -on Torsemide 10 mg/day  -no signs and symptoms of heart failure  -weight stable  -encouraged low salt diet  -Due to gout, will trial 10 mg every other day.  If she has any SOB, edema, weight " gain, she will go back to 10 mg daily     Hypertension  -on Metoprolol 100 mg BID, torsemide and BP controlled     Sleep Apnea  -open to outpatient sleep study, referral placed     Morbid Obesity  -on weight watchers      Follow up:  Dee Aldridge NP in 2 months with EKG and BMP (sooner if any CHF sxs, or recurrence of irregular rhythm)      Jeff Ray PA-C

## 2022-01-28 ENCOUNTER — MYC MEDICAL ADVICE (OUTPATIENT)
Dept: FAMILY MEDICINE | Facility: CLINIC | Age: 72
End: 2022-01-28

## 2022-01-28 ENCOUNTER — MYC MEDICAL ADVICE (OUTPATIENT)
Dept: CARDIOLOGY | Facility: CLINIC | Age: 72
End: 2022-01-28
Payer: COMMERCIAL

## 2022-01-28 DIAGNOSIS — M10.071 ACUTE IDIOPATHIC GOUT INVOLVING TOE OF RIGHT FOOT: ICD-10-CM

## 2022-02-01 RX ORDER — COLCHICINE 0.6 MG/1
TABLET ORAL
Qty: 16 TABLET | Refills: 0 | Status: SHIPPED | OUTPATIENT
Start: 2022-02-01 | End: 2022-02-16

## 2022-02-01 NOTE — TELEPHONE ENCOUNTER
Her K was WNL, and we were going to trial lowering her torsemide.     She does not need to avoid K rich foods.   I think she can stay off KCl for now.    Repeat BMP at her next visit (I think already scheduled).     Thanks,   Jeff Ray PA-C 2/1/2022 2:43 PM

## 2022-02-16 ENCOUNTER — OFFICE VISIT (OUTPATIENT)
Dept: FAMILY MEDICINE | Facility: CLINIC | Age: 72
End: 2022-02-16

## 2022-02-16 ENCOUNTER — MYC MEDICAL ADVICE (OUTPATIENT)
Dept: FAMILY MEDICINE | Facility: CLINIC | Age: 72
End: 2022-02-16

## 2022-02-16 DIAGNOSIS — M10.071 ACUTE IDIOPATHIC GOUT INVOLVING TOE OF RIGHT FOOT: ICD-10-CM

## 2022-02-16 DIAGNOSIS — G40.909 SEIZURE DISORDER (H): ICD-10-CM

## 2022-02-16 PROBLEM — K57.92 ACUTE DIVERTICULITIS: Status: RESOLVED | Noted: 2018-06-16 | Resolved: 2022-02-16

## 2022-02-16 PROBLEM — K57.92 DIVERTICULITIS: Status: RESOLVED | Noted: 2018-09-12 | Resolved: 2022-02-16

## 2022-02-16 PROCEDURE — 99214 OFFICE O/P EST MOD 30 MIN: CPT | Mod: GT | Performed by: FAMILY MEDICINE

## 2022-02-16 RX ORDER — COLCHICINE 0.6 MG/1
TABLET ORAL
Qty: 16 TABLET | Refills: 0 | Status: SHIPPED | OUTPATIENT
Start: 2022-02-16 | End: 2022-03-02

## 2022-02-16 NOTE — PROGRESS NOTES
Assessment & Plan   Problem List Items Addressed This Visit     None      Visit Diagnoses     Acute idiopathic gout involving toe of right foot        Relevant Medications    colchicine (COLCYRS) 0.6 MG tablet         Video-Visit Details    Type of service:  Video Visit    Video Start Time (time video started): 5:28    Video End Time (time video stopped): 5:40    Originating Location (pt. Location): Home    Distant Location (provider location):  [facility]     Mode of Communication:  Video Conference via doximity    Physician has received verbal consent for a Video Visit from the patient? Yes      Sunita Adame MD    1. Acute idiopathic gout involving toe of right foot  Restart colchicine. Come in to check uric acid level. Continue to watch diet, low gout diet. She has tried to make some medication changes with her cardiologist to lower her risk. Wait to start allopurinol.  - colchicine (COLCYRS) 0.6 MG tablet; Take 1 tablet (0.6 mg) by mouth 3 times daily for 1 day, then decrease down to 1 tablet twice daily until resolved.  Dispense: 16 tablet; Refill: 0             FUTURE APPOINTMENTS:       - Follow-up visit if no improvement.  No follow-ups on file.    Sunita Adame MD  [facility]    Subjective     Nursing Notes:   Elyse Petit CMA  2/16/2022  5:22 PM  Signed  Chief Complaint   Patient presents with     Arthritis     virtual visit: discuss gout flare-up     Pre-visit Screening:  Immunizations:  not up to date   Colonoscopy:  is due and to be scheduled by patient for later completion  Mammogram: is due and to be scheduled by patient for later completion  Asthma Action Test/Plan:  NA  PHQ9:  NA  GAD7:  NA  Questioned patient about current smoking habits Pt. has never smoked.  Ok to leave detailed message on voice mail for today's visit only Yes, phone # [phone]           Gema Coombs is a 71 year old female who presents to clinic today for the following  health issues   HPI       Video visit for gout flare.  Right foot. Had a cardioversion on jan 13th. A couple days prior to that had pain in the large toe. By the time of the apt was limping and could hardly walk. Since they had to do blood they checked uric acid. It was very high at 15.6. has had one episode in left foot in June 2014 and it was at 8. This resolved with medications and no other problems.  Saw matheus by video, cochicine, immediately better. Then was ok for awhile on the allopurinol and then came back. Did another round of colchicine. Better with the second round. This took it right away. Had discussed also with cardiology. They  Had her take a couple of her meds every other day, meds that could cause gout.   The most recent episode started 2 days ago. Last night it was pretty bad.it is a little red, not really swelling. Pain at mp joint and into the foot. No injury.   She is willing to take the colchicine again.  Has a refill on the allopurinol.     Review of Systems   Constitutional, HEENT, cardiovascular, pulmonary, gi and gu systems are negative, except as otherwise noted.      Objective    There were no vitals taken for this visit.  There is no height or weight on file to calculate BMI.  Physical Exam   GENERAL: healthy, alert and no distress  MS: no gross musculoskeletal defects noted, no edema  NEURO: Normal strength and tone, mentation intact and speech normal  PSYCH: mentation appears normal, affect normal/bright  Limited by video visit. Right mp joint appears to be slightly red and swollen, she reports that she has difficulty walking on it due to the inflammation and pain.

## 2022-02-16 NOTE — NURSING NOTE
Chief Complaint   Patient presents with     Arthritis     virtual visit: discuss gout flare-up     Pre-visit Screening:  Immunizations:  not up to date   Colonoscopy:  is due and to be scheduled by patient for later completion  Mammogram: is due and to be scheduled by patient for later completion  Asthma Action Test/Plan:  NA  PHQ9:  NA  GAD7:  NA  Questioned patient about current smoking habits Pt. has never smoked.  Ok to leave detailed message on voice mail for today's visit only Yes, phone # [phone]

## 2022-02-23 DIAGNOSIS — G40.909 SEIZURE DISORDER (H): ICD-10-CM

## 2022-02-23 RX ORDER — PRIMIDONE 250 MG/1
TABLET ORAL
Qty: 180 TABLET | COMMUNITY
Start: 2022-02-23

## 2022-02-23 RX ORDER — PRIMIDONE 250 MG/1
250 TABLET ORAL 2 TIMES DAILY
Qty: 60 TABLET | Refills: 0 | Status: SHIPPED | OUTPATIENT
Start: 2022-02-23 | End: 2022-03-22

## 2022-02-23 NOTE — TELEPHONE ENCOUNTER
Denied 90 day was give na 30 day supply     Refused Prescriptions:                       Disp   Refills    primidone (MYSOLINE) 250 MG tablet [Pharma*180 ta*         Sig: TAKE 1 TABLET(250 MG) BY MOUTH TWICE DAILY  Refused By: FABI RAHMAN  Reason for Refusal: Patient should contact provider first  Reason for Refusal Comment: given a 30 day supply

## 2022-03-02 ENCOUNTER — MYC MEDICAL ADVICE (OUTPATIENT)
Dept: FAMILY MEDICINE | Facility: CLINIC | Age: 72
End: 2022-03-02

## 2022-03-02 DIAGNOSIS — M10.071 ACUTE IDIOPATHIC GOUT INVOLVING TOE OF RIGHT FOOT: ICD-10-CM

## 2022-03-02 RX ORDER — COLCHICINE 0.6 MG/1
TABLET ORAL
Qty: 16 TABLET | Refills: 0 | Status: SHIPPED | OUTPATIENT
Start: 2022-03-02 | End: 2023-07-10

## 2022-03-14 NOTE — PROGRESS NOTES
HISTORY OF PRESENT ILLNESS:     This is a 71 year old female who follows with Dr. Titus at [facility]  Her past medical history includes:  Paroxysmal atrial fibrillation, hypertension, severe sleep apnea, asthma, chronic ITP, diverticulitis s/p sigmoid colectomy, morbid obesity, hyperlipidemia, epilepsy, and fibromyalgia     Ms Coombs has a history of paroxysmal atrial fibrillation noted in 2016 and 2018. Due to an abnormal stress test, she underwent coronary angiography (2018) which showed normal coronaries. Initially, anticoagulation was not started due to her ITP, but eventually she was cleared from hematology and was started on Eliquis.      She was hospitalized (11/25/21) with rapid A-fib and congestive heart failure  Her ECHO showed LVEF 55-60%, poor imaging of her RV, but grossly normal, RVSP 24 mmHg and no significant valvular pathology   Her Metoprolol dose was increased and Diltiazem + digoxin were added to control her heart rate.  She was diuresed well with IV lasix and placed on Torsemide at discharge     7-day ZioPatch monitor showed persistent A-fib with an average HR of 75 bpm  There was a 3 second pause during sleep  She was advised to see a sleep physician.     She underwent a DCCV restoring sinus rhythm (1/2022) and her Diltiazem and Digoxin were stopped then.   In follow up 2 months ago, she was still in sinus rhythm  Her Torsemide was lowered to 10 mg every other day      Our visit today is for further review, labs and EKG    Ms Coombs comes in with her family member who assists with her history and symptoms  Overall she denies any significant cardiovascular complaints.  She is still loosing weight and watching the salt in her diet  She denies any palpitations, chest pain, peripheral edema, or significant shortness of breath  She is suffering from intermittent gout.          I reviewed her labs today:  Sodium: 139  Potassium: 3.7  BUN: 14  Creatinine: 0.95      I reviewed her EKG  today  This showed Sinus Rhythm  At 62 bpm  Poor R-wave progression        VITAL SIGNS:  BP: 154/76   136/62 (at home)  Pulse:  58  Weight:   266 lbs (down 12 lbs)  BMI: 47        IMPRESSION AND PLAN:     Paroxysmal Atrial Fibrillation:  -s/p DCCV restoring SR (1/2021)  -in Sinus rhythm today  -on Eliquis  (CHADS2-Vasc score: 4)       Chronic HFpEF  -exacerbation in setting of rapid A-fib (11/2021)  -LVE 55-60% with grossly normal RV function  -on Torsemide 10 mg every other day, will change to 3 times a week  -no signs and symptoms of heart failure  -weight down 12 lbs in 2 months       Hypertension:  -on Metoprolol 100 mg BID, Torsemide 10 mg  -BP controlled at home     Sleep Apnea  -open to outpatient sleep study     Morbid Obesity  -on weight watchers with continued weight loss    The total time for the visit today was >35 minutes which includes patient visit, reviewing of records, discussion, and placing of orders of the outpatient coordination of cardiovascular care as described.  The level of medical decision making during this visit was of moderate complexity.  Thank you for allowing me to participate in their care.    Orders Placed This Encounter   Procedures     Follow-Up with Cardiology     EKG 12-lead complete w/read - Clinics (performed today)       No orders of the defined types were placed in this encounter.      There are no discontinued medications.      Encounter Diagnoses   Name Primary?     Paroxysmal atrial fibrillation (H)      Chronic heart failure with preserved ejection fraction (H) Yes       CURRENT MEDICATIONS:  Current Outpatient Medications   Medication Sig Dispense Refill     allopurinol (ZYLOPRIM) 100 MG tablet Take 1 tablet (100 mg) by mouth daily 30 tablet 1     apixaban ANTICOAGULANT (ELIQUIS) 5 MG tablet Take 1 tablet (5 mg) by mouth 2 times daily 180 tablet 3     fluticasone-vilanterol (BREO ELLIPTA) 100-25 MCG/INH inhaler Inhale 1 puff into the lungs daily 60 each 1      levalbuterol (XOPENEX HFA) 45 MCG/ACT inhaler Inhale 2 puffs into the lungs every 4 hours as needed for shortness of breath / dyspnea or wheezing 15 g 0     levalbuterol (XOPENEX) 0.63 MG/3ML neb solution Take 3 mLs (0.63 mg) by nebulization every 4 hours as needed for shortness of breath / dyspnea or wheezing 90 mL 0     metoprolol tartrate (LOPRESSOR) 100 MG tablet Take 1 tablet (100 mg) by mouth 2 times daily 180 tablet 3     primidone (MYSOLINE) 250 MG tablet Take 1 tablet (250 mg) by mouth 2 times daily 60 tablet 0     torsemide (DEMADEX) 10 MG tablet Take 1 tablet (10 mg) by mouth daily (Patient taking differently: Take 10 mg by mouth every 48 hours ) 30 tablet 11     colchicine (COLCYRS) 0.6 MG tablet Take 1 tablet (0.6 mg) by mouth 3 times daily for 1 day, then decrease down to 1 tablet twice daily until resolved. 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     none for 3 years but EEG still shows seizure activity     Hypertension      Paroxysmal atrial fibrillation (H) 8/1/16     Sleep apnea     Doesn't use a CPAP       PAST SURGICAL HISTORY:  Past Surgical History:   Procedure Laterality Date     BIOPSY OF BREAST, INCISIONAL  1993    L breasts     BIOPSY OF BREAST, NEEDLE CORE  1986&1996    R breasts     COLONOSCOPY N/A 10/24/2018    Procedure: COLONOSCOPY;  Surgeon: Nieves Cordova MD;  Location:  OR     LAPAROSCOPIC ASSISTED SIGMOID COLECTOMY N/A 10/24/2018    Procedure: Intraoperative colonoscopy with laparoscopic-assisted sigmoid colectomy;  Surgeon: Nieves Cordova MD;  Location: [facility] OR     LAPAROSCOPY,LYSIS ADHESNS  1985    pain R side poss r/t c-sections      ZZC ANORECTAL MYOMECTOMY      uterine fibroids     ZZC  DELIVERY ONLY      x 2       FAMILY HISTORY:  Family History   Problem Relation Age of Onset     Cancer Maternal Aunt         breast-     Cancer Maternal Grandmother         breast-     Heart Disease Paternal Grandfather         heartattack-  at 58     Heart Disease Maternal Grandfather              Heart Disease Paternal Uncle         bypass-alive &well     Thyroid Disease Sister         hypothyroidism-alive       SOCIAL HISTORY:  Social History     Socioeconomic History     Marital status:      Spouse name: Jalen     Number of children: 2     Years of education: 14     Highest education level: Not on file   Occupational History     Occupation: [employer]     Employer: [employer]   Tobacco Use     Smoking status: Never Smoker     Smokeless tobacco: Never Used   Substance and Sexual Activity     Alcohol use: Yes     Alcohol/week: 0.0 standard drinks     Comment: rare     Drug use: No     Sexual activity: Yes     Partners: Male     Comment: postmenopause   Other Topics Concern      Service Not Asked     Blood Transfusions Not Asked     Caffeine Concern Not Asked     Occupational Exposure Not Asked     Hobby Hazards Not Asked     Sleep Concern Not Asked     Stress Concern Not Asked     Weight Concern Not Asked     Special Diet Not Asked     Back Care Not Asked     Exercise Yes     Bike Helmet Not Asked     Seat Belt Yes     Self-Exams Yes     Parent/sibling w/ CABG, MI or angioplasty before 65F 55M? Not Asked   Social History Narrative     Not on file     Social Determinants of Health     Financial Resource Strain: Not on file   Food Insecurity: Not on file   Transportation Needs: Not on file   Physical Activity: Not on file   Stress: Not on file   Social Connections: Not on file   Intimate Partner Violence: Not on file   Housing Stability: Not on file       Review of  "Systems:  Skin:  Positive for rash     Eyes:  Positive for glasses    ENT:  Negative      Respiratory:  Positive for dyspnea on exertion ashma   Cardiovascular:  Negative      Gastroenterology: Positive for diarrhea;constipation    Genitourinary:  Negative      Musculoskeletal:  Positive for back pain;neck pain;joint pain;foot pain Foot pain since being diagnosed with gout  Neurologic:  Negative      Psychiatric:  Negative      Heme/Lymph/Imm:  Positive for allergies    Endocrine:  Negative        Physical Exam:  Vitals: /62   Pulse 58   Ht 1.6 m (5' 3\")   Wt 121 kg (266 lb 12.8 oz)   SpO2 95%   BMI 47.26 kg/m      Constitutional:           Skin:             Head:           Eyes:           Lymph:      ENT:           Neck:           Respiratory:            Cardiac:                                                           GI:           Extremities and Muscular Skeletal:                 Neurological:           Psych:             CC  Jeff Ray, FRANK  [address]  [place]  [place] [address]                  "

## 2022-03-15 ENCOUNTER — LAB (OUTPATIENT)
Dept: LAB | Facility: CLINIC | Age: 72
End: 2022-03-15
Payer: COMMERCIAL

## 2022-03-15 ENCOUNTER — OFFICE VISIT (OUTPATIENT)
Dept: CARDIOLOGY | Facility: CLINIC | Age: 72
End: 2022-03-15
Attending: PHYSICIAN ASSISTANT
Payer: COMMERCIAL

## 2022-03-15 VITALS
WEIGHT: 266.8 LBS | SYSTOLIC BLOOD PRESSURE: 136 MMHG | DIASTOLIC BLOOD PRESSURE: 62 MMHG | OXYGEN SATURATION: 95 % | HEART RATE: 58 BPM | HEIGHT: 63 IN | BODY MASS INDEX: 47.27 KG/M2

## 2022-03-15 DIAGNOSIS — I48.0 PAROXYSMAL ATRIAL FIBRILLATION (H): ICD-10-CM

## 2022-03-15 DIAGNOSIS — I50.32 CHRONIC HEART FAILURE WITH PRESERVED EJECTION FRACTION (H): Primary | ICD-10-CM

## 2022-03-15 LAB
ANION GAP SERPL CALCULATED.3IONS-SCNC: 4 MMOL/L (ref 3–14)
BUN SERPL-MCNC: 14 MG/DL (ref 7–30)
CALCIUM SERPL-MCNC: 9 MG/DL (ref 8.5–10.1)
CHLORIDE BLD-SCNC: 101 MMOL/L (ref 94–109)
CO2 SERPL-SCNC: 34 MMOL/L (ref 20–32)
CREAT SERPL-MCNC: 0.95 MG/DL (ref 0.52–1.04)
GFR SERPL CREATININE-BSD FRML MDRD: 65 ML/MIN/1.73M2
GLUCOSE BLD-MCNC: 106 MG/DL (ref 70–99)
POTASSIUM BLD-SCNC: 3.7 MMOL/L (ref 3.4–5.3)
SODIUM SERPL-SCNC: 139 MMOL/L (ref 133–144)

## 2022-03-15 PROCEDURE — 36415 COLL VENOUS BLD VENIPUNCTURE: CPT | Performed by: PHYSICIAN ASSISTANT

## 2022-03-15 PROCEDURE — 80048 BASIC METABOLIC PNL TOTAL CA: CPT | Performed by: PHYSICIAN ASSISTANT

## 2022-03-15 PROCEDURE — 99214 OFFICE O/P EST MOD 30 MIN: CPT | Performed by: NURSE PRACTITIONER

## 2022-03-15 PROCEDURE — 93000 ELECTROCARDIOGRAM COMPLETE: CPT | Performed by: NURSE PRACTITIONER

## 2022-03-15 NOTE — LETTER
3/15/2022    Sunita Adame MD  [address] [facility] [address]    RE: Gema Westrichi       Dear Colleague,     I had the pleasure of seeing Gema Coombs in the [facility].  HISTORY OF PRESENT ILLNESS:     This is a 71 year old female who follows with Dr. Titus at [facility]  Her past medical history includes:  Paroxysmal atrial fibrillation, hypertension, severe sleep apnea, asthma, chronic ITP, diverticulitis s/p sigmoid colectomy, morbid obesity, hyperlipidemia, epilepsy, and fibromyalgia     Ms Coombs has a history of paroxysmal atrial fibrillation noted in 2016 and 2018. Due to an abnormal stress test, she underwent coronary angiography (2018) which showed normal coronaries. Initially, anticoagulation was not started due to her ITP, but eventually she was cleared from hematology and was started on Eliquis.      She was hospitalized (11/25/21) with rapid A-fib and congestive heart failure  Her ECHO showed LVEF 55-60%, poor imaging of her RV, but grossly normal, RVSP 24 mmHg and no significant valvular pathology   Her Metoprolol dose was increased and Diltiazem + digoxin were added to control her heart rate.  She was diuresed well with IV lasix and placed on Torsemide at discharge     7-day ZioPatch monitor showed persistent A-fib with an average HR of 75 bpm  There was a 3 second pause during sleep  She was advised to see a sleep physician.     She underwent a DCCV restoring sinus rhythm (1/2022) and her Diltiazem and Digoxin were stopped then.   In follow up 2 months ago, she was still in sinus rhythm  Her Torsemide was lowered to 10 mg every other day      Our visit today is for further review, labs and EKG    Ms Coombs comes in with her family member who assists with her history and symptoms  Overall she denies any significant cardiovascular complaints.  She is still loosing weight and watching the salt in her diet  She denies any palpitations, chest pain,  peripheral edema, or significant shortness of breath  She is suffering from intermittent gout.          I reviewed her labs today:  Sodium: 139  Potassium: 3.7  BUN: 14  Creatinine: 0.95      I reviewed her EKG today  This showed Sinus Rhythm  At 62 bpm  Poor R-wave progression        VITAL SIGNS:  BP: 154/76   136/62 (at home)  Pulse:  58  Weight:   266 lbs (down 12 lbs)  BMI: 47        IMPRESSION AND PLAN:     Paroxysmal Atrial Fibrillation:  -s/p DCCV restoring SR (1/2021)  -in Sinus rhythm today  -on Eliquis  (CHADS2-Vasc score: 4)       Chronic HFpEF  -exacerbation in setting of rapid A-fib (11/2021)  -LVE 55-60% with grossly normal RV function  -on Torsemide 10 mg every other day, will change to 3 times a week  -no signs and symptoms of heart failure  -weight down 12 lbs in 2 months       Hypertension:  -on Metoprolol 100 mg BID, Torsemide 10 mg  -BP controlled at home     Sleep Apnea  -open to outpatient sleep study     Morbid Obesity  -on weight watchers with continued weight loss    The total time for the visit today was >35 minutes which includes patient visit, reviewing of records, discussion, and placing of orders of the outpatient coordination of cardiovascular care as described.  The level of medical decision making during this visit was of moderate complexity.  Thank you for allowing me to participate in their care.    Orders Placed This Encounter   Procedures     Follow-Up with Cardiology     EKG 12-lead complete w/read - Clinics (performed today)       No orders of the defined types were placed in this encounter.      There are no discontinued medications.      Encounter Diagnoses   Name Primary?     Paroxysmal atrial fibrillation (H)      Chronic heart failure with preserved ejection fraction (H) Yes       CURRENT MEDICATIONS:  Current Outpatient Medications   Medication Sig Dispense Refill     allopurinol (ZYLOPRIM) 100 MG tablet Take 1 tablet (100 mg) by mouth daily 30 tablet 1     apixaban  ANTICOAGULANT (ELIQUIS) 5 MG tablet Take 1 tablet (5 mg) by mouth 2 times daily 180 tablet 3     fluticasone-vilanterol (BREO ELLIPTA) 100-25 MCG/INH inhaler Inhale 1 puff into the lungs daily 60 each 1     levalbuterol (XOPENEX HFA) 45 MCG/ACT inhaler Inhale 2 puffs into the lungs every 4 hours as needed for shortness of breath / dyspnea or wheezing 15 g 0     levalbuterol (XOPENEX) 0.63 MG/3ML neb solution Take 3 mLs (0.63 mg) by nebulization every 4 hours as needed for shortness of breath / dyspnea or wheezing 90 mL 0     metoprolol tartrate (LOPRESSOR) 100 MG tablet Take 1 tablet (100 mg) by mouth 2 times daily 180 tablet 3     primidone (MYSOLINE) 250 MG tablet Take 1 tablet (250 mg) by mouth 2 times daily 60 tablet 0     torsemide (DEMADEX) 10 MG tablet Take 1 tablet (10 mg) by mouth daily (Patient taking differently: Take 10 mg by mouth every 48 hours ) 30 tablet 11     colchicine (COLCYRS) 0.6 MG tablet Take 1 tablet (0.6 mg) by mouth 3 times daily for 1 day, then decrease down to 1 tablet twice daily until resolved. 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     none for 3 years but EEG still shows seizure activity     Hypertension      Paroxysmal atrial fibrillation (H) 8/1/16     Sleep apnea     Doesn't use a CPAP       PAST SURGICAL HISTORY:  Past Surgical History:   Procedure Laterality Date     BIOPSY OF BREAST, INCISIONAL  1993    L breasts     BIOPSY OF BREAST, NEEDLE CORE  1986&1996    R breasts     COLONOSCOPY N/A 10/24/2018    Procedure: COLONOSCOPY;  Surgeon: Nieves Cordova MD;  Location: [facility] OR     LAPAROSCOPIC ASSISTED SIGMOID COLECTOMY N/A 10/24/2018     Procedure: Intraoperative colonoscopy with laparoscopic-assisted sigmoid colectomy;  Surgeon: Nieves Cordova MD;  Location: [facility] OR     LAPAROSCOPY,LYSIS ADHESNS      pain R side poss r/t c-sections     ZZC ANORECTAL MYOMECTOMY      uterine fibroids     ZZC  DELIVERY ONLY      x 2       FAMILY HISTORY:  Family History   Problem Relation Age of Onset     Cancer Maternal Aunt         breast-     Cancer Maternal Grandmother         breast-     Heart Disease Paternal Grandfather         heartattack-  at 58     Heart Disease Maternal Grandfather              Heart Disease Paternal Uncle         bypass-alive &well     Thyroid Disease Sister         hypothyroidism-alive       SOCIAL HISTORY:  Social History     Socioeconomic History     Marital status:      Spouse name: Jalen     Number of children: 2     Years of education: 14     Highest education level: Not on file   Occupational History     Occupation: [employer]     Employer: [employer]   Tobacco Use     Smoking status: Never Smoker     Smokeless tobacco: Never Used   Substance and Sexual Activity     Alcohol use: Yes     Alcohol/week: 0.0 standard drinks     Comment: rare     Drug use: No     Sexual activity: Yes     Partners: Male     Comment: postmenopause   Other Topics Concern      Service Not Asked     Blood Transfusions Not Asked     Caffeine Concern Not Asked     Occupational Exposure Not Asked     Hobby Hazards Not Asked     Sleep Concern Not Asked     Stress Concern Not Asked     Weight Concern Not Asked     Special Diet Not Asked     Back Care Not Asked     Exercise Yes     Bike Helmet Not Asked     Seat Belt Yes     Self-Exams Yes     Parent/sibling w/ CABG, MI or angioplasty before 65F 55M? Not Asked   Social History Narrative     Not on file     Social Determinants of Health     Financial Resource Strain: Not on file   Food Insecurity: Not on file  "  Transportation Needs: Not on file   Physical Activity: Not on file   Stress: Not on file   Social Connections: Not on file   Intimate Partner Violence: Not on file   Housing Stability: Not on file       Review of Systems:  Skin:  Positive for rash     Eyes:  Positive for glasses    ENT:  Negative      Respiratory:  Positive for dyspnea on exertion ashma   Cardiovascular:  Negative      Gastroenterology: Positive for diarrhea;constipation    Genitourinary:  Negative      Musculoskeletal:  Positive for back pain;neck pain;joint pain;foot pain Foot pain since being diagnosed with gout  Neurologic:  Negative      Psychiatric:  Negative      Heme/Lymph/Imm:  Positive for allergies    Endocrine:  Negative        Physical Exam:  Vitals: /62   Pulse 58   Ht 1.6 m (5' 3\")   Wt 121 kg (266 lb 12.8 oz)   SpO2 95%   BMI 47.26 kg/m      Constitutional:           Skin:             Head:           Eyes:           Lymph:      ENT:           Neck:           Respiratory:            Cardiac:                                                           GI:           Extremities and Muscular Skeletal:                 Neurological:           Psych:             CC  Jeff Ray, PAKaeC  [address] [place] [address]    Thank you for allowing me to participate in the care of your patient.      Sincerely,     NINI Cohen CNP     [facility]  "

## 2022-03-28 ENCOUNTER — CARE COORDINATION (OUTPATIENT)
Dept: SLEEP MEDICINE | Facility: CLINIC | Age: 72
End: 2022-03-28
Payer: COMMERCIAL

## 2022-04-09 ENCOUNTER — HEALTH MAINTENANCE LETTER (OUTPATIENT)
Age: 72
End: 2022-04-09

## 2022-04-28 ENCOUNTER — TRANSFERRED RECORDS (OUTPATIENT)
Dept: FAMILY MEDICINE | Facility: CLINIC | Age: 72
End: 2022-04-28

## 2022-05-02 ENCOUNTER — TRANSFERRED RECORDS (OUTPATIENT)
Dept: FAMILY MEDICINE | Facility: CLINIC | Age: 72
End: 2022-05-02

## 2022-06-04 ENCOUNTER — HEALTH MAINTENANCE LETTER (OUTPATIENT)
Age: 72
End: 2022-06-04

## 2022-07-17 DIAGNOSIS — J45.30 MILD PERSISTENT ASTHMA, UNSPECIFIED WHETHER COMPLICATED: ICD-10-CM

## 2022-07-18 NOTE — TELEPHONE ENCOUNTER
Gema Coombs is requesting a refill of:    Refused Prescriptions:                       Disp   Refills    BREO ELLIPTA 100-25 MCG/INH inhaler [Pharm*                Sig: INHALE 1 PUFF INTO THE LUNGS DAILY  Refused By: MAGGIE HARMAN  Reason for Refusal: Originating/Specialty Provider to approve  Reason for Refusal Comment: Refills need to go to Dr. Quezada    Pt was told previously that refills need to come from Pulmonologist

## 2022-09-12 NOTE — TELEPHONE ENCOUNTER

## 2022-10-06 ENCOUNTER — TELEPHONE (OUTPATIENT)
Dept: CARDIOLOGY | Facility: CLINIC | Age: 72
End: 2022-10-06

## 2022-10-06 NOTE — TELEPHONE ENCOUNTER
[facility] Call Center    Phone Message    May a detailed message be left on voicemail: yes     Reason for Call: Other: Pt would like a call back as she is confused regarding the order to see [facility] for Paroxysmal atrial fibrillation (H) [I48.0] but pt is confused  on why she is being switched providers and would like clarification before schedule     Action Taken: Message routed to:  [facility] ([facility] Cardio    Travel Screening: Not Applicable

## 2022-10-06 NOTE — TELEPHONE ENCOUNTER
Recommend patient see a General Cardiologist along with CHAD's. Last visit in June 2022 recommend patient see a Cardiologist.   Patient verbalized understanding.

## 2022-10-09 ENCOUNTER — HEALTH MAINTENANCE LETTER (OUTPATIENT)
Age: 72
End: 2022-10-09

## 2023-01-25 ENCOUNTER — OFFICE VISIT (OUTPATIENT)
Dept: CARDIOLOGY | Facility: CLINIC | Age: 73
End: 2023-01-25
Attending: NURSE PRACTITIONER
Payer: COMMERCIAL

## 2023-01-25 VITALS
WEIGHT: 280 LBS | DIASTOLIC BLOOD PRESSURE: 85 MMHG | HEART RATE: 65 BPM | HEIGHT: 63 IN | BODY MASS INDEX: 49.61 KG/M2 | SYSTOLIC BLOOD PRESSURE: 156 MMHG

## 2023-01-25 DIAGNOSIS — I50.9 CONGESTIVE HEART FAILURE, UNSPECIFIED HF CHRONICITY, UNSPECIFIED HEART FAILURE TYPE (H): ICD-10-CM

## 2023-01-25 DIAGNOSIS — I50.32 CHRONIC HEART FAILURE WITH PRESERVED EJECTION FRACTION (H): ICD-10-CM

## 2023-01-25 DIAGNOSIS — I48.0 PAROXYSMAL ATRIAL FIBRILLATION (H): ICD-10-CM

## 2023-01-25 PROCEDURE — 99214 OFFICE O/P EST MOD 30 MIN: CPT | Performed by: INTERNAL MEDICINE

## 2023-01-25 RX ORDER — TORSEMIDE 10 MG/1
10 TABLET ORAL DAILY
Qty: 30 TABLET | Refills: 11 | Status: SHIPPED | OUTPATIENT
Start: 2023-01-25 | End: 2023-01-27

## 2023-01-25 NOTE — PROGRESS NOTES
HPI and Plan:   See dictation    Today's clinic visit entailed:    33 minutes spent on the date of the encounter doing chart review, history and exam, documentation and further activities per the note  Provider  Link to MDM Help Grid     The level of medical decision making during this visit was of moderate complexity.      No orders of the defined types were placed in this encounter.      Orders Placed This Encounter   Medications     torsemide (DEMADEX) 10 MG tablet     Sig: Take 1 tablet (10 mg) by mouth daily     Dispense:  30 tablet     Refill:  11     Take every other day or daily as needed.       Medications Discontinued During This Encounter   Medication Reason     torsemide (DEMADEX) 10 MG tablet Reorder (No AVS / No eCancel)         Encounter Diagnoses   Name Primary?     Paroxysmal atrial fibrillation (H)      Chronic heart failure with preserved ejection fraction (H)      Congestive heart failure, unspecified HF chronicity, unspecified heart failure type (H)        CURRENT MEDICATIONS:  Current Outpatient Medications   Medication Sig Dispense Refill     apixaban ANTICOAGULANT (ELIQUIS) 5 MG tablet Take 1 tablet (5 mg) by mouth 2 times daily 180 tablet 3     levalbuterol (XOPENEX HFA) 45 MCG/ACT inhaler Inhale 2 puffs into the lungs every 4 hours as needed for shortness of breath / dyspnea or wheezing 15 g 1     levalbuterol (XOPENEX) 0.63 MG/3ML neb solution Take 3 mLs (0.63 mg) by nebulization every 4 hours as needed for shortness of breath / dyspnea or wheezing 90 mL 0     metoprolol tartrate (LOPRESSOR) 100 MG tablet Take 1 tablet (100 mg) by mouth 2 times daily 180 tablet 3     primidone (MYSOLINE) 250 MG tablet Take 1 tablet (250 mg) by mouth 2 times daily 60 tablet 0     torsemide (DEMADEX) 10 MG tablet Take 1 tablet (10 mg) by mouth daily 30 tablet 11     allopurinol (ZYLOPRIM) 100 MG tablet Take 1 tablet (100 mg) by mouth daily 30 tablet 1     colchicine (COLCYRS) 0.6 MG tablet Take 1 tablet  (0.6 mg) by mouth 3 times daily for 1 day, then decrease down to 1 tablet twice daily until resolved. 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     none for 3 years but EEG still shows seizure activity     Hypertension      Paroxysmal atrial fibrillation (H) 18     Sleep apnea     Doesn't use a CPAP       PAST SURGICAL HISTORY:  Past Surgical History:   Procedure Laterality Date     BIOPSY OF BREAST, INCISIONAL      L breasts     BIOPSY OF BREAST, NEEDLE CORE  &    R breasts     COLONOSCOPY N/A 10/24/2018    Procedure: COLONOSCOPY;  Surgeon: Nieves Cordova MD;  Location: [facility]     LAPAROSCOPIC ASSISTED SIGMOID COLECTOMY N/A 10/24/2018    Procedure: Intraoperative colonoscopy with laparoscopic-assisted sigmoid colectomy;  Surgeon: Nieves Cordova MD;  Location: [facility] OR     LAPAROSCOPY,LYSIS ADHESNS      pain R side poss r/t c-sections     Z ANORECTAL MYOMECTOMY      uterine fibroids     Z  DELIVERY ONLY      x 2       FAMILY HISTORY:  Family History   Problem Relation Age of Onset     Cancer Maternal Aunt         breast-     Cancer Maternal Grandmother         breast-     Heart Disease Paternal Grandfather         heartattack-  at 58     Heart Disease Maternal Grandfather              Heart Disease Paternal Uncle         bypass-alive &well     Thyroid Disease Sister         hypothyroidism-alive       SOCIAL HISTORY:  Social History     Socioeconomic History     Marital status:      Spouse  "name: Jalen     Number of children: 2     Years of education: 14     Highest education level: None   Occupational History     Occupation: [employer]     Employer: [employer]   Tobacco Use     Smoking status: Never     Smokeless tobacco: Never   Substance and Sexual Activity     Alcohol use: Yes     Alcohol/week: 0.0 standard drinks     Comment: rare     Drug use: No     Sexual activity: Yes     Partners: Male     Comment: postmenopause   Other Topics Concern     Exercise Yes     Seat Belt Yes     Self-Exams Yes       Review of Systems:  Skin:          Eyes:         ENT:         Respiratory:  Negative       Cardiovascular:    fatigue;Positive for    Gastroenterology:        Genitourinary:         Musculoskeletal:         Neurologic:         Psychiatric:         Heme/Lymph/Imm:         Endocrine:           Physical Exam:  Vitals: BP (!) 156/85   Pulse 65   Ht 1.6 m (5' 3\")   Wt 127 kg (280 lb)   BMI 49.60 kg/m      Constitutional:  cooperative obese      Skin:  warm and dry to the touch          Head:  normocephalic        Eyes:  pupils equal and round        Lymph:      ENT:  no pallor or cyanosis        Neck:  no carotid bruit        Respiratory:  clear to auscultation;normal respiratory excursion diminished breath sounds bilaterally       Cardiac: regular rhythm     no presence of murmur          pulses full and equal                                        GI:  abdomen soft obese      Extremities and Muscular Skeletal:  no edema              Neurological:  affect appropriate        Psych:  Alert and Oriented x 3        CC  NINI Riley CNP  [address]  [place] [id number]              "

## 2023-01-25 NOTE — LETTER
1/25/2023    Sunita Adame MD  [address]  [facility] [address]    RE: Gema Coombs       Dear Colleague,     I had the pleasure of seeing Gema Coombs in the [facility].  HPI and Plan:   See dictation    Today's clinic visit entailed:    33 minutes spent on the date of the encounter doing chart review, history and exam, documentation and further activities per the note  Provider  Link to MDM Help Grid     The level of medical decision making during this visit was of moderate complexity.      No orders of the defined types were placed in this encounter.      Orders Placed This Encounter   Medications     torsemide (DEMADEX) 10 MG tablet     Sig: Take 1 tablet (10 mg) by mouth daily     Dispense:  30 tablet     Refill:  11     Take every other day or daily as needed.       Medications Discontinued During This Encounter   Medication Reason     torsemide (DEMADEX) 10 MG tablet Reorder (No AVS / No eCancel)         Encounter Diagnoses   Name Primary?     Paroxysmal atrial fibrillation (H)      Chronic heart failure with preserved ejection fraction (H)      Congestive heart failure, unspecified HF chronicity, unspecified heart failure type (H)        CURRENT MEDICATIONS:  Current Outpatient Medications   Medication Sig Dispense Refill     apixaban ANTICOAGULANT (ELIQUIS) 5 MG tablet Take 1 tablet (5 mg) by mouth 2 times daily 180 tablet 3     levalbuterol (XOPENEX HFA) 45 MCG/ACT inhaler Inhale 2 puffs into the lungs every 4 hours as needed for shortness of breath / dyspnea or wheezing 15 g 1     levalbuterol (XOPENEX) 0.63 MG/3ML neb solution Take 3 mLs (0.63 mg) by nebulization every 4 hours as needed for shortness of breath / dyspnea or wheezing 90 mL 0     metoprolol tartrate (LOPRESSOR) 100 MG tablet Take 1 tablet (100 mg) by mouth 2 times daily 180 tablet 3     primidone (MYSOLINE) 250 MG tablet Take 1 tablet (250 mg) by mouth 2 times daily 60 tablet 0     torsemide (DEMADEX) 10 MG  tablet Take 1 tablet (10 mg) by mouth daily 30 tablet 11     allopurinol (ZYLOPRIM) 100 MG tablet Take 1 tablet (100 mg) by mouth daily 30 tablet 1     colchicine (COLCYRS) 0.6 MG tablet Take 1 tablet (0.6 mg) by mouth 3 times daily for 1 day, then decrease down to 1 tablet twice daily until resolved. 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     none for 3 years but EEG still shows seizure activity     Hypertension      Paroxysmal atrial fibrillation (H) 16     Sleep apnea     Doesn't use a CPAP       PAST SURGICAL HISTORY:  Past Surgical History:   Procedure Laterality Date     BIOPSY OF BREAST, INCISIONAL      L breasts     BIOPSY OF BREAST, NEEDLE CORE  &    R breasts     COLONOSCOPY N/A 10/24/2018    Procedure: COLONOSCOPY;  Surgeon: Nieves Cordova MD;  Location: [facility]     LAPAROSCOPIC ASSISTED SIGMOID COLECTOMY N/A 10/24/2018    Procedure: Intraoperative colonoscopy with laparoscopic-assisted sigmoid colectomy;  Surgeon: Nieves Cordova MD;  Location: [facility] OR     LAPAROSCOPY,LYSIS ADHESNS  1985    pain R side poss r/t c-sections     Z ANORECTAL MYOMECTOMY      uterine fibroids     [facility]  DELIVERY ONLY      x 2       FAMILY HISTORY:  Family History   Problem Relation Age of Onset     Cancer Maternal Aunt         breast-     Cancer Maternal Grandmother         breast-     Heart Disease Paternal Grandfather         heartattack-  at 58     Heart Disease Maternal Grandfather              Heart Disease  "Paternal Uncle         bypass-alive &well     Thyroid Disease Sister         hypothyroidism-alive       SOCIAL HISTORY:  Social History     Socioeconomic History     Marital status:      Spouse name: Jalen     Number of children: 2     Years of education: 14     Highest education level: None   Occupational History     Occupation: [employer]     Employer: [employer]   Tobacco Use     Smoking status: Never     Smokeless tobacco: Never   Substance and Sexual Activity     Alcohol use: Yes     Alcohol/week: 0.0 standard drinks     Comment: rare     Drug use: No     Sexual activity: Yes     Partners: Male     Comment: postmenopause   Other Topics Concern     Exercise Yes     Seat Belt Yes     Self-Exams Yes       Review of Systems:  Skin:          Eyes:         ENT:         Respiratory:  Negative       Cardiovascular:    fatigue;Positive for    Gastroenterology:        Genitourinary:         Musculoskeletal:         Neurologic:         Psychiatric:         Heme/Lymph/Imm:         Endocrine:           Physical Exam:  Vitals: BP (!) 156/85   Pulse 65   Ht 1.6 m (5' 3\")   Wt 127 kg (280 lb)   BMI 49.60 kg/m      Constitutional:  cooperative obese      Skin:  warm and dry to the touch          Head:  normocephalic        Eyes:  pupils equal and round        Lymph:      ENT:  no pallor or cyanosis        Neck:  no carotid bruit        Respiratory:  clear to auscultation;normal respiratory excursion diminished breath sounds bilaterally       Cardiac: regular rhythm     no presence of murmur          pulses full and equal                                        GI:  abdomen soft obese      Extremities and Muscular Skeletal:  no edema              Neurological:  affect appropriate        Psych:  Alert and Oriented x 3        CC  NINI Riley CNP  [address]  [place] [address]                Service Date: 01/25/2023    REFERRING PROVIDER:  Sunita Adame MD    HISTORY OF PRESENT " ILLNESS:  Ms. Coombs is a pleasant 72-year-old female with a history of paroxysmal atrial fibrillation and heart failure with preserved ejection fraction.  She is morbidly obese and has high blood pressure as well.  She is here for a followup visit.  Overall, she has been doing well from a heart perspective.  Over the last year, she has not had increased work of breathing.  She really never had palpitations with her atrial fibrillation.  It was mainly shortness of breath and her breathing has been pretty stable.  She is on metoprolol for blood pressure and her AFib and she takes apixaban for CVA prophylaxis.  She does have underlying chronic ITP with low platelets.  Last measured 01/2022, her platelet count was 86.  Hemoglobin was 14.8.  She has not had any bleeding complications related to the use of apixaban.    PHYSICAL EXAMINATION:    VITAL SIGNS:  Today her blood pressure is mildly elevated at 156/85, pulse is 65, weight is 280, body mass index of 49.  CARDIOVASCULAR:  Tones today were regular suggesting a normal sinus rhythm.  I do not hear a murmur, gallop or rub.  LUNGS:  Lung fields are diminished, but otherwise clear.  EXTREMITIES:  No peripheral edema.    IMPRESSION:    1.  In summary, Ms. Coombs is a pleasant 72-year-old female with a history of paroxysmal atrial fibrillation.  She is on apixaban for CVA prophylaxis with an underlying history of chronic ITP.  She is not having any bleeding complications.  We will recommend to continue this medication.  She also takes high dose metoprolol but did not need a renewed prescription for that today.  2.  Hypertension.  Her blood pressure is not well controlled.  I have asked her to start monitoring her blood pressure, contact either her primary care provider or myself if her blood pressures are averaging above 140s over 90.  She may need additional blood pressure management.  3.  Heart failure with preserved ejection fraction.  She has done well over the last  year.  She appears compensated on exam today.  I did renew her prescription for Demadex, which she is taking every other day.  We talked about its use and increasing  if she were to experience increased work of breathing in association with orthopnea or PND.      I will be happy to continue to see her annually or as needed.  Please feel free to contact me with any questions you have in regards to her care.    Gracie Titus DO    cc:  Sunita Adame MD  [facility]  [address]  [place]  [address]    Gracie Titus DO        D: 2023   T: 2023   MT: dara    Name:     BEE HALE  MRN:      [id number]        Account:      [id number]   :      1950           Service Date: 2023       Document: [id number]      Thank you for allowing me to participate in the care of your patient.      Sincerely,     Gracie Titus DO     [facility]  cc:   NINI Riley CNP  [address]  [place] [address]

## 2023-01-25 NOTE — PROGRESS NOTES
Service Date: 01/25/2023    REFERRING PROVIDER:  Sunita Adame MD    HISTORY OF PRESENT ILLNESS:  Ms. Coombs is a pleasant 72-year-old female with a history of paroxysmal atrial fibrillation and heart failure with preserved ejection fraction.  She is morbidly obese and has high blood pressure as well.  She is here for a followup visit.  Overall, she has been doing well from a heart perspective.  Over the last year, she has not had increased work of breathing.  She really never had palpitations with her atrial fibrillation.  It was mainly shortness of breath and her breathing has been pretty stable.  She is on metoprolol for blood pressure and her AFib and she takes apixaban for CVA prophylaxis.  She does have underlying chronic ITP with low platelets.  Last measured 01/2022, her platelet count was 86.  Hemoglobin was 14.8.  She has not had any bleeding complications related to the use of apixaban.    PHYSICAL EXAMINATION:    VITAL SIGNS:  Today her blood pressure is mildly elevated at 156/85, pulse is 65, weight is 280, body mass index of 49.  CARDIOVASCULAR:  Tones today were regular suggesting a normal sinus rhythm.  I do not hear a murmur, gallop or rub.  LUNGS:  Lung fields are diminished, but otherwise clear.  EXTREMITIES:  No peripheral edema.    IMPRESSION:    1.  In summary, Ms. Coombs is a pleasant 72-year-old female with a history of paroxysmal atrial fibrillation.  She is on apixaban for CVA prophylaxis with an underlying history of chronic ITP.  She is not having any bleeding complications.  We will recommend to continue this medication.  She also takes high dose metoprolol but did not need a renewed prescription for that today.  2.  Hypertension.  Her blood pressure is not well controlled.  I have asked her to start monitoring her blood pressure, contact either her primary care provider or myself if her blood pressures are averaging above 140s over 90.  She may need additional blood pressure  management.  3.  Heart failure with preserved ejection fraction.  She has done well over the last year.  She appears compensated on exam today.  I did renew her prescription for Demadex, which she is taking every other day.  We talked about its use and increasing  if she were to experience increased work of breathing in association with orthopnea or PND.      I will be happy to continue to see her annually or as needed.  Please feel free to contact me with any questions you have in regards to her care.    Gracie Titus DO    cc:  Sunita Adame MD  [facility]  [address] [place]  [address]    Gracie Titus DO        D: 2023   T: 2023   MT: dara    Name:     BEE HALE  MRN:      [id number]        Account:      [id number]   :      1950           Service Date: 2023       Document: [id number]

## 2023-01-27 DIAGNOSIS — I50.9 CONGESTIVE HEART FAILURE, UNSPECIFIED HF CHRONICITY, UNSPECIFIED HEART FAILURE TYPE (H): ICD-10-CM

## 2023-01-27 RX ORDER — TORSEMIDE 10 MG/1
10 TABLET ORAL DAILY
Qty: 90 TABLET | Refills: 3 | Status: SHIPPED | OUTPATIENT
Start: 2023-01-27 | End: 2024-02-08

## 2023-02-03 ENCOUNTER — TELEPHONE (OUTPATIENT)
Dept: CARDIOLOGY | Facility: CLINIC | Age: 73
End: 2023-02-03

## 2023-02-03 DIAGNOSIS — I48.0 PAROXYSMAL ATRIAL FIBRILLATION (H): ICD-10-CM

## 2023-02-03 RX ORDER — METOPROLOL TARTRATE 100 MG
100 TABLET ORAL 2 TIMES DAILY
Qty: 180 TABLET | Refills: 4 | Status: SHIPPED | OUTPATIENT
Start: 2023-02-03 | End: 2024-02-14

## 2023-02-03 NOTE — TELEPHONE ENCOUNTER
[facility] Refill Guideline reviewed.  Medication meets criteria for refill.      april

## 2023-02-03 NOTE — TELEPHONE ENCOUNTER
Health Call Center    Phone Message    May a detailed message be left on voicemail: yes     Reason for Call: Medication Refill Request    Has the patient contacted the pharmacy for the refill? Yes   Name of medication being requested:   metoprolol tartrate (LOPRESSOR) 100 MG tablet  apixaban ANTICOAGULANT (ELIQUIS) 5 MG tablet  Provider who prescribed the medication: Dee Aldridge  Pharmacy: [id number] - [place], [place] - [address]  Date medication is needed: 2/3/23    Action Taken: Message routed to:  Other: Cardiology    Travel Screening: Not Applicable     Thank you!  [facility]

## 2023-04-27 ENCOUNTER — TRANSFERRED RECORDS (OUTPATIENT)
Dept: HEALTH INFORMATION MANAGEMENT | Facility: CLINIC | Age: 73
End: 2023-04-27
Payer: COMMERCIAL

## 2023-05-09 ENCOUNTER — TELEPHONE (OUTPATIENT)
Dept: CARDIOLOGY | Facility: CLINIC | Age: 73
End: 2023-05-09

## 2023-05-09 DIAGNOSIS — I48.0 PAROXYSMAL ATRIAL FIBRILLATION (H): Primary | ICD-10-CM

## 2023-05-09 NOTE — TELEPHONE ENCOUNTER
[facility] Call Center    Phone Message    May a detailed message be left on voicemail: yes     Reason for Call: Symptoms or Concerns     If patient has red-flag symptoms, warm transfer to triage line    Current symptom or concern: Afib per patient  HR this morning @ 10:58AM is 68 @ 11AM  it was 82  Oxygen 97%   Measured in Pulse OX  The patient said in the past few days the highest it has been is 92    Symptoms have been present for:  A few  Day(s) Since Friday         Action Taken: Other: Cardiology    Travel Screening: Not Applicable     Thank you!  [facility]

## 2023-05-09 NOTE — TELEPHONE ENCOUNTER
Patient states she has been back in afib since Friday 5/5/23.  Patient states HR has remained below 100.  Patient denies any new symptoms.  Patient is currently taking Eliquis BID.  Patient would like to know if Dr. Titus has any new recommendations.    Suzie Senior RN on 5/9/2023 at 1:20 PM

## 2023-05-12 NOTE — TELEPHONE ENCOUNTER
Gracie Titus DO Lidke, Jen M, RN  Caller: Unspecified (3 days ago, 10:58 AM)  See EP    Discussed with patient recommendation.  Patient is in agreement with plan and will await call from scheduling.  RN will route to scheduling.  Suzie Senior RN on 5/12/2023 at 12:34 PM

## 2023-06-05 ENCOUNTER — OFFICE VISIT (OUTPATIENT)
Dept: CARDIOLOGY | Facility: CLINIC | Age: 73
End: 2023-06-05
Payer: COMMERCIAL

## 2023-06-05 ENCOUNTER — HOSPITAL ENCOUNTER (OUTPATIENT)
Facility: CLINIC | Age: 73
End: 2023-06-05
Admitting: INTERNAL MEDICINE
Payer: COMMERCIAL

## 2023-06-05 ENCOUNTER — HOSPITAL ENCOUNTER (OUTPATIENT)
Facility: CLINIC | Age: 73
End: 2023-06-05
Payer: COMMERCIAL

## 2023-06-05 VITALS
HEIGHT: 63 IN | DIASTOLIC BLOOD PRESSURE: 63 MMHG | HEART RATE: 62 BPM | SYSTOLIC BLOOD PRESSURE: 127 MMHG | BODY MASS INDEX: 49.35 KG/M2 | WEIGHT: 278.5 LBS

## 2023-06-05 DIAGNOSIS — I48.0 PAROXYSMAL ATRIAL FIBRILLATION (H): ICD-10-CM

## 2023-06-05 PROCEDURE — 99205 OFFICE O/P NEW HI 60 MIN: CPT | Performed by: INTERNAL MEDICINE

## 2023-06-05 PROCEDURE — 93000 ELECTROCARDIOGRAM COMPLETE: CPT | Performed by: INTERNAL MEDICINE

## 2023-06-05 NOTE — LETTER
6/5/2023    Sunita Adame MD  [address]  [facility] [address]    RE: Gema Coombs       Dear Colleague,     I had the pleasure of seeing Gema Coombs in the [facility].  Electrophysiology/ Clinic Note         H&P and Plan:     REASON FOR VISIT: Electrophysiology evaluation.      HISTORY OF PRESENT ILLNESS: Patient is a pleasant 72-year-old lady with a history of morbid obesity, asthma, ITP/thrombocytopenia, HFpEF and paroxysmal atrial fibrillation, who is here for evaluation.    Patient was admitted with CHF exacerbation back 2021.  At that time, she was found to be in atrial fibrillation underwent successful cardioversion.  She has been on chronic therapy with metoprolol and Eliquis.  Most recently, she was found to be back in atrial fibrillation, and was referred here for evaluation.    Today, she informs she is doing well.  She seems to be minimally symptomatic with A-fib.  She noticed atrial fibrillation because she experiences more shortness of breath with activity compared to her baseline.  She denies palpitations, lightheadedness, near-syncope or syncope.    EKG today is compatible with atrial fibrillation.    PREVIOUS STUDIES (personally reviewed):  -Echo (11/26/2021): Normal LV function.  Mild AS.  No significant LVH.  -Cath (2018): Trivial coronary artery disease.      ASSESSMENT AND PLAN:   1. Persistent atrial fibrillation.    I had an extensive discussion with patient regarding nature of A-fib.  Options are rate versus rhythm control strategy.  She is minimally symptomatic with A-fib.  However, she did well with cardioversion in 2021.  Therefore, I favor another attempt of cardioversion.  She agrees with plan.    Of note, she informs to be compliant with Eliquis therapy and has not missed any doses in the last month.  We will schedule her for cardioversion.    2.  Embolic prevention.  CHADS-VASc score of 3.  Continue anticoagulation with Eliquis indefinitely.      "Gio Reilly MD    Physical Exam:  Vitals: /63   Pulse 62   Ht 1.6 m (5' 3\")   Wt 126.3 kg (278 lb 8 oz)   BMI 49.33 kg/m      Constitutional:  AAO x3.  Pt is in NAD.  HEAD: normocephalic.  SKIN: Skin normal color, texture and turgor with no lesions or eruptions.  Eyes: PERRL, EOMI.  ENT:  Supple, normal JVP. No lymphadenopathy or thyroid enlargement.  Chest:  CTAB.  Cardiac:  IIR, variable sound of S1 and Normal S2.  No murmurs rubs or gallop.   Abdomen:  Normal BS.  Soft, non-tender and non-distended.  No rebound or guarding.    Extremities:  Pedious pulses palpable B/L.  No LE edema noticed.   Neurological: Strength and sensation grossly symmetric and intact throughout.       CURRENT MEDICATIONS:  Current Outpatient Medications   Medication Sig Dispense Refill    apixaban ANTICOAGULANT (ELIQUIS) 5 MG tablet Take 1 tablet (5 mg) by mouth 2 times daily 180 tablet 4    levalbuterol (XOPENEX HFA) 45 MCG/ACT inhaler Inhale 2 puffs into the lungs every 4 hours as needed for shortness of breath / dyspnea or wheezing 15 g 1    metoprolol tartrate (LOPRESSOR) 100 MG tablet Take 1 tablet (100 mg) by mouth 2 times daily 180 tablet 4    primidone (MYSOLINE) 250 MG tablet Take 1 tablet (250 mg) by mouth 2 times daily 60 tablet 0    torsemide (DEMADEX) 10 MG tablet Take 1 tablet (10 mg) by mouth daily 90 tablet 3    allopurinol (ZYLOPRIM) 100 MG tablet Take 1 tablet (100 mg) by mouth daily 30 tablet 1    colchicine (COLCYRS) 0.6 MG tablet Take 1 tablet (0.6 mg) by mouth 3 times daily for 1 day, then decrease down to 1 tablet twice daily until resolved. (Patient not taking: Reported on 3/15/2022) 16 tablet 0    fluticasone-vilanterol (BREO ELLIPTA) 100-25 MCG/INH inhaler Inhale 1 puff into the lungs daily 60 each 1    levalbuterol (XOPENEX) 0.63 MG/3ML neb solution Take 3 mLs (0.63 mg) by nebulization every 4 hours as needed for shortness of breath / dyspnea or wheezing (Patient not taking: Reported on 6/5/2023) " 90 mL 0       ALLERGIES     Allergies   Allergen Reactions    No Known Drug Allergy        PAST MEDICAL HISTORY:  Past Medical History:   Diagnosis Date    Arrhythmia     a fib    Diverticulitis     Extrinsic asthma, unspecified     Fibromyalgia     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     none for 3 years but EEG still shows seizure activity    Hypertension     Paroxysmal atrial fibrillation (H) 16    Sleep apnea     Doesn't use a CPAP       PAST SURGICAL HISTORY:  Past Surgical History:   Procedure Laterality Date    BIOPSY OF BREAST, INCISIONAL      L breasts    BIOPSY OF BREAST, NEEDLE CORE  &    R breasts    COLONOSCOPY N/A 10/24/2018    Procedure: COLONOSCOPY;  Surgeon: Nieves Cordova MD;  Location: [facility] OR    LAPAROSCOPIC ASSISTED SIGMOID COLECTOMY N/A 10/24/2018    Procedure: Intraoperative colonoscopy with laparoscopic-assisted sigmoid colectomy;  Surgeon: Nieves Cordova MD;  Location: [facility] OR    LAPAROSCOPY,LYSIS ADHESNS      pain R side poss r/t c-sections    Z ANORECTAL MYOMECTOMY      uterine fibroids    Z  DELIVERY ONLY      x 2       FAMILY HISTORY:  The patient's family history was reviewed and is non-contributory for heart disease.    SOCIAL HISTORY:  Social History     Socioeconomic History    Marital status:      Spouse name: Jalen    Number of children: 2    Years of education: 14    Highest education level: None   Occupational History    Occupation: [employer]     Employer: [employer]   Tobacco Use    Smoking status: Never    Smokeless tobacco: Never   Substance and Sexual Activity    Alcohol use: Yes     Alcohol/week: 0.0 standard drinks of alcohol     Comment: rare    Drug use: No    Sexual activity: Yes     Partners: Male     Comment: postmenopause   Other Topics Concern    Exercise Yes    Seat Belt Yes    Self-Exams Yes       Review of Systems:  Skin:        Eyes:       ENT:        Respiratory:       Cardiovascular:       Gastroenterology:      Genitourinary:       Musculoskeletal:       Neurologic:       Psychiatric:       Heme/Lymph/Imm:       Endocrine:           Recent Lab Results:  LIPID RESULTS:  Lab Results   Component Value Date    CHOL 235 (A) 02/28/2020    HDL 59 02/28/2020     (A) 02/28/2020     (H) 01/29/2019    TRIG 134 02/28/2020    CHOLHDLRATIO 4 02/28/2020       LIVER ENZYME RESULTS:  Lab Results   Component Value Date    AST 46 (H) 11/25/2021    AST 31 10/15/2018    ALT 66 (H) 11/25/2021    ALT 7 01/29/2019       CBC RESULTS:  Lab Results   Component Value Date    WBC 5.0 01/04/2022    WBC 5.2 04/13/2021    RBC 4.88 01/04/2022    RBC 4.91 04/13/2021    HGB 14.8 01/04/2022    HGB 14.7 04/13/2021    HCT 46.7 01/04/2022    HCT 45.0 04/13/2021    MCV 96 01/04/2022    MCV 91.7 04/13/2021    MCH 30.3 01/04/2022    MCH 29.9 04/13/2021    MCHC 31.7 01/04/2022    MCHC 32.7 04/13/2021    RDW 16.4 (H) 01/04/2022    RDW 13.3 02/27/2019    PLT 86 (L) 01/04/2022     (A) 04/13/2021     (L) 02/27/2019       BMP RESULTS:  Lab Results   Component Value Date     03/15/2022    .9 04/13/2021    POTASSIUM 3.7 03/15/2022    POTASSIUM 4.51 04/13/2021    CHLORIDE 101 03/15/2022    CHLORIDE 104.8 04/13/2021    CO2 34 (H) 03/15/2022    CO2 30.5 04/13/2021    ANIONGAP 4 03/15/2022    ANIONGAP 4 02/25/2019     (H) 03/15/2022     (A) 04/13/2021    BUN 14 03/15/2022    BUN 11 04/13/2021    BUN 14.9 04/13/2021    CR 0.95 03/15/2022    CR 0.74 04/13/2021    GFRESTIMATED 65 03/15/2022    GFRESTIMATED 84 02/25/2019    GFRESTBLACK >90 02/25/2019    TONIA 9.0 03/15/2022    TONIA 9.0 04/13/2021        A1C RESULTS:  Lab Results   Component Value Date    A1C 4.9 02/28/2020       INR RESULTS:  Lab Results   Component Value Date    INR 0.97 02/29/2016    INR 0.99 02/14/2012         ECHOCARDIOGRAM  No results found for this or any previous visit (from the past 8760  hour(s)).      Orders Placed This Encounter   Procedures    EKG 12-lead complete w/read - Clinics (performed today)     No orders of the defined types were placed in this encounter.    There are no discontinued medications.      Encounter Diagnosis   Name Primary?    Paroxysmal atrial fibrillation (H)          RICK Titus,   [address]  [place] [address]      Thank you for allowing me to participate in the care of your patient.      Sincerely,     Gio Reilly MD     [facility]

## 2023-06-05 NOTE — H&P (VIEW-ONLY)
"Electrophysiology/ Clinic Note         H&P and Plan:     REASON FOR VISIT: Electrophysiology evaluation.      HISTORY OF PRESENT ILLNESS: Patient is a pleasant 72-year-old lady with a history of morbid obesity, asthma, ITP/thrombocytopenia, HFpEF and paroxysmal atrial fibrillation, who is here for evaluation.    Patient was admitted with CHF exacerbation back 2021.  At that time, she was found to be in atrial fibrillation underwent successful cardioversion.  She has been on chronic therapy with metoprolol and Eliquis.  Most recently, she was found to be back in atrial fibrillation, and was referred here for evaluation.    Today, she informs she is doing well.  She seems to be minimally symptomatic with A-fib.  She noticed atrial fibrillation because she experiences more shortness of breath with activity compared to her baseline.  She denies palpitations, lightheadedness, near-syncope or syncope.    EKG today is compatible with atrial fibrillation.    PREVIOUS STUDIES (personally reviewed):  -Echo (11/26/2021): Normal LV function.  Mild AS.  No significant LVH.  -Cath (2018): Trivial coronary artery disease.      ASSESSMENT AND PLAN:   1. Persistent atrial fibrillation.    I had an extensive discussion with patient regarding nature of A-fib.  Options are rate versus rhythm control strategy.  She is minimally symptomatic with A-fib.  However, she did well with cardioversion in 2021.  Therefore, I favor another attempt of cardioversion.  She agrees with plan.    Of note, she informs to be compliant with Eliquis therapy and has not missed any doses in the last month.  We will schedule her for cardioversion.    2.  Embolic prevention.  CHADS-VASc score of 3.  Continue anticoagulation with Eliquis indefinitely.       Gio Reilly MD    Physical Exam:  Vitals: /63   Pulse 62   Ht 1.6 m (5' 3\")   Wt 126.3 kg (278 lb 8 oz)   BMI 49.33 kg/m      Constitutional:  AAO x3.  Pt is in NAD.  HEAD: normocephalic.  SKIN: " Skin normal color, texture and turgor with no lesions or eruptions.  Eyes: PERRL, EOMI.  ENT:  Supple, normal JVP. No lymphadenopathy or thyroid enlargement.  Chest:  CTAB.  Cardiac:  IIR, variable sound of S1 and Normal S2.  No murmurs rubs or gallop.   Abdomen:  Normal BS.  Soft, non-tender and non-distended.  No rebound or guarding.    Extremities:  Pedious pulses palpable B/L.  No LE edema noticed.   Neurological: Strength and sensation grossly symmetric and intact throughout.       CURRENT MEDICATIONS:  Current Outpatient Medications   Medication Sig Dispense Refill     apixaban ANTICOAGULANT (ELIQUIS) 5 MG tablet Take 1 tablet (5 mg) by mouth 2 times daily 180 tablet 4     levalbuterol (XOPENEX HFA) 45 MCG/ACT inhaler Inhale 2 puffs into the lungs every 4 hours as needed for shortness of breath / dyspnea or wheezing 15 g 1     metoprolol tartrate (LOPRESSOR) 100 MG tablet Take 1 tablet (100 mg) by mouth 2 times daily 180 tablet 4     primidone (MYSOLINE) 250 MG tablet Take 1 tablet (250 mg) by mouth 2 times daily 60 tablet 0     torsemide (DEMADEX) 10 MG tablet Take 1 tablet (10 mg) by mouth daily 90 tablet 3     allopurinol (ZYLOPRIM) 100 MG tablet Take 1 tablet (100 mg) by mouth daily 30 tablet 1     colchicine (COLCYRS) 0.6 MG tablet Take 1 tablet (0.6 mg) by mouth 3 times daily for 1 day, then decrease down to 1 tablet twice daily until resolved. (Patient not taking: Reported on 3/15/2022) 16 tablet 0     fluticasone-vilanterol (BREO ELLIPTA) 100-25 MCG/INH inhaler Inhale 1 puff into the lungs daily 60 each 1     levalbuterol (XOPENEX) 0.63 MG/3ML neb solution Take 3 mLs (0.63 mg) by nebulization every 4 hours as needed for shortness of breath / dyspnea or wheezing (Patient not taking: Reported on 6/5/2023) 90 mL 0       ALLERGIES     Allergies   Allergen Reactions     No Known Drug Allergy        PAST MEDICAL HISTORY:  Past Medical History:   Diagnosis Date     Arrhythmia     a fib     Diverticulitis       Extrinsic asthma, unspecified      Fibromyalgia      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     none for 3 years but EEG still shows seizure activity     Hypertension      Paroxysmal atrial fibrillation (H) 16     Sleep apnea     Doesn't use a CPAP       PAST SURGICAL HISTORY:  Past Surgical History:   Procedure Laterality Date     BIOPSY OF BREAST, INCISIONAL      L breasts     BIOPSY OF BREAST, NEEDLE CORE  &    R breasts     COLONOSCOPY N/A 10/24/2018    Procedure: COLONOSCOPY;  Surgeon: Nieves Cordova MD;  Location: [facility] OR     LAPAROSCOPIC ASSISTED SIGMOID COLECTOMY N/A 10/24/2018    Procedure: Intraoperative colonoscopy with laparoscopic-assisted sigmoid colectomy;  Surgeon: Nieves Cordova MD;  Location: [facility] OR     LAPAROSCOPY,LYSIS ADHESNS  1985    pain R side poss r/t c-sections     ZZC ANORECTAL MYOMECTOMY      uterine fibroids     ZZC  DELIVERY ONLY      x 2       FAMILY HISTORY:  The patient's family history was reviewed and is non-contributory for heart disease.    SOCIAL HISTORY:  Social History     Socioeconomic History     Marital status:      Spouse name: Jalen     Number of children: 2     Years of education: 14     Highest education level: None   Occupational History     Occupation: [employer]     Employer: [employer]   Tobacco Use     Smoking status: Never     Smokeless tobacco: Never   Substance and Sexual Activity     Alcohol use: Yes     Alcohol/week: 0.0 standard drinks of alcohol     Comment: rare     Drug use: No     Sexual activity: Yes     Partners: Male     Comment: postmenopause   Other Topics Concern     Exercise Yes     Seat Belt Yes     Self-Exams Yes       Review of Systems:  Skin:        Eyes:       ENT:       Respiratory:       Cardiovascular:       Gastroenterology:      Genitourinary:       Musculoskeletal:       Neurologic:       Psychiatric:       Heme/Lymph/Imm:        Endocrine:           Recent Lab Results:  LIPID RESULTS:  Lab Results   Component Value Date    CHOL 235 (A) 02/28/2020    HDL 59 02/28/2020     (A) 02/28/2020     (H) 01/29/2019    TRIG 134 02/28/2020    CHOLHDLRATIO 4 02/28/2020       LIVER ENZYME RESULTS:  Lab Results   Component Value Date    AST 46 (H) 11/25/2021    AST 31 10/15/2018    ALT 66 (H) 11/25/2021    ALT 7 01/29/2019       CBC RESULTS:  Lab Results   Component Value Date    WBC 5.0 01/04/2022    WBC 5.2 04/13/2021    RBC 4.88 01/04/2022    RBC 4.91 04/13/2021    HGB 14.8 01/04/2022    HGB 14.7 04/13/2021    HCT 46.7 01/04/2022    HCT 45.0 04/13/2021    MCV 96 01/04/2022    MCV 91.7 04/13/2021    MCH 30.3 01/04/2022    MCH 29.9 04/13/2021    MCHC 31.7 01/04/2022    MCHC 32.7 04/13/2021    RDW 16.4 (H) 01/04/2022    RDW 13.3 02/27/2019    PLT 86 (L) 01/04/2022     (A) 04/13/2021     (L) 02/27/2019       BMP RESULTS:  Lab Results   Component Value Date     03/15/2022    .9 04/13/2021    POTASSIUM 3.7 03/15/2022    POTASSIUM 4.51 04/13/2021    CHLORIDE 101 03/15/2022    CHLORIDE 104.8 04/13/2021    CO2 34 (H) 03/15/2022    CO2 30.5 04/13/2021    ANIONGAP 4 03/15/2022    ANIONGAP 4 02/25/2019     (H) 03/15/2022     (A) 04/13/2021    BUN 14 03/15/2022    BUN 11 04/13/2021    BUN 14.9 04/13/2021    CR 0.95 03/15/2022    CR 0.74 04/13/2021    GFRESTIMATED 65 03/15/2022    GFRESTIMATED 84 02/25/2019    GFRESTBLACK >90 02/25/2019    TONIA 9.0 03/15/2022    TONIA 9.0 04/13/2021        A1C RESULTS:  Lab Results   Component Value Date    A1C 4.9 02/28/2020       INR RESULTS:  Lab Results   Component Value Date    INR 0.97 02/29/2016    INR 0.99 02/14/2012         ECHOCARDIOGRAM  No results found for this or any previous visit (from the past 8760 hour(s)).      Orders Placed This Encounter   Procedures     EKG 12-lead complete w/read - Clinics (performed today)     No orders of the defined types were placed in  this encounter.    There are no discontinued medications.      Encounter Diagnosis   Name Primary?     Paroxysmal atrial fibrillation (H)          CC  Gracie Titus,   [address]  [place] [id number]

## 2023-06-05 NOTE — PROGRESS NOTES
"Electrophysiology/ Clinic Note         H&P and Plan:     REASON FOR VISIT: Electrophysiology evaluation.      HISTORY OF PRESENT ILLNESS: Patient is a pleasant 72-year-old lady with a history of morbid obesity, asthma, ITP/thrombocytopenia, HFpEF and paroxysmal atrial fibrillation, who is here for evaluation.    Patient was admitted with CHF exacerbation back 2021.  At that time, she was found to be in atrial fibrillation underwent successful cardioversion.  She has been on chronic therapy with metoprolol and Eliquis.  Most recently, she was found to be back in atrial fibrillation, and was referred here for evaluation.    Today, she informs she is doing well.  She seems to be minimally symptomatic with A-fib.  She noticed atrial fibrillation because she experiences more shortness of breath with activity compared to her baseline.  She denies palpitations, lightheadedness, near-syncope or syncope.    EKG today is compatible with atrial fibrillation.    PREVIOUS STUDIES (personally reviewed):  -Echo (11/26/2021): Normal LV function.  Mild AS.  No significant LVH.  -Cath (2018): Trivial coronary artery disease.      ASSESSMENT AND PLAN:   1. Persistent atrial fibrillation.    I had an extensive discussion with patient regarding nature of A-fib.  Options are rate versus rhythm control strategy.  She is minimally symptomatic with A-fib.  However, she did well with cardioversion in 2021.  Therefore, I favor another attempt of cardioversion.  She agrees with plan.    Of note, she informs to be compliant with Eliquis therapy and has not missed any doses in the last month.  We will schedule her for cardioversion.    2.  Embolic prevention.  CHADS-VASc score of 3.  Continue anticoagulation with Eliquis indefinitely.       Gio Reilly MD    Physical Exam:  Vitals: /63   Pulse 62   Ht 1.6 m (5' 3\")   Wt 126.3 kg (278 lb 8 oz)   BMI 49.33 kg/m      Constitutional:  AAO x3.  Pt is in NAD.  HEAD: normocephalic.  SKIN: " Skin normal color, texture and turgor with no lesions or eruptions.  Eyes: PERRL, EOMI.  ENT:  Supple, normal JVP. No lymphadenopathy or thyroid enlargement.  Chest:  CTAB.  Cardiac:  IIR, variable sound of S1 and Normal S2.  No murmurs rubs or gallop.   Abdomen:  Normal BS.  Soft, non-tender and non-distended.  No rebound or guarding.    Extremities:  Pedious pulses palpable B/L.  No LE edema noticed.   Neurological: Strength and sensation grossly symmetric and intact throughout.       CURRENT MEDICATIONS:  Current Outpatient Medications   Medication Sig Dispense Refill     apixaban ANTICOAGULANT (ELIQUIS) 5 MG tablet Take 1 tablet (5 mg) by mouth 2 times daily 180 tablet 4     levalbuterol (XOPENEX HFA) 45 MCG/ACT inhaler Inhale 2 puffs into the lungs every 4 hours as needed for shortness of breath / dyspnea or wheezing 15 g 1     metoprolol tartrate (LOPRESSOR) 100 MG tablet Take 1 tablet (100 mg) by mouth 2 times daily 180 tablet 4     primidone (MYSOLINE) 250 MG tablet Take 1 tablet (250 mg) by mouth 2 times daily 60 tablet 0     torsemide (DEMADEX) 10 MG tablet Take 1 tablet (10 mg) by mouth daily 90 tablet 3     allopurinol (ZYLOPRIM) 100 MG tablet Take 1 tablet (100 mg) by mouth daily 30 tablet 1     colchicine (COLCYRS) 0.6 MG tablet Take 1 tablet (0.6 mg) by mouth 3 times daily for 1 day, then decrease down to 1 tablet twice daily until resolved. (Patient not taking: Reported on 3/15/2022) 16 tablet 0     fluticasone-vilanterol (BREO ELLIPTA) 100-25 MCG/INH inhaler Inhale 1 puff into the lungs daily 60 each 1     levalbuterol (XOPENEX) 0.63 MG/3ML neb solution Take 3 mLs (0.63 mg) by nebulization every 4 hours as needed for shortness of breath / dyspnea or wheezing (Patient not taking: Reported on 6/5/2023) 90 mL 0       ALLERGIES     Allergies   Allergen Reactions     No Known Drug Allergy        PAST MEDICAL HISTORY:  Past Medical History:   Diagnosis Date     Arrhythmia     a fib     Diverticulitis       Extrinsic asthma, unspecified      Fibromyalgia      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     none for 3 years but EEG still shows seizure activity     Hypertension      Paroxysmal atrial fibrillation (H) 16     Sleep apnea     Doesn't use a CPAP       PAST SURGICAL HISTORY:  Past Surgical History:   Procedure Laterality Date     BIOPSY OF BREAST, INCISIONAL      L breasts     BIOPSY OF BREAST, NEEDLE CORE  &    R breasts     COLONOSCOPY N/A 10/24/2018    Procedure: COLONOSCOPY;  Surgeon: Nieves Cordova MD;  Location: [facility] OR     LAPAROSCOPIC ASSISTED SIGMOID COLECTOMY N/A 10/24/2018    Procedure: Intraoperative colonoscopy with laparoscopic-assisted sigmoid colectomy;  Surgeon: Nieves Cordova MD;  Location: [facility] OR     LAPAROSCOPY,LYSIS ADHESNS  1985    pain R side poss r/t c-sections     ZZC ANORECTAL MYOMECTOMY      uterine fibroids     ZZC  DELIVERY ONLY      x 2       FAMILY HISTORY:  The patient's family history was reviewed and is non-contributory for heart disease.    SOCIAL HISTORY:  Social History     Socioeconomic History     Marital status:      Spouse name: Jalen     Number of children: 2     Years of education: 14     Highest education level: None   Occupational History     Occupation: [employer]     Employer: [employer]   Tobacco Use     Smoking status: Never     Smokeless tobacco: Never   Substance and Sexual Activity     Alcohol use: Yes     Alcohol/week: 0.0 standard drinks of alcohol     Comment: rare     Drug use: No     Sexual activity: Yes     Partners: Male     Comment: postmenopause   Other Topics Concern     Exercise Yes     Seat Belt Yes     Self-Exams Yes       Review of Systems:  Skin:        Eyes:       ENT:       Respiratory:       Cardiovascular:       Gastroenterology:      Genitourinary:       Musculoskeletal:       Neurologic:       Psychiatric:       Heme/Lymph/Imm:        Endocrine:           Recent Lab Results:  LIPID RESULTS:  Lab Results   Component Value Date    CHOL 235 (A) 02/28/2020    HDL 59 02/28/2020     (A) 02/28/2020     (H) 01/29/2019    TRIG 134 02/28/2020    CHOLHDLRATIO 4 02/28/2020       LIVER ENZYME RESULTS:  Lab Results   Component Value Date    AST 46 (H) 11/25/2021    AST 31 10/15/2018    ALT 66 (H) 11/25/2021    ALT 7 01/29/2019       CBC RESULTS:  Lab Results   Component Value Date    WBC 5.0 01/04/2022    WBC 5.2 04/13/2021    RBC 4.88 01/04/2022    RBC 4.91 04/13/2021    HGB 14.8 01/04/2022    HGB 14.7 04/13/2021    HCT 46.7 01/04/2022    HCT 45.0 04/13/2021    MCV 96 01/04/2022    MCV 91.7 04/13/2021    MCH 30.3 01/04/2022    MCH 29.9 04/13/2021    MCHC 31.7 01/04/2022    MCHC 32.7 04/13/2021    RDW 16.4 (H) 01/04/2022    RDW 13.3 02/27/2019    PLT 86 (L) 01/04/2022     (A) 04/13/2021     (L) 02/27/2019       BMP RESULTS:  Lab Results   Component Value Date     03/15/2022    .9 04/13/2021    POTASSIUM 3.7 03/15/2022    POTASSIUM 4.51 04/13/2021    CHLORIDE 101 03/15/2022    CHLORIDE 104.8 04/13/2021    CO2 34 (H) 03/15/2022    CO2 30.5 04/13/2021    ANIONGAP 4 03/15/2022    ANIONGAP 4 02/25/2019     (H) 03/15/2022     (A) 04/13/2021    BUN 14 03/15/2022    BUN 11 04/13/2021    BUN 14.9 04/13/2021    CR 0.95 03/15/2022    CR 0.74 04/13/2021    GFRESTIMATED 65 03/15/2022    GFRESTIMATED 84 02/25/2019    GFRESTBLACK >90 02/25/2019    TONIA 9.0 03/15/2022    TONIA 9.0 04/13/2021        A1C RESULTS:  Lab Results   Component Value Date    A1C 4.9 02/28/2020       INR RESULTS:  Lab Results   Component Value Date    INR 0.97 02/29/2016    INR 0.99 02/14/2012         ECHOCARDIOGRAM  No results found for this or any previous visit (from the past 8760 hour(s)).      Orders Placed This Encounter   Procedures     EKG 12-lead complete w/read - Clinics (performed today)     No orders of the defined types were placed in  this encounter.    There are no discontinued medications.      Encounter Diagnosis   Name Primary?     Paroxysmal atrial fibrillation (H)          CC  Gracie Titus,   [address]  [place] [id number]

## 2023-06-10 ENCOUNTER — HEALTH MAINTENANCE LETTER (OUTPATIENT)
Age: 73
End: 2023-06-10

## 2023-06-21 ENCOUNTER — TELEPHONE (OUTPATIENT)
Dept: CARDIOLOGY | Facility: CLINIC | Age: 73
End: 2023-06-21
Payer: COMMERCIAL

## 2023-06-21 NOTE — TELEPHONE ENCOUNTER
Pt aware of check in time of 0830 and where to check in at. Pt aware to have nothing to eat up to 8 hours from the procedure time and may have clears up to 2 hours before arrival. Pt has no medications to hold as she does not take her diuretic tomorrow am. Pt  will be  on discharge. Pt states that she has not missed her Eliquis for at least 4 weeks. Pt asked to call the A fib clinic if not feeling well tomorrow AM.  Will check pt post Cardioversion to see if pt needs to decrease her dose of metoprolol. Paolo

## 2023-06-22 ENCOUNTER — ANESTHESIA (OUTPATIENT)
Dept: MEDSURG UNIT | Facility: CLINIC | Age: 73
End: 2023-06-22

## 2023-06-22 ENCOUNTER — TELEPHONE (OUTPATIENT)
Dept: CARDIOLOGY | Facility: CLINIC | Age: 73
End: 2023-06-22

## 2023-06-22 ENCOUNTER — ANESTHESIA EVENT (OUTPATIENT)
Dept: SURGERY | Facility: CLINIC | Age: 73
End: 2023-06-22
Payer: COMMERCIAL

## 2023-06-22 ENCOUNTER — HOSPITAL ENCOUNTER (OUTPATIENT)
Facility: CLINIC | Age: 73
Discharge: HOME | End: 2023-06-22
Admitting: INTERNAL MEDICINE
Payer: COMMERCIAL

## 2023-06-22 ENCOUNTER — APPOINTMENT (OUTPATIENT)
Dept: CARDIOLOGY | Facility: CLINIC | Age: 73
End: 2023-06-22
Attending: INTERNAL MEDICINE
Payer: COMMERCIAL

## 2023-06-22 ENCOUNTER — ANESTHESIA EVENT (OUTPATIENT)
Dept: MEDSURG UNIT | Facility: CLINIC | Age: 73
End: 2023-06-22

## 2023-06-22 ENCOUNTER — ANESTHESIA (OUTPATIENT)
Dept: SURGERY | Facility: CLINIC | Age: 73
End: 2023-06-22
Payer: COMMERCIAL

## 2023-06-22 VITALS
TEMPERATURE: 96.8 F | HEIGHT: 63 IN | DIASTOLIC BLOOD PRESSURE: 71 MMHG | BODY MASS INDEX: 49.49 KG/M2 | WEIGHT: 279.3 LBS | OXYGEN SATURATION: 97 % | SYSTOLIC BLOOD PRESSURE: 129 MMHG | RESPIRATION RATE: 19 BRPM | HEART RATE: 54 BPM

## 2023-06-22 DIAGNOSIS — I48.91 ATRIAL FIBRILLATION WITH RVR (H): Primary | ICD-10-CM

## 2023-06-22 LAB
MAGNESIUM SERPL-MCNC: 2.2 MG/DL (ref 1.7–2.3)
POTASSIUM SERPL-SCNC: 3.9 MMOL/L (ref 3.4–5.3)

## 2023-06-22 PROCEDURE — 36415 COLL VENOUS BLD VENIPUNCTURE: CPT | Performed by: INTERNAL MEDICINE

## 2023-06-22 PROCEDURE — 93010 ELECTROCARDIOGRAM REPORT: CPT | Mod: 59 | Performed by: INTERNAL MEDICINE

## 2023-06-22 PROCEDURE — 93005 ELECTROCARDIOGRAM TRACING: CPT

## 2023-06-22 PROCEDURE — 250N000011 HC RX IP 250 OP 636: Performed by: NURSE ANESTHETIST, CERTIFIED REGISTERED

## 2023-06-22 PROCEDURE — 250N000013 HC RX MED GY IP 250 OP 250 PS 637: Performed by: INTERNAL MEDICINE

## 2023-06-22 PROCEDURE — 258N000003 HC RX IP 258 OP 636: Performed by: INTERNAL MEDICINE

## 2023-06-22 PROCEDURE — 92960 CARDIOVERSION ELECTRIC EXT: CPT

## 2023-06-22 PROCEDURE — 999N000010 HC STATISTIC ANES STAT CODE-CRNA PER MINUTE

## 2023-06-22 PROCEDURE — 83735 ASSAY OF MAGNESIUM: CPT | Performed by: INTERNAL MEDICINE

## 2023-06-22 PROCEDURE — 92960 CARDIOVERSION ELECTRIC EXT: CPT | Performed by: INTERNAL MEDICINE

## 2023-06-22 PROCEDURE — 93227 XTRNL ECG REC<48 HR R&I: CPT | Performed by: INTERNAL MEDICINE

## 2023-06-22 PROCEDURE — 999N000184 HC STATISTIC TELEMETRY

## 2023-06-22 PROCEDURE — 999N000054 HC STATISTIC EKG NON-CHARGEABLE

## 2023-06-22 PROCEDURE — 370N000017 HC ANESTHESIA TECHNICAL FEE, PER MIN

## 2023-06-22 PROCEDURE — 84132 ASSAY OF SERUM POTASSIUM: CPT | Performed by: INTERNAL MEDICINE

## 2023-06-22 PROCEDURE — 36591 DRAW BLOOD OFF VENOUS DEVICE: CPT

## 2023-06-22 PROCEDURE — 93225 XTRNL ECG REC<48 HRS REC: CPT

## 2023-06-22 RX ORDER — NALOXONE HYDROCHLORIDE 0.4 MG/ML
0.2 INJECTION, SOLUTION INTRAMUSCULAR; INTRAVENOUS; SUBCUTANEOUS
Status: DISCONTINUED | OUTPATIENT
Start: 2023-06-22 | End: 2023-06-22 | Stop reason: HOSPADM

## 2023-06-22 RX ORDER — SODIUM CHLORIDE 9 MG/ML
INJECTION, SOLUTION INTRAVENOUS CONTINUOUS
Status: DISCONTINUED | OUTPATIENT
Start: 2023-06-22 | End: 2023-06-22 | Stop reason: HOSPADM

## 2023-06-22 RX ORDER — MAGNESIUM SULFATE HEPTAHYDRATE 40 MG/ML
2 INJECTION, SOLUTION INTRAVENOUS
Status: DISCONTINUED | OUTPATIENT
Start: 2023-06-22 | End: 2023-06-22 | Stop reason: HOSPADM

## 2023-06-22 RX ORDER — PROPOFOL 10 MG/ML
INJECTION, EMULSION INTRAVENOUS PRN
Status: DISCONTINUED | OUTPATIENT
Start: 2023-06-22 | End: 2023-06-22

## 2023-06-22 RX ORDER — POTASSIUM CHLORIDE 1500 MG/1
40 TABLET, EXTENDED RELEASE ORAL
Status: DISCONTINUED | OUTPATIENT
Start: 2023-06-22 | End: 2023-06-22 | Stop reason: HOSPADM

## 2023-06-22 RX ORDER — POTASSIUM CHLORIDE 1500 MG/1
20 TABLET, EXTENDED RELEASE ORAL
Status: DISCONTINUED | OUTPATIENT
Start: 2023-06-22 | End: 2023-06-22 | Stop reason: HOSPADM

## 2023-06-22 RX ORDER — ATROPINE SULFATE 0.1 MG/ML
.5-1 INJECTION INTRAVENOUS
Status: DISCONTINUED | OUTPATIENT
Start: 2023-06-22 | End: 2023-06-22 | Stop reason: HOSPADM

## 2023-06-22 RX ORDER — NALOXONE HYDROCHLORIDE 0.4 MG/ML
0.4 INJECTION, SOLUTION INTRAMUSCULAR; INTRAVENOUS; SUBCUTANEOUS
Status: DISCONTINUED | OUTPATIENT
Start: 2023-06-22 | End: 2023-06-22 | Stop reason: HOSPADM

## 2023-06-22 RX ORDER — FLUMAZENIL 0.1 MG/ML
0.2 INJECTION, SOLUTION INTRAVENOUS
Status: DISCONTINUED | OUTPATIENT
Start: 2023-06-22 | End: 2023-06-22 | Stop reason: HOSPADM

## 2023-06-22 RX ADMIN — PROPOFOL 70 MG: 10 INJECTION, EMULSION INTRAVENOUS at 10:57

## 2023-06-22 RX ADMIN — SODIUM CHLORIDE: 9 INJECTION, SOLUTION INTRAVENOUS at 10:23

## 2023-06-22 RX ADMIN — POTASSIUM CHLORIDE 20 MEQ: 1500 TABLET, EXTENDED RELEASE ORAL at 09:41

## 2023-06-22 ASSESSMENT — ACTIVITIES OF DAILY LIVING (ADL)
ADLS_ACUITY_SCORE: 35
ADLS_ACUITY_SCORE: 35

## 2023-06-22 ASSESSMENT — ENCOUNTER SYMPTOMS
SEIZURES: 1
DYSRHYTHMIAS: 1

## 2023-06-22 NOTE — PRE-PROCEDURE
GENERAL PRE-PROCEDURE:   Procedure:  Cardioversion  Date/Time:  6/22/2023 10:30 AM    Verbal consent obtained?: Yes    Written consent obtained?: Yes    Risks and benefits: Risks, benefits and alternatives were discussed    DC Plan: Appropriate discharge home plan in place for patients who are going home after procedure   Consent given by:  Patient  Patient states understanding of procedure being performed: Yes    Patient's understanding of procedure matches consent: Yes    Procedure consent matches procedure scheduled: Yes    Expected level of sedation:  Deep  Appropriately NPO:  Yes  ASA Class:  3  Mallampati  :  Grade 4- soft palate obscured by base of tongue  Lungs:  Lungs clear with good breath sounds bilaterally  Heart:  Normal heart sounds and rate  History & Physical reviewed:  History and physical reviewed and no updates needed  Statement of review:  I have reviewed the lab findings, diagnostic data, medications, and the plan for sedation

## 2023-06-22 NOTE — PROGRESS NOTES
Reason for Visit: Cardioversion    1000 A/O. Pt denies sleep apnea but reports she was supposed to have a sleep study done that was never able to be completed. No dentures or loose teeth. NPO x 12+ hrs. Pt's spouse Shreyas at bedside. Discharge instructions given to pt & Shreyas w/ verbal understanding received. All questions & concerns addressed.     Pre-procedure EKG done - rhythm is atrial fibrillation    CDV: Pt tolerated well. CDV x 1 at 200J. Zoll did not print rhythm strips. See Procedural Sedation Flowsheet. Total sedation given per anesthesia.    Dr. Moran placed orders for pt to discharge with 48 hour holter monitor and follow up with EP NP.     1158 Pt discharged per w/c to private vehicle. All personal belongings sent with pt.

## 2023-06-22 NOTE — PROCEDURES
[facility]    Procedure: *Cardioversion    Date/Time: 6/22/2023 11:03 AM    Performed by: Jojo Moran MD  Authorized by: Jojo Moran MD      UNIVERSAL PROTOCOL   Site Marked: Yes  Prior Images Obtained and Reviewed:  Yes  Required items: Required blood products, implants, devices and special equipment available    Patient identity confirmed:  Verbally with patient  Patient was reevaluated immediately before administering moderate or deep sedation or anesthesia  Confirmation Checklist:  Patient's identity using two indicators  Time out: Immediately prior to the procedure a time out was called    Universal Protocol: the Joint Commission Universal Protocol was followed       ANESTHESIA  Anesthesia was administered and monitored by anesthesiology.  See anesthesia documentation for details.    SEDATION  Patient Sedated: Yes    Sedation:  Propofol  Vital signs: Vital signs monitored during sedation      PROCEDURE DETAILS  Cardioversion basis: elective  Pre-procedure rhythm: atrial fibrillation  Patient position: patient was placed in a supine position  Electrodes: pads  Electrodes placed: anterior-posterior  Number of attempts: 1  Post-procedure rhythm: normal sinus rhythm  Complications: no complications      PROCEDURE  Describe Procedure: Consent obtained. Anticoagulation status confirmed. Pads placed in AP position. Single shock at 200 J converted AFIB into normal sinus rhythm at 53 bpm. No immediate complications.  Patient Tolerance:  Patient tolerated the procedure well with no immediate complications

## 2023-06-22 NOTE — INTERVAL H&P NOTE
"I have reviewed the surgical (or preoperative) H&P that is linked to this encounter, and examined the patient. There are no significant changes    Clinical Conditions Present on Arrival:  Clinically Significant Risk Factors Present on Admission                # Drug Induced Coagulation Defect: home medication list includes an anticoagulant medication   # Severe Obesity: Estimated body mass index is 49.48 kg/m  as calculated from the following:    Height as of this encounter: 1.6 m (5' 3\").    Weight as of this encounter: 126.7 kg (279 lb 4.8 oz).       "

## 2023-06-22 NOTE — ANESTHESIA POSTPROCEDURE EVALUATION
Patient: Gema Coombs    Procedure: Procedure(s):  Anesthesia for cardioversion       Anesthesia Type:  MAC    Note:     Postop Pain Control: Uneventful            Sign Out: Well controlled pain   PONV: No   Neuro/Psych: Uneventful            Sign Out: Acceptable/Baseline neuro status   Airway/Respiratory: Uneventful            Sign Out: Acceptable/Baseline resp. status   CV/Hemodynamics: Uneventful            Sign Out: Acceptable CV status; No obvious hypovolemia; No obvious fluid overload   Other NRE: NONE   DID A NON-ROUTINE EVENT OCCUR? No           Last vitals:  Vitals:    06/22/23 1100 06/22/23 1105 06/22/23 1110   BP: 133/68 120/63 127/61   Pulse: 108 (!) 48 (!) 49   Resp: 11 15 11   Temp:      SpO2:          Electronically Signed By: Pranav Reich MD  June 22, 2023  11:14 AM

## 2023-06-22 NOTE — ANESTHESIA PREPROCEDURE EVALUATION
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     none for 3 years but EEG still shows seizure activity     Hypertension      Paroxysmal atrial fibrillation (H) 16     Sleep apnea     Doesn't use a CPAP      Past Surgical History:   Procedure Laterality Date     BIOPSY OF BREAST, INCISIONAL      L breasts     BIOPSY OF BREAST, NEEDLE CORE  &    R breasts     COLONOSCOPY N/A 10/24/2018    Procedure: COLONOSCOPY;  Surgeon: Nieves Cordova MD;  Location: [facility] OR     LAPAROSCOPIC ASSISTED SIGMOID COLECTOMY N/A 10/24/2018    Procedure: Intraoperative colonoscopy with laparoscopic-assisted sigmoid colectomy;  Surgeon: Nieves Cordova MD;  Location: [facility] OR     LAPAROSCOPY,LYSIS ADHESNS      pain R side poss r/t c-sections     Z ANORECTAL MYOMECTOMY      uterine fibroids     Z  DELIVERY ONLY      x 2      Allergies   Allergen Reactions     No Known Drug Allergy       Social History     Tobacco Use     Smoking status: Never     Smokeless tobacco: Never   Substance Use Topics     Alcohol use: Yes     Alcohol/week: 0.0 standard drinks of alcohol     Comment: rare      Wt Readings from Last 1 Encounters:   23 126.3 kg (278 lb 8 oz)        Anesthesia Evaluation   Pt has had prior anesthetic.     No history of anesthetic complications       ROS/MED HX  ENT/Pulmonary:     (+) sleep apnea, doesn't use CPAP, RAFI risk factors, hypertension, obese, Intermittent, asthma     Neurologic:     (+) seizures,     Cardiovascular:     (+) Dyslipidemia hypertension-----Taking blood thinners CHF dysrhythmias, a-fib, pulmonary hypertension,     METS/Exercise  Tolerance:     Hematologic: Comments: ITP - neg hematologic  ROS     Musculoskeletal:       GI/Hepatic: Comment: DIVERTICULITIS S/P SIGMOID COLECTOMY    (+) GERD,     Renal/Genitourinary:  - neg Renal ROS  (-) renal disease   Endo:     (+) Obesity,  (-) Type II DM   Psychiatric/Substance Use:       Infectious Disease:  - neg infectious disease ROS     Malignancy:  - neg malignancy ROS (+) Malignancy, History of Skin.    Other:      (+) , H/O Chronic Pain,        Physical Exam    Airway        Mallampati: III   TM distance: > 3 FB   Neck ROM: full   Mouth opening: > 3 cm    Respiratory Devices and Support         Dental       (+) Minor Abnormalities - some fillings, tiny chips      Cardiovascular          Rhythm and rate: irregular     Pulmonary   pulmonary exam normal                OUTSIDE LABS:  CBC:   Lab Results   Component Value Date    WBC 5.0 01/04/2022    WBC 5.8 12/04/2021    HGB 14.8 01/04/2022    HGB 14.7 12/04/2021    HCT 46.7 01/04/2022    HCT 48.9 (H) 12/04/2021    PLT 86 (L) 01/04/2022     (L) 12/04/2021     BMP:   Lab Results   Component Value Date     03/15/2022     01/13/2022    POTASSIUM 3.7 03/15/2022    POTASSIUM 3.5 01/13/2022    CHLORIDE 101 03/15/2022    CHLORIDE 100 01/13/2022    CO2 34 (H) 03/15/2022    CO2 36 (H) 01/13/2022    BUN 14 03/15/2022    BUN 19 01/13/2022    CR 0.95 03/15/2022    CR 1.20 (H) 01/13/2022     (H) 03/15/2022     (H) 01/13/2022     COAGS:   Lab Results   Component Value Date    PTT 30 02/29/2016    INR 0.97 02/29/2016     POC:   Lab Results   Component Value Date     (H) 02/29/2016    HCG Negative 02/14/2012     HEPATIC:   Lab Results   Component Value Date    ALBUMIN 3.0 (L) 11/25/2021    PROTTOTAL 7.0 11/25/2021    ALT 66 (H) 11/25/2021    AST 46 (H) 11/25/2021    ALKPHOS 107 11/25/2021    BILITOTAL 0.4 11/25/2021    BILIDIRECT 0.1 03/04/2016     OTHER:   Lab Results   Component Value Date    PH 5.5 06/09/2012    LACT 1.0  08/01/2018    A1C 4.9 02/28/2020    TONIA 9.0 03/15/2022    MAG 2.4 (H) 01/13/2022    LIPASE 116 09/12/2018    TSH 1.68 11/26/2021    T4 7.7 02/26/2004    CRP 69.9 (H) 06/18/2018    SED 9 06/16/2018       Anesthesia Plan    ASA Status:  3   NPO Status:  NPO Appropriate    Anesthesia Type: MAC.     - Reason for MAC: immobility needed              Consents    Anesthesia Plan(s) and associated risks, benefits, and realistic alternatives discussed. Questions answered and patient/representative(s) expressed understanding.    - Discussed:     - Discussed with:  Patient         Postoperative Care    Pain management: IV analgesics.   PONV prophylaxis: Ondansetron (or other 5HT-3)     Comments:                Pranav Reich MD

## 2023-06-22 NOTE — DISCHARGE INSTRUCTIONS
Cardioversion Discharge Instructions    After you go home:       For 24 hours - due to the sedation you received:    Have an adult stay with you for 24 hours.   Relax and take it easy.  Do NOT make any important or legal decisions.  Do NOT drive or operate machines at home or at work.  Do NOT drink alcohol.    Diet:    Start with clear liquids and progress to your normal diet as you feel able.    Medicines:    Take your medications, including blood thinners, unless your provider tells you not to.  If you have stopped any medications, check with your provider about when to restart them.    Follow Up Appointments:    Follow up with your cardiologist at [facility] of patient preference as instructed.  Follow up with your primary care provider as needed.    Post cardioversion:    The skin on your chest or back may feel tender for 48 hours.  If your skin is tender, you may:    Use a cold pack on the site. Never use ice directly on your skin. Use the cold pack for 20 minutes. Remove it for at least 30 minutes before re-using.  Apply 1% hydrocortisone cream to the skin (sold at drug stores)  Take Advil (Ibuprofen) or Tylenol (Acetaminophen) per your provider's recommendations.      Call your provider if you have:    Weakness, dizziness, lightheadedness, or fainting.  Shortness of breath.  Irregular heartbeat, feelings of your heart fluttering or beating fast, hard or palpitations.   More than minor skin discomfort or redness where the cardioversion pads were placed.  Questions or concerns.      Call 911 if you have:    Pain in your chest, arm, shoulder, neck, or upper back.  You have problems speaking or seeing.  Weakness in your arm or leg.  You are unable to move your arm or leg.  You have uncontrolled bleeding.         [facility] [place]:    [phone] [facility] (7 days a week)

## 2023-06-22 NOTE — TELEPHONE ENCOUNTER
Spoke to pt who who went home with a Holter d/t her low HR post cardioversion. Pt at this time rate is 56 bpm. Asked to keep an eye on it with her Pulse ox. Pt will then follow up with Tere on 7/10 at 1000, but is aware to call if she has any issues prior to this visit.  Pt Metoprolol has not been changed. JNSiddharth

## 2023-06-22 NOTE — PROGRESS NOTES
"Care Suites Admission Nursing Note    Patient Information  Name: Gema Coombs  Age: 72 year old  Reason for admission: cardioversion  Care Suites arrival time: 0830    Visitor Information  Name: Shreyas spouse     Patient Admission/Assessment   Pre-procedure assessment complete: Yes  If abnormal assessment/labs, provider notified: Yes - page to Dr. Moran: \"DOtto Leigh. Pt 5'3\" and 279 lbs - please let me know if you would like [place] from cath lab for DCCV.\"  NPO: Yes  Medications held per instructions/orders: Yes - held torsemide this AM  Consent: deferred to MD  Patient oriented to room: Yes  Education/questions answered: Yes  Plan/other: Proceed as planned per orders. K+ 3.9 - will replace per MAR    Discharge Planning  Discharge name/phone number: Shreyas spouse [phone]  Overnight post sedation caregiver: Shreyas  Discharge location: home    Sarah Conner RN           "

## 2023-06-22 NOTE — ANESTHESIA CARE TRANSFER NOTE
Patient: Gema Coombs    Procedure: Procedure(s):  Anesthesia for cardioversion       Diagnosis: Atrial fibrillation (H) [I48.91]  Diagnosis Additional Information: No value filed.    Anesthesia Type:   MAC     Note:    Oropharynx: oropharynx clear of all foreign objects and spontaneously breathing  Level of Consciousness: drowsy  Oxygen Supplementation: nasal cannula  Level of Supplemental Oxygen (L/min / FiO2): 4  Independent Airway: airway patency satisfactory and stable  Dentition: dentition unchanged  Vital Signs Stable: post-procedure vital signs reviewed and stable  Report to RN Given: handoff report given  Patient transferred to: [facility] (Care Suites)          Vitals:  Vitals Value Taken Time   /63 06/22/23 1105   Temp     Pulse 49 06/22/23 1110   Resp 11 06/22/23 1110   SpO2 98 % 06/22/23 1110   Vitals shown include unvalidated device data.    Electronically Signed By: NINI Ferguson CRNA  June 22, 2023  11:11 AM

## 2023-06-23 LAB
ATRIAL RATE - MUSE: 357 BPM
ATRIAL RATE - MUSE: 47 BPM
DIASTOLIC BLOOD PRESSURE - MUSE: NORMAL MMHG
DIASTOLIC BLOOD PRESSURE - MUSE: NORMAL MMHG
INTERPRETATION ECG - MUSE: NORMAL
INTERPRETATION ECG - MUSE: NORMAL
P AXIS - MUSE: 29 DEGREES
P AXIS - MUSE: NORMAL DEGREES
PR INTERVAL - MUSE: 192 MS
PR INTERVAL - MUSE: NORMAL MS
QRS DURATION - MUSE: 70 MS
QRS DURATION - MUSE: 82 MS
QT - MUSE: 420 MS
QT - MUSE: 430 MS
QTC - MUSE: 380 MS
QTC - MUSE: 472 MS
R AXIS - MUSE: -16 DEGREES
R AXIS - MUSE: -16 DEGREES
SYSTOLIC BLOOD PRESSURE - MUSE: NORMAL MMHG
SYSTOLIC BLOOD PRESSURE - MUSE: NORMAL MMHG
T AXIS - MUSE: -38 DEGREES
T AXIS - MUSE: 206 DEGREES
VENTRICULAR RATE- MUSE: 47 BPM
VENTRICULAR RATE- MUSE: 76 BPM

## 2023-07-10 ENCOUNTER — OFFICE VISIT (OUTPATIENT)
Dept: CARDIOLOGY | Facility: CLINIC | Age: 73
End: 2023-07-10
Payer: COMMERCIAL

## 2023-07-10 VITALS
DIASTOLIC BLOOD PRESSURE: 70 MMHG | HEIGHT: 63 IN | HEART RATE: 58 BPM | SYSTOLIC BLOOD PRESSURE: 132 MMHG | OXYGEN SATURATION: 94 % | BODY MASS INDEX: 49.38 KG/M2 | WEIGHT: 278.7 LBS

## 2023-07-10 DIAGNOSIS — I48.91 ATRIAL FIBRILLATION WITH RVR (H): Primary | ICD-10-CM

## 2023-07-10 DIAGNOSIS — I10 ESSENTIAL HYPERTENSION, BENIGN: ICD-10-CM

## 2023-07-10 PROCEDURE — 93000 ELECTROCARDIOGRAM COMPLETE: CPT | Performed by: NURSE PRACTITIONER

## 2023-07-10 PROCEDURE — 99213 OFFICE O/P EST LOW 20 MIN: CPT | Performed by: NURSE PRACTITIONER

## 2023-07-10 NOTE — LETTER
7/10/2023    Sunita Adame MD  [address]  [facility] [address]    RE: Gema Coombs       Dear Colleague,     I had the pleasure of seeing Gema Coombs in the [facility].  HPI:  Gema Coombs is a delightful 73-year-old female with a history of morbid obesity, asthma, ITP/thrombocytopenia, HFpEF and paroxysmal atrial fibrillation.  She was recently seen in consultation by Dr. Reilly last month when she was noted to be back in atrial fibrillation.     Patient was admitted with CHF exacerbation back 2021.  At that time, she was found to be in atrial fibrillation underwent successful cardioversion.  She has been on chronic therapy with metoprolol and Eliquis.    After her visit with Dr. Reilly he recommended a repeat cardioversion.  This took place on 6/22/2023.  She was sent home with a Holter monitor due to post cardioversion bradycardia.  This showed sinus bradycardia with minimum heart rate of 43 bpm, average of 55 bpm and fastest at 90 bpm.    At today's visit she denies chest pain, shortness of breath, lightheadedness, dizziness.  She does get some lower extremity edema and has exertional dyspnea which is not new..  Blood pressure is stable today at 132/70.    PREVIOUS STUDIES:  -Echo (11/26/2021): Normal LV function.  Mild AS.  No significant LVH.    -Cath (2018): Trivial coronary artery disease    -EKG 6/5/2023 showed atrial fibrillation.  -EKG 7/10/2023 showed sinus bradycardia 56 bpm.    -48-hour Holter monitor showed sinus bradycardia minimum heart rate 43 bpm, average 55 bpm, and fastest 90 bpm.  5% SVT burden was noted.  Longest was 94 beats at 121 bpm.  Fastest was 16 beats at 155 bpm.  Patient did hit the event button for this, but states that that was accidental.  She was unaware of her SVT.    Plan:   1. persistent atrial fibrillation  Patient is minimally symptomatic when she is in atrial fibrillation.    Post cardioversion 3 weeks ago.  Maintaining sinus rhythm.  This  was her second cardioversion, first was in 2021.    Patient feeling well.  Admits to being fearful of exerting herself because of A-fib.  Encouraged her to increase her activities as tolerated.  If she has another recurrence of A-fib then we need to discuss antiarrhythmic therapy.  She will continue metoprolol and apixaban.    2. [id number] score 3 continue Eliquis indefinitely.    3.RAFI intolerant of CPAP   4.  Hypertension stable  Metoprolol 100 mg twice daily and torsemide  Patient was encouraged to  a blood pressure cuff to monitor blood pressure at home.    Order was placed for patient to follow-up with Dr. Titus in 6 months for her annual visit.  If there are questions or concerns in the interim of asked that she please contact us.    Tere Alves NP, APRN CNP        Today's clinic visit entailed:  Review of the result(s) of each unique test - EKG  I spent a total of 20 minutes on the day of the visit.   Time spent by me doing chart review, history and exam, documentation and further activities per the note  Provider  Link to [facility] Help Grid     The level of medical decision making during this visit was of moderate complexity.      Orders Placed This Encounter   Procedures    Follow-Up with Cardiology    EKG 12-lead complete w/read - Clinics (performed today)       No orders of the defined types were placed in this encounter.      Medications Discontinued During This Encounter   Medication Reason    allopurinol (ZYLOPRIM) 100 MG tablet     colchicine (COLCYRS) 0.6 MG tablet Therapy completed (No AVS)         Encounter Diagnoses   Name Primary?    Atrial fibrillation with RVR (H) Yes    Essential hypertension, benign        CURRENT MEDICATIONS:  Current Outpatient Medications   Medication Sig Dispense Refill    apixaban ANTICOAGULANT (ELIQUIS) 5 MG tablet Take 1 tablet (5 mg) by mouth 2 times daily 180 tablet 4    levalbuterol (XOPENEX HFA) 45 MCG/ACT inhaler Inhale 2 puffs into the lungs every 4  hours as needed for shortness of breath / dyspnea or wheezing 15 g 1    metoprolol tartrate (LOPRESSOR) 100 MG tablet Take 1 tablet (100 mg) by mouth 2 times daily 180 tablet 4    primidone (MYSOLINE) 250 MG tablet Take 1 tablet (250 mg) by mouth 2 times daily 60 tablet 0    torsemide (DEMADEX) 10 MG tablet Take 1 tablet (10 mg) by mouth daily 90 tablet 3    fluticasone-vilanterol (BREO ELLIPTA) 100-25 MCG/INH inhaler Inhale 1 puff into the lungs daily 60 each 1    levalbuterol (XOPENEX) 0.63 MG/3ML neb solution Take 3 mLs (0.63 mg) by nebulization every 4 hours as needed for shortness of breath / dyspnea or wheezing (Patient not taking: Reported on 2023) 90 mL 0       ALLERGIES     Allergies   Allergen Reactions    No Known Drug Allergy        PAST MEDICAL HISTORY:  Past Medical History:   Diagnosis Date    Arrhythmia     a fib    Diverticulitis     Extrinsic asthma, unspecified     Fibromyalgia     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     none for 3 years but EEG still shows seizure activity    Hypertension     Paroxysmal atrial fibrillation (H) 16    Sleep apnea     Doesn't use a CPAP       PAST SURGICAL HISTORY:  Past Surgical History:   Procedure Laterality Date    ANESTHESIA CARDIOVERSION N/A 2023    Procedure: Anesthesia for cardioversion;  Surgeon: GENERIC ANESTHESIA PROVIDER;  Location: [facility]    BIOPSY OF BREAST, INCISIONAL      L breasts    BIOPSY OF BREAST, NEEDLE CORE  &    R breasts    COLONOSCOPY N/A 10/24/2018    Procedure: COLONOSCOPY;  Surgeon: Nieves Cordova MD;  Location: [facility] OR    LAPAROSCOPIC ASSISTED SIGMOID COLECTOMY N/A 10/24/2018    Procedure: Intraoperative colonoscopy with laparoscopic-assisted sigmoid colectomy;  Surgeon: Nieves Cordova MD;  Location: [facility] OR    LAPAROSCOPY,LYSIS ADHESNS  1985    pain R side poss r/t c-sections    ZZC ANORECTAL MYOMECTOMY      uterine fibroids    ZZC  DELIVERY ONLY      x  "2       FAMILY HISTORY:  Family History   Problem Relation Age of Onset    Cancer Maternal Aunt         breast-    Cancer Maternal Grandmother         breast-    Heart Disease Paternal Grandfather         heartattack-  at 58    Heart Disease Maternal Grandfather             Heart Disease Paternal Uncle         bypass-alive &well    Thyroid Disease Sister         hypothyroidism-alive       SOCIAL HISTORY:  Social History     Socioeconomic History    Marital status:      Spouse name: Jalen    Number of children: 2    Years of education: 14    Highest education level: None   Occupational History    Occupation: [employer]     Employer: [employer]   Tobacco Use    Smoking status: Never    Smokeless tobacco: Never   Substance and Sexual Activity    Alcohol use: Not Currently     Comment: rare    Drug use: No    Sexual activity: Yes     Partners: Male     Comment: postmenopause   Other Topics Concern    Exercise Yes    Seat Belt Yes    Self-Exams Yes       Review of Systems:  Skin:          Eyes:         ENT:         Respiratory:  Positive for dyspnea on exertion;sleep apnea     Cardiovascular:  Negative;chest pain;palpitations;syncope or near-syncope;lightheadedness;dizziness;fatigue edema;Positive for;exercise intolerance pt states they are cautious with exercise due to potential flip into Afib. Swelling in rt ankle.  Gastroenterology:        Genitourinary:         Musculoskeletal:         Neurologic:         Psychiatric:         Heme/Lymph/Imm:         Endocrine:  Negative thyroid disorder;hot flashes;night sweats;diabetes      Physical Exam:  Vitals: /70   Pulse 58   Ht 1.6 m (5' 3\")   Wt 126.4 kg (278 lb 11.2 oz)   SpO2 94%   BMI 49.37 kg/m      Constitutional:  cooperative;cooperative, alert and oriented, well developed, well nourished, in no acute distress obese      Skin:  warm and dry to the touch          Head:  normocephalic    "     Eyes:  pupils equal and round;conjunctivae and lids unremarkable        ENT:  no pallor or cyanosis        Neck:  JVP normal        Respiratory:  clear to auscultation;normal respiratory excursion         Cardiac: regular rhythm;normal S1 and S2;no murmurs, gallops or rubs detected                not assessed this visit                                        GI:  abdomen soft obese      Extremities and Muscular Skeletal:  no edema;no deformities, clubbing, cyanosis, erythema observed              Neurological:  affect appropriate        Psych:  Alert and Oriented x 3        CC  Jojo Moran MD  [address] [place], [place] [id number]      Thank you for allowing me to participate in the care of your patient.      Sincerely,     Tere Alves NP, APRN [facility]

## 2023-07-10 NOTE — PATIENT INSTRUCTIONS
Call my nurse with any questions or concerns:  [phone]  *If you have concerns after hours, please call [phone], option 2 to speak with on call Cardiologist.    1. Medication changes from today:    Consider Omron cuff   See  in the spring  Call with any concerns

## 2023-07-10 NOTE — PROGRESS NOTES
HPI:  Gema Coombs is a delightful 73-year-old female with a history of morbid obesity, asthma, ITP/thrombocytopenia, HFpEF and paroxysmal atrial fibrillation.  She was recently seen in consultation by Dr. Reilly last month when she was noted to be back in atrial fibrillation.     Patient was admitted with CHF exacerbation back 2021.  At that time, she was found to be in atrial fibrillation underwent successful cardioversion.  She has been on chronic therapy with metoprolol and Eliquis.    After her visit with Dr. Reilly he recommended a repeat cardioversion.  This took place on 6/22/2023.  She was sent home with a Holter monitor due to post cardioversion bradycardia.  This showed sinus bradycardia with minimum heart rate of 43 bpm, average of 55 bpm and fastest at 90 bpm.    At today's visit she denies chest pain, shortness of breath, lightheadedness, dizziness.  She does get some lower extremity edema and has exertional dyspnea which is not new..  Blood pressure is stable today at 132/70.    PREVIOUS STUDIES:  -Echo (11/26/2021): Normal LV function.  Mild AS.  No significant LVH.    -Cath (2018): Trivial coronary artery disease    -EKG 6/5/2023 showed atrial fibrillation.  -EKG 7/10/2023 showed sinus bradycardia 56 bpm.    -48-hour Holter monitor showed sinus bradycardia minimum heart rate 43 bpm, average 55 bpm, and fastest 90 bpm.  5% SVT burden was noted.  Longest was 94 beats at 121 bpm.  Fastest was 16 beats at 155 bpm.  Patient did hit the event button for this, but states that that was accidental.  She was unaware of her SVT.    Plan:   1. persistent atrial fibrillation  Patient is minimally symptomatic when she is in atrial fibrillation.    Post cardioversion 3 weeks ago.  Maintaining sinus rhythm.  This was her second cardioversion, first was in 2021.    Patient feeling well.  Admits to being fearful of exerting herself because of A-fib.  Encouraged her to increase her activities as tolerated.  If she has  another recurrence of A-fib then we need to discuss antiarrhythmic therapy.  She will continue metoprolol and apixaban.    2. [id number] score 3 continue Eliquis indefinitely.    3.RAFI intolerant of CPAP   4.  Hypertension stable  Metoprolol 100 mg twice daily and torsemide  Patient was encouraged to  a blood pressure cuff to monitor blood pressure at home.    Order was placed for patient to follow-up with Dr. Titus in 6 months for her annual visit.  If there are questions or concerns in the interim of asked that she please contact us.    Tere Alves, NP, APRN CNP        Today's clinic visit entailed:  Review of the result(s) of each unique test - EKG  I spent a total of 20 minutes on the day of the visit.   Time spent by me doing chart review, history and exam, documentation and further activities per the note  Provider  Link to [facility] Help Grid     The level of medical decision making during this visit was of moderate complexity.      Orders Placed This Encounter   Procedures     Follow-Up with Cardiology     EKG 12-lead complete w/read - Clinics (performed today)       No orders of the defined types were placed in this encounter.      Medications Discontinued During This Encounter   Medication Reason     allopurinol (ZYLOPRIM) 100 MG tablet      colchicine (COLCYRS) 0.6 MG tablet Therapy completed (No AVS)         Encounter Diagnoses   Name Primary?     Atrial fibrillation with RVR (H) Yes     Essential hypertension, benign        CURRENT MEDICATIONS:  Current Outpatient Medications   Medication Sig Dispense Refill     apixaban ANTICOAGULANT (ELIQUIS) 5 MG tablet Take 1 tablet (5 mg) by mouth 2 times daily 180 tablet 4     levalbuterol (XOPENEX HFA) 45 MCG/ACT inhaler Inhale 2 puffs into the lungs every 4 hours as needed for shortness of breath / dyspnea or wheezing 15 g 1     metoprolol tartrate (LOPRESSOR) 100 MG tablet Take 1 tablet (100 mg) by mouth 2 times daily 180 tablet 4     primidone (MYSOLINE)  250 MG tablet Take 1 tablet (250 mg) by mouth 2 times daily 60 tablet 0     torsemide (DEMADEX) 10 MG tablet Take 1 tablet (10 mg) by mouth daily 90 tablet 3     fluticasone-vilanterol (BREO ELLIPTA) 100-25 MCG/INH inhaler Inhale 1 puff into the lungs daily 60 each 1     levalbuterol (XOPENEX) 0.63 MG/3ML neb solution Take 3 mLs (0.63 mg) by nebulization every 4 hours as needed for shortness of breath / dyspnea or wheezing (Patient not taking: Reported on 2023) 90 mL 0       ALLERGIES     Allergies   Allergen Reactions     No Known Drug Allergy        PAST MEDICAL HISTORY:  Past Medical History:   Diagnosis Date     Arrhythmia     a fib     Diverticulitis      Extrinsic asthma, unspecified      Fibromyalgia      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     none for 3 years but EEG still shows seizure activity     Hypertension      Paroxysmal atrial fibrillation (H) 16     Sleep apnea     Doesn't use a CPAP       PAST SURGICAL HISTORY:  Past Surgical History:   Procedure Laterality Date     ANESTHESIA CARDIOVERSION N/A 2023    Procedure: Anesthesia for cardioversion;  Surgeon: GENERIC ANESTHESIA PROVIDER;  Location: [facility] OR     BIOPSY OF BREAST, INCISIONAL      L breasts     BIOPSY OF BREAST, NEEDLE CORE  &    R breasts     COLONOSCOPY N/A 10/24/2018    Procedure: COLONOSCOPY;  Surgeon: Nieves Cordova MD;  Location: [facility] OR     LAPAROSCOPIC ASSISTED SIGMOID COLECTOMY N/A 10/24/2018    Procedure: Intraoperative colonoscopy with laparoscopic-assisted sigmoid colectomy;  Surgeon: Nieves Cordova MD;  Location: [facility] OR     LAPAROSCOPY,LYSIS ADHESNS      pain R side poss r/t c-sections     Z ANORECTAL MYOMECTOMY      uterine fibroids     [facility]  DELIVERY ONLY      x 2       FAMILY HISTORY:  Family History   Problem Relation Age of Onset     Cancer Maternal Aunt         breast-     Cancer Maternal Grandmother         breast-      "Heart Disease Paternal Grandfather         heartattack-  at 58     Heart Disease Maternal Grandfather              Heart Disease Paternal Uncle         bypass-alive &well     Thyroid Disease Sister         hypothyroidism-alive       SOCIAL HISTORY:  Social History     Socioeconomic History     Marital status:      Spouse name: Jalen     Number of children: 2     Years of education: 14     Highest education level: None   Occupational History     Occupation: [employer]     Employer: [employer]   Tobacco Use     Smoking status: Never     Smokeless tobacco: Never   Substance and Sexual Activity     Alcohol use: Not Currently     Comment: rare     Drug use: No     Sexual activity: Yes     Partners: Male     Comment: postmenopause   Other Topics Concern     Exercise Yes     Seat Belt Yes     Self-Exams Yes       Review of Systems:  Skin:          Eyes:         ENT:         Respiratory:  Positive for dyspnea on exertion;sleep apnea     Cardiovascular:  Negative;chest pain;palpitations;syncope or near-syncope;lightheadedness;dizziness;fatigue edema;Positive for;exercise intolerance pt states they are cautious with exercise due to potential flip into Afib. Swelling in rt ankle.  Gastroenterology:        Genitourinary:         Musculoskeletal:         Neurologic:         Psychiatric:         Heme/Lymph/Imm:         Endocrine:  Negative thyroid disorder;hot flashes;night sweats;diabetes      Physical Exam:  Vitals: /70   Pulse 58   Ht 1.6 m (5' 3\")   Wt 126.4 kg (278 lb 11.2 oz)   SpO2 94%   BMI 49.37 kg/m      Constitutional:  cooperative;cooperative, alert and oriented, well developed, well nourished, in no acute distress obese      Skin:  warm and dry to the touch          Head:  normocephalic        Eyes:  pupils equal and round;conjunctivae and lids unremarkable        ENT:  no pallor or cyanosis        Neck:  JVP normal        Respiratory:  clear to " auscultation;normal respiratory excursion         Cardiac: regular rhythm;normal S1 and S2;no murmurs, gallops or rubs detected                not assessed this visit                                        GI:  abdomen soft obese      Extremities and Muscular Skeletal:  no edema;no deformities, clubbing, cyanosis, erythema observed              Neurological:  affect appropriate        Psych:  Alert and Oriented x 3        CC  Jojo Moran MD  [address] [place] [address]

## 2023-09-08 ENCOUNTER — OFFICE VISIT (OUTPATIENT)
Dept: FAMILY MEDICINE | Facility: CLINIC | Age: 73
End: 2023-09-08

## 2023-09-08 VITALS
OXYGEN SATURATION: 96 % | DIASTOLIC BLOOD PRESSURE: 82 MMHG | TEMPERATURE: 97.8 F | SYSTOLIC BLOOD PRESSURE: 128 MMHG | HEART RATE: 66 BPM

## 2023-09-08 DIAGNOSIS — M10.072 ACUTE IDIOPATHIC GOUT INVOLVING TOE OF LEFT FOOT: ICD-10-CM

## 2023-09-08 DIAGNOSIS — M10.072 ACUTE IDIOPATHIC GOUT INVOLVING TOE OF LEFT FOOT: Primary | ICD-10-CM

## 2023-09-08 LAB
% GRANULOCYTES: 72 %
HCT VFR BLD AUTO: 43.7 % (ref 35–47)
HEMOGLOBIN: 14.3 G/DL (ref 11.7–15.7)
LYMPHOCYTES NFR BLD AUTO: 19.6 %
MCH RBC QN AUTO: 32.4 PG (ref 26–33)
MCHC RBC AUTO-ENTMCNC: 32.7 G/DL (ref 31–36)
MCV RBC AUTO: 99 FL (ref 78–100)
MONOCYTES NFR BLD AUTO: 8.4 %
PLATELET COUNT - QUEST: 79 10^9/L (ref 150–375)
RBC # BLD AUTO: 4.41 10*12/L (ref 3.8–5.2)
URIC ACID (BFP): 8 (ref 2.5–7)
WBC # BLD AUTO: 5.6 10*9/L (ref 4–11)

## 2023-09-08 PROCEDURE — 36415 COLL VENOUS BLD VENIPUNCTURE: CPT | Performed by: PHYSICIAN ASSISTANT

## 2023-09-08 PROCEDURE — 84550 ASSAY OF BLOOD/URIC ACID: CPT | Performed by: PHYSICIAN ASSISTANT

## 2023-09-08 PROCEDURE — 99214 OFFICE O/P EST MOD 30 MIN: CPT | Performed by: PHYSICIAN ASSISTANT

## 2023-09-08 PROCEDURE — 85025 COMPLETE CBC W/AUTO DIFF WBC: CPT | Performed by: PHYSICIAN ASSISTANT

## 2023-09-08 RX ORDER — ALLOPURINOL 100 MG/1
100 TABLET ORAL DAILY
Qty: 60 TABLET | Refills: 0 | Status: SHIPPED | OUTPATIENT
Start: 2023-09-08 | End: 2024-02-01

## 2023-09-08 RX ORDER — PREDNISONE 20 MG/1
40 TABLET ORAL DAILY
Qty: 10 TABLET | Refills: 0 | Status: SHIPPED | OUTPATIENT
Start: 2023-09-08 | End: 2024-02-01

## 2023-09-08 NOTE — NURSING NOTE
Chief Complaint   Patient presents with    Foot Pain     Left foot pain started 3 days ago, pain started at the ball of her foot, pain is now in her big toe and under her foot, struggling to walk with the pain     Pre-visit Screening:  Immunizations:  not up to date  Colonoscopy:  is due and to be scheduled by patient for later completion  Mammogram: is due and to be scheduled by patient for later completion  Asthma Action Test/Plan:  NA  PHQ9:  NA  GAD7:  NA  Questioned patient about current smoking habits Pt. has never smoked.  Ok to leave detailed message on voice mail for today's visit only Yes, phone # [phone]

## 2023-09-08 NOTE — PROGRESS NOTES
Assessment & Plan     Acute idiopathic gout involving toe of left foot-suspect gout flare at this time given hx of very high uric acid and PE. Will treat with prednisone, then start allopurinol long term  Educated on gout friendly diet  - predniSONE (DELTASONE) 20 MG tablet  Dispense: 10 tablet; Refill: 0  - allopurinol (ZYLOPRIM) 100 MG tablet  Dispense: 60 tablet; Refill: 0  - VENOUS COLLECTION  - HEMOGRAM PLATELET DIFF (BFP)  - Uric Acid (BFP)      Follow up 2 months OV for titration of allopurinol    No follow-ups on file.    Mike Davis PA-C  [facility]    Subjective   [place]ia is a 73 year old, presenting for the following health issues:  Foot Pain (Left foot pain started 3 days ago, pain started at the ball of her foot, pain is now in her big toe and under her foot, struggling to walk with the pain)    HPI     Pt presents with L foot pain. Has had elevated uric acid levels in the past, she is worried about gout. Was on allopurinol in the past. Pain is on the ball of her foot, into base of 1st toe. No falls, trauma, or cuts to the area. Lots of pain with walking. Last gout attack 1/22.     Review of Systems   Constitutional, HEENT, cardiovascular, pulmonary, gi and gu systems are negative, except as otherwise noted.      Objective    /82 (BP Location: Left arm, Patient Position: Sitting, Cuff Size: Adult Large)   Pulse 66   Temp 97.8  F (36.6  C) (Temporal)   SpO2 96%   There is no height or weight on file to calculate BMI.  Physical Exam   GENERAL: healthy, alert and no distress  NECK: no adenopathy, no asymmetry, masses, or scars and thyroid normal to palpation  RESP: lungs clear to auscultation - no rales, rhonchi or wheezes  CV: regular rate and rhythm, normal S1 S2, no S3 or S4, no murmur, click or rub, no peripheral edema and peripheral pulses strong  ABDOMEN: soft, nontender, no hepatosplenomegaly, no masses and bowel sounds normal  MS: L 1st toe: erythema and swelling at MTP  joint, tender to palpation        Results for orders placed or performed in visit on 09/08/23 (from the past 24 hour(s))   HEMOGRAM PLATELET DIFF (BFP)   Result Value Ref Range    WBC 5.6 4.0 - 11 10*9/L    RBC Count 4.41 3.8 - 5.2 10*12/L    Hemoglobin 14.3 11.7 - 15.7 g/dL    Hematocrit 43.7 35.0 - 47.0 %    MCV 99.0 78 - 100 fL    MCH 32.4 26 - 33 pg    MCHC 32.7 31 - 36 g/dL    Platelet Count 79 (A) 150 - 375 10^9/L    % Granulocytes 72.0 %    % Lymphocytes 19.6 %    % Monocytes 8.4 %

## 2023-09-11 RX ORDER — ALLOPURINOL 100 MG/1
100 TABLET ORAL DAILY
Qty: 90 TABLET | COMMUNITY
Start: 2023-09-11

## 2023-09-11 NOTE — TELEPHONE ENCOUNTER
Gema Coombs is requesting a refill of:    Refused Prescriptions:                       Disp   Refills    allopurinol (ZYLOPRIM) 100 MG tablet [Phar*90 tab*         Sig: TAKE 1 TABLET(100 MG) BY MOUTH DAILY  Refused By: MAGGIE HARMAN  Reason for Refusal: OTHER    60 tab sent on 09/08/23 note says return in 2 months

## 2023-09-29 ENCOUNTER — MYC MEDICAL ADVICE (OUTPATIENT)
Dept: FAMILY MEDICINE | Facility: CLINIC | Age: 73
End: 2023-09-29

## 2023-09-29 DIAGNOSIS — M10.072 ACUTE IDIOPATHIC GOUT INVOLVING TOE OF LEFT FOOT: Primary | ICD-10-CM

## 2023-09-29 RX ORDER — COLCHICINE 0.6 MG/1
TABLET ORAL
Qty: 30 TABLET | Refills: 0 | Status: SHIPPED | OUTPATIENT
Start: 2023-09-29 | End: 2024-02-01

## 2023-11-03 DIAGNOSIS — M10.072 ACUTE IDIOPATHIC GOUT INVOLVING TOE OF LEFT FOOT: ICD-10-CM

## 2023-11-07 RX ORDER — ALLOPURINOL 100 MG/1
100 TABLET ORAL DAILY
COMMUNITY
Start: 2023-11-07

## 2023-11-07 NOTE — TELEPHONE ENCOUNTER
Gema Coombs is requesting a refill of:    Refused Prescriptions:                       Disp   Refills    allopurinol (ZYLOPRIM) 100 MG tablet [Phar*                Sig: TAKE 1 TABLET(100 MG) BY MOUTH DAILY  Refused By: MAGGIE HARMAN  Reason for Refusal: Patient needs appointment    Pt due for OV

## 2024-01-25 ENCOUNTER — TRANSFERRED RECORDS (OUTPATIENT)
Dept: FAMILY MEDICINE | Facility: CLINIC | Age: 74
End: 2024-01-25

## 2024-02-01 ENCOUNTER — OFFICE VISIT (OUTPATIENT)
Dept: FAMILY MEDICINE | Facility: CLINIC | Age: 74
End: 2024-02-01

## 2024-02-01 VITALS
HEART RATE: 68 BPM | WEIGHT: 278 LBS | TEMPERATURE: 97.4 F | SYSTOLIC BLOOD PRESSURE: 130 MMHG | BODY MASS INDEX: 49.26 KG/M2 | OXYGEN SATURATION: 96 % | DIASTOLIC BLOOD PRESSURE: 84 MMHG | HEIGHT: 63 IN

## 2024-02-01 DIAGNOSIS — J06.9 VIRAL URI WITH COUGH: Primary | ICD-10-CM

## 2024-02-01 PROCEDURE — 99213 OFFICE O/P EST LOW 20 MIN: CPT | Performed by: PHYSICIAN ASSISTANT

## 2024-02-01 RX ORDER — FLUTICASONE PROPIONATE AND SALMETEROL 250; 50 UG/1; UG/1
POWDER RESPIRATORY (INHALATION)
COMMUNITY
Start: 2024-01-07

## 2024-02-01 NOTE — NURSING NOTE
Chief Complaint   Patient presents with    Acute Cough     Cough began 3 days ago  Coughing up green mucous for two days  Blowing her nose a lot  Neg covid test this AM  Has not taken any medication due to unaware what is ok to take with CHF         Pre-visit Screening:  Immunizations:  Not up to date- covid, flu, rsv, shingrix are due but is here for a sick visit today  Colonoscopy:  is due and to be scheduled by patient for later completion  Mammogram: is due and to be scheduled by patient for later completion  Asthma Action Test/Plan:  Yes  PHQ9:  NA  GAD7:  NA  Questioned patient about current smoking habits Pt. has never smoked.  Ok to leave detailed message on voice mail for today's visit only Yes, phone # [phone]

## 2024-02-01 NOTE — PROGRESS NOTES
"  Assessment & Plan     Viral URI with cough-no concerning signs/symptoms in history or on PE, suspect viral origin. Will treat symptomatically and await improvement  -Rest, increase fluids, honey for cough suppression/sore throat  -Add Mucinex symptomatic relief  -Tylenol as needed for symptomatic relief  Seek emergency care for significant shortness of breath, chest pain, and/or fever >103F that cannot be controlled with antipyretics       Follow up as needed    No follow-ups on file.    Subjective   Georgia is a 73 year old, presenting for the following health issues:  Acute Cough (Cough began 3 days ago/Coughing up green mucous for two days/Blowing her nose a lot/Neg covid test this AM/Has not taken any medication due to unaware what is ok to take with CHF)    HPI   Started with sore throat, gone now.  Some green phlegm with the cough.   Negative COVID test today    Acute Illness  Acute illness concerns: productive cough  Onset/Duration: 3 days  Symptoms:  Fever: No  Chills/Sweats: No  Headache (location?): YES  Sinus Pressure: No  Conjunctivitis:  No  Ear Pain: no  Rhinorrhea: YES  Congestion: YES  Sore Throat: No  Cough: YES-productive of green sputum  Wheeze: No  Decreased Appetite: YES  Nausea: No  Vomiting: No  Diarrhea: No  Dysuria/Freq.: No  Dysuria or Hematuria: No  Fatigue/Achiness: No  Sick/Strep Exposure: No  Therapies tried and outcome: None        Review of Systems  Constitutional, HEENT, cardiovascular, pulmonary, gi and gu systems are negative, except as otherwise noted.      Objective    /84 (BP Location: Right arm, Patient Position: Sitting, Cuff Size: Adult Large)   Pulse 68   Temp 97.4  F (36.3  C) (Temporal)   Ht 1.6 m (5' 3\")   Wt 126.1 kg (278 lb)   SpO2 96%   BMI 49.25 kg/m    Body mass index is 49.25 kg/m .  Physical Exam   GENERAL: alert and no distress  HENT: ear canals and TM's normal, nose and mouth without ulcers or lesions  NECK: no adenopathy, no asymmetry, masses, or " scars  RESP: lungs clear to auscultation - no rales, rhonchi or wheezes  CV: regular rate and rhythm, normal S1 S2, no S3 or S4, no murmur, click or rub, no peripheral edema  ABDOMEN: soft, nontender, no hepatosplenomegaly, no masses and bowel sounds normal  MS: no gross musculoskeletal defects noted, no edema  NEURO: Normal strength and tone, mentation intact and speech normal  PSYCH: mentation appears normal, affect normal/bright            Signed Electronically by: Mike Davis PA-C

## 2024-02-08 DIAGNOSIS — I48.91 ATRIAL FIBRILLATION WITH RVR (H): Primary | ICD-10-CM

## 2024-02-08 DIAGNOSIS — I10 ESSENTIAL HYPERTENSION, BENIGN: ICD-10-CM

## 2024-02-08 DIAGNOSIS — I50.32 CHRONIC HEART FAILURE WITH PRESERVED EJECTION FRACTION (H): ICD-10-CM

## 2024-02-08 DIAGNOSIS — I50.9 CONGESTIVE HEART FAILURE, UNSPECIFIED HF CHRONICITY, UNSPECIFIED HEART FAILURE TYPE (H): ICD-10-CM

## 2024-02-08 RX ORDER — TORSEMIDE 10 MG/1
TABLET ORAL
Qty: 45 TABLET | Refills: 0 | Status: SHIPPED | OUTPATIENT
Start: 2024-02-08 | End: 2024-05-24

## 2024-02-08 NOTE — TELEPHONE ENCOUNTER
Received request from [facility] for refill of Torsemide 10mg tab, every other day and once a day PRN on the other days.    Last OV   July 2023 with Tere TERRAZAS CNP,  upcoming visit in March with Dr. Titus    Last labs:  June 2022 BMP   We don't have permission for access to [facility]where from [facility].    Overdue for labs.   Will send enough refill to get her to the appointment with Dr. Titus.    Patient notified via [employer] Mobile message that she needs to get labs done when she comes in to see Dr. Titus.

## 2024-02-14 DIAGNOSIS — I48.0 PAROXYSMAL ATRIAL FIBRILLATION (H): ICD-10-CM

## 2024-02-14 RX ORDER — METOPROLOL TARTRATE 100 MG
100 TABLET ORAL 2 TIMES DAILY
Qty: 180 TABLET | Refills: 0 | Status: SHIPPED | OUTPATIENT
Start: 2024-02-14 | End: 2024-05-16

## 2024-02-21 DIAGNOSIS — I48.0 PAROXYSMAL ATRIAL FIBRILLATION (H): ICD-10-CM

## 2024-03-14 NOTE — TELEPHONE ENCOUNTER
Pending Prescriptions:                       Disp   Refills    albuterol (PROAIR HFA/PROVENTIL HFA/INDIGO*18 g                Sig: INHALE 1 PUFF INTO THE LUNGS EVERY 4 HOURS AS           NEEDED FOR SHORTNESS OF BREATH OR DIFFICULT           BREATHING OR WHEEZING    Talked to pt   Will follow up here in 3-6 months per notes from [facility] from 2-20  Would like one more inhaler.   Please fax a 30 days worth and close encounter  Aleksandra     Yes

## 2024-04-25 ENCOUNTER — TRANSFERRED RECORDS (OUTPATIENT)
Dept: HEALTH INFORMATION MANAGEMENT | Facility: CLINIC | Age: 74
End: 2024-04-25
Payer: COMMERCIAL

## 2024-05-16 DIAGNOSIS — I48.0 PAROXYSMAL ATRIAL FIBRILLATION (H): ICD-10-CM

## 2024-05-16 RX ORDER — METOPROLOL TARTRATE 100 MG
100 TABLET ORAL 2 TIMES DAILY
Qty: 180 TABLET | Refills: 0 | Status: SHIPPED | OUTPATIENT
Start: 2024-05-16 | End: 2024-05-24

## 2024-05-22 ENCOUNTER — LAB (OUTPATIENT)
Dept: LAB | Facility: CLINIC | Age: 74
End: 2024-05-22
Payer: COMMERCIAL

## 2024-05-22 DIAGNOSIS — I10 ESSENTIAL HYPERTENSION, BENIGN: ICD-10-CM

## 2024-05-22 DIAGNOSIS — I50.32 CHRONIC HEART FAILURE WITH PRESERVED EJECTION FRACTION (H): ICD-10-CM

## 2024-05-22 DIAGNOSIS — I48.91 ATRIAL FIBRILLATION WITH RVR (H): ICD-10-CM

## 2024-05-22 PROCEDURE — 80048 BASIC METABOLIC PNL TOTAL CA: CPT

## 2024-05-22 PROCEDURE — 36415 COLL VENOUS BLD VENIPUNCTURE: CPT

## 2024-05-23 LAB
ANION GAP SERPL CALCULATED.3IONS-SCNC: 10 MMOL/L (ref 7–15)
BUN SERPL-MCNC: 19.7 MG/DL (ref 8–23)
CALCIUM SERPL-MCNC: 9.1 MG/DL (ref 8.8–10.2)
CHLORIDE SERPL-SCNC: 103 MMOL/L (ref 98–107)
CREAT SERPL-MCNC: 1.18 MG/DL (ref 0.51–0.95)
DEPRECATED HCO3 PLAS-SCNC: 30 MMOL/L (ref 22–29)
EGFRCR SERPLBLD CKD-EPI 2021: 49 ML/MIN/1.73M2
GLUCOSE SERPL-MCNC: 96 MG/DL (ref 70–99)
POTASSIUM SERPL-SCNC: 4.6 MMOL/L (ref 3.4–5.3)
SODIUM SERPL-SCNC: 143 MMOL/L (ref 135–145)

## 2024-05-24 ENCOUNTER — OFFICE VISIT (OUTPATIENT)
Dept: CARDIOLOGY | Facility: CLINIC | Age: 74
End: 2024-05-24
Attending: NURSE PRACTITIONER
Payer: COMMERCIAL

## 2024-05-24 VITALS
SYSTOLIC BLOOD PRESSURE: 138 MMHG | WEIGHT: 286.4 LBS | HEART RATE: 77 BPM | OXYGEN SATURATION: 95 % | BODY MASS INDEX: 50.75 KG/M2 | HEIGHT: 63 IN | DIASTOLIC BLOOD PRESSURE: 85 MMHG

## 2024-05-24 DIAGNOSIS — I48.0 PAROXYSMAL ATRIAL FIBRILLATION (H): ICD-10-CM

## 2024-05-24 DIAGNOSIS — I50.9 CONGESTIVE HEART FAILURE, UNSPECIFIED HF CHRONICITY, UNSPECIFIED HEART FAILURE TYPE (H): ICD-10-CM

## 2024-05-24 PROCEDURE — 99214 OFFICE O/P EST MOD 30 MIN: CPT | Performed by: INTERNAL MEDICINE

## 2024-05-24 RX ORDER — TORSEMIDE 10 MG/1
TABLET ORAL
Qty: 45 TABLET | Refills: 3 | Status: SHIPPED | OUTPATIENT
Start: 2024-05-24

## 2024-05-24 RX ORDER — METOPROLOL TARTRATE 100 MG
100 TABLET ORAL 2 TIMES DAILY
Qty: 180 TABLET | Refills: 3 | Status: SHIPPED | OUTPATIENT
Start: 2024-05-24

## 2024-05-24 NOTE — LETTER
5/24/2024    Sunita Adame MD  [address] [facility] [address]    RE: Gema Coombs       Dear Colleague,     I had the pleasure of seeing Gema Coombs in the [facility].  HPI and Plan:   Gema Coombs is a 73 year old female who presents with history of persistent atrial fibrillation, heart failure with preserved ejection fraction, hypertension and morbid obesity and untreated sleep apnea.  She is here for an annual follow-up visit.  She continues to take torsemide 10 mg every other day for fluid retention.  This seems to be working well for her, she does go up a pound or 2 on days when she does not take it but this comes right back the following day when she takes the torsemide.  Her breathing has been fairly stable, she does take inhalers for history of asthma and this seems to help.  She has not had any progressive worsening of her breathing or symptoms of orthopnea.  She is also not been hospitalized with heart failure in over a year.  She is on Eliquis for CVA prophylaxis and is not experiencing any bleeding complications.  She does have a history of ITP with low platelet count, this has been stable and her hemoglobin has been normal and stable as well.  On exam today, she has gained about 8 pounds since her last visit.  She believes this is related to increased candy intake and true weight gain rather than fluid retention.  She had a basic metabolic panel a week ago showing normal electrolytes but elevated creatinine at 1.18.  Although this is increased from her previous in 2022, she has had creatinine levels elevated dating back at least 2 years, with creatinine in January 2022 at 1.2    Summary    1.  Persistent atrial fibrillation-rate controlled and on Eliquis for CVA prophylaxis.  Will recommend to continue current medical management and I did renew her prescriptions for metoprolol and Eliquis today    2.  Heart failure with preserved ejection fraction-appears  compensated on exam today.  Sounds like she has reached somewhat stable fluid balance with taking torsemide every other day.  Recommend she continue to monitor weight and breathing on a daily basis to avoid decompensation.  She is following a strict low-sodium diet which is also beneficial  I did renew her prescription for torsemide today.  I do recommend that she have a basic metabolic panel at least once per year to follow kidney function and electrolytes while on diuretic    Be happy to see her back annually or as needed, please feel free to contact me with any questions given regards to her care         Today's clinic visit entailed:  Review of the result(s) of each unique test - BMP  Prescription drug management    Provider  Link to [facility] Help Grid     The level of medical decision making during this visit was of moderate complexity.    No orders of the defined types were placed in this encounter.    Orders Placed This Encounter   Medications    apixaban ANTICOAGULANT (ELIQUIS) 5 MG tablet     Sig: Take 1 tablet (5 mg) by mouth 2 times daily     Dispense:  180 tablet     Refill:  3    metoprolol tartrate (LOPRESSOR) 100 MG tablet     Sig: Take 1 tablet (100 mg) by mouth 2 times daily     Dispense:  180 tablet     Refill:  3    torsemide (DEMADEX) 10 MG tablet     Sig: Take one tab every other day, may also take one tab on other days IF needed.     Dispense:  45 tablet     Refill:  3     Medications Discontinued During This Encounter   Medication Reason    torsemide (DEMADEX) 10 MG tablet Reorder (No AVS)    apixaban ANTICOAGULANT (ELIQUIS) 5 MG tablet Reorder (No AVS)    metoprolol tartrate (LOPRESSOR) 100 MG tablet Reorder (No AVS)         Encounter Diagnoses   Name Primary?    Paroxysmal atrial fibrillation (H)     Congestive heart failure, unspecified HF chronicity, unspecified heart failure type (H)        CURRENT MEDICATIONS:  Current Outpatient Medications   Medication Sig Dispense Refill    apixaban  ANTICOAGULANT (ELIQUIS) 5 MG tablet Take 1 tablet (5 mg) by mouth 2 times daily 180 tablet 3    fluticasone-salmeterol (ADVAIR) 250-50 MCG/ACT inhaler INHALE 1 PUFF BY MOUTH TWICE DAILY EVERY MORNING AND EVERY EVENING APPROXIMATELY 12 HOURS APART      levalbuterol (XOPENEX HFA) 45 MCG/ACT inhaler Inhale 2 puffs into the lungs every 4 hours as needed for shortness of breath / dyspnea or wheezing 15 g 1    metoprolol tartrate (LOPRESSOR) 100 MG tablet Take 1 tablet (100 mg) by mouth 2 times daily 180 tablet 3    primidone (MYSOLINE) 250 MG tablet Take 1 tablet (250 mg) by mouth 2 times daily 60 tablet 0    torsemide (DEMADEX) 10 MG tablet Take one tab every other day, may also take one tab on other days IF needed. 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     none for 3 years but EEG still shows seizure activity    Hypertension     Paroxysmal atrial fibrillation (H) 8/1/16    Sleep apnea     Doesn't use a CPAP       PAST SURGICAL HISTORY:  Past Surgical History:   Procedure Laterality Date    ANESTHESIA CARDIOVERSION N/A 6/22/2023    Procedure: Anesthesia for cardioversion;  Surgeon: GENERIC ANESTHESIA PROVIDER;  Location: [facility] OR    BIOPSY OF BREAST, INCISIONAL  1993    L breasts    BIOPSY OF BREAST, NEEDLE CORE  1986&1996    R breasts    COLONOSCOPY N/A 10/24/2018    Procedure: COLONOSCOPY;  Surgeon: Nieves Cordova MD;  Location:  OR    LAPAROSCOPIC ASSISTED SIGMOID COLECTOMY N/A 10/24/2018    Procedure: Intraoperative colonoscopy with laparoscopic-assisted sigmoid colectomy;  Surgeon: Nieves Cordova MD;  Location: [facility] OR    LAPAROSCOPY,LYSIS ADHESNS  1985    pain R side poss r/t c-sections    ZC ANORECTAL MYOMECTOMY  1980  "   uterine fibroids    ZZC  DELIVERY ONLY      x 2       FAMILY HISTORY:  Family History   Problem Relation Age of Onset    Cancer Maternal Aunt         breast-    Cancer Maternal Grandmother         breast-    Heart Disease Paternal Grandfather         heartattack-  at 58    Heart Disease Maternal Grandfather             Heart Disease Paternal Uncle         bypass-alive &well    Thyroid Disease Sister         hypothyroidism-alive       SOCIAL HISTORY:  Social History     Socioeconomic History    Marital status:      Spouse name: Jalen    Number of children: 2    Years of education: 14    Highest education level: None   Occupational History    Occupation: [employer]     Employer: [employer]   Tobacco Use    Smoking status: Never     Passive exposure: Never    Smokeless tobacco: Never   Substance and Sexual Activity    Alcohol use: Not Currently     Comment: rare    Drug use: No    Sexual activity: Yes     Partners: Male     Comment: postmenopause   Other Topics Concern    Exercise Yes    Seat Belt Yes    Self-Exams Yes       Review of Systems:  Skin:        Eyes:       ENT:       Respiratory:  Negative shortness of breath  Cardiovascular:  Negative;palpitations;chest pain;edema;fatigue;lightheadedness;dizziness    Gastroenterology:      Genitourinary:       Musculoskeletal:       Neurologic:       Psychiatric:       Heme/Lymph/Imm:       Endocrine:  Negative thyroid disorder;diabetes    Physical Exam:  Vitals: /85 (BP Location: Right arm, Patient Position: Sitting)   Pulse 77   Ht 1.6 m (5' 3\")   Wt 129.9 kg (286 lb 6.4 oz)   SpO2 95%   BMI 50.73 kg/m      Constitutional:  cooperative;in no acute distress obese      Skin:  warm and dry to the touch          Head:  normocephalic        Eyes:  pupils equal and round;conjunctivae and lids unremarkable        Lymph:      ENT:  no pallor or cyanosis        Neck:  no carotid bruit    "     Respiratory:  clear to auscultation diminished breath sounds bilaterally       Cardiac: regular rhythm   distant heart sounds no presence of murmur            pulses below the femoral arteries are diminished                                      GI:  abdomen soft obese      Extremities and Muscular Skeletal:  no deformities, clubbing, cyanosis, erythema observed     trace        Neurological:  affect appropriate        Psych:  Alert and Oriented x 3        Recent Lab Results:  LIPID RESULTS:  Lab Results   Component Value Date    CHOL 235 (A) 02/28/2020    HDL 59 02/28/2020     (A) 02/28/2020     (H) 01/29/2019    TRIG 134 02/28/2020    CHOLHDLRATIO 4 02/28/2020       LIVER ENZYME RESULTS:  Lab Results   Component Value Date    AST 46 (H) 11/25/2021    AST 31 10/15/2018    ALT 66 (H) 11/25/2021    ALT 7 01/29/2019       CBC RESULTS:  Lab Results   Component Value Date    WBC 5.6 09/08/2023    RBC 4.41 09/08/2023    HGB 14.3 09/08/2023    HCT 43.7 09/08/2023    MCV 99.0 09/08/2023    MCH 32.4 09/08/2023    MCHC 32.7 09/08/2023    RDW 16.4 (H) 01/04/2022    RDW 13.3 02/27/2019    PLT 79 (A) 09/08/2023     (L) 02/27/2019       BMP RESULTS:  Lab Results   Component Value Date     05/22/2024    .9 04/13/2021    POTASSIUM 4.6 05/22/2024    POTASSIUM 3.7 03/15/2022    POTASSIUM 4.51 04/13/2021    CHLORIDE 103 05/22/2024    CHLORIDE 101 03/15/2022    CHLORIDE 104.8 04/13/2021    CO2 30 (H) 05/22/2024    CO2 34 (H) 03/15/2022    CO2 30.5 04/13/2021    ANIONGAP 10 05/22/2024    ANIONGAP 4 03/15/2022    ANIONGAP 4 02/25/2019    GLC 96 05/22/2024     (H) 03/15/2022     (A) 04/13/2021    BUN 19.7 05/22/2024    BUN 14 03/15/2022    BUN 11 04/13/2021    BUN 14.9 04/13/2021    CR 1.18 (H) 05/22/2024    CR 0.74 04/13/2021    GFRESTIMATED 49 (L) 05/22/2024    GFRESTIMATED 84 02/25/2019    GFRESTBLACK >90 02/25/2019    TONIA 9.1 05/22/2024    TONIA 9.0 04/13/2021        A1C RESULTS:  Lab  Results   Component Value Date    A1C 4.9 02/28/2020       INR RESULTS:  Lab Results   Component Value Date    INR 0.97 02/29/2016    INR 0.99 02/14/2012           CC  Gracie Titus DO  [address]  [place]  [place] [address]        Thank you for allowing me to participate in the care of your patient.      Sincerely,     Gracie Titus DO     [facility]

## 2024-05-24 NOTE — PROGRESS NOTES
HPI and Plan:   Gema Coombs is a 73 year old female who presents with history of persistent atrial fibrillation, heart failure with preserved ejection fraction, hypertension and morbid obesity and untreated sleep apnea.  She is here for an annual follow-up visit.  She continues to take torsemide 10 mg every other day for fluid retention.  This seems to be working well for her, she does go up a pound or 2 on days when she does not take it but this comes right back the following day when she takes the torsemide.  Her breathing has been fairly stable, she does take inhalers for history of asthma and this seems to help.  She has not had any progressive worsening of her breathing or symptoms of orthopnea.  She is also not been hospitalized with heart failure in over a year.  She is on Eliquis for CVA prophylaxis and is not experiencing any bleeding complications.  She does have a history of ITP with low platelet count, this has been stable and her hemoglobin has been normal and stable as well.  On exam today, she has gained about 8 pounds since her last visit.  She believes this is related to increased candy intake and true weight gain rather than fluid retention.  She had a basic metabolic panel a week ago showing normal electrolytes but elevated creatinine at 1.18.  Although this is increased from her previous in 2022, she has had creatinine levels elevated dating back at least 2 years, with creatinine in January 2022 at 1.2    Summary    1.  Persistent atrial fibrillation-rate controlled and on Eliquis for CVA prophylaxis.  Will recommend to continue current medical management and I did renew her prescriptions for metoprolol and Eliquis today    2.  Heart failure with preserved ejection fraction-appears compensated on exam today.  Sounds like she has reached somewhat stable fluid balance with taking torsemide every other day.  Recommend she continue to monitor weight and breathing on a daily basis to avoid  decompensation.  She is following a strict low-sodium diet which is also beneficial  I did renew her prescription for torsemide today.  I do recommend that she have a basic metabolic panel at least once per year to follow kidney function and electrolytes while on diuretic    Be happy to see her back annually or as needed, please feel free to contact me with any questions given regards to her care         Today's clinic visit entailed:  Review of the result(s) of each unique test - BMP  Prescription drug management    Provider  Link to [facility] Help Grid     The level of medical decision making during this visit was of moderate complexity.    No orders of the defined types were placed in this encounter.    Orders Placed This Encounter   Medications    apixaban ANTICOAGULANT (ELIQUIS) 5 MG tablet     Sig: Take 1 tablet (5 mg) by mouth 2 times daily     Dispense:  180 tablet     Refill:  3    metoprolol tartrate (LOPRESSOR) 100 MG tablet     Sig: Take 1 tablet (100 mg) by mouth 2 times daily     Dispense:  180 tablet     Refill:  3    torsemide (DEMADEX) 10 MG tablet     Sig: Take one tab every other day, may also take one tab on other days IF needed.     Dispense:  45 tablet     Refill:  3     Medications Discontinued During This Encounter   Medication Reason    torsemide (DEMADEX) 10 MG tablet Reorder (No AVS)    apixaban ANTICOAGULANT (ELIQUIS) 5 MG tablet Reorder (No AVS)    metoprolol tartrate (LOPRESSOR) 100 MG tablet Reorder (No AVS)         Encounter Diagnoses   Name Primary?    Paroxysmal atrial fibrillation (H)     Congestive heart failure, unspecified HF chronicity, unspecified heart failure type (H)        CURRENT MEDICATIONS:  Current Outpatient Medications   Medication Sig Dispense Refill    apixaban ANTICOAGULANT (ELIQUIS) 5 MG tablet Take 1 tablet (5 mg) by mouth 2 times daily 180 tablet 3    fluticasone-salmeterol (ADVAIR) 250-50 MCG/ACT inhaler INHALE 1 PUFF BY MOUTH TWICE DAILY EVERY MORNING AND EVERY  EVENING APPROXIMATELY 12 HOURS APART      levalbuterol (XOPENEX HFA) 45 MCG/ACT inhaler Inhale 2 puffs into the lungs every 4 hours as needed for shortness of breath / dyspnea or wheezing 15 g 1    metoprolol tartrate (LOPRESSOR) 100 MG tablet Take 1 tablet (100 mg) by mouth 2 times daily 180 tablet 3    primidone (MYSOLINE) 250 MG tablet Take 1 tablet (250 mg) by mouth 2 times daily 60 tablet 0    torsemide (DEMADEX) 10 MG tablet Take one tab every other day, may also take one tab on other days IF needed. 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     none for 3 years but EEG still shows seizure activity    Hypertension     Paroxysmal atrial fibrillation (H) 16    Sleep apnea     Doesn't use a CPAP       PAST SURGICAL HISTORY:  Past Surgical History:   Procedure Laterality Date    ANESTHESIA CARDIOVERSION N/A 2023    Procedure: Anesthesia for cardioversion;  Surgeon: GENERIC ANESTHESIA PROVIDER;  Location: [facility] OR    BIOPSY OF BREAST, INCISIONAL      L breasts    BIOPSY OF BREAST, NEEDLE CORE  &    R breasts    COLONOSCOPY N/A 10/24/2018    Procedure: COLONOSCOPY;  Surgeon: Nieves Cordova MD;  Location: [facility] OR    LAPAROSCOPIC ASSISTED SIGMOID COLECTOMY N/A 10/24/2018    Procedure: Intraoperative colonoscopy with laparoscopic-assisted sigmoid colectomy;  Surgeon: Nieves Cordova MD;  Location: [facility] OR    LAPAROSCOPY,LYSIS ADHESNS  1985    pain R side poss r/t c-sections    Z ANORECTAL MYOMECTOMY      uterine fibroids    Z  DELIVERY ONLY      x 2       FAMILY HISTORY:  Family History   Problem Relation Age of Onset    Cancer Maternal Aunt         breast-    Cancer Maternal Grandmother   "       breast-    Heart Disease Paternal Grandfather         heartattack-  at 58    Heart Disease Maternal Grandfather             Heart Disease Paternal Uncle         bypass-alive &well    Thyroid Disease Sister         hypothyroidism-alive       SOCIAL HISTORY:  Social History     Socioeconomic History    Marital status:      Spouse name: Jalen    Number of children: 2    Years of education: 14    Highest education level: None   Occupational History    Occupation: [employer]     Employer: [employer]   Tobacco Use    Smoking status: Never     Passive exposure: Never    Smokeless tobacco: Never   Substance and Sexual Activity    Alcohol use: Not Currently     Comment: rare    Drug use: No    Sexual activity: Yes     Partners: Male     Comment: postmenopause   Other Topics Concern    Exercise Yes    Seat Belt Yes    Self-Exams Yes       Review of Systems:  Skin:        Eyes:       ENT:       Respiratory:  Negative shortness of breath  Cardiovascular:  Negative;palpitations;chest pain;edema;fatigue;lightheadedness;dizziness    Gastroenterology:      Genitourinary:       Musculoskeletal:       Neurologic:       Psychiatric:       Heme/Lymph/Imm:       Endocrine:  Negative thyroid disorder;diabetes    Physical Exam:  Vitals: /85 (BP Location: Right arm, Patient Position: Sitting)   Pulse 77   Ht 1.6 m (5' 3\")   Wt 129.9 kg (286 lb 6.4 oz)   SpO2 95%   BMI 50.73 kg/m      Constitutional:  cooperative;in no acute distress obese      Skin:  warm and dry to the touch          Head:  normocephalic        Eyes:  pupils equal and round;conjunctivae and lids unremarkable        Lymph:      ENT:  no pallor or cyanosis        Neck:  no carotid bruit        Respiratory:  clear to auscultation diminished breath sounds bilaterally       Cardiac: regular rhythm   distant heart sounds no presence of murmur            pulses below the femoral arteries are diminished  "                                     GI:  abdomen soft obese      Extremities and Muscular Skeletal:  no deformities, clubbing, cyanosis, erythema observed     trace        Neurological:  affect appropriate        Psych:  Alert and Oriented x 3        Recent Lab Results:  LIPID RESULTS:  Lab Results   Component Value Date    CHOL 235 (A) 02/28/2020    HDL 59 02/28/2020     (A) 02/28/2020     (H) 01/29/2019    TRIG 134 02/28/2020    CHOLHDLRATIO 4 02/28/2020       LIVER ENZYME RESULTS:  Lab Results   Component Value Date    AST 46 (H) 11/25/2021    AST 31 10/15/2018    ALT 66 (H) 11/25/2021    ALT 7 01/29/2019       CBC RESULTS:  Lab Results   Component Value Date    WBC 5.6 09/08/2023    RBC 4.41 09/08/2023    HGB 14.3 09/08/2023    HCT 43.7 09/08/2023    MCV 99.0 09/08/2023    MCH 32.4 09/08/2023    MCHC 32.7 09/08/2023    RDW 16.4 (H) 01/04/2022    RDW 13.3 02/27/2019    PLT 79 (A) 09/08/2023     (L) 02/27/2019       BMP RESULTS:  Lab Results   Component Value Date     05/22/2024    .9 04/13/2021    POTASSIUM 4.6 05/22/2024    POTASSIUM 3.7 03/15/2022    POTASSIUM 4.51 04/13/2021    CHLORIDE 103 05/22/2024    CHLORIDE 101 03/15/2022    CHLORIDE 104.8 04/13/2021    CO2 30 (H) 05/22/2024    CO2 34 (H) 03/15/2022    CO2 30.5 04/13/2021    ANIONGAP 10 05/22/2024    ANIONGAP 4 03/15/2022    ANIONGAP 4 02/25/2019    GLC 96 05/22/2024     (H) 03/15/2022     (A) 04/13/2021    BUN 19.7 05/22/2024    BUN 14 03/15/2022    BUN 11 04/13/2021    BUN 14.9 04/13/2021    CR 1.18 (H) 05/22/2024    CR 0.74 04/13/2021    GFRESTIMATED 49 (L) 05/22/2024    GFRESTIMATED 84 02/25/2019    GFRESTBLACK >90 02/25/2019    TONIA 9.1 05/22/2024    TONIA 9.0 04/13/2021        A1C RESULTS:  Lab Results   Component Value Date    A1C 4.9 02/28/2020       INR RESULTS:  Lab Results   Component Value Date    INR 0.97 02/29/2016    INR 0.99 02/14/2012           CC  Gracie Titus, DO  [address]  [place] [id number]

## 2024-05-25 ENCOUNTER — HEALTH MAINTENANCE LETTER (OUTPATIENT)
Age: 74
End: 2024-05-25

## 2024-08-03 ENCOUNTER — HEALTH MAINTENANCE LETTER (OUTPATIENT)
Age: 74
End: 2024-08-03

## 2024-11-26 NOTE — TELEPHONE ENCOUNTER
Left message for patient informing her that Dr. Alberts would like to see her in clinic for an office visit to further discuss things and to call us back to schedule an appointment.    Acute UTI

## 2025-06-23 DIAGNOSIS — I48.0 PAROXYSMAL ATRIAL FIBRILLATION (H): ICD-10-CM

## 2025-06-23 DIAGNOSIS — I50.9 CONGESTIVE HEART FAILURE, UNSPECIFIED HF CHRONICITY, UNSPECIFIED HEART FAILURE TYPE (H): ICD-10-CM

## 2025-06-23 RX ORDER — TORSEMIDE 10 MG/1
TABLET ORAL
Qty: 45 TABLET | Refills: 0 | Status: SHIPPED | OUTPATIENT
Start: 2025-06-23

## 2025-06-23 RX ORDER — METOPROLOL TARTRATE 100 MG/1
100 TABLET ORAL 2 TIMES DAILY
Qty: 180 TABLET | Refills: 0 | Status: SHIPPED | OUTPATIENT
Start: 2025-06-23

## 2025-06-23 NOTE — LETTER
June 23, 2025       TO: Gema Coombs  [address]  [place] [address]       Dear Gema Coombs,    We recently received a call from your pharmacy requesting a refill of your medication(s).    Our records indicate that you are due for follow-up with your Heart Care Provider. We will refill your medications for 3 months which will allow you enough time to be seen.    Please call [phone] to schedule your appointment.    Thank you for allowing [facility] to be a part of your health care team and we look forward to seeing you soon.    Thank you,    [facility]

## 2025-07-10 DIAGNOSIS — I48.0 PAROXYSMAL ATRIAL FIBRILLATION (H): Primary | ICD-10-CM

## 2025-07-10 DIAGNOSIS — I10 ESSENTIAL HYPERTENSION, BENIGN: ICD-10-CM

## 2025-07-10 DIAGNOSIS — I50.32 CHRONIC HEART FAILURE WITH PRESERVED EJECTION FRACTION (H): ICD-10-CM

## 2025-07-10 DIAGNOSIS — I50.9 CONGESTIVE HEART FAILURE, UNSPECIFIED HF CHRONICITY, UNSPECIFIED HEART FAILURE TYPE (H): ICD-10-CM

## 2025-07-24 ENCOUNTER — LAB (OUTPATIENT)
Dept: LAB | Facility: CLINIC | Age: 75
End: 2025-07-24
Payer: COMMERCIAL

## 2025-07-24 DIAGNOSIS — I10 ESSENTIAL HYPERTENSION, BENIGN: ICD-10-CM

## 2025-07-24 DIAGNOSIS — I50.9 CONGESTIVE HEART FAILURE, UNSPECIFIED HF CHRONICITY, UNSPECIFIED HEART FAILURE TYPE (H): ICD-10-CM

## 2025-07-24 DIAGNOSIS — I48.0 PAROXYSMAL ATRIAL FIBRILLATION (H): ICD-10-CM

## 2025-07-24 DIAGNOSIS — I50.32 CHRONIC HEART FAILURE WITH PRESERVED EJECTION FRACTION (H): ICD-10-CM

## 2025-07-24 LAB
ANION GAP SERPL CALCULATED.3IONS-SCNC: 11 MMOL/L (ref 7–15)
BUN SERPL-MCNC: 15.3 MG/DL (ref 8–23)
CALCIUM SERPL-MCNC: 9.3 MG/DL (ref 8.8–10.4)
CHLORIDE SERPL-SCNC: 103 MMOL/L (ref 98–107)
CREAT SERPL-MCNC: 1.08 MG/DL (ref 0.51–0.95)
EGFRCR SERPLBLD CKD-EPI 2021: 54 ML/MIN/1.73M2
ERYTHROCYTE [DISTWIDTH] IN BLOOD BY AUTOMATED COUNT: 13.9 % (ref 10–15)
GLUCOSE SERPL-MCNC: 109 MG/DL (ref 70–99)
HCO3 SERPL-SCNC: 30 MMOL/L (ref 22–29)
HCT VFR BLD AUTO: 47 % (ref 35–47)
HGB BLD-MCNC: 15.2 G/DL (ref 11.7–15.7)
MCH RBC QN AUTO: 30.3 PG (ref 26.5–33)
MCHC RBC AUTO-ENTMCNC: 32.3 G/DL (ref 31.5–36.5)
MCV RBC AUTO: 94 FL (ref 78–100)
PLATELET # BLD AUTO: 79 10E3/UL (ref 150–450)
POTASSIUM SERPL-SCNC: 4.4 MMOL/L (ref 3.4–5.3)
RBC # BLD AUTO: 5.01 10E6/UL (ref 3.8–5.2)
SODIUM SERPL-SCNC: 144 MMOL/L (ref 135–145)
WBC # BLD AUTO: 4.6 10E3/UL (ref 4–11)

## 2025-08-15 DIAGNOSIS — I48.91 ATRIAL FIBRILLATION WITH RVR (H): Primary | ICD-10-CM

## 2025-08-16 ENCOUNTER — HEALTH MAINTENANCE LETTER (OUTPATIENT)
Age: 75
End: 2025-08-16

## (undated) DEVICE — STPL RELOAD REG TISSUE ECHELON 60 X 3.6MM BLUE GST60B

## (undated) DEVICE — GLOVE PROTEXIS W/NEU-THERA 7.0  2D73TE70

## (undated) DEVICE — STPL SKIN PROXIMATE 35 WIDE PMW35

## (undated) DEVICE — SU DERMABOND ADVANCED .7ML DNX12

## (undated) DEVICE — OSTOMY BAG CLAMP 8770

## (undated) DEVICE — KIT PATIENT POSITIONING PIGAZZI LATEX FREE 40580

## (undated) DEVICE — DRAPE LEGGINGS 8421

## (undated) DEVICE — GLOVE PROTEXIS BLUE W/NEU-THERA 8.0  2D73EB80

## (undated) DEVICE — ESU ELEC BLADE 6" COATED E1450-6

## (undated) DEVICE — Device

## (undated) DEVICE — SOL NACL 0.9% IRRIG 1000ML BOTTLE 2F7124

## (undated) DEVICE — OSTOMY BAG COLOSTOMY ACTIVE LIFE 1 PIECE 8651

## (undated) DEVICE — CATH TRAY FOLEY SURESTEP 16FR DRAIN BAG STATOCK A899916

## (undated) DEVICE — GLOVE PROTEXIS POWDER FREE SMT 7.0  2D72PT70X

## (undated) DEVICE — ESU PENCIL W/HOLSTER E2350H

## (undated) DEVICE — ENDO SNARE POLYPECTOMY OVAL 15MM LOOP SD-240U-15

## (undated) DEVICE — KIT PROCEDURE W/CLEAN-A-SCOPE LINERS V2 200800

## (undated) DEVICE — LINEN FULL SHEET 5511

## (undated) DEVICE — STPL POWERED ECHELON 60MM PSEE60A

## (undated) DEVICE — SPONGE LAP 18X18" X8435

## (undated) DEVICE — SYSTEM CLEARIFY VISUALIZATION 21-345

## (undated) DEVICE — ENDO TROCAR FIRST ENTRY KII FIOS ADV FIX 05X100MM CFF03

## (undated) DEVICE — PREP CHLORAPREP 26ML TINTED ORANGE  260815

## (undated) DEVICE — ESU LIGASURE LAPAROSCOPIC BLUNT TIP SEALER 5MMX37CM LF1837

## (undated) DEVICE — DRSG TEGADERM 4X10" 1627

## (undated) DEVICE — TAPE CLOTH ADHESIVE 3" LATEX FREE

## (undated) DEVICE — SU PDS II 0 CTX 60" Z990G

## (undated) DEVICE — SU PROLENE 2-0 SHDA 48" 8533H

## (undated) DEVICE — TUBING SUCTION MEDI-VAC SOFT 3/16"X20' N520A

## (undated) DEVICE — BAG CLEAR TRASH 1.3M 39X33" P4040C

## (undated) DEVICE — STPL CIRCULAR ENDOPATH 29MM CVD ECS29A

## (undated) DEVICE — ESU ELEC BLADE 6" COATED/INSULATED E1455-6

## (undated) DEVICE — LINEN DRAPE 54X72" 5467

## (undated) DEVICE — BLADE CLIPPER 3M 9670

## (undated) DEVICE — ENDO TROCAR BLUNT TIP KII BALLOON 12X100MM C0R47

## (undated) DEVICE — LINEN POUCH DBL 5427

## (undated) DEVICE — COVER FOOTSWITCH URO

## (undated) DEVICE — LINEN TOWEL PACK X10 5473

## (undated) DEVICE — SUCTION TIP POOLE K770

## (undated) DEVICE — SU PDS II 1 CTX 36" Z371T

## (undated) DEVICE — DRSG GAUZE 4X4" 8044

## (undated) DEVICE — DRAPE LAP W/ARMBOARD 29410

## (undated) DEVICE — ESU GROUND PAD ADULT W/CORD E7507

## (undated) DEVICE — SU VICRYL 2-0 TIE 12X18" J905T

## (undated) DEVICE — SU VICRYL 3-0 SH 8X18" UND J864D

## (undated) DEVICE — GOWN XLG DISP 9545

## (undated) DEVICE — SU VICRYL 0 UR-6 27" J603H

## (undated) DEVICE — DRAPE MAYO STAND 23X54 8337

## (undated) DEVICE — LINEN HALF SHEET 5512

## (undated) DEVICE — SUCTION CANISTER MEDIVAC LINER 3000ML W/LID 65651-530

## (undated) DEVICE — SU MONOCRYL 4-0 PS-2 27" UND Y426H

## (undated) DEVICE — PACK MAJOR SBA15MAFSI

## (undated) DEVICE — KIT SIGMOIDOSCOPE W/OBTURATOR 18FR SUCTION KI521/10

## (undated) DEVICE — SUCTION IRR STRYKERFLOW II W/TIP 250-070-520

## (undated) DEVICE — DRAPE SLEEVE 599

## (undated) DEVICE — DRAPE IOBAN INCISE 23X17" 6650EZ

## (undated) DEVICE — PAD CHUX UNDERPAD 30X36" P3036C

## (undated) DEVICE — SU VICRYL 3-0 SH 27" J316H

## (undated) DEVICE — SUCTION TIP YANKAUER W/O VENT K86

## (undated) DEVICE — SUCTION MANIFOLD NEPTUNE 2 SYS 4 PORT 0702-020-000

## (undated) DEVICE — SU VICRYL 0 TIE 12X18" J906G

## (undated) DEVICE — PROTECTOR ARM ONE-STEP TRENDELENBURG 40418

## (undated) DEVICE — ESU CORD MONOPOLAR 10'  E0510

## (undated) RX ORDER — EPHEDRINE SULFATE 50 MG/ML
INJECTION, SOLUTION INTRAMUSCULAR; INTRAVENOUS; SUBCUTANEOUS
Status: DISPENSED
Start: 2018-10-24

## (undated) RX ORDER — PHENYLEPHRINE HCL IN 0.9% NACL 1 MG/10 ML
SYRINGE (ML) INTRAVENOUS
Status: DISPENSED
Start: 2018-10-24

## (undated) RX ORDER — FENTANYL CITRATE 50 UG/ML
INJECTION, SOLUTION INTRAMUSCULAR; INTRAVENOUS
Status: DISPENSED
Start: 2018-10-24

## (undated) RX ORDER — ACETAMINOPHEN 325 MG/1
TABLET ORAL
Status: DISPENSED
Start: 2018-10-24

## (undated) RX ORDER — LIDOCAINE HYDROCHLORIDE 10 MG/ML
INJECTION, SOLUTION EPIDURAL; INFILTRATION; INTRACAUDAL; PERINEURAL
Status: DISPENSED
Start: 2018-10-24

## (undated) RX ORDER — POTASSIUM CHLORIDE 1500 MG/1
TABLET, EXTENDED RELEASE ORAL
Status: DISPENSED
Start: 2023-06-22

## (undated) RX ORDER — HEPARIN SODIUM 5000 [USP'U]/.5ML
INJECTION, SOLUTION INTRAVENOUS; SUBCUTANEOUS
Status: DISPENSED
Start: 2018-10-24

## (undated) RX ORDER — PROPOFOL 10 MG/ML
INJECTION, EMULSION INTRAVENOUS
Status: DISPENSED
Start: 2018-10-24

## (undated) RX ORDER — BUPIVACAINE HYDROCHLORIDE AND EPINEPHRINE 2.5; 5 MG/ML; UG/ML
INJECTION, SOLUTION EPIDURAL; INFILTRATION; INTRACAUDAL; PERINEURAL
Status: DISPENSED
Start: 2018-10-24